# Patient Record
Sex: MALE | Race: WHITE
[De-identification: names, ages, dates, MRNs, and addresses within clinical notes are randomized per-mention and may not be internally consistent; named-entity substitution may affect disease eponyms.]

---

## 2017-01-05 ENCOUNTER — HOSPITAL ENCOUNTER (EMERGENCY)
Dept: HOSPITAL 10 - E/R | Age: 57
Discharge: HOME | End: 2017-01-05
Payer: COMMERCIAL

## 2017-01-05 VITALS
HEIGHT: 63 IN | HEIGHT: 63 IN | WEIGHT: 160.94 LBS | WEIGHT: 160.94 LBS | BODY MASS INDEX: 28.52 KG/M2 | BODY MASS INDEX: 28.52 KG/M2

## 2017-01-05 VITALS
DIASTOLIC BLOOD PRESSURE: 69 MMHG | HEART RATE: 69 BPM | SYSTOLIC BLOOD PRESSURE: 113 MMHG | RESPIRATION RATE: 19 BRPM | TEMPERATURE: 97.7 F

## 2017-01-05 DIAGNOSIS — Z79.4: ICD-10-CM

## 2017-01-05 DIAGNOSIS — R10.84: ICD-10-CM

## 2017-01-05 DIAGNOSIS — E11.9: ICD-10-CM

## 2017-01-05 DIAGNOSIS — F17.210: ICD-10-CM

## 2017-01-05 DIAGNOSIS — R18.8: Primary | ICD-10-CM

## 2017-01-05 RX ADMIN — LIDOCAINE HYDROCHLORIDE ONE ML: 10 INJECTION, SOLUTION EPIDURAL; INFILTRATION; INTRACAUDAL; PERINEURAL at 11:36

## 2017-01-05 RX ADMIN — LIDOCAINE HYDROCHLORIDE ONE ML: 10 INJECTION, SOLUTION EPIDURAL; INFILTRATION; INTRACAUDAL; PERINEURAL at 10:39

## 2017-01-05 NOTE — RADRPT
PROCEDURE:   Ultrasound guided paracentesis. 

 

CLINICAL INDICATION:    Ascites and shortness of breath.  

 

COMPARISON:   12/30/2016.  

 

TECHNIQUE:   

The risks, benefits, and alternatives were explained to the patient, including but not limited to bl
eeding, infection, pain, visceral or vascular damage, shock, and death.  The patient understood the 
risks and the alternatives and wished to proceed with the procedure.  Informed written consent was o
btained. A procedural time out was performed.  The patient's name, date of birth, and procedure to b
e performed were verified.

 

Utilizing ultrasound guidance, optimal location for entry to the peritoneal cavity was ascertained. 
 The overlying skin was prepped and draped in the usual sterile fashion.  Approximately 10 ml of 1% 
Xylocaine was injected locally for pain control.  Using ultrasound guidance, an 8 Scottish catheter wa
s introduced into the peritoneal cavity in the right lower quadrant without difficulty.  

 

FINDINGS:

Initial images demonstrate ascites.  Approximately 4.8 liters of serous fluid was aspirated and disc
arded. The patient tolerated the procedure well without complication.

 

IMPRESSION:

1.  Successful ultrasound-guided paracentesis.  

 

RPTAT: QQ

_____________________________________________ 

.Rene De La Cruz MD, MD           Date    Time 

Electronically viewed and signed by .Rene De La Cruz MD, MD on 01/05/2017 13:45 

 

D:  01/05/2017 13:45  T:  01/05/2017 13:45

.R/

## 2017-01-05 NOTE — ERD
ER Documentation


Chief Complaint


Date/Time


DATE: 1/5/17 


TIME: 13:41


Chief Complaint


here for paracenthesis





HPI


Patient is a 56-year-old male with ascites who presents with abdominal 

distention.  He has abdominal pain and subjective fever but did not take his 

temperature.  It started 2 days ago.  Upon review of old medical records he has 

multiple visits to the ER with similar type complaints.  I have seen him before 

and he appears to be at his baseline.  The patient has had no treatment as of 

yet.





ROS


All systems reviewed and are negative except as per history of present illness.





Medications


Home Meds


Active Scripts


Nitrofurantoin Monohyd Macrocr* (Macrobid*) 100 Mg Capsr, 100 MG PO BID for 7 

Days, CAP


   Prov:FRANCESCA MONTOYA MD         12/26/16


Insulin Aspart* (Novolog Insulin Pen*) 100 Unit/Ml Soln, 10 UNIT SC WITH MEALS 

BEDTIME for 28 Days


   Prov:HANNAH CASTELLANO MD         11/15/16


Reported Medications


Dapagliflozin Propanediol (Farxiga) 10 Mg Tablet, 10 MG PO DAILY, #30 TAB


   11/11/16


Insulin Glargine* (Lantus*) 100 Unit/Ml Soln, 35 UNIT SC QHS, #1 VIAL


   11/11/16


Losartan Potassium* (Cozaar*) 25 Mg Tablet, 25 MG PO DAILY, #30 TAB


   11/11/16


Pantoprazole* (Protonix*) 40 Mg Tablet.dr, 40 MG PO DAILY, TAB


   11/11/16


Furosemide* (Furosemide*) 40 Mg Tablet, 40 MG PO DAILY, TAB


   11/11/16


Fenofibrate Nanocrystallized* (Fenofibrate*) 145 Mg Tablet, 145 MG PO DAILY, TAB


   11/11/16


Zolpidem Tartrate* (Zolpidem Tartrate*) 10 Mg Tablet, 10 MG PO QHS Y for 

INSOMNIA, #30 TAB


   11/11/16


Spironolactone* (Spironolactone*) 100 Mg Tablet, 100 MG PO QAM, TAB


   11/11/16





Allergies


Allergies:  


Coded Allergies:  


     No Known Drug Allergy (Verified  Allergy, Unknown, 1/5/17)





PMhx/Soc


History of Surgery:  Yes (TONSIL SX, COLON SX)


Anesthesia Reaction:  No


Hx Neurological Disorder:  No


Hx Respiratory Disorders:  No


Hx Cardiac Disorders:  Yes (HYPOTENSION)


Hx Psychiatric Problems:  No


Hx Miscellaneous Medical Probl:  Yes (DM, LIVER FAILURE, RENAL DYSFXN.)


Hx Alcohol Use:  Yes (FORMER DRINKER; LAST DRINK 1 YEAR AGO)


Hx Substance Use:  No


Hx Tobacco Use:  Yes (5-6 cig/ day)


Smoking Status:  Current every day smoker





FmHx


Family History:  No diabetes





Physical Exam


Vitals





Vital Signs








  Date Time  Temp Pulse Resp B/P Pulse Ox O2 Delivery O2 Flow Rate FiO2


 


1/5/17 12:09 97.7 69 19 113/69 99 Room Air  


 


1/5/17 11:41 97.9 79 18 118/75 99 Room Air  


 


1/5/17 09:28 98.0 106 24 91/60 99   








Physical Exam


Const: Mild distress


Head:   Atraumatic 


Eyes:    Normal Conjunctiva


ENT:    Normal External Ears, Nose and Mouth.


Neck:               Full range of motion..~ No meningismus.


Resp:    Clear to auscultation bilaterally


Cardio:    Regular rate and rhythm, no murmurs


Abd:    Distended abdomen with positive fluid wave


Skin:    No petechiae or rashes


Back:    No midline or flank tenderness


Ext:    No cyanosis, or edema


Neur:    Awake and alert


Psych:    Normal Mood and Affect


Results 24 hrs





 Current Medications








 Medications


  (Trade)  Dose


 Ordered  Sig/Johnathan


 Route


 PRN Reason  Start Time


 Stop Time Status Last Admin


Dose Admin


 


 Tramadol HCl


  (Ultram)  50 mg  ONCE  ONCE


 PO


   1/5/17 10:00


 1/5/17 10:01 DC 1/5/17 10:05


 


 


 Lidocaine


  (Xylocaine 1%


  (Mpf))  5 ml  STK-MED ONCE


 .ROUTE


   1/5/17 10:39


 1/5/17 10:40 DC  


 











Procedures/MDM


Ultrasound-guided paracentesis performed by radiology.





Patient is a 56-year-old male with ascites who presents for a paracentesis.  He 

had laboratories done 6 days ago and does not require repeat.  I doubt 

spontaneous bacterial peritonitis.  He is afebrile.  He was given tramadol for 

pain and had an ultrasound-guided paracentesis performed.  He feels much better 

and is now smiling and happy.  The patient will be discharged home and can 

follow-up with his primary doctor within 24-48 hours.





Departure


Diagnosis:  


 Primary Impression:  


 Ascites


 Ascites type:  other type  Qualified Code:  R18.8 - Other ascites


 Additional Impression:  


 Abdominal pain


 Abdominal location:  generalized  Qualified Code:  R10.84 - Generalized 

abdominal pain


Condition:  Fair


Patient Instructions:  Ascites





Additional Instructions:  


Call your primary care doctor TOMORROW for an appointment during the next 1-2 

days.See the doctor sooner or return here if your condition worsens before your 

appointment time.











FRANCESCA MONTOYA MD Jan 5, 2017 13:42

## 2017-01-08 ENCOUNTER — HOSPITAL ENCOUNTER (EMERGENCY)
Dept: HOSPITAL 10 - E/R | Age: 57
Discharge: HOME | End: 2017-01-08
Payer: COMMERCIAL

## 2017-01-08 VITALS — DIASTOLIC BLOOD PRESSURE: 69 MMHG | HEART RATE: 98 BPM | SYSTOLIC BLOOD PRESSURE: 89 MMHG | RESPIRATION RATE: 16 BRPM

## 2017-01-08 VITALS — WEIGHT: 155.21 LBS | HEIGHT: 60 IN | BODY MASS INDEX: 30.47 KG/M2

## 2017-01-08 DIAGNOSIS — Z79.4: ICD-10-CM

## 2017-01-08 DIAGNOSIS — F17.210: ICD-10-CM

## 2017-01-08 DIAGNOSIS — E11.9: ICD-10-CM

## 2017-01-08 DIAGNOSIS — K70.31: Primary | ICD-10-CM

## 2017-01-08 LAB
ALBUMIN SERPL-MCNC: 2.8 G/DL (ref 3.3–4.9)
ALBUMIN/GLOB SERPL: 0.65 {RATIO}
ALP SERPL-CCNC: 191 IU/L (ref 42–121)
ALT SERPL-CCNC: 43 IU/L (ref 13–69)
ANION GAP SERPL CALC-SCNC: 15 MMOL/L (ref 8–16)
APTT BLD: 34.6 SEC (ref 25–35)
AST SERPL-CCNC: 53 IU/L (ref 15–46)
BASOPHILS # BLD AUTO: 0 10^3/UL (ref 0–0.1)
BASOPHILS NFR BLD: 0.3 % (ref 0–2)
BILIRUB DIRECT SERPL-MCNC: 0 MG/DL (ref 0–0.2)
BILIRUB SERPL-MCNC: 0.5 MG/DL (ref 0.2–1.3)
BUN SERPL-MCNC: 16 MG/DL (ref 7–20)
CALCIUM SERPL-MCNC: 8.7 MG/DL (ref 8.4–10.2)
CHLORIDE SERPL-SCNC: 97 MMOL/L (ref 97–110)
CO2 SERPL-SCNC: 21 MMOL/L (ref 21–31)
CONDITION: 1
CREAT SERPL-MCNC: 0.93 MG/DL (ref 0.61–1.24)
EOSINOPHIL # BLD: 0.3 10^3/UL (ref 0–0.5)
EOSINOPHIL NFR BLD: 3.1 % (ref 0–7)
ERYTHROCYTE [DISTWIDTH] IN BLOOD BY AUTOMATED COUNT: 16.4 % (ref 11.5–14.5)
GLOBULIN SER-MCNC: 4.3 G/DL (ref 1.3–3.2)
GLUCOSE SERPL-MCNC: 305 MG/DL (ref 70–220)
HCT VFR BLD CALC: 37.8 % (ref 42–52)
HGB BLD-MCNC: 12.9 G/DL (ref 14–18)
INR PPP: 1.15
LH ANALYZER COMMENTS: 1
LYMPHOCYTES # BLD AUTO: 0.6 10^3/UL (ref 0.8–2.9)
LYMPHOCYTES NFR BLD AUTO: 7.7 % (ref 15–51)
MCH RBC QN AUTO: 30 PG (ref 29–33)
MCHC RBC AUTO-ENTMCNC: 34 G/DL (ref 32–37)
MCV RBC AUTO: 88.2 FL (ref 82–101)
MONOCYTES # BLD: 1 10^3/UL (ref 0.3–0.9)
MONOCYTES NFR BLD: 12.2 % (ref 0–11)
NEUTROPHILS # BLD: 6.4 10^3/UL (ref 1.6–7.5)
NEUTROPHILS NFR BLD AUTO: 76.7 % (ref 39–77)
NRBC # BLD MANUAL: 0 10^3/UL (ref 0–0)
NRBC BLD QL: 0 /100WBC (ref 0–0)
PLATELET # BLD: 162 10^3/UL (ref 140–440)
PMV BLD AUTO: 9.7 FL (ref 7.4–10.4)
POTASSIUM SERPL-SCNC: 5.2 MMOL/L (ref 3.5–5.1)
PROT SERPL-MCNC: 7.1 G/DL (ref 6.1–8.1)
PROTHROMBIN TIME: 14.7 SEC (ref 12.2–14.2)
PT RATIO: 1.1
RBC # BLD AUTO: 4.29 10^6/UL (ref 4.7–6.1)
SODIUM SERPL-SCNC: 128 MMOL/L (ref 135–144)
WBC # BLD AUTO: 8.4 10^3/UL (ref 4.8–10.8)
WBC # BLD: 8.4 10^3/UL (ref 4.8–10.8)

## 2017-01-08 PROCEDURE — 83690 ASSAY OF LIPASE: CPT

## 2017-01-08 PROCEDURE — 96374 THER/PROPH/DIAG INJ IV PUSH: CPT

## 2017-01-08 PROCEDURE — 85025 COMPLETE CBC W/AUTO DIFF WBC: CPT

## 2017-01-08 PROCEDURE — 85610 PROTHROMBIN TIME: CPT

## 2017-01-08 PROCEDURE — 76870 US EXAM SCROTUM: CPT

## 2017-01-08 PROCEDURE — 85730 THROMBOPLASTIN TIME PARTIAL: CPT

## 2017-01-08 PROCEDURE — 80053 COMPREHEN METABOLIC PANEL: CPT

## 2017-01-08 PROCEDURE — 96375 TX/PRO/DX INJ NEW DRUG ADDON: CPT

## 2017-01-08 PROCEDURE — 36415 COLL VENOUS BLD VENIPUNCTURE: CPT

## 2017-01-08 NOTE — RADRPT
PROCEDURE:   Ultrasound guided paracentesis

 

CLINICAL INDICATION:   Ascites 

 

TECHNIQUE:   The risks benefits and alternatives of the procedure were explained to the patient.  In
formed written consent was obtained.  A time out was performed.  The patient understood the risks be
nefits and alternatives and wished to proceed with the procedure.  The overlying skin of the right l
ower quadrant of the abdomen was prepped and draped in the usual sterile fashion. Approximately 10 c
c of Xylocaine was injected locally for pain control.  Utilizing ultrasound guidance, a skinny 5-Colin
[facility] Yueh catheter was placed into the  peritoneal cavity without difficulty.  The patient tolerated 
the procedure well without complication.  Approximately 5300 cc of thin jorden fluid was obtained.  T
he fluid was not sent to the lab for further analysis.

 

COMPARISON:  01/05/2017 

 

FINDINGS:

 

Initial ultrasound demonstrated a large amount of simple appearing ascites.  Successful ultrasound-g
uided paracentesis with a total of 5300 cc of thin yellow fluid aspirated.

 

IMPRESSION:

 

Successful ultrasound-guided paracentesis.

 

 

RPTAT: QQ

_____________________________________________ 

.Law Jean MD, MD           Date    Time 

Electronically viewed and signed by .Law Jean MD, MD on 01/08/2017 14:35 

 

D:  01/08/2017 14:35  T:  01/08/2017 14:35

.A/

## 2017-01-08 NOTE — ERD
ER Documentation


Chief Complaint


Date/Time


DATE: 1/8/17 


TIME: 12:09


Chief Complaint


TESTICULAR SWELLING X1 DAY, DENIES PAIN, HX OF LIVER CIRRHOSIS





HPI


56-year-old cirrhotic male who presents with abdominal swelling and testicular 

and penile swelling.  The patient states last paracentesis several days ago.  

He received paracentesis every 3 days.  The patient is concerned today because 

he has significant swelling and edema of his scrotum and penile shaft.  He 

denies any fevers or chills, no drainage or discharge, no focal pain.  She has 

chronic and stable pain of his back but is unchanged.





ROS


All systems reviewed and are negative except as per history of present illness.





Medications


Home Meds


Active Scripts


Nitrofurantoin Monohyd Macrocr* (Macrobid*) 100 Mg Capsr, 100 MG PO BID for 7 

Days, CAP


   Prov:FRANCESCA MONTOYA MD         12/26/16


Insulin Aspart* (Novolog Insulin Pen*) 100 Unit/Ml Soln, 10 UNIT SC WITH MEALS 

BEDTIME for 28 Days


   Prov:HANNAH CASTELLANO MD         11/15/16


Reported Medications


Dapagliflozin Propanediol (Farxiga) 10 Mg Tablet, 10 MG PO DAILY, #30 TAB


   11/11/16


Insulin Glargine* (Lantus*) 100 Unit/Ml Soln, 35 UNIT SC QHS, #1 VIAL


   11/11/16


Losartan Potassium* (Cozaar*) 25 Mg Tablet, 25 MG PO DAILY, #30 TAB


   11/11/16


Pantoprazole* (Protonix*) 40 Mg Tablet.dr, 40 MG PO DAILY, TAB


   11/11/16


Furosemide* (Furosemide*) 40 Mg Tablet, 40 MG PO DAILY, TAB


   11/11/16


Fenofibrate Nanocrystallized* (Fenofibrate*) 145 Mg Tablet, 145 MG PO DAILY, TAB


   11/11/16


Zolpidem Tartrate* (Zolpidem Tartrate*) 10 Mg Tablet, 10 MG PO QHS Y for 

INSOMNIA, #30 TAB


   11/11/16


Spironolactone* (Spironolactone*) 100 Mg Tablet, 100 MG PO QAM, TAB


   11/11/16





Allergies


Allergies:  


Coded Allergies:  


     No Known Drug Allergy (Verified  Allergy, Unknown, 1/8/17)





PMhx/Soc


History of Surgery:  Yes (TONSIL SX, COLON SX)


Anesthesia Reaction:  No


Hx Neurological Disorder:  No


Hx Respiratory Disorders:  No


Hx Cardiac Disorders:  Yes (HYPOTENSION)


Hx Psychiatric Problems:  No


Hx Miscellaneous Medical Probl:  Yes (DM, LIVER FAILURE, RENAL DYSFXN.)


Hx Alcohol Use:  Yes (FORMER DRINKER; LAST DRINK 1 YEAR AGO)


Hx Substance Use:  No


Hx Tobacco Use:  Yes (5-6 cig/ day)


Smoking Status:  Current some day smoker





FmHx


Family History:  No diabetes





Physical Exam


Vitals





Vital Signs








  Date Time  Temp Pulse Resp B/P Pulse Ox O2 Delivery O2 Flow Rate FiO2


 


1/8/17 13:44  98 16 89/69 98 Room Air  


 


1/8/17 10:34  100 18 90/65 99 Room Air  


 


1/8/17 09:31 96.8 107 17 94/63 99   








Physical Exam


General: Well developed, well nourished, no acute distress


Head: Normocephalic, atraumatic.


Eyes: Pupils equally reactive, EOM intact


ENT: Moist mucous membranes


Neck: Supple, no lymphadenopathy


Respiratory: Lungs clear bilaterally, no distress


Cardiovascular: RRR, no murmurs, rubs, or gallops


Abdominal: Soft, protuberant abdomen with fluid wave


: Edema of the penile shaft and scrotum, no focal tenderness difficult to 

palpate the testicles, no drainage or discharge, no evidence of phimosis or 

paraphimosis


MSK: No edema, no unilateral swelling, 5/5 strength


Neurologic: Alert and oriented, moving all extremities, normal speech, no focal 

weakness, no cerebellar signs


Skin: No rash


Psych: Normal mood


Result Diagram:  


1/8/17 1000                                                                    

            1/8/17 1000





Results 24 hrs





 Laboratory Tests








Test


  1/8/17


10:00


 


Activated Partial


Thromboplast Time 34.6Sec 


 


 


Alanine Aminotransferase


(ALT/SGPT) 43IU/L 


 


 


Albumin 2.8g/dl 


 


Albumin/Globulin Ratio 0.65 


 


Alkaline Phosphatase 191IU/L 


 


Anion Gap 15 


 


Aspartate Amino Transf


(AST/SGOT) 53IU/L 


 


 


Basophils # 0.010^3/ul 


 


Basophils % 0.3% 


 


Blood Morphology Comment  


 


Blood Urea Nitrogen 16mg/dl 


 


Calcium Level 8.7mg/dl 


 


Carbon Dioxide Level 21mmol/L 


 


Chloride Level 97mmol/L 


 


Creatinine 0.93mg/dl 


 


Direct Bilirubin 0.00mg/dl 


 


Eosinophils # 0.310^3/ul 


 


Eosinophils % 3.1% 


 


Globulin 4.30g/dl 


 


Glucose Level 305mg/dl 


 


Hematocrit 37.8% 


 


Hemoglobin 12.9g/dl 


 


INR International Normalized


Ratio 1.15 


 


 


Indirect Bilirubin 0.5mg/dl 


 


Lipase 156U/L 


 


Lymphocytes # 0.610^3/ul 


 


Lymphocytes % 7.7% 


 


Mean Corpuscular Hemoglobin 30.0pg 


 


Mean Corpuscular Hemoglobin


Concent 34.0g/dl 


 


 


Mean Corpuscular Volume 88.2fl 


 


Mean Platelet Volume 9.7fl 


 


Monocytes # 1.010^3/ul 


 


Monocytes % 12.2% 


 


Neutrophils # 6.410^3/ul 


 


Neutrophils % 76.7% 


 


Nucleated Red Blood Cells # 0.010^3/ul 


 


Nucleated Red Blood Cells % 0.0/100WBC 


 


Platelet Count 12126^3/UL 


 


Potassium Level 5.2mmol/L 


 


Prothrombin Time 14.7Sec 


 


Prothrombin Time Ratio 1.1 


 


Red Blood Count 4.2910^6/ul 


 


Red Cell Distribution Width 16.4% 


 


Sodium Level 128mmol/L 


 


Total Bilirubin 0.5mg/dl 


 


Total Protein 7.1g/dl 


 


White Blood Count 8.410^3/ul 








 Current Medications








 Medications


  (Trade)  Dose


 Ordered  Sig/Johnathan


 Route


 PRN Reason  Start Time


 Stop Time Status Last Admin


Dose Admin


 


 Morphine Sulfate


  (morphine)  4 mg  ONCE  STAT


 IV


   1/8/17 09:43


 1/8/17 09:44 DC 1/8/17 10:00


 


 


 Ondansetron HCl


  (Zofran Inj)  4 mg  ONCE  STAT


 IV


   1/8/17 09:43


 1/8/17 09:44 DC 1/8/17 09:59


 


 


 Lidocaine


  (Xylocaine 1%


  (Mpf))  5 ml  STK-MED ONCE


 .ROUTE


   1/8/17 13:08


 1/8/17 13:09 DC  


 











Procedures/MDM


EKG, MONITORS, & DIAGNOSTIC IMAGING:


Large volume therapeutic paracentesis performed by interventional radiology.





Ultrasound scrotum:


IMPRESSION:


1.  Benign right epididymal cyst measuring 0.8 cm.


2.  Possible right epididymitis.


3.  Otherwise normal scrotal ultrasound. 


 


RPTAT: QQ





LAB INTERPRETATION:


No significant coagulopathy noted.





MEDICAL DECISION MAKING:


The patient presents with abdominal ascites likely secondary to cirrhosis.  

Patient does not exhibit any signs or symptoms concerning for complications of 

cirrhosis such as GI bleed, hepatic encephalopathy or spontaneous bacterial 

peritonitis.  There is no indication currently for diagnostic paracentesis.  

The patient will benefit from large volume therapeutic paracentesis by 

interventional radiology.  If the patient remains stable without evidence of 

hemodynamic compromise secondary to fluid shifts the patient can be safely 

discharged home with close primary care and hepatology follow-up.





ER COURSE:


The patient had successful large volume therapeutic paracentesis.  The patient 

remained hemodynamically stable and otherwise well-appearing.  The patient is 

safe for discharge home.





The patient's scrotal exam is very consistent with likely dependent edema and 

anasarca secondary to cirrhosis.  The patient has no evidence of phimosis 

paraphimosis, testicular torsion or cellulitis.  The patient has no fever and a 

normal white count.  This is not consistent with necrotizing process.  Symptoms 

likely to improve with large volume paracentesis and fluid management.  Primary 

care referral recommended.





I kept the patient and/or family informed of laboratory and diagnostic imaging 

results throughout the emergency room course.





DISPOSITION PLAN:


We discussed follow up with the patient's primary care doctor within 24 to 48 

hours as needed.  We also discussed return to the emergency room for worsening 

symptoms or worsening condition.





Discharge Medications:


None





Departure


Diagnosis:  


 Primary Impression:  


 Ascites


 Ascites type:  due to alcoholic cirrhosis  Qualified Code:  K70.31 - Ascites 

due to alcoholic cirrhosis


Condition:  Stable











JESSE EM MD Jan 8, 2017 12:09

## 2017-01-08 NOTE — RADRPT
PROCEDURE:   US Scrotum. 

 

CLINICAL INDICATION:   Scrotal pain.  

 

TECHNIQUE:   Multiple sonographic images of the scrotal region were obtained utilizing a linear arra
y transducer with grayscale and color-flow and pulsed Doppler imaging. The images were reviewed on a
 high-resolution PACS workstation. 

 

COMPARISON:   No prior studies are available for comparison. 

 

FINDINGS:

The right testis measures 3.3 x 2.0 x 2.7 cm.

The left testis measures 3.5 x 2.0 x 2.7 cm.

There is no intratesticular mass. 

There is a benign right epididymal cyst measuring 0.8 cm.  The right epididymis is mildly enlarged a
nd hyperemic which may indicate epididymitis.  The epididymi are otherwise normal. 

There is normal flow to both testes demonstrated with color Doppler and pulsed Doppler sonography. 

There is no hydrocele. 

There is no varicocele. 

The scrotal wall is unremarkable.     

 

IMPRESSION:

1.  Benign right epididymal cyst measuring 0.8 cm.

2.  Possible right epididymitis.

3.  Otherwise normal scrotal ultrasound. 

 

RPTAT: QQ

_____________________________________________ 

.Rene De La Cruz MD, MD           Date    Time 

Electronically viewed and signed by .Rene De La Cruz MD, MD on 01/08/2017 11:45 

 

D:  01/08/2017 11:45  T:  01/08/2017 11:45

.R/

## 2017-01-11 ENCOUNTER — HOSPITAL ENCOUNTER (EMERGENCY)
Dept: HOSPITAL 10 - E/R | Age: 57
Discharge: HOME | End: 2017-01-11
Payer: COMMERCIAL

## 2017-01-11 VITALS
HEIGHT: 69 IN | HEIGHT: 69 IN | WEIGHT: 163.14 LBS | WEIGHT: 163.14 LBS | BODY MASS INDEX: 24.16 KG/M2 | BODY MASS INDEX: 24.16 KG/M2

## 2017-01-11 VITALS
RESPIRATION RATE: 18 BRPM | DIASTOLIC BLOOD PRESSURE: 63 MMHG | TEMPERATURE: 98.1 F | HEART RATE: 91 BPM | SYSTOLIC BLOOD PRESSURE: 100 MMHG

## 2017-01-11 DIAGNOSIS — F17.210: ICD-10-CM

## 2017-01-11 DIAGNOSIS — R18.8: Primary | ICD-10-CM

## 2017-01-11 DIAGNOSIS — R10.84: ICD-10-CM

## 2017-01-11 DIAGNOSIS — Z79.4: ICD-10-CM

## 2017-01-11 DIAGNOSIS — E11.9: ICD-10-CM

## 2017-01-11 NOTE — ERD
ER Documentation


Chief Complaint


Date/Time


DATE: 1/11/17 


TIME: 13:39


Chief Complaint


BIB FAMILY FOR ABD PAIN NEEDS PARACENTESIS





HPI


Patient is a 56-year-old male with cirrhosis and ascites who presents with 

abdominal distention.  He had a paracentesis done 3 days ago on January 8 and 

does have multiple visits to the ER for paracentesis.  He usually gets multiple 

paracentesis about every 3 days.  He has abdominal pain and shortness of 

breath.  He denies fever at this time.  He has abdominal pain and shortness of 

breath which is his usual presentation.





ROS


All systems reviewed and are negative except as per history of present illness.





Medications


Home Meds


Active Scripts


Nitrofurantoin Monohyd Macrocr* (Macrobid*) 100 Mg Capsr, 100 MG PO BID for 7 

Days, CAP


   Prov:FRANCESCA MONTOYA MD         12/26/16


Insulin Aspart* (Novolog Insulin Pen*) 100 Unit/Ml Soln, 10 UNIT SC WITH MEALS 

BEDTIME for 28 Days


   Prov:HANNAH CASTELLANO MD         11/15/16


Reported Medications


Dapagliflozin Propanediol (Farxiga) 10 Mg Tablet, 10 MG PO DAILY, #30 TAB


   11/11/16


Insulin Glargine* (Lantus*) 100 Unit/Ml Soln, 35 UNIT SC QHS, #1 VIAL


   11/11/16


Losartan Potassium* (Cozaar*) 25 Mg Tablet, 25 MG PO DAILY, #30 TAB


   11/11/16


Pantoprazole* (Protonix*) 40 Mg Tablet.dr, 40 MG PO DAILY, TAB


   11/11/16


Furosemide* (Furosemide*) 40 Mg Tablet, 40 MG PO DAILY, TAB


   11/11/16


Fenofibrate Nanocrystallized* (Fenofibrate*) 145 Mg Tablet, 145 MG PO DAILY, TAB


   11/11/16


Zolpidem Tartrate* (Zolpidem Tartrate*) 10 Mg Tablet, 10 MG PO QHS Y for 

INSOMNIA, #30 TAB


   11/11/16


Spironolactone* (Spironolactone*) 100 Mg Tablet, 100 MG PO QAM, TAB


   11/11/16





Allergies


Allergies:  


Coded Allergies:  


     No Known Drug Allergy (Verified  Allergy, Unknown, 1/8/17)





PMhx/Soc


History of Surgery:  Yes (TONSIL SX, COLON SX)


Anesthesia Reaction:  No


Hx Neurological Disorder:  No


Hx Respiratory Disorders:  No


Hx Cardiac Disorders:  Yes (HYPOTENSION)


Hx Psychiatric Problems:  No


Hx Miscellaneous Medical Probl:  Yes (DM, LIVER FAILURE, RENAL DYSFXN.)


Hx Alcohol Use:  Yes (FORMER DRINKER; LAST DRINK 1 YEAR AGO)


Hx Substance Use:  No


Hx Tobacco Use:  Yes (5-6 cig/ day)


Smoking Status:  Current every day smoker





FmHx


Family History:  No diabetes





Physical Exam


Vitals





Vital Signs








  Date Time  Temp Pulse Resp B/P Pulse Ox O2 Delivery O2 Flow Rate FiO2


 


1/11/17 13:03 98.1 91 18 100/63    


 


1/11/17 10:27 98.3 100 20 91/52 99   








Physical Exam


Const: No acute distress


Head:   Atraumatic 


Eyes:    Normal Conjunctiva


ENT:    Normal External Ears, Nose and Mouth.


Neck:               Full range of motion..~ No meningismus.


Resp:    Clear to auscultation bilaterally


Cardio:    Regular rate and rhythm, no murmurs


Abd:    Abdominal distention with positive fluid wave


Skin:    No petechiae or rashes


Back:    No midline or flank tenderness


Ext:    No cyanosis, or edema


Neur:    Awake and alert


Psych:    Normal Mood and Affect


Results 24 hrs





 Current Medications








 Medications


  (Trade)  Dose


 Ordered  Sig/Johnathan


 Route


 PRN Reason  Start Time


 Stop Time Status Last Admin


Dose Admin


 


 Lidocaine


  (Xylocaine 1%


  (Mpf))  5 ml  STK-MED ONCE


 .ROUTE


   1/11/17 12:34


 1/11/17 12:35 DC  


 











Procedures/MDM


Ultrasound-guided paracentesis performed by radiology.





Patient is a 56-year-old male who presents with abdominal distention.  He has 

required paracentesis frequently in the past.  He had laboratory studies done 3 

days ago which were basically normal and his coagulation studies were normal.  

The patient will be discharged now that his paracentesis is performed.  I doubt 

spontaneous bacterial peritonitis.  The patient can return for any worsening 

symptoms.  He is planning to have a drain placed to avoid this in the future.  

He tells me that this is supposed to be done within 1 month.





Departure


Diagnosis:  


 Primary Impression:  


 Ascites


 Ascites type:  other type  Qualified Code:  R18.8 - Other ascites


 Additional Impression:  


 Abdominal pain


 Abdominal location:  generalized  Qualified Code:  R10.84 - Generalized 

abdominal pain


Condition:  Fair


Patient Instructions:  Ascites





Additional Instructions:  


Call your primary care doctor TOMORROW for an appointment during the next 1-2 

days.See the doctor sooner or return here if your condition worsens before your 

appointment time.











FRANCESCA MONTOYA MD Jan 11, 2017 13:40

## 2017-01-11 NOTE — RADRPT
PROCEDURE:   Ultrasound guided paracentesis. 

 

CLINICAL INDICATION:    Ascites and shortness of breath.  

 

COMPARISON:   01/08/2017.  

 

TECHNIQUE:   

The risks, benefits, and alternatives were explained to the patient, including but not limited to bl
eeding, infection, pain, visceral or vascular damage, shock, and death.  The patient understood the 
risks and the alternatives and wished to proceed with the procedure.  Informed written consent was o
btained. A procedural time out was performed.  The patient's name, date of birth, and procedure to b
e performed were verified.

 

Utilizing ultrasound guidance, optimal location for entry to the peritoneal cavity was ascertained. 
 The overlying skin was prepped and draped in the usual sterile fashion.  Approximately 10 ml of 1% 
Xylocaine was injected locally for pain control.  Using ultrasound guidance, an 8 Italian catheter wa
s introduced into the peritoneal cavity in the right lower quadrant without difficulty.  

 

FINDINGS:

Initial images demonstrate ascites.  Approximately 7.0 liters of serous fluid was aspirated and disc
arded. The patient tolerated the procedure well without complication.

 

IMPRESSION:

1.  Successful ultrasound-guided paracentesis.  

 

RPTAT: QQ

_____________________________________________ 

.Rene De La Cruz MD, MD           Date    Time 

Electronically viewed and signed by .Rene De La Cruz MD, MD on 01/11/2017 13:38 

 

D:  01/11/2017 13:38  T:  01/11/2017 13:38

.R/

## 2017-01-15 ENCOUNTER — HOSPITAL ENCOUNTER (EMERGENCY)
Dept: HOSPITAL 10 - E/R | Age: 57
Discharge: HOME | End: 2017-01-15
Payer: COMMERCIAL

## 2017-01-15 VITALS
HEART RATE: 97 BPM | TEMPERATURE: 97.1 F | SYSTOLIC BLOOD PRESSURE: 87 MMHG | DIASTOLIC BLOOD PRESSURE: 68 MMHG | RESPIRATION RATE: 20 BRPM

## 2017-01-15 VITALS — HEIGHT: 60 IN | BODY MASS INDEX: 33.44 KG/M2 | WEIGHT: 170.35 LBS

## 2017-01-15 DIAGNOSIS — Z79.84: ICD-10-CM

## 2017-01-15 DIAGNOSIS — K74.60: Primary | ICD-10-CM

## 2017-01-15 DIAGNOSIS — R18.8: ICD-10-CM

## 2017-01-15 DIAGNOSIS — E11.9: ICD-10-CM

## 2017-01-15 DIAGNOSIS — F17.210: ICD-10-CM

## 2017-01-15 DIAGNOSIS — E87.5: ICD-10-CM

## 2017-01-15 DIAGNOSIS — Z79.4: ICD-10-CM

## 2017-01-15 LAB
ALBUMIN SERPL-MCNC: 2.7 G/DL (ref 3.3–4.9)
ALBUMIN/GLOB SERPL: 0.61 {RATIO}
ALP SERPL-CCNC: 215 IU/L (ref 42–121)
ALT SERPL-CCNC: 55 IU/L (ref 13–69)
ANION GAP SERPL CALC-SCNC: 16 MMOL/L (ref 8–16)
APTT BLD: 34.1 SEC (ref 25–35)
AST SERPL-CCNC: 75 IU/L (ref 15–46)
BASOPHILS # BLD AUTO: 0 10^3/UL (ref 0–0.1)
BASOPHILS NFR BLD: 0.2 % (ref 0–2)
BILIRUB DIRECT SERPL-MCNC: 0 MG/DL (ref 0–0.2)
BILIRUB SERPL-MCNC: 0.4 MG/DL (ref 0.2–1.3)
BUN SERPL-MCNC: 19 MG/DL (ref 7–20)
CALCIUM SERPL-MCNC: 8.7 MG/DL (ref 8.4–10.2)
CHLORIDE SERPL-SCNC: 98 MMOL/L (ref 97–110)
CO2 SERPL-SCNC: 20 MMOL/L (ref 21–31)
CONDITION: 1
CREAT SERPL-MCNC: 0.71 MG/DL (ref 0.61–1.24)
EOSINOPHIL # BLD: 0.2 10^3/UL (ref 0–0.5)
EOSINOPHIL NFR BLD: 2.7 % (ref 0–7)
ERYTHROCYTE [DISTWIDTH] IN BLOOD BY AUTOMATED COUNT: 17 % (ref 11.5–14.5)
GLOBULIN SER-MCNC: 4.4 G/DL (ref 1.3–3.2)
GLUCOSE SERPL-MCNC: 222 MG/DL (ref 70–220)
HCT VFR BLD CALC: 37.5 % (ref 42–52)
HGB BLD-MCNC: 12.6 G/DL (ref 14–18)
INR PPP: 1.1
LH ANALYZER COMMENTS: 1
LYMPHOCYTES # BLD AUTO: 0.6 10^3/UL (ref 0.8–2.9)
LYMPHOCYTES NFR BLD AUTO: 9.5 % (ref 15–51)
MCH RBC QN AUTO: 30.1 PG (ref 29–33)
MCHC RBC AUTO-ENTMCNC: 33.7 G/DL (ref 32–37)
MCV RBC AUTO: 89.4 FL (ref 82–101)
MONOCYTES # BLD: 1 10^3/UL (ref 0.3–0.9)
MONOCYTES NFR BLD: 14.2 % (ref 0–11)
NEUTROPHILS # BLD: 5 10^3/UL (ref 1.6–7.5)
NEUTROPHILS NFR BLD AUTO: 73.4 % (ref 39–77)
NRBC # BLD MANUAL: 0 10^3/UL (ref 0–0)
NRBC BLD QL: 0 /100WBC (ref 0–0)
PLATELET # BLD: 147 10^3/UL (ref 140–440)
PMV BLD AUTO: 9.6 FL (ref 7.4–10.4)
POTASSIUM SERPL-SCNC: 5.2 MMOL/L (ref 3.5–5.1)
PROT SERPL-MCNC: 7.1 G/DL (ref 6.1–8.1)
PROTHROMBIN TIME: 14.2 SEC (ref 12.2–14.2)
PT RATIO: 1.1
RBC # BLD AUTO: 4.19 10^6/UL (ref 4.7–6.1)
SODIUM SERPL-SCNC: 129 MMOL/L (ref 135–144)
WBC # BLD AUTO: 6.8 10^3/UL (ref 4.8–10.8)
WBC # BLD: 6.8 10^3/UL (ref 4.8–10.8)

## 2017-01-15 PROCEDURE — 83690 ASSAY OF LIPASE: CPT

## 2017-01-15 PROCEDURE — 85610 PROTHROMBIN TIME: CPT

## 2017-01-15 PROCEDURE — 80053 COMPREHEN METABOLIC PANEL: CPT

## 2017-01-15 PROCEDURE — 85730 THROMBOPLASTIN TIME PARTIAL: CPT

## 2017-01-15 PROCEDURE — 85025 COMPLETE CBC W/AUTO DIFF WBC: CPT

## 2017-01-15 NOTE — ERD
ER Documentation


Chief Complaint


Date/Time


DATE: 1/15/17 


TIME: 15:55


Chief Complaint


ASCITIS





HPI


56-year-old male presents emergency room for increasing abdominal distention. 

His pain is generalized in the abdomen is described as a stretching pain. 

Nothing makes it better or worse but it hasn't given it over the last few days. 

He has chronic liver failure and gets frequent paracenteses at this hospital. 

He denies any fever or chills and states that he feels well except for the 

abdominal distention. Spoke with him at length about his primary care doctor 

the need for a gastroenterology follow-up. He states that his has a heart 

specialist but no specialist for his liver for his abdomen. His doctor tells 

him to just come the emergency room every time he is a paracentesis. He denies 

any weakness lightheadedness, nausea and vomiting. Having normal bowel 

movements.





ROS


All systems reviewed and are negative except as per history of present illness.





Medications


Home Meds


Active Scripts


Furosemide* (Furosemide*) 40 Mg Tablet, 40 MG PO DAILY, #1 TAB


   Prov:WILMA MCLEOD DO         1/15/17


Sodium Polystyrene Sulfonate* (Kayexalate*) 15 Gm/60 Ml Susp, 30 GM PO DAILY 

for 3 Days, ML


   Prov:WILMA MCLEOD DO         1/15/17


Nitrofurantoin Monohyd Macrocr* (Macrobid*) 100 Mg Capsr, 100 MG PO BID for 7 

Days, CAP


   Prov:FRANCESCA MONTOYA MD         12/26/16


Insulin Aspart* (Novolog Insulin Pen*) 100 Unit/Ml Soln, 10 UNIT SC WITH MEALS 

BEDTIME for 28 Days


   Prov:HANNAH CASTELLANO MD         11/15/16


Reported Medications


Dapagliflozin Propanediol (Farxiga) 10 Mg Tablet, 10 MG PO DAILY, #30 TAB


   11/11/16


Insulin Glargine* (Lantus*) 100 Unit/Ml Soln, 35 UNIT SC QHS, #1 VIAL


   11/11/16


Losartan Potassium* (Cozaar*) 25 Mg Tablet, 25 MG PO DAILY, #30 TAB


   11/11/16


Pantoprazole* (Protonix*) 40 Mg Tablet.dr, 40 MG PO DAILY, TAB


   11/11/16


Furosemide* (Furosemide*) 40 Mg Tablet, 40 MG PO DAILY, TAB


   11/11/16


Fenofibrate Nanocrystallized* (Fenofibrate*) 145 Mg Tablet, 145 MG PO DAILY, TAB


   11/11/16


Zolpidem Tartrate* (Zolpidem Tartrate*) 10 Mg Tablet, 10 MG PO QHS Y for 

INSOMNIA, #30 TAB


   11/11/16


Spironolactone* (Spironolactone*) 100 Mg Tablet, 100 MG PO QAM, TAB


   11/11/16





Allergies


Allergies:  


Coded Allergies:  


     No Known Drug Allergy (Verified  Allergy, Unknown, 1/8/17)





PMhx/Soc


History of Surgery:  Yes (TONSIL SX, COLON SX)


Anesthesia Reaction:  No


Hx Neurological Disorder:  No


Hx Respiratory Disorders:  No


Hx Cardiac Disorders:  Yes (HYPOTENSION)


Hx Psychiatric Problems:  No


Hx Miscellaneous Medical Probl:  Yes (DM, LIVER FAILURE, RENAL DYSFXN.)


Hx Alcohol Use:  Yes (FORMER DRINKER: NO DRINK IN OVER A YEAR)


Hx Substance Use:  No


Hx Tobacco Use:  Yes (3 CIGS/DAY)


Smoking Status:  Current every day smoker





Physical Exam


Vitals





Vital Signs








  Date Time  Temp Pulse Resp B/P Pulse Ox O2 Delivery O2 Flow Rate FiO2


 


1/15/17 10:05 97.0 90 20 93/72 99   








Physical Exam


Const:  []             No distress, sitting on edge of bed comfortably


Head:   Atraumatic 


Eyes:    Normal Conjunctiva


ENT:    Normal External Ears, Nose and Mouth.


Neck:               Full range of motion..~ No meningismus.


Resp:    Clear to auscultation bilaterally


Cardio:    Regular rate and rhythm, no murmurs


Abd:    Soft, non tender, moderate firm abdominal distention that is dull to 

percussion,. Normal bowel sounds


Skin:    No petechiae or rashes


Back:    No midline or flank tenderness


Ext:    No cyanosis, or edema


Neur:    Awake and alert and oriented 3, no focal deficits


Psych:    Normal Mood and Affect


Result Diagram:  


1/15/17 1050                                                                   

             1/15/17 1050





Results 24 hrs





 Laboratory Tests








Test


  1/15/17


10:50 1/15/17


13:45


 


Alanine Aminotransferase


(ALT/SGPT) 55IU/L 


  


 


 


Albumin 2.7g/dl  


 


Albumin/Globulin Ratio 0.61  


 


Alkaline Phosphatase 215IU/L  


 


Anion Gap 16  


 


Aspartate Amino Transf


(AST/SGOT) 75IU/L 


  


 


 


Basophils # 0.010^3/ul  


 


Basophils % 0.2%  


 


Blood Morphology Comment   


 


Blood Urea Nitrogen 19mg/dl  


 


Calcium Level 8.7mg/dl  


 


Carbon Dioxide Level 20mmol/L  


 


Chloride Level 98mmol/L  


 


Creatinine 0.71mg/dl  


 


Direct Bilirubin 0.00mg/dl  


 


Eosinophils # 0.210^3/ul  


 


Eosinophils % 2.7%  


 


Globulin 4.40g/dl  


 


Glucose Level 222mg/dl  


 


Hematocrit 37.5%  


 


Hemoglobin 12.6g/dl  


 


Indirect Bilirubin 0.4mg/dl  


 


Lipase 162U/L  


 


Lymphocytes # 0.610^3/ul  


 


Lymphocytes % 9.5%  


 


Mean Corpuscular Hemoglobin 30.1pg  


 


Mean Corpuscular Hemoglobin


Concent 33.7g/dl 


  


 


 


Mean Corpuscular Volume 89.4fl  


 


Mean Platelet Volume 9.6fl  


 


Monocytes # 1.010^3/ul  


 


Monocytes % 14.2%  


 


Neutrophils # 5.010^3/ul  


 


Neutrophils % 73.4%  


 


Nucleated Red Blood Cells # 0.010^3/ul  


 


Nucleated Red Blood Cells % 0.0/100WBC  


 


Platelet Count 95337^3/UL  


 


Potassium Level 5.2mmol/L  


 


Red Blood Count 4.1910^6/ul  


 


Red Cell Distribution Width 17.0%  


 


Sodium Level 129mmol/L  


 


Total Bilirubin 0.4mg/dl  


 


Total Protein 7.1g/dl  


 


White Blood Count 6.810^3/ul  


 


Activated Partial


Thromboplast Time 


  34.1Sec 


 


 


INR International Normalized


Ratio 


  1.10 


 


 


Prothrombin Time  14.2Sec 


 


Prothrombin Time Ratio  1.1 








 Current Medications








 Medications


  (Trade)  Dose


 Ordered  Sig/Johnathan


 Route


 PRN Reason  Start Time


 Stop Time Status Last Admin


Dose Admin


 


 Acetaminophen/


 Hydrocodone Bitart


  (Norco (5/325))  1 tab  ONCE  ONCE


 PO


   1/15/17 13:00


 1/15/17 13:01 DC  


 


 


 Lidocaine


  (Xylocaine 1%


  (Mpf))  5 ml  STK-MED ONCE


 .ROUTE


   1/15/17 15:28


 1/15/17 15:29 DC  


 











Procedures/MDM


Accumulation of ascites and liver cirrhosis patient. Mild hyperkalemia.  Mild 

diabetic hyperglycemia.  Patient's blood pressure to systolic in the 90s 200s 

in the ER is consistent with his prior visits. He has no lightheadedness or 

dizziness. I doubt spontaneous bacterial peritonitis is patient has no signs of 

infection whatsoever in his pain was completely relieved after paracentesis. 

The obtain an ultrasound-guided paracentesis from radiology and states that hE 

is no symptoms currently. He does commonly get hyperkalemia and has a chronic 

hyponatremia. I'm going to discharge him with a tablet of Lasix as well as 3 

days of 30 mg Kayexalate lowers potassium.  Also instructing him to see his 

doctor tomorrow or the next day and have left verbal instructions as well as 

written instructions for his primary care doctor that he needs to set his 

patient up with a gastroenterologist for regular paracenteses as an outpatient 

and better management of his cirrhosis.





Departure


Diagnosis:  


 Primary Impression:  


 Cirrhosis


 Additional Impressions:  


 Ascites


 Hyperkalemia


Condition:  Stable


Patient Instructions:  Hyperkalemia, Ascites





Additional Instructions:  


YOUR PRIMARY CARE DOCTOR NEEDS TO GET YOU AN APPOINTMENT FOR A 

GASTROENTEROLOGIST TO OBTAIN REGULAR PARACENTESIS.


 


Call your primary care doctor TOMORROW for an appointment during the next 1-2 

days.See the doctor sooner or return here if your condition worsens before your 

appointment time.











WILMA MCLEOD DO Luke 15, 2017 16:01

## 2017-01-15 NOTE — RADRPT
PROCEDURE:   Ultrasound guided paracentesis. 

 

CLINICAL INDICATION:    Ascites and shortness of breath.  

 

COMPARISON:   01/11/2017.  

 

TECHNIQUE:   

The risks, benefits, and alternatives were explained to the patient, including but not limited to bl
eeding, infection, pain, visceral or vascular damage, shock, and death.  The patient understood the 
risks and the alternatives and wished to proceed with the procedure.  Informed written consent was o
btained. A procedural time out was performed.  The patient's name, date of birth, and procedure to b
e performed were verified.

 

Utilizing ultrasound guidance, optimal location for entry to the peritoneal cavity was ascertained. 
 The overlying skin was prepped and draped in the usual sterile fashion.  Approximately 10 ml of 1% 
Xylocaine was injected locally for pain control.  Using ultrasound guidance, an 8 [place] catheter wa
s introduced into the peritoneal cavity in the right lower quadrant without difficulty.  

 

FINDINGS:

Initial images demonstrate ascites.  Approximately 7.2 liters of serous fluid was aspirated and disc
arded. The patient tolerated the procedure well without complication.

 

IMPRESSION:

1.  Successful ultrasound-guided paracentesis.  

 

RPTAT: QQ

_____________________________________________ 

.Rene De La Cruz MD, MD           Date    Time 

Electronically viewed and signed by .Rene De La Cruz MD, MD on 01/15/2017 15:35 

 

D:  01/15/2017 15:35  T:  01/15/2017 15:35

.R/

## 2017-01-19 ENCOUNTER — HOSPITAL ENCOUNTER (EMERGENCY)
Dept: HOSPITAL 10 - E/R | Age: 57
Discharge: HOME | End: 2017-01-19
Payer: COMMERCIAL

## 2017-01-19 VITALS
HEART RATE: 95 BPM | RESPIRATION RATE: 20 BRPM | SYSTOLIC BLOOD PRESSURE: 98 MMHG | TEMPERATURE: 98.2 F | DIASTOLIC BLOOD PRESSURE: 71 MMHG

## 2017-01-19 VITALS — WEIGHT: 169.76 LBS | HEIGHT: 60 IN | BODY MASS INDEX: 33.33 KG/M2

## 2017-01-19 DIAGNOSIS — Z79.84: ICD-10-CM

## 2017-01-19 DIAGNOSIS — K74.60: Primary | ICD-10-CM

## 2017-01-19 DIAGNOSIS — D69.6: ICD-10-CM

## 2017-01-19 DIAGNOSIS — F17.210: ICD-10-CM

## 2017-01-19 DIAGNOSIS — E87.1: ICD-10-CM

## 2017-01-19 DIAGNOSIS — Z79.4: ICD-10-CM

## 2017-01-19 DIAGNOSIS — R18.8: ICD-10-CM

## 2017-01-19 DIAGNOSIS — E11.65: ICD-10-CM

## 2017-01-19 LAB
ALBUMIN SERPL-MCNC: 2.6 G/DL (ref 3.3–4.9)
ALBUMIN/GLOB SERPL: 0.63 {RATIO}
ALP SERPL-CCNC: 218 IU/L (ref 42–121)
ALT SERPL-CCNC: 60 IU/L (ref 13–69)
ANION GAP SERPL CALC-SCNC: 13 MMOL/L (ref 8–16)
APPEARANCE FLD: (no result)
APTT BLD: 34.4 SEC (ref 25–35)
AST SERPL-CCNC: 66 IU/L (ref 15–46)
BASOPHILS # BLD AUTO: 0 10^3/UL (ref 0–0.1)
BASOPHILS NFR BLD: 0.4 % (ref 0–2)
BILIRUB DIRECT SERPL-MCNC: 0 MG/DL (ref 0–0.2)
BILIRUB SERPL-MCNC: 0.3 MG/DL (ref 0.2–1.3)
BODY FLD TYPE: (no result)
BUN SERPL-MCNC: 18 MG/DL (ref 7–20)
CALCIUM SERPL-MCNC: 8.5 MG/DL (ref 8.4–10.2)
CHLORIDE SERPL-SCNC: 98 MMOL/L (ref 97–110)
CO2 SERPL-SCNC: 22 MMOL/L (ref 21–31)
CONDITION: 1
CREAT SERPL-MCNC: 0.65 MG/DL (ref 0.61–1.24)
EOSINOPHIL # BLD: 0.2 10^3/UL (ref 0–0.5)
EOSINOPHIL NFR BLD: 3.3 % (ref 0–7)
ERYTHROCYTE [DISTWIDTH] IN BLOOD BY AUTOMATED COUNT: 16.5 % (ref 11.5–14.5)
FLUID BASOPHIL: 0 %
FLUID EOSINOPHIL: 0 %
FLUID LYMPHOCYTES: 50 %
FLUID MONOCYTES: 40 %
FLUID NEUTROPHILS: 10 %
FLUID RBC EST: (no result)
FLUID VOLUME: 21 ML
FLUID WBC'S: 105 /CMM
GLOBULIN SER-MCNC: 4.1 G/DL (ref 1.3–3.2)
GLUCOSE SERPL-MCNC: 232 MG/DL (ref 70–220)
HCT VFR BLD CALC: 34.7 % (ref 42–52)
HGB BLD-MCNC: 11.7 G/DL (ref 14–18)
INR PPP: 1.18
LH ANALYZER COMMENTS: 1
LYMPHOCYTES # BLD AUTO: 0.7 10^3/UL (ref 0.8–2.9)
LYMPHOCYTES NFR BLD AUTO: 10.6 % (ref 15–51)
MCH RBC QN AUTO: 29.9 PG (ref 29–33)
MCHC RBC AUTO-ENTMCNC: 33.8 G/DL (ref 32–37)
MCV RBC AUTO: 88.6 FL (ref 82–101)
MONOCYTES # BLD: 0.8 10^3/UL (ref 0.3–0.9)
MONOCYTES NFR BLD: 12.7 % (ref 0–11)
NEUTROPHILS # BLD: 4.6 10^3/UL (ref 1.6–7.5)
NEUTROPHILS NFR BLD AUTO: 73 % (ref 39–77)
NRBC # BLD MANUAL: 0 10^3/UL (ref 0–0)
NRBC BLD QL: 0 /100WBC (ref 0–0)
PLATELET # BLD: 133 10^3/UL (ref 140–440)
PMV BLD AUTO: 10 FL (ref 7.4–10.4)
POTASSIUM SERPL-SCNC: 3.9 MMOL/L (ref 3.5–5.1)
PROT SERPL-MCNC: 6.7 G/DL (ref 6.1–8.1)
PROTHROMBIN TIME: 15.1 SEC (ref 12.2–14.2)
PT RATIO: 1.2
RBC # BLD AUTO: 3.92 10^6/UL (ref 4.7–6.1)
SODIUM SERPL-SCNC: 129 MMOL/L (ref 135–144)
WBC # BLD AUTO: 6.3 10^3/UL (ref 4.8–10.8)
WBC # BLD: 6.3 10^3/UL (ref 4.8–10.8)

## 2017-01-19 PROCEDURE — 96374 THER/PROPH/DIAG INJ IV PUSH: CPT

## 2017-01-19 PROCEDURE — 82150 ASSAY OF AMYLASE: CPT

## 2017-01-19 PROCEDURE — 84157 ASSAY OF PROTEIN OTHER: CPT

## 2017-01-19 PROCEDURE — 85730 THROMBOPLASTIN TIME PARTIAL: CPT

## 2017-01-19 PROCEDURE — 89050 BODY FLUID CELL COUNT: CPT

## 2017-01-19 PROCEDURE — 85610 PROTHROMBIN TIME: CPT

## 2017-01-19 PROCEDURE — 85025 COMPLETE CBC W/AUTO DIFF WBC: CPT

## 2017-01-19 PROCEDURE — 82042 OTHER SOURCE ALBUMIN QUAN EA: CPT

## 2017-01-19 PROCEDURE — 36415 COLL VENOUS BLD VENIPUNCTURE: CPT

## 2017-01-19 PROCEDURE — 80053 COMPREHEN METABOLIC PANEL: CPT

## 2017-01-19 NOTE — ERD
ER Documentation


Chief Complaint


Date/Time


DATE: 1/19/17 


TIME: 14:50


Chief Complaint


ap with abd distention needs paracenthesis.





HPI


This 56-year-old male presents with increasing abdominal pain secondary to 

abdominal ascites. He's had no fever, chills, diarrhea, nausea or vomiting.  Is 

a known cirrhotic patient receives frequent paracenteses here. Was on his [place] 

his last visit and said that in his best interest he needed to establish 

himself gastroenterologist.  Said the known at told him that yet he did so in 

this interval. Second she sent in by his specialist for a paracentesis to get 

basic studies for analysis. He has an appointment at [facility] on Monday for 

possible TIPS procedure. His abdominal pain is described as a stretching 

abdominal pain that is gotten worse over the last 2 days.





ROS


All systems reviewed and are negative except as per history of present illness.





Medications


Home Meds


Active Scripts


Furosemide* (Furosemide*) 40 Mg Tablet, 40 MG PO DAILY, #1 TAB


   Prov:WILMA MCLEOD DO         1/15/17


Sodium Polystyrene Sulfonate* (Kayexalate*) 15 Gm/60 Ml Susp, 30 GM PO DAILY 

for 3 Days, ML


   Prov:WILMA MCLEOD DO         1/15/17


Nitrofurantoin Monohyd Macrocr* (Macrobid*) 100 Mg Capsr, 100 MG PO BID for 7 

Days, CAP


   Prov:FRANCESCA MONTOYA MD         12/26/16


Insulin Aspart* (Novolog Insulin Pen*) 100 Unit/Ml Soln, 10 UNIT SC WITH MEALS 

BEDTIME for 28 Days


   Prov:HANNAH CASTELLANO MD         11/15/16


Reported Medications


Dapagliflozin Propanediol (Farxiga) 10 Mg Tablet, 10 MG PO DAILY, #30 TAB


   11/11/16


Insulin Glargine* (Lantus*) 100 Unit/Ml Soln, 35 UNIT SC QHS, #1 VIAL


   11/11/16


Losartan Potassium* (Cozaar*) 25 Mg Tablet, 25 MG PO DAILY, #30 TAB


   11/11/16


Pantoprazole* (Protonix*) 40 Mg Tablet.dr, 40 MG PO DAILY, TAB


   11/11/16


Furosemide* (Furosemide*) 40 Mg Tablet, 40 MG PO DAILY, TAB


   11/11/16


Fenofibrate Nanocrystallized* (Fenofibrate*) 145 Mg Tablet, 145 MG PO DAILY, TAB


   11/11/16


Zolpidem Tartrate* (Zolpidem Tartrate*) 10 Mg Tablet, 10 MG PO QHS Y for 

INSOMNIA, #30 TAB


   11/11/16


Spironolactone* (Spironolactone*) 100 Mg Tablet, 100 MG PO QAM, TAB


   11/11/16





Allergies


Allergies:  


Coded Allergies:  


     No Known Drug Allergy (Verified  Allergy, Unknown, 1/8/17)





PMhx/Soc


History of Surgery:  Yes (TONSIL SX, COLON SX)


Anesthesia Reaction:  No


Hx Neurological Disorder:  No


Hx Respiratory Disorders:  No


Hx Cardiac Disorders:  Yes (HYPOTENSION)


Hx Psychiatric Problems:  No


Hx Miscellaneous Medical Probl:  Yes (DM, LIVER FAILURE, RENAL DYSFXN.)


Hx Alcohol Use:  Yes (FORMER DRINKER: NO DRINK IN OVER A YEAR)


Hx Substance Use:  No


Hx Tobacco Use:  Yes (3 CIGS/DAY)


Smoking Status:  Current every day smoker





Physical Exam


Vitals





Vital Signs








  Date Time  Temp Pulse Resp B/P Pulse Ox O2 Delivery O2 Flow Rate FiO2


 


1/19/17 11:20 98.1 100 20 100/70 100 Room Air  


 


1/19/17 08:34 97.2 107 20 98/59 98   








Physical Exam


Const:  []          No acute distress


Head:   Atraumatic 


Eyes:    Normal Conjunctiva


ENT:    Normal External Ears, Nose and Mouth.


Neck:               Full range of motion..~ No meningismus.


Resp:    Clear to auscultation bilaterally


Cardio:    Regular rate and rhythm, no murmurs


Abd:    Soft, no specific tenderness,, moderately distended and firm,. Normal 

bowel sounds


Skin:    No petechiae or rashes


Back:    No midline or flank tenderness


Ext:    No cyanosis, or edema


Neur:    Awake and alert and oriented 3, no focal deficits


Psych:    Normal Mood and Affect


Result Diagram:  


1/19/17 1110                                                                   

             1/19/17 1110





Results 24 hrs





 Laboratory Tests








Test


  1/19/17


11:10 1/19/17


12:27


 


Activated Partial


Thromboplast Time 34.4Sec 


  


 


 


Alanine Aminotransferase


(ALT/SGPT) 60IU/L 


  


 


 


Albumin 2.6g/dl  


 


Albumin/Globulin Ratio 0.63  


 


Alkaline Phosphatase 218IU/L  


 


Amylase Level 44U/L  


 


Anion Gap 13  


 


Aspartate Amino Transf


(AST/SGOT) 66IU/L 


  


 


 


Basophils # 0.010^3/ul  


 


Basophils % 0.4%  


 


Blood Morphology Comment   


 


Blood Urea Nitrogen 18mg/dl  


 


Calcium Level 8.5mg/dl  


 


Carbon Dioxide Level 22mmol/L  


 


Chloride Level 98mmol/L  


 


Creatinine 0.65mg/dl  


 


Direct Bilirubin 0.00mg/dl  


 


Eosinophils # 0.210^3/ul  


 


Eosinophils % 3.3%  


 


Globulin 4.10g/dl  


 


Glucose Level 232mg/dl  


 


Hematocrit 34.7%  


 


Hemoglobin 11.7g/dl  


 


INR International Normalized


Ratio 1.18 


  


 


 


Indirect Bilirubin 0.3mg/dl  


 


Lymphocytes # 0.710^3/ul  


 


Lymphocytes % 10.6%  


 


Mean Corpuscular Hemoglobin 29.9pg  


 


Mean Corpuscular Hemoglobin


Concent 33.8g/dl 


  


 


 


Mean Corpuscular Volume 88.6fl  


 


Mean Platelet Volume 10.0fl  


 


Monocytes # 0.810^3/ul  


 


Monocytes % 12.7%  


 


Neutrophils # 4.610^3/ul  


 


Neutrophils % 73.0%  


 


Nucleated Red Blood Cells # 0.010^3/ul  


 


Nucleated Red Blood Cells % 0.0/100WBC  


 


Platelet Count 03787^3/UL  


 


Potassium Level 3.9mmol/L  


 


Prothrombin Time 15.1Sec  


 


Prothrombin Time Ratio 1.2  


 


Red Blood Count 3.9210^6/ul  


 


Red Cell Distribution Width 16.5%  


 


Sodium Level 129mmol/L  


 


Total Bilirubin 0.3mg/dl  


 


Total Protein 6.7g/dl  


 


White Blood Count 6.310^3/ul  


 


Body Fluid Appearance  SLIGHTLY HAZY 


 


Body Fluid Basophils %  0% 


 


Body Fluid Color  YELLOW 


 


Body Fluid Eosinophils %  0% 


 


Body Fluid Lymphocytes (%)  50% 


 


Body Fluid Monocytes %  40% 


 


Body Fluid Neutrophils %  10% 


 


Body Fluid Other Cells (%)  0% 


 


Body Fluid RBC  1+ 


 


Body Fluid Total Protein  < 2.0g/dl 


 


Body Fluid Type  ASCITES 


 


Body Fluid Volume  21.0ml 


 


Body Fluid WBC  105/cmm 








 Current Medications








 Medications


  (Trade)  Dose


 Ordered  Sig/Johnathan


 Route


 PRN Reason  Start Time


 Stop Time Status Last Admin


Dose Admin


 


 Morphine Sulfate


  (morphine)  2 mg  ONCE  ONCE


 IV


   1/19/17 11:00


 1/19/17 11:01 DC 1/19/17 10:57


 


 


 Lidocaine


  (Xylocaine 1%


  (Mpf))  5 ml  STK-MED ONCE


 .ROUTE


   1/19/17 13:07


 1/19/17 13:08 DC  


 











Procedures/MDM


Patient received an ultrasound-guided paracentesis. Fluid studies were 

corrected as per the note from his physician Dr. Sidney Ching, or telephone 

and spoke with on the phone. He explained to me the workup and surgical artery 

disease that her being considered for the patient and I'll say he will have to 

return to this emergency room for routine paracenteses.  Patient was initially 

given 2 mg of morphine while his labs were returned so that radiology would 

perform the paracentesis. That helped his pain a little bit the pain was 

completely resolved after he received a paracentesis which several liters of 

fluid were obtained. Initial fluid studies show no signs of infection. I 

virtually no suspicion for spontaneous bacterial peritonitis as the patient has 

stable vital signs and feels very well. She is asymptomatic in the initial 

fluid studies show no serious abnormality him going to discharge him. 

Laboratories arty obtained a been faxed to  per his request.  His 

hyponatremia, anemia, and thrombocytopenia are chronic. He has no symptoms of 

dizziness or any neurological symptoms. Abdomen from the patient and he can 

return to this ER whenever he needs to.





Departure


Diagnosis:  


 Primary Impression:  


 Cirrhosis


 Additional Impressions:  


 Ascites


 Hyperglycemia due to type 2 diabetes mellitus


 Hyponatremia


 Thrombocytopenia


Condition:  Stable


Patient Instructions:  Ascites





Additional Instructions:  


Call your primary care doctor TOMORROW for a SAME-DAY APPOINTMENT.Tell the 

 that you were referred from this facility.Call again if your 

condition worsens before your appointment time.











WILMA MCLEOD DO Jan 19, 2017 14:56

## 2017-01-19 NOTE — RADRPT
PROCEDURE:   Ultrasound guided paracentesis. 

 

CLINICAL INDICATION:    Ascites and shortness of breath.  

 

COMPARISON:   01/15/2017.  

 

TECHNIQUE:   

The risks, benefits, and alternatives were explained to the patient, including but not limited to bl
eeding, infection, pain, visceral or vascular damage, shock, and death.  The patient understood the 
risks and the alternatives and wished to proceed with the procedure.  Informed written consent was o
btained. A procedural time out was performed.  The patient's name, date of birth, and procedure to b
e performed were verified.

 

Utilizing ultrasound guidance, optimal location for entry to the peritoneal cavity was ascertained. 
 The overlying skin was prepped and draped in the usual sterile fashion.  Approximately 10 ml of 1% 
Xylocaine was injected locally for pain control.  Using ultrasound guidance, an 8 [place] catheter wa
s introduced into the peritoneal cavity in the right lower quadrant without difficulty.  

 

FINDINGS:

Initial images demonstrate ascites.  Approximately 9.6 liters of serous fluid was aspirated and sent
 for laboratory analysis.. The patient tolerated the procedure well without complication.

 

IMPRESSION:

1.  Successful ultrasound-guided paracentesis.  

 

RPTAT: QQ

_____________________________________________ 

.Rene De La Cruz MD, MD           Date    Time 

Electronically viewed and signed by .Rene De La Cruz MD, MD on 01/19/2017 13:12 

 

D:  01/19/2017 13:12  T:  01/19/2017 13:12

.R/

## 2017-01-22 ENCOUNTER — HOSPITAL ENCOUNTER (EMERGENCY)
Dept: HOSPITAL 10 - E/R | Age: 57
Discharge: HOME | End: 2017-01-22
Payer: COMMERCIAL

## 2017-01-22 VITALS — SYSTOLIC BLOOD PRESSURE: 102 MMHG | DIASTOLIC BLOOD PRESSURE: 68 MMHG | HEART RATE: 78 BPM | RESPIRATION RATE: 18 BRPM

## 2017-01-22 VITALS — HEIGHT: 60 IN | WEIGHT: 165.35 LBS | BODY MASS INDEX: 32.46 KG/M2

## 2017-01-22 VITALS — TEMPERATURE: 98.9 F

## 2017-01-22 DIAGNOSIS — I10: ICD-10-CM

## 2017-01-22 DIAGNOSIS — K70.31: Primary | ICD-10-CM

## 2017-01-22 DIAGNOSIS — Z79.4: ICD-10-CM

## 2017-01-22 DIAGNOSIS — E11.9: ICD-10-CM

## 2017-01-22 DIAGNOSIS — F17.210: ICD-10-CM

## 2017-01-22 LAB
ALBUMIN SERPL-MCNC: 2.7 G/DL (ref 3.3–4.9)
ALBUMIN/GLOB SERPL: 0.61 {RATIO}
ALP SERPL-CCNC: 221 IU/L (ref 42–121)
ALT SERPL-CCNC: 58 IU/L (ref 13–69)
ANION GAP SERPL CALC-SCNC: 14 MMOL/L (ref 8–16)
APPEARANCE FLD: (no result)
AST SERPL-CCNC: 68 IU/L (ref 15–46)
BASOPHILS # BLD AUTO: 0 10^3/UL (ref 0–0.1)
BASOPHILS NFR BLD: 0.3 % (ref 0–2)
BILIRUB DIRECT SERPL-MCNC: 0 MG/DL (ref 0–0.2)
BILIRUB SERPL-MCNC: 0.6 MG/DL (ref 0.2–1.3)
BODY FLD TYPE: (no result)
BODY FLD TYPE: (no result)
BUN SERPL-MCNC: 19 MG/DL (ref 7–20)
CALCIUM SERPL-MCNC: 8.7 MG/DL (ref 8.4–10.2)
CHLORIDE SERPL-SCNC: 99 MMOL/L (ref 97–110)
CO2 SERPL-SCNC: 22 MMOL/L (ref 21–31)
CONDITION: 1
CREAT SERPL-MCNC: 0.81 MG/DL (ref 0.61–1.24)
EOSINOPHIL # BLD: 0.2 10^3/UL (ref 0–0.5)
EOSINOPHIL NFR BLD: 3.5 % (ref 0–7)
ERYTHROCYTE [DISTWIDTH] IN BLOOD BY AUTOMATED COUNT: 16.9 % (ref 11.5–14.5)
FLUID AMYLASE: < 30 U/L
FLUID BASOPHIL: 0 %
FLUID EOSINOPHIL: 0 %
FLUID LYMPHOCYTES: 50 %
FLUID MONOCYTES: 22 %
FLUID NEUTROPHILS: 23 %
FLUID RBC EST: (no result)
FLUID TOTAL PROTEIN: < 2 G/DL
FLUID VOLUME: 1100 ML
FLUID WBC'S: 173 /CMM
GLOBULIN SER-MCNC: 4.4 G/DL (ref 1.3–3.2)
GLUCOSE SERPL-MCNC: 113 MG/DL (ref 70–220)
HCT VFR BLD CALC: 37.2 % (ref 42–52)
HGB BLD-MCNC: 12.6 G/DL (ref 14–18)
INR PPP: 1.07
LH ANALYZER COMMENTS: 1
LYMPHOCYTES # BLD AUTO: 0.8 10^3/UL (ref 0.8–2.9)
LYMPHOCYTES NFR BLD AUTO: 14.1 % (ref 15–51)
MCH RBC QN AUTO: 30 PG (ref 29–33)
MCHC RBC AUTO-ENTMCNC: 33.8 G/DL (ref 32–37)
MCV RBC AUTO: 88.7 FL (ref 82–101)
MONOCYTES # BLD: 1 10^3/UL (ref 0.3–0.9)
MONOCYTES NFR BLD: 17.2 % (ref 0–11)
NEUTROPHILS # BLD: 3.8 10^3/UL (ref 1.6–7.5)
NEUTROPHILS NFR BLD AUTO: 64.9 % (ref 39–77)
NRBC # BLD MANUAL: 0 10^3/UL (ref 0–0)
NRBC BLD QL: 0 /100WBC (ref 0–0)
PLATELET # BLD: 151 10^3/UL (ref 140–440)
PMV BLD AUTO: 9.9 FL (ref 7.4–10.4)
POTASSIUM SERPL-SCNC: 4.9 MMOL/L (ref 3.5–5.1)
PROT SERPL-MCNC: 7.1 G/DL (ref 6.1–8.1)
PROTHROMBIN TIME: 13.9 SEC (ref 12.2–14.2)
PT RATIO: 1.1
RBC # BLD AUTO: 4.19 10^6/UL (ref 4.7–6.1)
SODIUM SERPL-SCNC: 130 MMOL/L (ref 135–144)
TROPONIN I SERPL-MCNC: < 0.012 NG/ML (ref 0–0.12)
WBC # BLD AUTO: 5.8 10^3/UL (ref 4.8–10.8)
WBC # BLD: 5.8 10^3/UL (ref 4.8–10.8)

## 2017-01-22 PROCEDURE — 93005 ELECTROCARDIOGRAM TRACING: CPT

## 2017-01-22 PROCEDURE — 83690 ASSAY OF LIPASE: CPT

## 2017-01-22 PROCEDURE — 80053 COMPREHEN METABOLIC PANEL: CPT

## 2017-01-22 PROCEDURE — 85610 PROTHROMBIN TIME: CPT

## 2017-01-22 PROCEDURE — 83615 LACTATE (LD) (LDH) ENZYME: CPT

## 2017-01-22 PROCEDURE — 87070 CULTURE OTHR SPECIMN AEROBIC: CPT

## 2017-01-22 PROCEDURE — 87102 FUNGUS ISOLATION CULTURE: CPT

## 2017-01-22 PROCEDURE — 87116 MYCOBACTERIA CULTURE: CPT

## 2017-01-22 PROCEDURE — 89050 BODY FLUID CELL COUNT: CPT

## 2017-01-22 PROCEDURE — 84157 ASSAY OF PROTEIN OTHER: CPT

## 2017-01-22 PROCEDURE — 84484 ASSAY OF TROPONIN QUANT: CPT

## 2017-01-22 PROCEDURE — 71010: CPT

## 2017-01-22 PROCEDURE — 82150 ASSAY OF AMYLASE: CPT

## 2017-01-22 PROCEDURE — 85025 COMPLETE CBC W/AUTO DIFF WBC: CPT

## 2017-01-22 PROCEDURE — 36415 COLL VENOUS BLD VENIPUNCTURE: CPT

## 2017-01-22 PROCEDURE — 83986 ASSAY PH BODY FLUID NOS: CPT

## 2017-01-22 NOTE — RADRPT
PROCEDURE:   XR Chest. 

 

CLINICAL INDICATION:  Abdominal pain.

 

TECHNIQUE:   Single frontal chest x-ray. 

 

COMPARISON:  Chest radiograph 11/11/2016.

 

FINDINGS:

The cardiomediastinal silhouette is unremarkable. Aortic atherosclerotic vascular calcifications are
 identified.

There are bilateral low lung volumes with vascular crowding and mild bibasilar atelectasis. No pneum
othorax, pleural effusion or consolidation is seen.

 No acute osseous abnormality is noted.

 

 

IMPRESSION:

1.  Low lung volumes with vascular crowding and mild bibasilar atelectasis. 

2.  Aortic atherosclerosis.

 

 

RPTAT: AA

_____________________________________________ 

.Vj Jones MD, MD           Date    Time 

Electronically viewed and signed by .Vj Jones MD, MD on 01/22/2017 09:48 

 

D:  01/22/2017 09:48  T:  01/22/2017 09:48

.N/

## 2017-01-22 NOTE — ERD
ER Documentation


Chief Complaint


Date/Time


DATE: 1/22/17 


TIME: 12:58


Chief Complaint


PARACENTISIS, LAST ONE DONE 01/18





HPI


56-year-old man here with continued abdominal distention requesting 

paracentesis.  He denies fevers or chills, no blood per rectum or melena, no 

chest pain or shortness of breath.  Has a long history of alcohol induced 

cirrhosis and recurrent ascites.  Just a few days ago about 10 L of ascitic 

fluid was removed





ROS


All systems reviewed and are negative except as per history of present illness.





Medications


Home Meds


Active Scripts


Sodium Polystyrene Sulfonate* (Kayexalate*) 15 Gm/60 Ml Susp, 30 GM PO DAILY 

for 3 Days, ML


   Prov:WILMA MCLEOD DO         1/15/17


Insulin Aspart* (Novolog Insulin Pen*) 100 Unit/Ml Soln, 10 UNIT SC WITH MEALS 

BEDTIME for 28 Days


   Prov:HANNAH CASTELLANO MD         11/15/16


Reported Medications


Dapagliflozin Propanediol (Farxiga) 10 Mg Tablet, 10 MG PO DAILY, #30 TAB


   11/11/16


Insulin Glargine* (Lantus*) 100 Unit/Ml Soln, 35 UNIT SC QHS, #1 VIAL


   11/11/16


Losartan Potassium* (Cozaar*) 25 Mg Tablet, 25 MG PO DAILY, #30 TAB


   11/11/16


Pantoprazole* (Protonix*) 40 Mg Tablet.dr, 40 MG PO DAILY, TAB


   11/11/16


Furosemide* (Furosemide*) 40 Mg Tablet, 40 MG PO DAILY, TAB


   11/11/16


Fenofibrate Nanocrystallized* (Fenofibrate*) 145 Mg Tablet, 145 MG PO DAILY, TAB


   11/11/16


Zolpidem Tartrate* (Zolpidem Tartrate*) 10 Mg Tablet, 10 MG PO QHS Y for 

INSOMNIA, #30 TAB


   11/11/16


Spironolactone* (Spironolactone*) 100 Mg Tablet, 100 MG PO QAM, TAB


   11/11/16


Discontinued Scripts


Furosemide* (Furosemide*) 40 Mg Tablet, 40 MG PO DAILY, #1 TAB


   Prov:WILMA MCLEOD DO         1/15/17


Nitrofurantoin Monohyd Macrocr* (Macrobid*) 100 Mg Capsr, 100 MG PO BID for 7 

Days, CAP


   Prov:FRANCESCA MONTOYA MD         12/26/16





Allergies


Allergies:  


Coded Allergies:  


     No Known Drug Allergy (Verified  Allergy, Unknown, 1/22/17)





PMhx/Soc


Chronic cirrhosis with recurrent ascites secondary to remote alcoholism, 

gastritis, hypertension, diabetes mellitus, hypertension


History of Surgery:  Yes (TONSIL SX, COLON SX)


Anesthesia Reaction:  No


Hx Neurological Disorder:  No


Hx Respiratory Disorders:  No


Hx Cardiac Disorders:  Yes (HYPOTENSION)


Hx Psychiatric Problems:  No


Hx Miscellaneous Medical Probl:  Yes (DM, LIVER FAILURE, RENAL DSE.)


Hx Alcohol Use:  Yes (FORMER DRINKER: NO DRINK IN OVER A YEAR)


Hx Substance Use:  No


Hx Tobacco Use:  Yes (3 CIGS/DAY)


Smoking Status:  Current some day smoker





Physical Exam


Vitals





Vital Signs








  Date Time  Temp Pulse Resp B/P Pulse Ox O2 Delivery O2 Flow Rate FiO2


 


1/22/17 13:22  78 18 102/68 98 Room Air  


 


1/22/17 13:10 98.9 88 18 109/67 98 Room Air  


 


1/22/17 09:09 98.0 103 18 92/54 99   








Physical Exam


GENERAL: Well-developed, well-nourished, well-hydrated, in no apparent distress

, looks nontoxic in appearance


HEENT: Moist mucous membranes, pink conjunctiva, no cervical spine tenderness 

or step-off deformities, no goiter, no jaundice or icterus, extraocular 

movements intact without pain. No submandibular induration, and no pharyngeal 

erythema


NEURO: Alert and oriented 3, cranial nerves II through XII intact bilaterally, 

pupils equal round reactive to light, no focal deficits or facial asymmetry, 

sensation intact distally Strength 5/5 in upper and lower extremities 

bilaterally


CARDIAC: Regular rate and rhythm, no murmurs rubs or gallops


LUNGS: Clear bilaterally no wheezing crackles or stridor


ABDOMEN: Protuberant soft abdomen with evidence of ascites, no rigidity, no 

rebound, no psoas sign no obturator sign. Normoactive bowel sounds


SKIN: Warm and dry to touch, no abrasions, contusions, or hematomas, no 

lacerations, no ecchymosis, no target lesions, and without ulcers


EXTREMITIES: No clubbing cyanosis or edema, calves are bilaterally symmetrical, 

no Homans sign, no popliteal cord sign. Distal pulses equal and bilateral


PSYCH: Normal affect without agitation or irritability


Result Diagram:  


1/22/17 0937                                                                   

             1/22/17 0937





Results 24 hrs





 Laboratory Tests








Test


  1/22/17


09:37


 


Alanine Aminotransferase


(ALT/SGPT) 58IU/L 


 


 


Albumin 2.7g/dl 


 


Albumin/Globulin Ratio 0.61 


 


Alkaline Phosphatase 221IU/L 


 


Anion Gap 14 


 


Aspartate Amino Transf


(AST/SGOT) 68IU/L 


 


 


Basophils # 0.010^3/ul 


 


Basophils % 0.3% 


 


Blood Morphology Comment  


 


Blood Urea Nitrogen 19mg/dl 


 


Calcium Level 8.7mg/dl 


 


Carbon Dioxide Level 22mmol/L 


 


Chloride Level 99mmol/L 


 


Creatinine 0.81mg/dl 


 


Direct Bilirubin 0.00mg/dl 


 


Eosinophils # 0.210^3/ul 


 


Eosinophils % 3.5% 


 


Globulin 4.40g/dl 


 


Glucose Level 113mg/dl 


 


Hematocrit 37.2% 


 


Hemoglobin 12.6g/dl 


 


INR International Normalized


Ratio 1.07 


 


 


Indirect Bilirubin 0.6mg/dl 


 


Lipase 218U/L 


 


Lymphocytes # 0.810^3/ul 


 


Lymphocytes % 14.1% 


 


Mean Corpuscular Hemoglobin 30.0pg 


 


Mean Corpuscular Hemoglobin


Concent 33.8g/dl 


 


 


Mean Corpuscular Volume 88.7fl 


 


Mean Platelet Volume 9.9fl 


 


Monocytes # 1.010^3/ul 


 


Monocytes % 17.2% 


 


Neutrophils # 3.810^3/ul 


 


Neutrophils % 64.9% 


 


Nucleated Red Blood Cells # 0.010^3/ul 


 


Nucleated Red Blood Cells % 0.0/100WBC 


 


Platelet Count 09959^3/UL 


 


Potassium Level 4.9mmol/L 


 


Prothrombin Time 13.9Sec 


 


Prothrombin Time Ratio 1.1 


 


Red Blood Count 4.1910^6/ul 


 


Red Cell Distribution Width 16.9% 


 


Sodium Level 130mmol/L 


 


Total Bilirubin 0.6mg/dl 


 


Total Protein 7.1g/dl 


 


Troponin I < 0.012ng/ml 


 


White Blood Count 5.810^3/ul 








 Current Medications








 Medications


  (Trade)  Dose


 Ordered  Sig/Johnathan


 Route


 PRN Reason  Start Time


 Stop Time Status Last Admin


Dose Admin


 


 Oxycodone/


 Acetaminophen


  (Percocet (5/


 325))  1 tab  ONCE  ONCE


 PO


   1/22/17 09:30


 1/22/17 09:31 DC 1/22/17 09:26


 


 


 Ondansetron HCl


  (Zofran Odt)  4 mg  ONCE  STAT


 ODT


   1/22/17 09:20


 1/22/17 09:22 DC 1/22/17 09:26


 











Procedures/MDM


I administered Percocet 1 tablet p.o. and Zofran 4 mg ODT for his symptoms.





EKG performed, read by me: 99 bpm, normal sinus rhythm, normal axis, no acute 

ST segment changes, narrow QRS complex, with good R-wave progression in 

precordial leads.





CBC was unremarkable, electrolytes normal, liver function tests are normal 

except for mildly elevated alkaline phosphatase, troponin was negative.





Patient underwent ultrasound-guided paracentesis and 7.5 L of clear fluid was 

removed





Differential diagnoses considered, included but not limited to acute coronary 

syndrome, pulmonary embolism, aortic dissection, abdominal aortic aneurysm, 

sepsis, stroke, meningitis, encephalitis, pneumonia, appendicitis, cholecystitis

, bowel obstruction, pyelonephritis, nephrolithiasis, cystitis, as well as 

metabolic, hematologic, and electrolyte abnormalities. As well as abscess, 

cellulitis, fractures, and dislocations. 





Patient feels much better at this time, and vital signs are normal, symptoms 

have improved. I did give strict instructions to return to the ED if symptoms 

continue or worsen, patient will otherwise follow-up with primary care 

physician. Patient understood instructions and agreed to plan.





Departure


Diagnosis:  


 Primary Impression:  


 Ascites


 Ascites type:  due to alcoholic cirrhosis  Qualified Code:  K70.31 - Ascites 

due to alcoholic cirrhosis


Condition:  HOLDEN Darling MD Jan 22, 2017 13:08

## 2017-01-22 NOTE — RADRPT
PROCEDURE:   Ultrasound guided paracentesis 

 

CLINICAL INDICATION:   Ascites 

 

TECHNIQUE:   Risks benefits and alternatives of the procedure were explained to the patient.  Inform
ed written consent was obtained.  Preliminary  ultrasound of the abdomen was performed.  Fluid 
was identified in the left lower quadrant.  The overlying skin of the right lower quadrant was prepp
ed and draped in the usual sterile fashion.  Under ultrasound guidance, an 8-Telugu  catheter was in
troduced into the right lower quadrant peritoneal cavity. 7500 cc of clear yellow fluid was obtained
 without difficulty.  The fluid was sent the laboratory for further analysis.  The patient tolerated
 procedure well without complication. A time out was performed.

 

COMPARISON:  01/19/2017

 

FINDINGS:

 

Approximately 7500 cc of clear yellow fluid was obtained.  The fluid sample given to the emergency r
oom nurse.

 

IMPRESSION:

 

1.  Successful ultrasound-guided paracentesis. 

 

RPTAT: QQ

_____________________________________________ 

.Nura Boyle MD, MD           Date    Time 

Electronically viewed and signed by .Nura Boyle MD, MD on 01/22/2017 12:41 

 

D:  01/22/2017 12:41  T:  01/22/2017 12:41

.d/

## 2017-01-25 ENCOUNTER — HOSPITAL ENCOUNTER (EMERGENCY)
Dept: HOSPITAL 10 - E/R | Age: 57
Discharge: HOME | End: 2017-01-25
Payer: COMMERCIAL

## 2017-01-25 VITALS — WEIGHT: 166.23 LBS | BODY MASS INDEX: 32.64 KG/M2 | HEIGHT: 60 IN

## 2017-01-25 VITALS — SYSTOLIC BLOOD PRESSURE: 100 MMHG | RESPIRATION RATE: 18 BRPM | HEART RATE: 69 BPM | DIASTOLIC BLOOD PRESSURE: 69 MMHG

## 2017-01-25 DIAGNOSIS — Z79.4: ICD-10-CM

## 2017-01-25 DIAGNOSIS — E11.9: ICD-10-CM

## 2017-01-25 DIAGNOSIS — F17.210: ICD-10-CM

## 2017-01-25 DIAGNOSIS — R10.84: ICD-10-CM

## 2017-01-25 DIAGNOSIS — Z79.84: ICD-10-CM

## 2017-01-25 DIAGNOSIS — R18.8: Primary | ICD-10-CM

## 2017-01-25 NOTE — RADRPT
PROCEDURE:   Ultrasound guided paracentesis. 

 

CLINICAL INDICATION:    Ascites and shortness of breath.  

 

COMPARISON:   01/22/2017.  

 

TECHNIQUE:   

The risks, benefits, and alternatives were explained to the patient, including but not limited to bl
eeding, infection, pain, visceral or vascular damage, shock, and death.  The patient understood the 
risks and the alternatives and wished to proceed with the procedure.  Informed written consent was o
btained. A procedural time out was performed.  The patient's name, date of birth, and procedure to b
e performed were verified.

 

Utilizing ultrasound guidance, optimal location for entry to the peritoneal cavity was ascertained. 
 The overlying skin was prepped and draped in the usual sterile fashion.  Approximately 10 ml of 1% 
Xylocaine was injected locally for pain control.  Using ultrasound guidance, an 8 [place] catheter wa
s introduced into the peritoneal cavity in the right lower quadrant without difficulty.  

 

FINDINGS:

Initial images demonstrate ascites.  Approximately 6.7 liters of serous fluid was aspirated and disc
arded. The patient tolerated the procedure well without complication.

 

IMPRESSION:

1.  Successful ultrasound-guided paracentesis.  

 

RPTAT: QQ

_____________________________________________ 

.Rene De La Cruz MD, MD           Date    Time 

Electronically viewed and signed by .Rene De La Cruz MD, MD on 01/25/2017 17:07 

 

D:  01/25/2017 17:07  T:  01/25/2017 17:07

.R/

## 2017-01-25 NOTE — ERD
ER Documentation


Chief Complaint


Date/Time


DATE: 1/25/17 


TIME: 19:06


Chief Complaint


ABDOMINAL PAIN AND DISTENTION FOR THE PAST WEEK, NEEDS PARACNETHESIS





HPI


Patient is a 56-year-old male with cirrhosis and ascites who presents for 

paracentesis.  The patient had a paracentesis done 3 days ago.  He said that he 

needs another paracentesis because his belly is filled with fluid again.  He 

has no fevers.  He has abdominal distention and pain which is his usual 

presentation.  He is well-known to myself and to the staff.  He had laboratory 

studies done 3 days ago which showed normal platelets and anticoagulation 

studies.





ROS


All systems reviewed and are negative except as per history of present illness.





Medications


Home Meds


Active Scripts


Sodium Polystyrene Sulfonate* (Kayexalate*) 15 Gm/60 Ml Susp, 30 GM PO DAILY 

for 3 Days, ML


   Prov:WILMA MCLEOD DO         1/15/17


Insulin Aspart* (Novolog Insulin Pen*) 100 Unit/Ml Soln, 10 UNIT SC WITH MEALS 

BEDTIME for 28 Days


   Prov:HANNAH CASTELLANO MD         11/15/16


Reported Medications


Dapagliflozin Propanediol (Farxiga) 10 Mg Tablet, 10 MG PO DAILY, #30 TAB


   11/11/16


Insulin Glargine* (Lantus*) 100 Unit/Ml Soln, 35 UNIT SC QHS, #1 VIAL


   11/11/16


Losartan Potassium* (Cozaar*) 25 Mg Tablet, 25 MG PO DAILY, #30 TAB


   11/11/16


Pantoprazole* (Protonix*) 40 Mg Tablet.dr, 40 MG PO DAILY, TAB


   11/11/16


Furosemide* (Furosemide*) 40 Mg Tablet, 40 MG PO DAILY, TAB


   11/11/16


Fenofibrate Nanocrystallized* (Fenofibrate*) 145 Mg Tablet, 145 MG PO DAILY, TAB


   11/11/16


Zolpidem Tartrate* (Zolpidem Tartrate*) 10 Mg Tablet, 10 MG PO QHS Y for 

INSOMNIA, #30 TAB


   11/11/16


Spironolactone* (Spironolactone*) 100 Mg Tablet, 100 MG PO QAM, TAB


   11/11/16


Discontinued Scripts


Furosemide* (Furosemide*) 40 Mg Tablet, 40 MG PO DAILY, #1 TAB


   Prov:WILMA MCLEOD DO         1/15/17


Nitrofurantoin Monohyd Macrocr* (Macrobid*) 100 Mg Capsr, 100 MG PO BID for 7 

Days, CAP


   Prov:FRANCESCA MONTOYA MD         12/26/16





Allergies


Allergies:  


Coded Allergies:  


     No Known Drug Allergy (Verified  Allergy, Unknown, 1/22/17)





PMhx/Soc


History of Surgery:  Yes (TONSIL SX, COLON SX)


Anesthesia Reaction:  No


Hx Neurological Disorder:  No


Hx Respiratory Disorders:  No


Hx Cardiac Disorders:  Yes (HYPOTENSION)


Hx Psychiatric Problems:  No


Hx Miscellaneous Medical Probl:  Yes (DM, LIVER FAILURE, RENAL DSE.)


Hx Alcohol Use:  Yes (FORMER DRINKER: NO DRINK IN OVER A YEAR)


Hx Substance Use:  No


Hx Tobacco Use:  Yes (3 CIGS/DAY)


Smoking Status:  Never smoker





FmHx


Family History:  No diabetes





Physical Exam


Vitals





Vital Signs








  Date Time  Temp Pulse Resp B/P Pulse Ox O2 Delivery O2 Flow Rate FiO2


 


1/25/17 17:05  69 18 100/69 99 Room Air  


 


1/25/17 14:34 97.5 102 22 99/75 99   








Physical Exam


Const: No acute distress


Head:   Atraumatic 


Eyes:    Normal Conjunctiva


ENT:    Normal External Ears, Nose and Mouth.


Neck:               Full range of motion..~ No meningismus.


Resp:    Clear to auscultation bilaterally


Cardio:    Regular rate and rhythm, no murmurs


Abd:    Distended abdomen with positive fluid wave


Skin:    No petechiae or rashes


Back:    No midline or flank tenderness


Ext:    No cyanosis, or edema


Neur:    Awake and alert


Psych:    Normal Mood and Affect


Results 24 hrs





 Current Medications








 Medications


  (Trade)  Dose


 Ordered  Sig/Johnathan


 Route


 PRN Reason  Start Time


 Stop Time Status Last Admin


Dose Admin


 


 Lidocaine


  (Xylocaine 1%


  (Mpf))  5 ml  STK-MED ONCE


 .ROUTE


   1/25/17 16:11


 1/25/17 16:12 DC  


 











Procedures/MDM


Paracentesis performed by radiology.





Patient is a 56-year-old male with cirrhosis who presents for paracentesis.  

The patient had a paracentesis performed by radiology.  Laboratory studies done 

3 days ago were within normal limits and he did not need repeat laboratory 

studies.  He feels better after paracentesis.  I doubt spontaneous bacterial 

peritonitis.  I believe outpatient management is appropriate.  The patient 

should follow-up with his primary doctor and schedule these paracentesis as an 

outpatient.





Departure


Diagnosis:  


 Primary Impression:  


 Ascites


 Ascites type:  other type  Qualified Code:  R18.8 - Other ascites


 Additional Impression:  


 Abdominal pain


 Abdominal location:  generalized  Qualified Code:  R10.84 - Generalized 

abdominal pain


Condition:  Fair


Patient Instructions:  Ascites





Additional Instructions:  


Call your primary care doctor TOMORROW for an appointment during the next 1-2 

days.See the doctor sooner or return here if your condition worsens before your 

appointment time.











FRANCESCA MONTOYA MD Jan 25, 2017 19:08

## 2017-01-28 ENCOUNTER — HOSPITAL ENCOUNTER (EMERGENCY)
Dept: HOSPITAL 10 - E/R | Age: 57
Discharge: HOME | End: 2017-01-28
Payer: COMMERCIAL

## 2017-01-28 VITALS — BODY MASS INDEX: 32.03 KG/M2 | WEIGHT: 163.14 LBS | HEIGHT: 60 IN

## 2017-01-28 VITALS — DIASTOLIC BLOOD PRESSURE: 69 MMHG | HEART RATE: 75 BPM | RESPIRATION RATE: 19 BRPM | SYSTOLIC BLOOD PRESSURE: 102 MMHG

## 2017-01-28 DIAGNOSIS — K70.31: Primary | ICD-10-CM

## 2017-01-28 DIAGNOSIS — E11.9: ICD-10-CM

## 2017-01-28 DIAGNOSIS — Z79.84: ICD-10-CM

## 2017-01-28 DIAGNOSIS — F17.210: ICD-10-CM

## 2017-01-28 DIAGNOSIS — Z79.4: ICD-10-CM

## 2017-01-28 LAB
ALBUMIN SERPL-MCNC: 2.8 G/DL (ref 3.3–4.9)
ALBUMIN/GLOB SERPL: 0.65 {RATIO}
ALP SERPL-CCNC: 259 IU/L (ref 42–121)
ALT SERPL-CCNC: 65 IU/L (ref 13–69)
ANION GAP SERPL CALC-SCNC: 14 MMOL/L (ref 8–16)
APTT BLD: 34 SEC (ref 25–35)
AST SERPL-CCNC: 80 IU/L (ref 15–46)
BASOPHILS # BLD AUTO: 0.1 10^3/UL (ref 0–0.1)
BASOPHILS NFR BLD: 1.2 % (ref 0–2)
BILIRUB DIRECT SERPL-MCNC: 0 MG/DL (ref 0–0.2)
BILIRUB SERPL-MCNC: 0.5 MG/DL (ref 0.2–1.3)
BUN SERPL-MCNC: 26 MG/DL (ref 7–20)
CALCIUM SERPL-MCNC: 9.1 MG/DL (ref 8.4–10.2)
CHLORIDE SERPL-SCNC: 97 MMOL/L (ref 97–110)
CO2 SERPL-SCNC: 19 MMOL/L (ref 21–31)
CONDITION: 1
CREAT SERPL-MCNC: 0.67 MG/DL (ref 0.61–1.24)
EOSINOPHIL # BLD: 0.2 10^3/UL (ref 0–0.5)
EOSINOPHIL NFR BLD: 3.3 % (ref 0–7)
ERYTHROCYTE [DISTWIDTH] IN BLOOD BY AUTOMATED COUNT: 16.6 % (ref 11.5–14.5)
GLOBULIN SER-MCNC: 4.3 G/DL (ref 1.3–3.2)
GLUCOSE SERPL-MCNC: 190 MG/DL (ref 70–220)
HCT VFR BLD CALC: 36.3 % (ref 42–52)
HGB BLD-MCNC: 12.7 G/DL (ref 14–18)
INR PPP: 1.09
LH ANALYZER COMMENTS: 1
LYMPHOCYTES # BLD AUTO: 0.8 10^3/UL (ref 0.8–2.9)
LYMPHOCYTES NFR BLD AUTO: 10.8 % (ref 15–51)
MCH RBC QN AUTO: 30.5 PG (ref 29–33)
MCHC RBC AUTO-ENTMCNC: 35 G/DL (ref 32–37)
MCV RBC AUTO: 87.2 FL (ref 82–101)
MONOCYTES # BLD: 1.1 10^3/UL (ref 0.3–0.9)
MONOCYTES NFR BLD: 14.2 % (ref 0–11)
NEUTROPHILS # BLD: 5.2 10^3/UL (ref 1.6–7.5)
NEUTROPHILS NFR BLD AUTO: 70.5 % (ref 39–77)
NRBC # BLD MANUAL: 0 10^3/UL (ref 0–0)
NRBC BLD QL: 0 /100WBC (ref 0–0)
PLATELET # BLD: 153 10^3/UL (ref 140–440)
PMV BLD AUTO: 10.5 FL (ref 7.4–10.4)
POTASSIUM SERPL-SCNC: 4.4 MMOL/L (ref 3.5–5.1)
PROT SERPL-MCNC: 7.1 G/DL (ref 6.1–8.1)
PROTHROMBIN TIME: 14.1 SEC (ref 12.2–14.2)
PT RATIO: 1.1
RBC # BLD AUTO: 4.17 10^6/UL (ref 4.7–6.1)
SODIUM SERPL-SCNC: 126 MMOL/L (ref 135–144)
WBC # BLD AUTO: 7.4 10^3/UL (ref 4.8–10.8)
WBC # BLD: 7.4 10^3/UL (ref 4.8–10.8)

## 2017-01-28 PROCEDURE — 85025 COMPLETE CBC W/AUTO DIFF WBC: CPT

## 2017-01-28 PROCEDURE — 85730 THROMBOPLASTIN TIME PARTIAL: CPT

## 2017-01-28 PROCEDURE — 80053 COMPREHEN METABOLIC PANEL: CPT

## 2017-01-28 PROCEDURE — 85610 PROTHROMBIN TIME: CPT

## 2017-01-28 NOTE — ERD
ER Documentation


Chief Complaint


Date/Time


DATE: 17 


TIME: 09:18


Chief Complaint


ABD PAIN AND DISTENTION. HERE FOR PARACENTHESIS. MILD DISTRESS





HPI


56-year-old male well-known to this ED history of diabetes mellitus and liver 

cirrhosis with recurrent ascites requiring frequent paracentesis most recently  ambulatory to the ED complaining of increasing abdominal distention 

causing shortness of breath while supine.  No chest pain or palpitations.  

Denies abdominal pain, nausea, vomiting, diarrhea or constipation.  No 

hematemesis hematochezia.  Denies dysuria or polyuria.  No headache, neck pain 

altered mental status.  No fevers or chills.





ROS


All systems reviewed and are negative except as per history of present illness.





Medications


Home Meds


Active Scripts


Sodium Polystyrene Sulfonate* (Kayexalate*) 15 Gm/60 Ml Susp, 30 GM PO DAILY 

for 3 Days, ML


   Prov:WILMA MCLEOD DO         1/15/17


Insulin Aspart* (Novolog Insulin Pen*) 100 Unit/Ml Soln, 10 UNIT SC WITH MEALS 

BEDTIME for 28 Days


   Prov:HANNAH CASTELLANO MD         11/15/16


Reported Medications


Dapagliflozin Propanediol (Farxiga) 10 Mg Tablet, 10 MG PO DAILY, #30 TAB


   16


Insulin Glargine* (Lantus*) 100 Unit/Ml Soln, 35 UNIT SC QHS, #1 VIAL


   16


Losartan Potassium* (Cozaar*) 25 Mg Tablet, 25 MG PO DAILY, #30 TAB


   16


Pantoprazole* (Protonix*) 40 Mg Tablet.dr, 40 MG PO DAILY, TAB


   16


Furosemide* (Furosemide*) 40 Mg Tablet, 40 MG PO DAILY, TAB


   16


Fenofibrate Nanocrystallized* (Fenofibrate*) 145 Mg Tablet, 145 MG PO DAILY, TAB


   16


Zolpidem Tartrate* (Zolpidem Tartrate*) 10 Mg Tablet, 10 MG PO QHS Y for 

INSOMNIA, #30 TAB


   16


Spironolactone* (Spironolactone*) 100 Mg Tablet, 100 MG PO QAM, TAB


   16


Discontinued Scripts


Furosemide* (Furosemide*) 40 Mg Tablet, 40 MG PO DAILY, #1 TAB


   Prov:WILMA MCLEOD DO         1/15/17


Nitrofurantoin Monohyd Macrocr* (Macrobid*) 100 Mg Capsr, 100 MG PO BID for 7 

Days, CAP


   Prov:FRANCESCA MONTOYA MD         16





Allergies


Allergies:  


Coded Allergies:  


     No Known Drug Allergy (Verified  Allergy, Unknown, 17)





PMhx/Soc


Reviewed in chart, as per HPI.


History of Surgery:  Yes (TONSIL SX, COLON SX)


Anesthesia Reaction:  No


Hx Neurological Disorder:  No


Hx Respiratory Disorders:  No


Hx Cardiac Disorders:  Yes (HYPOTENSION)


Hx Psychiatric Problems:  No


Hx Miscellaneous Medical Probl:  Yes (DM, LIVER FAILURE, RENAL DSE.)


Hx Alcohol Use:  Yes (FORMER DRINKER: NO DRINK IN OVER A YEAR)


Hx Substance Use:  No


Hx Tobacco Use:  Yes (3 CIGS/DAY)





FmHx


Not relevant to presenting complaint.





Physical Exam


Vitals





Vital Signs








  Date Time  Temp Pulse Resp B/P Pulse Ox O2 Delivery O2 Flow Rate FiO2


 


17 12:23  75 19 102/69 97 Room Air  


 


17 10:18  75 19 92/60 96 Room Air  


 


17 09:01 98.5 101 21 97/55 99   








Physical Exam


Const:     Alert, no acute distress.


Head:   Atraumatic 


Eyes:    Normal Conjunctiva


ENT:    Normal External Ears, Nose and Mouth.


Neck:               Full range of motion.  No JVD.


Resp:   Decreased at the bases but otherwise clear to auscultation bilaterally


Cardio:    Regular rate and rhythm, no murmurs


Abd:    Soft, markedly distended.  Positive fluid wave.  Nontender.  No rebound 

or guarding.


Skin:    No petechiae or rashes


Back:    No midline or flank tenderness


Ext:    No cyanosis,.  1+ edema.


Neur:    Awake and alert


Psych:    Normal Mood and Affect


Result Diagram:  


1730





Results 24 hrs





 Laboratory Tests








Test


  17


09:30


 


Activated Partial


Thromboplast Time 34.0Sec 


 


 


Alanine Aminotransferase


(ALT/SGPT) 65IU/L 


 


 


Albumin 2.8g/dl 


 


Albumin/Globulin Ratio 0.65 


 


Alkaline Phosphatase 259IU/L 


 


Anion Gap 14 


 


Aspartate Amino Transf


(AST/SGOT) 80IU/L 


 


 


Basophils # 0.110^3/ul 


 


Basophils % 1.2% 


 


Blood Morphology Comment  


 


Blood Urea Nitrogen 26mg/dl 


 


Calcium Level 9.1mg/dl 


 


Carbon Dioxide Level 19mmol/L 


 


Chloride Level 97mmol/L 


 


Creatinine 0.67mg/dl 


 


Direct Bilirubin 0.00mg/dl 


 


Eosinophils # 0.210^3/ul 


 


Eosinophils % 3.3% 


 


Globulin 4.30g/dl 


 


Glucose Level 190mg/dl 


 


Hematocrit 36.3% 


 


Hemoglobin 12.7g/dl 


 


INR International Normalized


Ratio 1.09 


 


 


Indirect Bilirubin 0.5mg/dl 


 


Lymphocytes # 0.810^3/ul 


 


Lymphocytes % 10.8% 


 


Mean Corpuscular Hemoglobin 30.5pg 


 


Mean Corpuscular Hemoglobin


Concent 35.0g/dl 


 


 


Mean Corpuscular Volume 87.2fl 


 


Mean Platelet Volume 10.5fl 


 


Monocytes # 1.110^3/ul 


 


Monocytes % 14.2% 


 


Neutrophils # 5.210^3/ul 


 


Neutrophils % 70.5% 


 


Nucleated Red Blood Cells # 0.010^3/ul 


 


Nucleated Red Blood Cells % 0.0/100WBC 


 


Platelet Count 01481^3/UL 


 


Potassium Level 4.4mmol/L 


 


Prothrombin Time 14.1Sec 


 


Prothrombin Time Ratio 1.1 


 


Red Blood Count 4.1710^6/ul 


 


Red Cell Distribution Width 16.6% 


 


Sodium Level 126mmol/L 


 


Total Bilirubin 0.5mg/dl 


 


Total Protein 7.1g/dl 


 


White Blood Count 7.410^3/ul 








 Current Medications








 Medications


  (Trade)  Dose


 Ordered  Sig/Johnathan


 Route


 PRN Reason  Start Time


 Stop Time Status Last Admin


Dose Admin


 


 Lidocaine


  (Xylocaine 1%


  (Mpf))  5 ml  STK-MED ONCE


 .ROUTE


   17 11:55


 17 11:56 DC  


 





PROCEDURE:   Ultrasound guided paracentesis


 


CLINICAL INDICATION:   Ascites 


 


TECHNIQUE:   The risks benefits and alternatives of the procedure were 

explained to the patient.  Informed written consent was obtained.  The patient 

understood the risks benefits and alternatives and wished to proceed with the 

procedure.   A time out was performed.  The overlying skin of the right lower 

quadrant of the abdomen was prepped and draped in the usual sterile fashion. 

Approximately 10 cc of lidocaine was injected locally for pain control.  

Utilizing ultrasound guidance, an 6-Arabic paracentesis catheter was placed 

into the  peritoneal cavity without difficulty.  Fluid was drained into vacuum 

bottles.  After completion of draining fluid, the catheter was removed and 

direct pressure was applied to the puncture site.  A compression bandage was 

then placed at the puncture site.  The patient tolerated the procedure well 

without complication.    The fluid was not sent to the lab for further analysis.


 


COMPARISON:   2017 


 


FINDINGS:


 


Surgeon:  Paulina ALVA.


Preprocedural diagnosis:  Ascites.


Postprocedural diagnosis:  Ascites.


Samples removed:  5500 cc of clear yellow fluid was obtained.


Complications:  None.


Estimated blood loss:  0 cc.


Condition:  Stable and unchanged.


 


IMPRESSION:


 


1.  Successful ultrasound-guided paracentesis.


 


RPTAT: QQ


_____________________________________________ 


.Cleveland Gallardo MD, MD           Date    Time 


Electronically viewed and signed by .Cleveland Gallardo MD, MD on 2017 12:

28 


 


D:  2017 12:28  T:  2017 12:28


.M/





Procedures/MDM


DOCUMENTS REVIEWED:   ED nurse, prior ED, prior records





REEXAMINATION/REEVALUATION: Time: 12: 30.  Doing well.  Back from 

interventional radiology.  Vital signs stable.








MEDICAL DECISION MAKIN-year-old male well-known to this ED history of 

diabetes mellitus and liver cirrhosis with recurrent ascites requiring frequent 

paracentesis most recently 19 ambulatory to the ED complaining of 

increasing abdominal distention causing shortness of breath while supine.  

Paracentesis performed under ultrasound guidance by interventional radiology.  

5.5 L of clear fluid removed.  Patient feels much better.  Abdomen soft 

nontender.  No signs of simultaneous bacterial peritonitis.  Stable for 

discharge with coronary instructions and outpatient follow-up as counseled.





Counseled patient regarding diagnostic workup, diagnosis and need for followup. 

Understands to return to ED if symptoms recur, worsen or any other concerns.





Departure


Diagnosis:  


 Primary Impression:  


 Ascites


 Ascites type:  due to alcoholic cirrhosis  Qualified Code:  K70.31 - Ascites 

due to alcoholic cirrhosis


 Additional Impression:  


 Liver cirrhosis


 Hepatic cirrhosis type:  alcoholic cirrhosis  Ascites presence:  with ascites  

Qualified Code:  K70.31 - Alcoholic cirrhosis of liver with ascites


Condition:  Stable


Patient Instructions:  Ascites, Cirrhosis of the Liver











NITZA ROSS MD 2017 09:18

## 2017-01-31 ENCOUNTER — HOSPITAL ENCOUNTER (EMERGENCY)
Age: 57
Discharge: HOME | End: 2017-01-31

## 2017-01-31 DIAGNOSIS — Z79.84: ICD-10-CM

## 2017-01-31 DIAGNOSIS — Z79.4: ICD-10-CM

## 2017-01-31 DIAGNOSIS — R18.8: Primary | ICD-10-CM

## 2017-01-31 DIAGNOSIS — E11.9: ICD-10-CM

## 2017-01-31 DIAGNOSIS — F17.210: ICD-10-CM

## 2017-01-31 DIAGNOSIS — R10.84: ICD-10-CM

## 2017-02-02 ENCOUNTER — HOSPITAL ENCOUNTER (EMERGENCY)
Dept: HOSPITAL 10 - E/R | Age: 57
Discharge: HOME | End: 2017-02-02
Payer: COMMERCIAL

## 2017-02-02 VITALS — WEIGHT: 165.35 LBS | BODY MASS INDEX: 32.46 KG/M2 | HEIGHT: 60 IN

## 2017-02-02 VITALS
RESPIRATION RATE: 18 BRPM | SYSTOLIC BLOOD PRESSURE: 106 MMHG | DIASTOLIC BLOOD PRESSURE: 60 MMHG | HEART RATE: 99 BPM | TEMPERATURE: 98.2 F

## 2017-02-02 DIAGNOSIS — K72.10: ICD-10-CM

## 2017-02-02 DIAGNOSIS — R06.02: ICD-10-CM

## 2017-02-02 DIAGNOSIS — R18.8: Primary | ICD-10-CM

## 2017-02-02 DIAGNOSIS — Z79.4: ICD-10-CM

## 2017-02-02 DIAGNOSIS — E11.69: ICD-10-CM

## 2017-02-02 LAB
ALBUMIN SERPL-MCNC: 2.6 G/DL (ref 3.3–4.9)
ALBUMIN/GLOB SERPL: 0.65 {RATIO}
ALP SERPL-CCNC: 243 IU/L (ref 42–121)
ALT SERPL-CCNC: 73 IU/L (ref 13–69)
ANION GAP SERPL CALC-SCNC: 13 MMOL/L (ref 8–16)
ANION GAP SERPL CALC-SCNC: 14 MMOL/L (ref 8–16)
APTT BLD: 35.3 SEC (ref 25–35)
AST SERPL-CCNC: 87 IU/L (ref 15–46)
BASOPHILS # BLD AUTO: 0 10^3/UL (ref 0–0.1)
BASOPHILS NFR BLD: 0.6 % (ref 0–2)
BILIRUB DIRECT SERPL-MCNC: 0 MG/DL (ref 0–0.2)
BILIRUB SERPL-MCNC: 0.4 MG/DL (ref 0.2–1.3)
BUN SERPL-MCNC: 24 MG/DL (ref 7–20)
BUN SERPL-MCNC: 25 MG/DL (ref 7–20)
CALCIUM SERPL-MCNC: 8.9 MG/DL (ref 8.4–10.2)
CALCIUM SERPL-MCNC: 9.1 MG/DL (ref 8.4–10.2)
CHLORIDE SERPL-SCNC: 101 MMOL/L (ref 97–110)
CHLORIDE SERPL-SCNC: 99 MMOL/L (ref 97–110)
CO2 SERPL-SCNC: 18 MMOL/L (ref 21–31)
CO2 SERPL-SCNC: 19 MMOL/L (ref 21–31)
CONDITION: 1
CREAT SERPL-MCNC: 0.63 MG/DL (ref 0.61–1.24)
CREAT SERPL-MCNC: 0.73 MG/DL (ref 0.61–1.24)
EOSINOPHIL # BLD: 0.1 10^3/UL (ref 0–0.5)
EOSINOPHIL NFR BLD: 2.1 % (ref 0–7)
ERYTHROCYTE [DISTWIDTH] IN BLOOD BY AUTOMATED COUNT: 16.6 % (ref 11.5–14.5)
GLOBULIN SER-MCNC: 4 G/DL (ref 1.3–3.2)
GLUCOSE SERPL-MCNC: 253 MG/DL (ref 70–220)
GLUCOSE SERPL-MCNC: 312 MG/DL (ref 70–220)
HCT VFR BLD CALC: 38.7 % (ref 42–52)
HGB BLD-MCNC: 12.9 G/DL (ref 14–18)
INR PPP: 1.13
LH ANALYZER COMMENTS: 1
LYMPHOCYTES # BLD AUTO: 0.7 10^3/UL (ref 0.8–2.9)
LYMPHOCYTES NFR BLD AUTO: 10.5 % (ref 15–51)
MCH RBC QN AUTO: 30.2 PG (ref 29–33)
MCHC RBC AUTO-ENTMCNC: 33.4 G/DL (ref 32–37)
MCV RBC AUTO: 90.5 FL (ref 82–101)
MONOCYTES # BLD: 0.8 10^3/UL (ref 0.3–0.9)
MONOCYTES NFR BLD: 12.8 % (ref 0–11)
NEUTROPHILS # BLD: 4.8 10^3/UL (ref 1.6–7.5)
NEUTROPHILS NFR BLD AUTO: 74 % (ref 39–77)
NRBC # BLD MANUAL: 0 10^3/UL (ref 0–0)
NRBC BLD QL: 0 /100WBC (ref 0–0)
PLATELET # BLD: 168 10^3/UL (ref 140–440)
PMV BLD AUTO: 10.8 FL (ref 7.4–10.4)
POTASSIUM SERPL-SCNC: 4.9 MMOL/L (ref 3.5–5.1)
POTASSIUM SERPL-SCNC: 6.3 MMOL/L (ref 3.5–5.1)
PROT SERPL-MCNC: 6.6 G/DL (ref 6.1–8.1)
PROTHROMBIN TIME: 14.5 SEC (ref 12.2–14.2)
PT RATIO: 1.1
RBC # BLD AUTO: 4.28 10^6/UL (ref 4.7–6.1)
SODIUM SERPL-SCNC: 126 MMOL/L (ref 135–144)
SODIUM SERPL-SCNC: 127 MMOL/L (ref 135–144)
WBC # BLD AUTO: 6.4 10^3/UL (ref 4.8–10.8)
WBC # BLD: 6.4 10^3/UL (ref 4.8–10.8)

## 2017-02-02 PROCEDURE — 93005 ELECTROCARDIOGRAM TRACING: CPT

## 2017-02-02 PROCEDURE — 85610 PROTHROMBIN TIME: CPT

## 2017-02-02 PROCEDURE — 80053 COMPREHEN METABOLIC PANEL: CPT

## 2017-02-02 PROCEDURE — 80048 BASIC METABOLIC PNL TOTAL CA: CPT

## 2017-02-02 PROCEDURE — 96374 THER/PROPH/DIAG INJ IV PUSH: CPT

## 2017-02-02 PROCEDURE — 85025 COMPLETE CBC W/AUTO DIFF WBC: CPT

## 2017-02-02 PROCEDURE — 96375 TX/PRO/DX INJ NEW DRUG ADDON: CPT

## 2017-02-02 PROCEDURE — 94644 CONT INHLJ TX 1ST HOUR: CPT

## 2017-02-02 PROCEDURE — 85730 THROMBOPLASTIN TIME PARTIAL: CPT

## 2017-02-02 PROCEDURE — 36415 COLL VENOUS BLD VENIPUNCTURE: CPT

## 2017-02-02 NOTE — RADRPT
PROCEDURE:   Ultrasound guided paracentesis. 

 

CLINICAL INDICATION:    Ascites and shortness of breath.  

 

COMPARISON:   01/31/2017.  

 

TECHNIQUE:   

The risks, benefits, and alternatives were explained to the patient, including but not limited to bl
eeding, infection, pain, visceral or vascular damage, shock, and death.  The patient understood the 
risks and the alternatives and wished to proceed with the procedure.  Informed written consent was o
btained. A procedural time out was performed.  The patient's name, date of birth, and procedure to b
e performed were verified.

 

Utilizing ultrasound guidance, optimal location for entry to the peritoneal cavity was ascertained. 
 The overlying skin was prepped and draped in the usual sterile fashion.  Approximately 10 ml of 1% 
Xylocaine was injected locally for pain control.  Using ultrasound guidance, an 8 Ecuadorean catheter wa
s introduced into the peritoneal cavity in the right lower quadrant without difficulty.  

 

FINDINGS:

Initial images demonstrate ascites.  Approximately 5.4 liters of serous fluid was aspirated and disc
arded. The patient tolerated the procedure well without complication.

 

IMPRESSION:

1.  Successful ultrasound-guided paracentesis.  

 

RPTAT: QQ

_____________________________________________ 

.Rene De La Cruz MD, MD           Date    Time 

Electronically viewed and signed by .Rene De La Cruz MD, MD on 02/02/2017 17:03 

 

D:  02/02/2017 17:03  T:  02/02/2017 17:03

.R/

## 2017-02-02 NOTE — QN
Documentation


Comment


HPI: 57-year-old man signed out to me pending labs and ultrasound-guided 

paracentesis.  Patient has no complaints of chest pain or shortness of breath 

although he has some discomfort due to recent abdominal distention requiring 

paracentesis.  He has had multiple previous paracentesis procedures over the 

last month for chronic recurrent ascites





Past medical history: Chronic recurrent ascites, alcoholic cirrhosis, 

hypertension





Physical exam:





GENERAL: Well-developed, well-nourished, well-hydrated, in no apparent distress

, looks nontoxic in appearance


HEENT: Moist mucous membranes, pink conjunctiva, no cervical spine tenderness 

or step-off deformities, no goiter, no jaundice or icterus, extraocular 

movements intact without pain. No submandibular induration, and no pharyngeal 

erythema


NEURO: Alert and oriented 3, cranial nerves II through XII intact bilaterally, 

pupils equal round reactive to light, no focal deficits or facial asymmetry, 

sensation intact distally Strength 5/5 in upper and lower extremities 

bilaterally


CARDIAC: Regular rate and rhythm, no murmurs rubs or gallops


LUNGS: Clear bilaterally no wheezing crackles or stridor


ABDOMEN: Soft protuberant abdomen without guarding or rigidity, no rebound, no 

psoas sign no obturator sign. Normoactive bowel sounds


SKIN: Warm and dry to touch, no abrasions, contusions, or hematomas, no 

lacerations, no ecchymosis, no target lesions, and without ulcers


EXTREMITIES: No clubbing cyanosis or edema, calves are bilaterally symmetrical, 

no Homans sign, no popliteal cord sign. Distal pulses equal and bilateral


PSYCH: Normal affect without agitation or irritability





Medical decision making: CBC was unremarkable, electrolytes revealed a 

potassium of 6.3.  I immediately administered furosemide 60 mg IV 1 for 

diuresis and to help with hyperkalemia.  I also administered albuterol 10 mg 

via nebulizer.





EKG was performed, read by me revealed a sinus tachycardia at 104 bpm, normal 

axis, narrow QRS complex, no concerning ST elevations or depressions noted, no 

peak T waves noted.





Patient received his paracentesis about 5.5 L was removed and patient feels 

much better.





I repeated the BMP and potassium improved to 4.9.





Differential diagnoses considered, included but not limited to acute coronary 

syndrome, pulmonary embolism, aortic dissection, abdominal aortic aneurysm, 

sepsis, stroke, meningitis, encephalitis, pneumonia, appendicitis, cholecystitis

, bowel obstruction, pyelonephritis, nephrolithiasis, cystitis, as well as 

metabolic, hematologic, and electrolyte abnormalities. As well as abscess, 

cellulitis, fractures, and dislocations.





Patient feels much better at this time, and vital signs are normal, symptoms 

have improved. I did give strict instructions to return to the ED if symptoms 

continue or worsen, patient will otherwise follow-up with primary care 

physician. Patient understood instructions and agreed to plan.





Diagnostic impression: #1) acute ascites post paracentesis





2.)  Acute hyperkalemia











HOLDEN CORONADO MD Feb 2, 2017 19:27

## 2017-02-02 NOTE — ERD
ER Documentation


Chief Complaint


Date/Time


DATE: 2/2/17 


TIME: 13:53


Chief Complaint


ABDOMINAL PAIN AND DISTENTION FOR THE PAST FEW DAYS. NEEDS PARACENTHESIS





HPI


Patient is a 57-year-old  male who reports to the emergency department 

today complaining of abdominal distention.  He has a history of liver failure 

with cirrhosis.  He gets periodic paracentesis for this.  He says that he is in 

need of paracentesis once again.  He denies any significant abdominal pain, 

fever, nausea, vomiting, confusion, chest pain, shortness of breath, diarrhea, 

dysuria, hematuria, flank or back pain, bleeding, bruising, or abnormal rashes.

  He says his abdomen is uncomfortable however this is how it feels when he 

gets distended like this.  The remainder of the systems are negative.





ROS


All systems reviewed and are negative except as per history of present illness.





Medications


Home Meds


Active Scripts


Sodium Polystyrene Sulfonate* (Kayexalate*) 15 Gm/60 Ml Susp, 30 GM PO DAILY 

for 3 Days, ML


   Prov:WILMA MCLEOD DO         1/15/17


Insulin Aspart* (Novolog Insulin Pen*) 100 Unit/Ml Soln, 10 UNIT SC WITH MEALS 

BEDTIME for 28 Days


   Prov:HANNAH CASTELLANO MD         11/15/16


Reported Medications


Dapagliflozin Propanediol (Farxiga) 10 Mg Tablet, 10 MG PO DAILY, #30 TAB


   11/11/16


Insulin Glargine* (Lantus*) 100 Unit/Ml Soln, 35 UNIT SC QHS, #1 VIAL


   11/11/16


Losartan Potassium* (Cozaar*) 25 Mg Tablet, 25 MG PO DAILY, #30 TAB


   11/11/16


Pantoprazole* (Protonix*) 40 Mg Tablet.dr, 40 MG PO DAILY, TAB


   11/11/16


Furosemide* (Furosemide*) 40 Mg Tablet, 40 MG PO DAILY, TAB


   11/11/16


Fenofibrate Nanocrystallized* (Fenofibrate*) 145 Mg Tablet, 145 MG PO DAILY, TAB


   11/11/16


Zolpidem Tartrate* (Zolpidem Tartrate*) 10 Mg Tablet, 10 MG PO QHS Y for 

INSOMNIA, #30 TAB


   11/11/16


Spironolactone* (Spironolactone*) 100 Mg Tablet, 100 MG PO QAM, TAB


   11/11/16





Allergies


Allergies:  


Coded Allergies:  


     No Known Drug Allergy (Verified  Allergy, Unknown, 1/31/17)





PMhx/Soc


History of Surgery:  Yes (TONSIL SX, COLON SX)


Anesthesia Reaction:  No


Hx Neurological Disorder:  No


Hx Respiratory Disorders:  No


Hx Cardiac Disorders:  Yes (HYPOTENSION)


Hx Psychiatric Problems:  No


Hx Miscellaneous Medical Probl:  Yes (DM, LIVER FAILURE, RENAL DSE.)


Hx Alcohol Use:  Yes (FORMER DRINKER: NO DRINK IN OVER A YEAR)


Hx Substance Use:  No


Hx Tobacco Use:  Yes (3 CIGS/DAY)





FmHx


Family History:  coronary disease, diabetes





Physical Exam


Vitals





Vital Signs








  Date Time  Temp Pulse Resp B/P Pulse Ox O2 Delivery O2 Flow Rate FiO2


 


2/2/17 14:05 98.2 95 20 98/70 99 Room Air  


 


2/2/17 10:27 98.9 100 21 98/55 99   








Physical Exam


Const:  [] Well-developed thin  male sitting on the bed in no acute 

distress


Head:   Atraumatic normocephalic


Eyes:    Normal Conjunctiva


ENT:    Normal External Ears, Nose and Mouth.


Neck:               Full range of motion..~ No meningismus.


Resp:    Clear to auscultation bilaterally


Cardio:    Regular rate and rhythm, no murmurs


Abd:    Soft, nontender, grossly distended with a ascitic fluid wave, no masses

, rebound, or guarding, normal bowel sounds


Skin:    No petechiae or rashes


Back:    No midline or flank tenderness


Ext:    No cyanosis, mild nonpitting bilateral lower extremity edema


Neur:    Awake and alert oriented 3, GCS 15


Psych:    Normal Mood and Affect


Result Diagram:  


2/2/17 1350





Results 24 hrs








 Laboratory Tests








Test


  2/2/17


13:50


 


Activated Partial


Thromboplast Time 35.3Sec 


 


 


Basophils # 0.010^3/ul 


 


Basophils % 0.6% 


 


Blood Morphology Comment  


 


Eosinophils # 0.110^3/ul 


 


Eosinophils % 2.1% 


 


Hematocrit 38.7% 


 


Hemoglobin 12.9g/dl 


 


INR International Normalized


Ratio 1.13 


 


 


Lymphocytes # 0.710^3/ul 


 


Lymphocytes % 10.5% 


 


Mean Corpuscular Hemoglobin 30.2pg 


 


Mean Corpuscular Hemoglobin


Concent 33.4g/dl 


 


 


Mean Corpuscular Volume 90.5fl 


 


Mean Platelet Volume 10.8fl 


 


Monocytes # 0.810^3/ul 


 


Monocytes % 12.8% 


 


Neutrophils # 4.810^3/ul 


 


Neutrophils % 74.0% 


 


Nucleated Red Blood Cells # 0.010^3/ul 


 


Nucleated Red Blood Cells % 0.0/100WBC 


 


Platelet Count 01719^3/UL 


 


Prothrombin Time 14.5Sec 


 


Prothrombin Time Ratio 1.1 


 


Red Blood Count 4.2810^6/ul 


 


Red Cell Distribution Width 16.6% 


 


White Blood Count 6.410^3/ul 








 Current Medications








 Medications


  (Trade)  Dose


 Ordered  Sig/Johnathan


 Route


 PRN Reason  Start Time


 Stop Time Status Last Admin


Dose Admin


 


 Sodium Chloride


  (NS)  1,000 ml @ 


 1,000 mls/hr  Q1H ONCE


 IV


   2/2/17 14:30


 2/2/17 15:29  2/2/17 14:15


 


 


 Morphine Sulfate


  (morphine)  4 mg  ONCE  STAT


 IV


   2/2/17 14:04


 2/2/17 14:11 DC 2/2/17 14:15


 


 


 Ondansetron HCl


  (Zofran Inj)  4 mg  ONCE  STAT


 IV


   2/2/17 14:04


 2/2/17 14:11 DC 2/2/17 14:15


 














Procedures/MDM


Differential includes recurrent cirrhosis, spontaneous bacterial peritonitis, 

liver failure





Departure


Diagnosis:  


 Primary Impression:  


 Ascites


 Ascites type:  other type  Qualified Code:  R18.8 - Other ascites


 Additional Impressions:  


 Liver failure without hepatic coma


 Liver failure chronicity:  chronic  Qualified Code:  K72.10 - Chronic liver 

failure without hepatic coma


 Diabetes mellitus


 Diabetes mellitus type:  type 2  Diabetes mellitus complication status:  with 

other specified complication  Diabetes mellitus long term insulin use:  

unspecified long term insulin use status  Qualified Code:  E11.69 - Type 2 

diabetes mellitus with other specified complication, unspecified long term 

insulin use status











REESE GOMEZ Feb 2, 2017 13:56

## 2017-02-05 ENCOUNTER — HOSPITAL ENCOUNTER (EMERGENCY)
Dept: HOSPITAL 10 - E/R | Age: 57
Discharge: HOME | End: 2017-02-05
Payer: COMMERCIAL

## 2017-02-05 VITALS
HEIGHT: 67 IN | WEIGHT: 154.32 LBS | HEIGHT: 67 IN | WEIGHT: 154.32 LBS | BODY MASS INDEX: 24.22 KG/M2 | BODY MASS INDEX: 24.22 KG/M2

## 2017-02-05 VITALS
DIASTOLIC BLOOD PRESSURE: 61 MMHG | HEART RATE: 96 BPM | RESPIRATION RATE: 14 BRPM | SYSTOLIC BLOOD PRESSURE: 99 MMHG | TEMPERATURE: 97.6 F

## 2017-02-05 DIAGNOSIS — Z79.4: ICD-10-CM

## 2017-02-05 DIAGNOSIS — F17.210: ICD-10-CM

## 2017-02-05 DIAGNOSIS — R18.8: Primary | ICD-10-CM

## 2017-02-05 DIAGNOSIS — E11.9: ICD-10-CM

## 2017-02-05 DIAGNOSIS — Z79.84: ICD-10-CM

## 2017-02-05 LAB
ANION GAP SERPL CALC-SCNC: 12 MMOL/L (ref 8–16)
BASOPHILS # BLD AUTO: 0 10^3/UL (ref 0–0.1)
BASOPHILS NFR BLD: 0.8 % (ref 0–2)
BUN SERPL-MCNC: 24 MG/DL (ref 7–20)
CALCIUM SERPL-MCNC: 8.9 MG/DL (ref 8.4–10.2)
CHLORIDE SERPL-SCNC: 97 MMOL/L (ref 97–110)
CO2 SERPL-SCNC: 22 MMOL/L (ref 21–31)
CONDITION: 1
CREAT SERPL-MCNC: 0.67 MG/DL (ref 0.61–1.24)
EOSINOPHIL # BLD: 0.1 10^3/UL (ref 0–0.5)
EOSINOPHIL NFR BLD: 2.4 % (ref 0–7)
ERYTHROCYTE [DISTWIDTH] IN BLOOD BY AUTOMATED COUNT: 16.5 % (ref 11.5–14.5)
GLUCOSE SERPL-MCNC: 166 MG/DL (ref 70–220)
HCT VFR BLD CALC: 37.6 % (ref 42–52)
HGB BLD-MCNC: 12.8 G/DL (ref 14–18)
INR PPP: 1.11
LH ANALYZER COMMENTS: 1
LYMPHOCYTES # BLD AUTO: 0.6 10^3/UL (ref 0.8–2.9)
LYMPHOCYTES NFR BLD AUTO: 11.3 % (ref 15–51)
MCH RBC QN AUTO: 30.3 PG (ref 29–33)
MCHC RBC AUTO-ENTMCNC: 34.1 G/DL (ref 32–37)
MCV RBC AUTO: 89 FL (ref 82–101)
MONOCYTES # BLD: 0.7 10^3/UL (ref 0.3–0.9)
MONOCYTES NFR BLD: 14.7 % (ref 0–11)
NEUTROPHILS # BLD: 3.6 10^3/UL (ref 1.6–7.5)
NEUTROPHILS NFR BLD AUTO: 70.8 % (ref 39–77)
NRBC # BLD MANUAL: 0 10^3/UL (ref 0–0)
NRBC BLD QL: 0 /100WBC (ref 0–0)
PLATELET # BLD: 152 10^3/UL (ref 140–440)
PMV BLD AUTO: 10.1 FL (ref 7.4–10.4)
POTASSIUM SERPL-SCNC: 5.3 MMOL/L (ref 3.5–5.1)
PROTHROMBIN TIME: 14.3 SEC (ref 12.2–14.2)
PT RATIO: 1.1
RBC # BLD AUTO: 4.22 10^6/UL (ref 4.7–6.1)
SODIUM SERPL-SCNC: 126 MMOL/L (ref 135–144)
WBC # BLD AUTO: 5.1 10^3/UL (ref 4.8–10.8)
WBC # BLD: 5.1 10^3/UL (ref 4.8–10.8)

## 2017-02-05 PROCEDURE — 85610 PROTHROMBIN TIME: CPT

## 2017-02-05 PROCEDURE — 88305 TISSUE EXAM BY PATHOLOGIST: CPT

## 2017-02-05 PROCEDURE — 88104 CYTOPATH FL NONGYN SMEARS: CPT

## 2017-02-05 PROCEDURE — 82140 ASSAY OF AMMONIA: CPT

## 2017-02-05 PROCEDURE — 85025 COMPLETE CBC W/AUTO DIFF WBC: CPT

## 2017-02-05 PROCEDURE — 80048 BASIC METABOLIC PNL TOTAL CA: CPT

## 2017-02-05 NOTE — ERD
ER Documentation


Chief Complaint


Date/Time


DATE: 17 


TIME: 12:53


Chief Complaint


NEEDS PARACENTESIS





HPI


57-year-old male returns to the emergency department with his family for 

recurrent paracentesis.


Patient is a long-standing history of alcohol induced cirrhosis with ascites.  

He is apparently awaiting a TIPS procedure.


Patient returns the emergency department for recurrent paracentesis.  Patient 

reports no fevers chills abdominal pain.  Patient reports no GI bleeding 

symptoms.  According to the family, patient has not been confused or 

encephalopathic.  Patient has had no shortness of breath.





ROS


All systems reviewed and are negative except as per history of present illness.





Medications


Home Meds


Active Scripts


Insulin Aspart* (Novolog Insulin Pen*) 100 Unit/Ml Soln, 10 UNIT SC WITH MEALS 

BEDTIME for 28 Days


   Prov:HANNAH CASTELLANO MD         11/15/16


Reported Medications


Dapagliflozin Propanediol (Farxiga) 10 Mg Tablet, 10 MG PO DAILY, #30 TAB


   16


Insulin Glargine* (Lantus*) 100 Unit/Ml Soln, 35 UNIT SC QHS, #1 VIAL


   16


Losartan Potassium* (Cozaar*) 25 Mg Tablet, 25 MG PO DAILY, #30 TAB


   16


Pantoprazole* (Protonix*) 40 Mg Tablet.dr, 40 MG PO DAILY, TAB


   16


Furosemide* (Furosemide*) 40 Mg Tablet, 40 MG PO DAILY, TAB


   16


Fenofibrate Nanocrystallized* (Fenofibrate*) 145 Mg Tablet, 145 MG PO DAILY, TAB


   16


Zolpidem Tartrate* (Zolpidem Tartrate*) 10 Mg Tablet, 10 MG PO QHS Y for 

INSOMNIA, #30 TAB


   16


Spironolactone* (Spironolactone*) 100 Mg Tablet, 100 MG PO QAM, TAB


   16


Discontinued Scripts


Sodium Polystyrene Sulfonate* (Kayexalate*) 15 Gm/60 Ml Susp, 30 GM PO DAILY 

for 3 Days, ML


   Prov:WILMA MCLEOD DO         1/15/17





Allergies


Allergies:  


Coded Allergies:  


     No Known Drug Allergy (Verified  Allergy, Unknown, 17)





PMhx/Soc


History of Surgery:  No


Anesthesia Reaction:  No


Hx Neurological Disorder:  No


Hx Respiratory Disorders:  No


Hx Cardiac Disorders:  No


Hx Psychiatric Problems:  No


Hx Miscellaneous Medical Probl:  Yes (liver disease/failure )


Hx Alcohol Use:  Yes (Used too )


Hx Substance Use:  No


Hx Tobacco Use:  Yes (Used to )


Smoking Status:  Current some day smoker





FmHx


Noncontributory for chief complaint with supportive son at bedside





Physical Exam


Vitals





Vital Signs








  Date Time  Temp Pulse Resp B/P Pulse Ox O2 Delivery O2 Flow Rate FiO2


 


17 09:18 97.8 98 18 101/68 100   








Physical Exam


GENERAL: Chronically ill appearing male in no acute distress


HEENT: Pupils equal, round, and reactive to light.  EOMI. There is no scleral 

icterus.


NECK: C-spine is soft and supple, there is no meningismus.  There is no 

cervical lymphadenopathy.


LUNGS: Clear to auscultation bilaterally. There are no rales, wheezes or 

rhonchi.


HEART: Regular rate and rhythm, no murmurs, clicks, rubs or gallops.


ABDOMEN: Soft, distended with a fluid wave.  No tenderness to palpation with no 

evidence of peritonitis.


EXTREMITIES: There is no peripheral cyanosis or edema.  No focal swelling or 

erythema.


NEURO: The patient moves all four extremities with 5/5 strength.  Cranial 

nerves II - XII are intact. Normal gait. Alert and oriented


SKIN: There is no apparent rash or petechiae.


HEME/LYMPHATIC: There is no evidence of excessive bruising or lymphedema.


PSYCHIATRIC: The patient does not appear anxious or depressed.


Result Diagram:  


17 0953                                                                    

            17 0953





Results 24 hrs





 Laboratory Tests








Test


  17


04:53 17


09:53


 


INR International Normalized


Ratio 1.11 


  


 


 


Prothrombin Time 14.3Sec  


 


Prothrombin Time Ratio 1.1  


 


Ammonia  54umol/l 


 


Anion Gap  12 


 


Basophils #  0.010^3/ul 


 


Basophils %  0.8% 


 


Blood Morphology Comment   


 


Blood Urea Nitrogen  24mg/dl 


 


Calcium Level  8.9mg/dl 


 


Carbon Dioxide Level  22mmol/L 


 


Chloride Level  97mmol/L 


 


Creatinine  0.67mg/dl 


 


Eosinophils #  0.110^3/ul 


 


Eosinophils %  2.4% 


 


Glucose Level  166mg/dl 


 


Hematocrit  37.6% 


 


Hemoglobin  12.8g/dl 


 


Lymphocytes #  0.610^3/ul 


 


Lymphocytes %  11.3% 


 


Mean Corpuscular Hemoglobin  30.3pg 


 


Mean Corpuscular Hemoglobin


Concent 


  34.1g/dl 


 


 


Mean Corpuscular Volume  89.0fl 


 


Mean Platelet Volume  10.1fl 


 


Monocytes #  0.710^3/ul 


 


Monocytes %  14.7% 


 


Neutrophils #  3.610^3/ul 


 


Neutrophils %  70.8% 


 


Nucleated Red Blood Cells #  0.010^3/ul 


 


Nucleated Red Blood Cells %  0.0/100WBC 


 


Platelet Count  59353^3/UL 


 


Potassium Level  5.3mmol/L 


 


Red Blood Count  4.2210^6/ul 


 


Red Cell Distribution Width  16.5% 


 


Sodium Level  126mmol/L 


 


White Blood Count  5.110^3/ul 








 Current Medications








 Medications


  (Trade)  Dose


 Ordered  Sig/Johnathan


 Route


 PRN Reason  Start Time


 Stop Time Status Last Admin


Dose Admin


 


 Lidocaine


  (Xylocaine 1%


  (Mpf))  5 ml  STK-MED ONCE


 .ROUTE


   17 12:37


 17 12:38 DC  


 











Procedures/MDM


Patient was taken to a room, seen and evaluated. Comfort measures were 

initiated. 





Diagnostic tests were ordered and reviewed.





RADIOLOGY: reviewed with the radiologist





REEVALUATION: After his therapeutic paracentesis, his abdominal symptoms 

improved significantly





MEDICAL DECISION MAKIN-year-old male presents to the emergency department 

for recurrent paracentesis.  At this time, patient shows no evidence of SBP or 

other high-risk symptoms.  He does not appear to be cephalopathic.  After his 

therapeutic paracentesis, he is feeling much better and seems to be appropriate 

for outpatient care.





Departure


Diagnosis:  


 Primary Impression:  


 Ascites


Condition:  Stable


Patient Instructions:  Ascites





Additional Instructions:  


See your doctor for follow-up as discussed.  Take a copy of your test results, 

if appropriate, to this follow-up visit.





See your doctor or return here if your symptoms do not improve as expected.





At any time, please return to the emergency department for any change or 

worsening in her symptoms.











MAGNOLIA MELENDEZ 2017 12:56

## 2017-02-05 NOTE — RADRPT
PROCEDURE:   Ultrasound guided paracentesis 

 

CLINICAL INDICATION:   Ascites 

 

TECHNIQUE:   The risks, benefits, and alternatives were explained to the patient, including but not 
limited to bleeding, infection, pain, visceral or vascular damage, shock, and death. The patient und
erstood the risks and the alternatives and wished to proceed with the procedure. Informed written co
nsent was obtained. A procedural time out was performed. The patient's name, date of birth, and proc
edure to be performed were verified.

 

Preliminary  ultrasound of the abdomen was performed.  Fluid was identified in the right lower 
quadrant.  The overlying skin of the right lower quadrant was prepped and draped in the usual steril
e fashion.  Under ultrasound guidance, a skin an 8-Nepali catheter was introduced into the right low
er quadrant peritoneal cavity after 10 ml of 1% lidocaine was injected for pain control. 7400 ml of 
clear yellow fluid was obtained without difficulty.  The fluid was sent the laboratory for further a
nalysis.  The patient tolerated procedure well without complication. 

 

COMPARISON:   02/02/2017

 

FINDINGS:

 

Approximately 7400 ml of clear yellow fluid was obtained.  The fluid was sent to the laboratory for 
further evaluation. 

 

RPTAT: QQ

 

IMPRESSION:

 

1.  Successful ultrasound-guided paracentesis.

_____________________________________________ 

.Mehreen Kiser MD, MD           Date    Time 

Electronically viewed and signed by .Merheen Kiser MD, MD on 02/05/2017 12:40 

 

D:  02/05/2017 12:40  T:  02/05/2017 12:40

.T/

## 2017-02-07 ENCOUNTER — HOSPITAL ENCOUNTER (EMERGENCY)
Dept: HOSPITAL 10 - E/R | Age: 57
Discharge: HOME | End: 2017-02-07
Payer: COMMERCIAL

## 2017-02-07 VITALS
RESPIRATION RATE: 20 BRPM | DIASTOLIC BLOOD PRESSURE: 54 MMHG | TEMPERATURE: 97.5 F | HEART RATE: 95 BPM | SYSTOLIC BLOOD PRESSURE: 86 MMHG

## 2017-02-07 VITALS — HEIGHT: 60 IN | WEIGHT: 160.94 LBS | BODY MASS INDEX: 31.6 KG/M2

## 2017-02-07 DIAGNOSIS — R11.2: ICD-10-CM

## 2017-02-07 DIAGNOSIS — G47.09: ICD-10-CM

## 2017-02-07 DIAGNOSIS — Z79.4: ICD-10-CM

## 2017-02-07 DIAGNOSIS — E11.9: ICD-10-CM

## 2017-02-07 DIAGNOSIS — R18.8: Primary | ICD-10-CM

## 2017-02-07 DIAGNOSIS — R10.9: ICD-10-CM

## 2017-02-07 DIAGNOSIS — Z79.84: ICD-10-CM

## 2017-02-07 LAB
ALBUMIN SERPL-MCNC: 2.4 G/DL (ref 3.3–4.9)
ALBUMIN/GLOB SERPL: 0.61 {RATIO}
ALP SERPL-CCNC: 236 IU/L (ref 42–121)
ALT SERPL-CCNC: 67 IU/L (ref 13–69)
ANION GAP SERPL CALC-SCNC: 12 MMOL/L (ref 8–16)
APTT BLD: 35.8 SEC (ref 25–35)
AST SERPL-CCNC: 82 IU/L (ref 15–46)
BASOPHILS # BLD AUTO: 0 10^3/UL (ref 0–0.1)
BASOPHILS NFR BLD: 0.4 % (ref 0–2)
BILIRUB DIRECT SERPL-MCNC: 0 MG/DL (ref 0–0.2)
BILIRUB SERPL-MCNC: 0.4 MG/DL (ref 0.2–1.3)
BUN SERPL-MCNC: 20 MG/DL (ref 7–20)
CALCIUM SERPL-MCNC: 8.3 MG/DL (ref 8.4–10.2)
CHLORIDE SERPL-SCNC: 97 MMOL/L (ref 97–110)
CO2 SERPL-SCNC: 19 MMOL/L (ref 21–31)
CONDITION: 1
CREAT SERPL-MCNC: 0.66 MG/DL (ref 0.61–1.24)
EOSINOPHIL # BLD: 0.1 10^3/UL (ref 0–0.5)
EOSINOPHIL NFR BLD: 1.7 % (ref 0–7)
ERYTHROCYTE [DISTWIDTH] IN BLOOD BY AUTOMATED COUNT: 16.1 % (ref 11.5–14.5)
GLOBULIN SER-MCNC: 3.9 G/DL (ref 1.3–3.2)
GLUCOSE SERPL-MCNC: 297 MG/DL (ref 70–220)
HCT VFR BLD CALC: 34.5 % (ref 42–52)
HGB BLD-MCNC: 11.7 G/DL (ref 14–18)
INR PPP: 1.13
LH ANALYZER COMMENTS: 1
LYMPHOCYTES # BLD AUTO: 0.7 10^3/UL (ref 0.8–2.9)
LYMPHOCYTES NFR BLD AUTO: 10.5 % (ref 15–51)
MCH RBC QN AUTO: 30.4 PG (ref 29–33)
MCHC RBC AUTO-ENTMCNC: 34 G/DL (ref 32–37)
MCV RBC AUTO: 89.5 FL (ref 82–101)
MONOCYTES # BLD: 1.1 10^3/UL (ref 0.3–0.9)
MONOCYTES NFR BLD: 16.2 % (ref 0–11)
NEUTROPHILS # BLD: 5 10^3/UL (ref 1.6–7.5)
NEUTROPHILS NFR BLD AUTO: 71.2 % (ref 39–77)
NRBC # BLD MANUAL: 0 10^3/UL (ref 0–0)
NRBC BLD QL: 0 /100WBC (ref 0–0)
PLATELET # BLD: 167 10^3/UL (ref 140–440)
PMV BLD AUTO: 10.4 FL (ref 7.4–10.4)
POTASSIUM SERPL-SCNC: 5.3 MMOL/L (ref 3.5–5.1)
PROT SERPL-MCNC: 6.3 G/DL (ref 6.1–8.1)
PROTHROMBIN TIME: 14.5 SEC (ref 12.2–14.2)
PT RATIO: 1.1
RBC # BLD AUTO: 3.86 10^6/UL (ref 4.7–6.1)
SODIUM SERPL-SCNC: 123 MMOL/L (ref 135–144)
TROPONIN I SERPL-MCNC: < 0.012 NG/ML (ref 0–0.12)
WBC # BLD AUTO: 7 10^3/UL (ref 4.8–10.8)
WBC # BLD: 7 10^3/UL (ref 4.8–10.8)

## 2017-02-07 PROCEDURE — 85730 THROMBOPLASTIN TIME PARTIAL: CPT

## 2017-02-07 PROCEDURE — 83690 ASSAY OF LIPASE: CPT

## 2017-02-07 PROCEDURE — 85025 COMPLETE CBC W/AUTO DIFF WBC: CPT

## 2017-02-07 PROCEDURE — 80053 COMPREHEN METABOLIC PANEL: CPT

## 2017-02-07 PROCEDURE — 85610 PROTHROMBIN TIME: CPT

## 2017-02-07 PROCEDURE — 84484 ASSAY OF TROPONIN QUANT: CPT

## 2017-02-07 NOTE — ERD
ER Documentation


Chief Complaint


Date/Time


DATE: 2/7/17 


TIME: 14:43


Chief Complaint


abdominal pain and distention for a few days. needs paracenthesis





HPI


This is a 57-year-old male with a history of liver failure who is well known to 

this ER for repetitive visits for paracentesis.  Patient states that he needs 

to have his fluid drained from his abdomen is getting too large and starting to 

get tense and painful.  He has no shortness of breath no abdominal pain, he 

said he had one episode of nausea vomiting yesterday but he says that he has 

nausea vomiting frequently and this is not anything new.  No chest pain 

shortness of breath.  No diarrhea.  No fever cough.





ROS


All systems reviewed and are negative except as per history of present illness.





Medications


Home Meds


Active Scripts


Ondansetron (Ondansetron Odt) 4 Mg Tab.rapdis, 4 MG PO Q6H Y for NAUSEA AND/OR 

VOMITING, #10 TAB


   Prov:BERNIE COWAN DO         2/7/17


Zolpidem Tartrate* (Ambien*) 5 Mg Tablet, 5 MG PO HS Y for INSOMNIA, #15 TAB


   Prov:BERNIE COWAN DO         2/7/17


Insulin Aspart* (Novolog Insulin Pen*) 100 Unit/Ml Soln, 10 UNIT SC WITH MEALS 

BEDTIME for 28 Days


   Prov:HANNAH CASTELLANO MD         11/15/16


Reported Medications


Dapagliflozin Propanediol (Farxiga) 10 Mg Tablet, 10 MG PO DAILY, #30 TAB


   11/11/16


Insulin Glargine* (Lantus*) 100 Unit/Ml Soln, 35 UNIT SC QHS, #1 VIAL


   11/11/16


Losartan Potassium* (Cozaar*) 25 Mg Tablet, 25 MG PO DAILY, #30 TAB


   11/11/16


Pantoprazole* (Protonix*) 40 Mg Tablet.dr, 40 MG PO DAILY, TAB


   11/11/16


Furosemide* (Furosemide*) 40 Mg Tablet, 40 MG PO DAILY, TAB


   11/11/16


Fenofibrate Nanocrystallized* (Fenofibrate*) 145 Mg Tablet, 145 MG PO DAILY, TAB


   11/11/16


Zolpidem Tartrate* (Zolpidem Tartrate*) 10 Mg Tablet, 10 MG PO QHS Y for 

INSOMNIA, #30 TAB


   11/11/16


Spironolactone* (Spironolactone*) 100 Mg Tablet, 100 MG PO QAM, TAB


   11/11/16


Discontinued Scripts


Sodium Polystyrene Sulfonate* (Kayexalate*) 15 Gm/60 Ml Susp, 30 GM PO DAILY 

for 3 Days, ML


   Prov:WILMA MCLEOD DO         1/15/17





Allergies


Allergies:  


Coded Allergies:  


     No Known Drug Allergy (Verified  Allergy, Unknown, 2/5/17)





PMhx/Soc


History of Surgery:  No


Anesthesia Reaction:  No


Hx Neurological Disorder:  No


Hx Respiratory Disorders:  No


Hx Cardiac Disorders:  No


Hx Psychiatric Problems:  No


Hx Miscellaneous Medical Probl:  Yes (liver disease/failure )


Hx Alcohol Use:  Yes (Used too )


Hx Substance Use:  No


Hx Tobacco Use:  Yes (Used to )


Smoking Status:  Never smoker





FmHx


Family History:  No coronary disease





Physical Exam


Vitals





Vital Signs








  Date Time  Temp Pulse Resp B/P Pulse Ox O2 Delivery O2 Flow Rate FiO2


 


2/7/17 09:04 97.5 100 21 95/64 97   








Physical Exam


Const:      Well-developed, well-nourished


 Head:        Atraumatic, normocephalic


 Eyes:       Normal Conjunctiva, PERRLA, EOMI, normal sclera, no nystagmus


 ENT:         Normal External Ears, Nose and Mouth, moist mucus membranes.


 Neck:        Full range of motion.  No meningismus, no lymphadenopathy.


 Resp:         Clear to auscultation bilaterally, no wheezing, rhonchi, rales


 Cardio:       Regular rate and rhythm, no murmurs, S1 S2 present


 Abd:         Soft, non tender x 4, distended abdomen with ascites normal bowel 

sounds, no guarding or rebound, no pulsitile abdominal masses or bruits


 Skin:         No petechiae or rashes, no ecchymosis , no maculopapular rash


 Back:        No midline or flank tenderness


 Ext:          No cyanosis, or edema, FROM x 4, normal inspection, 

neurovascularly intact x 4


 Neur:        Awake and alert, STR 5/5 x 4, sensation intact x 4, no focal 

findings, cerebellum intact


 Psych:        Normal Mood and Affect


Result Diagram:  


2/7/17 1225                                                                    

            2/7/17 1225





Results 24 hrs





 Laboratory Tests








Test


  2/7/17


12:25


 


Activated Partial


Thromboplast Time 35.8Sec 


 


 


Alanine Aminotransferase


(ALT/SGPT) 67IU/L 


 


 


Albumin 2.4g/dl 


 


Albumin/Globulin Ratio 0.61 


 


Alkaline Phosphatase 236IU/L 


 


Anion Gap 12 


 


Aspartate Amino Transf


(AST/SGOT) 82IU/L 


 


 


Basophils # 0.010^3/ul 


 


Basophils % 0.4% 


 


Blood Morphology Comment  


 


Blood Urea Nitrogen 20mg/dl 


 


Calcium Level 8.3mg/dl 


 


Carbon Dioxide Level 19mmol/L 


 


Chloride Level 97mmol/L 


 


Creatinine 0.66mg/dl 


 


Direct Bilirubin 0.00mg/dl 


 


Eosinophils # 0.110^3/ul 


 


Eosinophils % 1.7% 


 


Globulin 3.90g/dl 


 


Glucose Level 297mg/dl 


 


Hematocrit 34.5% 


 


Hemoglobin 11.7g/dl 


 


INR International Normalized


Ratio 1.13 


 


 


Indirect Bilirubin 0.4mg/dl 


 


Lipase 235U/L 


 


Lymphocytes # 0.710^3/ul 


 


Lymphocytes % 10.5% 


 


Mean Corpuscular Hemoglobin 30.4pg 


 


Mean Corpuscular Hemoglobin


Concent 34.0g/dl 


 


 


Mean Corpuscular Volume 89.5fl 


 


Mean Platelet Volume 10.4fl 


 


Monocytes # 1.110^3/ul 


 


Monocytes % 16.2% 


 


Neutrophils # 5.010^3/ul 


 


Neutrophils % 71.2% 


 


Nucleated Red Blood Cells # 0.010^3/ul 


 


Nucleated Red Blood Cells % 0.0/100WBC 


 


Platelet Count 02978^3/UL 


 


Potassium Level 5.3mmol/L 


 


Prothrombin Time 14.5Sec 


 


Prothrombin Time Ratio 1.1 


 


Red Blood Count 3.8610^6/ul 


 


Red Cell Distribution Width 16.1% 


 


Sodium Level 123mmol/L 


 


Total Bilirubin 0.4mg/dl 


 


Total Protein 6.3g/dl 


 


Troponin I < 0.012ng/ml 


 


White Blood Count 7.010^3/ul 








 Current Medications








 Medications


  (Trade)  Dose


 Ordered  Sig/Johnathan


 Route


 PRN Reason  Start Time


 Stop Time Status Last Admin


Dose Admin


 


 Ondansetron HCl


  (Zofran Odt)  4 mg  ONCE  STAT


 ODT


   2/7/17 12:02


 2/7/17 12:03 DC 2/7/17 12:24


 


 


 Lidocaine


  (Xylocaine 1%


  (Mpf))  5 ml  STK-MED ONCE


 .ROUTE


   2/7/17 13:37


 2/7/17 13:38 DC  


 











Procedures/MDM


PROCEDURE:   Ultrasound guided paracentesis


 


CLINICAL INDICATION:   Ascites 


 


TECHNIQUE:   The risks benefits and alternatives of the procedure were 

explained to the patient.  Informed written consent was obtained.  A time out 

was performed.  The patient understood the risks benefits and alternatives and 

wished to proceed with the procedure. 


 


COMPARISON:   None available 


 


FINDINGS:


 


A time out was performed.  The overlying skin of the right lower quadrant of 

the abdomen was prepped and draped in the usual sterile fashion. Approximately 

10 cc of lidocaine was injected locally for pain control.  Utilizing ultrasound 

guidance, a 6-Yi paracentesis catheter was placed into the  peritoneal 

cavity without difficulty.  The patient tolerated the procedure well without 

complication.  Approximately 7550 cc of clear yellow fluid was obtained.  The 

fluid was not sent to the lab for further analysis.


 


IMPRESSION:


 


1.  Successful ultrasound-guided paracentesis.


 


RPTAT: QQ


_____________________________________________ 


.Max Hein MD, MD           Date    Time 


Electronically viewed and signed by .Max Hein MD, MD on 02/07/2017 14:

31 


 


D:  02/07/2017 14:31  T:  02/07/2017 14:31


.R/





CC: BERNIE COWAN DO














Patient is asking for some sleep aid is having some insomnia lately.  He has 

successful paracentesis and says he is feeling much better





Departure


Diagnosis:  


 Primary Impression:  


 Ascites


 Ascites type:  other type  Qualified Code:  R18.8 - Other ascites


 Additional Impression:  


 Insomnia


 Insomnia type:  other insomnia  Qualified Code:  G47.09 - Other insomnia


Condition:  Stable


Patient Instructions:  Treating Insomnia, Ascites











BERNIE COWAN DO Feb 7, 2017 14:45

## 2017-02-07 NOTE — RADRPT
PROCEDURE:   Ultrasound guided paracentesis

 

CLINICAL INDICATION:   Ascites 

 

TECHNIQUE:   The risks benefits and alternatives of the procedure were explained to the patient.  In
formed written consent was obtained.  A time out was performed.  The patient understood the risks be
nefits and alternatives and wished to proceed with the procedure. 

 

COMPARISON:   None available 

 

FINDINGS:

 

A time out was performed.  The overlying skin of the right lower quadrant of the abdomen was prepped
 and draped in the usual sterile fashion. Approximately 10 cc of lidocaine was injected locally for 
pain control.  Utilizing ultrasound guidance, a 6-Tamazight paracentesis catheter was placed into the  
peritoneal cavity without difficulty.  The patient tolerated the procedure well without complication
.  Approximately 7550 cc of clear yellow fluid was obtained.  The fluid was not sent to the lab for 
further analysis.

 

IMPRESSION:

 

1.  Successful ultrasound-guided paracentesis.

 

RPTAT: QQ

_____________________________________________ 

.Max Hein MD, MD           Date    Time 

Electronically viewed and signed by .Max Hein MD, MD on 02/07/2017 14:31 

 

D:  02/07/2017 14:31  T:  02/07/2017 14:31

.R/

## 2017-02-09 ENCOUNTER — HOSPITAL ENCOUNTER (EMERGENCY)
Dept: HOSPITAL 10 - E/R | Age: 57
Discharge: HOME | End: 2017-02-09
Payer: COMMERCIAL

## 2017-02-09 VITALS
HEART RATE: 88 BPM | SYSTOLIC BLOOD PRESSURE: 107 MMHG | DIASTOLIC BLOOD PRESSURE: 62 MMHG | RESPIRATION RATE: 18 BRPM | TEMPERATURE: 98.3 F

## 2017-02-09 VITALS
HEIGHT: 66 IN | BODY MASS INDEX: 27.46 KG/M2 | WEIGHT: 170.86 LBS | HEIGHT: 66 IN | WEIGHT: 170.86 LBS | BODY MASS INDEX: 27.46 KG/M2

## 2017-02-09 DIAGNOSIS — Z79.84: ICD-10-CM

## 2017-02-09 DIAGNOSIS — E11.9: ICD-10-CM

## 2017-02-09 DIAGNOSIS — Z79.4: ICD-10-CM

## 2017-02-09 DIAGNOSIS — R18.8: Primary | ICD-10-CM

## 2017-02-09 DIAGNOSIS — Z87.891: ICD-10-CM

## 2017-02-09 PROCEDURE — 82962 GLUCOSE BLOOD TEST: CPT

## 2017-02-09 NOTE — ERD
ER Documentation


Chief Complaint


Date/Time


DATE: 2/9/17 


TIME: 14:28


Chief Complaint


Complains of abdominal pain Hx of Ascites





HPI


Patient is a 57-year-old male with cirrhosis and ascites as well as diabetes 

who presents for a paracentesis.  He had a paracentesis done 2 days ago and his 

abdomen is filled with fluid.  He is well-known to our staff for multiple 

visits for paracentesis.  He has had no fevers.  He forgot to take his insulin 

yesterday and wants to have his sugar checked as well.





ROS


All systems reviewed and are negative except as per history of present illness.





Medications


Home Meds


Active Scripts


Ondansetron (Ondansetron Odt) 4 Mg Tab.rapdis, 4 MG PO Q6H Y for NAUSEA AND/OR 

VOMITING, #10 TAB


   Prov:BERNIE COWAN DO         2/7/17


Insulin Aspart* (Novolog Insulin Pen*) 100 Unit/Ml Soln, 10 UNIT SC WITH MEALS 

BEDTIME for 28 Days


   Prov:HANNAH CASTELLANO MD         11/15/16


Reported Medications


Dapagliflozin Propanediol (Farxiga) 10 Mg Tablet, 10 MG PO DAILY, #30 TAB


   11/11/16


Insulin Glargine* (Lantus*) 100 Unit/Ml Soln, 35 UNIT SC QHS, #1 VIAL


   11/11/16


Losartan Potassium* (Cozaar*) 25 Mg Tablet, 25 MG PO DAILY, #30 TAB


   11/11/16


Pantoprazole* (Protonix*) 40 Mg Tablet.dr, 40 MG PO DAILY, TAB


   11/11/16


Furosemide* (Furosemide*) 40 Mg Tablet, 40 MG PO DAILY, TAB


   11/11/16


Fenofibrate Nanocrystallized* (Fenofibrate*) 145 Mg Tablet, 145 MG PO DAILY, TAB


   11/11/16


Zolpidem Tartrate* (Zolpidem Tartrate*) 10 Mg Tablet, 10 MG PO QHS Y for 

INSOMNIA, #30 TAB


   11/11/16


Spironolactone* (Spironolactone*) 100 Mg Tablet, 100 MG PO QAM, TAB


   11/11/16


Discontinued Scripts


Zolpidem Tartrate* (Ambien*) 5 Mg Tablet, 5 MG PO HS Y for INSOMNIA, #15 TAB


   Prov:BERNIE COWAN DO         2/7/17


Sodium Polystyrene Sulfonate* (Kayexalate*) 15 Gm/60 Ml Susp, 30 GM PO DAILY 

for 3 Days, ML


   Prov:WILMA MCLEOD DO         1/15/17





Allergies


Allergies:  


Coded Allergies:  


     No Known Drug Allergy (Verified  Allergy, Unknown, 2/9/17)





PMhx/Soc


Medical and Surgical Hx:  pt denies Medical Hx, pt denies Surgical Hx


History of Surgery:  No


Anesthesia Reaction:  No


Hx Neurological Disorder:  No


Hx Respiratory Disorders:  No


Hx Cardiac Disorders:  No


Hx Psychiatric Problems:  No


Hx Miscellaneous Medical Probl:  Yes (liver disease/failure )


Hx Alcohol Use:  Yes (Used too )


Hx Substance Use:  No


Hx Tobacco Use:  Yes (Used to )


Smoking Status:  Never smoker





FmHx


Family History:  diabetes





Physical Exam


Vitals





Vital Signs








  Date Time  Temp Pulse Resp B/P Pulse Ox O2 Delivery O2 Flow Rate FiO2


 


2/9/17 09:24 98.3 93 20 110/68 99   








Physical Exam


Const: No acute distress


Head:   Atraumatic 


Eyes:    Normal Conjunctiva


ENT:    Normal External Ears, Nose and Mouth.


Neck:               Full range of motion..~ No meningismus.


Resp:    Clear to auscultation bilaterally


Cardio:    Regular rate and rhythm, no murmurs


Abd:    Distended abdomen with positive fluid wave


Skin:    No petechiae or rashes


Back:    No midline or flank tenderness


Ext:    No cyanosis, or edema


Neur:    Awake and alert


Psych:    Normal Mood and Affect


Results 24 hrs





 Laboratory Tests








Test


  2/9/17


09:48


 


Bedside Glucose 277mg/dL 








 Current Medications








 Medications


  (Trade)  Dose


 Ordered  Sig/Johnathan


 Route


 PRN Reason  Start Time


 Stop Time Status Last Admin


Dose Admin


 


 Lidocaine


  (Xylocaine 1%


  (Mpf))  5 ml  STK-MED ONCE


 .ROUTE


   2/9/17 12:28


 2/9/17 12:29 DC 2/9/17 12:38


 











Procedures/MDM


Ultrasound-guided paracentesis done by radiology.





Patient is a 57-year-old male who presents with acute ascites.  The patient had 

a paracentesis performed.  At this point I doubt spontaneous bacterial 

peritonitis.  I doubt diabetic ketoacidosis.  I doubt appendicitis, 

cholecystitis, pink otitis, or bowel obstruction.  I believe outpatient 

management is appropriate.  The patient can return for any worsening symptoms.





Departure


Diagnosis:  


 Primary Impression:  


 Ascites


 Ascites type:  other type  Qualified Code:  R18.8 - Other ascites


 Additional Impression:  


 Abdominal pain


 Abdominal location:  unspecified location  Qualified Code:  R10.9 - Abdominal 

pain, unspecified location


Condition:  Fair


Patient Instructions:  Abdominal Pain, Ascites





Additional Instructions:  


Call your primary care doctor TOMORROW for an appointment during the next 1-2 

days.See the doctor sooner or return here if your condition worsens before your 

appointment time.











FRANCESCA MONTOYA MD Feb 9, 2017 14:29

## 2017-02-09 NOTE — RADRPT
PROCEDURE:   Ultrasound guided paracentesis

 

CLINICAL INDICATION:   Ascites 

 

TECHNIQUE:   The risks benefits and alternatives of the procedure were explained to the patient.  In
formed written consent was obtained.  A time out was performed.  The patient understood the risks be
nefits and alternatives and wished to proceed with the procedure. 

 

COMPARISON:   02/07/2017

 

FINDINGS:

 

A time out was performed.  The overlying skin of the right lower quadrant of the abdomen was prepped
 and draped in the usual sterile fashion. Approximately 10 cc of lidocaine was injected locally for 
pain control.  Utilizing ultrasound guidance, a 6-Q[place] paracentesis catheter was placed into the  
peritoneal cavity without difficulty.  The patient tolerated the procedure well without complication
.  Approximately 6000 cc of clear yellow fluid was obtained.  The fluid was not sent to the lab for 
further analysis.

 

IMPRESSION:

 

1.  Successful ultrasound-guided paracentesis.

 

RPTAT: QQ

_____________________________________________ 

.Max Hein MD, MD           Date    Time 

Electronically viewed and signed by .Max Hein MD, MD on 02/09/2017 13:06 

 

D:  02/09/2017 13:06  T:  02/09/2017 13:06

.R/

## 2017-02-10 ENCOUNTER — HOSPITAL ENCOUNTER (INPATIENT)
Dept: HOSPITAL 10 - E/R | Age: 57
LOS: 4 days | Discharge: HOME | DRG: 948 | End: 2017-02-14
Attending: INTERNAL MEDICINE | Admitting: INTERNAL MEDICINE
Payer: COMMERCIAL

## 2017-02-10 VITALS — TEMPERATURE: 98 F

## 2017-02-10 VITALS — HEART RATE: 95 BPM | DIASTOLIC BLOOD PRESSURE: 59 MMHG | SYSTOLIC BLOOD PRESSURE: 102 MMHG | RESPIRATION RATE: 18 BRPM

## 2017-02-10 VITALS
WEIGHT: 160.34 LBS | BODY MASS INDEX: 28.41 KG/M2 | HEIGHT: 63 IN | HEIGHT: 63 IN | BODY MASS INDEX: 28.41 KG/M2 | WEIGHT: 160.34 LBS

## 2017-02-10 VITALS — RESPIRATION RATE: 18 BRPM | SYSTOLIC BLOOD PRESSURE: 104 MMHG | DIASTOLIC BLOOD PRESSURE: 67 MMHG

## 2017-02-10 DIAGNOSIS — R10.9: ICD-10-CM

## 2017-02-10 DIAGNOSIS — K86.1: ICD-10-CM

## 2017-02-10 DIAGNOSIS — E87.1: ICD-10-CM

## 2017-02-10 DIAGNOSIS — D64.9: ICD-10-CM

## 2017-02-10 DIAGNOSIS — K72.90: ICD-10-CM

## 2017-02-10 DIAGNOSIS — I10: ICD-10-CM

## 2017-02-10 DIAGNOSIS — K74.60: ICD-10-CM

## 2017-02-10 DIAGNOSIS — R18.8: Primary | ICD-10-CM

## 2017-02-10 DIAGNOSIS — E11.9: ICD-10-CM

## 2017-02-10 LAB
ADD SCAN DIFF: NO
ADD UMIC: YES
ALBUMIN SERPL-MCNC: 2.4 G/DL (ref 3.3–4.9)
ALBUMIN/GLOB SERPL: 0.6 {RATIO}
ALP SERPL-CCNC: 236 IU/L (ref 42–121)
ALT SERPL-CCNC: 68 IU/L (ref 13–69)
ANION GAP SERPL CALC-SCNC: 14 MMOL/L (ref 8–16)
ANION GAP SERPL CALC-SCNC: 16 MMOL/L (ref 8–16)
APTT BLD: 33.4 SEC (ref 25–35)
APTT BLD: 35.4 SEC (ref 25–35)
AST SERPL-CCNC: 90 IU/L (ref 15–46)
BASOPHILS # BLD AUTO: 0 10^3/UL (ref 0–0.1)
BASOPHILS NFR BLD: 0.7 % (ref 0–2)
BILIRUB DIRECT SERPL-MCNC: 0 MG/DL (ref 0–0.2)
BILIRUB SERPL-MCNC: 0.3 MG/DL (ref 0.2–1.3)
BUN SERPL-MCNC: 23 MG/DL (ref 7–20)
BUN SERPL-MCNC: 24 MG/DL (ref 7–20)
CALCIUM SERPL-MCNC: 8.5 MG/DL (ref 8.4–10.2)
CALCIUM SERPL-MCNC: 8.6 MG/DL (ref 8.4–10.2)
CHLORIDE SERPL-SCNC: 100 MMOL/L (ref 97–110)
CHLORIDE SERPL-SCNC: 99 MMOL/L (ref 97–110)
CO2 SERPL-SCNC: 14 MMOL/L (ref 21–31)
CO2 SERPL-SCNC: 19 MMOL/L (ref 21–31)
COLOR UR: (no result)
CREAT SERPL-MCNC: 0.63 MG/DL (ref 0.61–1.24)
CREAT SERPL-MCNC: 0.66 MG/DL (ref 0.61–1.24)
EOSINOPHIL # BLD: 0.1 10^3/UL (ref 0–0.5)
EOSINOPHIL NFR BLD: 1.3 % (ref 0–7)
ERYTHROCYTE [DISTWIDTH] IN BLOOD BY AUTOMATED COUNT: 15.5 % (ref 11.5–14.5)
GLOBULIN SER-MCNC: 4 G/DL (ref 1.3–3.2)
GLUCOSE SERPL-MCNC: 224 MG/DL (ref 70–220)
GLUCOSE SERPL-MCNC: 227 MG/DL (ref 70–220)
GLUCOSE UR STRIP-MCNC: >=1000 %
HCT VFR BLD CALC: 35.3 % (ref 42–52)
HGB BLD-MCNC: 12.3 G/DL (ref 14–18)
INR PPP: 1.1
INR PPP: 1.16
KETONES UR STRIP.AUTO-MCNC: NEGATIVE MG/DL
LYMPHOCYTES # BLD AUTO: 0.6 10^3/UL (ref 0.8–2.9)
LYMPHOCYTES NFR BLD AUTO: 10.1 % (ref 15–51)
MCH RBC QN AUTO: 30.1 PG (ref 29–33)
MCHC RBC AUTO-ENTMCNC: 34.8 G/DL (ref 32–37)
MCV RBC AUTO: 86.5 FL (ref 82–101)
MONOCYTES # BLD: 1 10^3/UL (ref 0.3–0.9)
MONOCYTES NFR BLD: 17 % (ref 0–11)
NEUTROPHILS # BLD: 4.3 10^3/UL (ref 1.6–7.5)
NEUTROPHILS NFR BLD AUTO: 70.4 % (ref 39–77)
NITRITE UR QL STRIP.AUTO: NEGATIVE
NRBC # BLD MANUAL: 0 10^3/UL (ref 0–0)
NRBC BLD QL: 0 /100WBC (ref 0–0)
PLATELET # BLD: 132 10^3/UL (ref 140–415)
PMV BLD AUTO: 11.6 FL (ref 7.4–10.4)
POTASSIUM SERPL-SCNC: 4.6 MMOL/L (ref 3.5–5.1)
POTASSIUM SERPL-SCNC: 4.7 MMOL/L (ref 3.5–5.1)
PROT SERPL-MCNC: 6.4 G/DL (ref 6.1–8.1)
PROTHROMBIN TIME: 14.2 SEC (ref 12.2–14.2)
PROTHROMBIN TIME: 14.8 SEC (ref 12.2–14.2)
PT RATIO: 1.1
PT RATIO: 1.2
RBC # BLD AUTO: 4.08 10^6/UL (ref 4.7–6.1)
RBC # UR AUTO: NEGATIVE /UL
RBC #/AREA URNS HPF: (no result) /HPF
SODIUM SERPL-SCNC: 124 MMOL/L (ref 135–144)
SODIUM SERPL-SCNC: 128 MMOL/L (ref 135–144)
TROPONIN I SERPL-MCNC: < 0.012 NG/ML (ref 0–0.12)
URINE BILIRUBIN (DIP): NEGATIVE
URINE TOTAL PROTEIN (DIP): NEGATIVE
UROBILINOGEN UR STRIP-ACNC: (no result) (ref 0.1–1)
WBC # BLD AUTO: 6.1 10^3/UL (ref 4.8–10.8)
WBC # UR STRIP: (no result) /UL

## 2017-02-10 PROCEDURE — 80048 BASIC METABOLIC PNL TOTAL CA: CPT

## 2017-02-10 PROCEDURE — 96376 TX/PRO/DX INJ SAME DRUG ADON: CPT

## 2017-02-10 PROCEDURE — 84484 ASSAY OF TROPONIN QUANT: CPT

## 2017-02-10 PROCEDURE — 83690 ASSAY OF LIPASE: CPT

## 2017-02-10 PROCEDURE — 82962 GLUCOSE BLOOD TEST: CPT

## 2017-02-10 PROCEDURE — 96374 THER/PROPH/DIAG INJ IV PUSH: CPT

## 2017-02-10 PROCEDURE — 74176 CT ABD & PELVIS W/O CONTRAST: CPT

## 2017-02-10 PROCEDURE — 80053 COMPREHEN METABOLIC PANEL: CPT

## 2017-02-10 PROCEDURE — 85610 PROTHROMBIN TIME: CPT

## 2017-02-10 PROCEDURE — 85025 COMPLETE CBC W/AUTO DIFF WBC: CPT

## 2017-02-10 PROCEDURE — 82140 ASSAY OF AMMONIA: CPT

## 2017-02-10 PROCEDURE — 36415 COLL VENOUS BLD VENIPUNCTURE: CPT

## 2017-02-10 PROCEDURE — 93005 ELECTROCARDIOGRAM TRACING: CPT

## 2017-02-10 PROCEDURE — 96375 TX/PRO/DX INJ NEW DRUG ADDON: CPT

## 2017-02-10 PROCEDURE — P9047 ALBUMIN (HUMAN), 25%, 50ML: HCPCS

## 2017-02-10 PROCEDURE — 81003 URINALYSIS AUTO W/O SCOPE: CPT

## 2017-02-10 PROCEDURE — C9113 INJ PANTOPRAZOLE SODIUM, VIA: HCPCS

## 2017-02-10 PROCEDURE — 85730 THROMBOPLASTIN TIME PARTIAL: CPT

## 2017-02-10 PROCEDURE — 81001 URINALYSIS AUTO W/SCOPE: CPT

## 2017-02-10 RX ADMIN — PANTOPRAZOLE SODIUM SCH MG: 40 INJECTION, POWDER, FOR SOLUTION INTRAVENOUS at 22:14

## 2017-02-10 RX ADMIN — PANTOPRAZOLE SODIUM SCH MG: 40 INJECTION, POWDER, FOR SOLUTION INTRAVENOUS at 08:52

## 2017-02-10 RX ADMIN — FOLIC ACID SCH MLS/HR: 5 INJECTION, SOLUTION INTRAMUSCULAR; INTRAVENOUS; SUBCUTANEOUS at 18:03

## 2017-02-10 NOTE — RADRPT
PROCEDURE:   CT abdomen and pelvis without intravenous contrast. 

 

CLINICAL INDICATION: Pain.

 

TECHNIQUE:   CT of the abdomen/pelvis was performed utilizing axial images with reconstructions in s
agittal and coronal planes. The administered radiation dose is CTDI 14.3 mGy, .2 mGy-cm. 

 

COMPARISON: No pertinent prior examinations were submitted for comparison.

 

FINDINGS:

 

Visualized Chest: Mild atelectasis within the lung bases.  Coronary artery calcifications are noted.
  There is trace pericardial effusion.  There is mild cardiomegaly.

 

Abdomen:

 

The liver is small with diffusely nodular contours, compatible with cirrhosis.  There is moderate sp
lenomegaly.  Extensive upper abdominal varices are present.  There is moderate intra-abdominal ascit
es.

 

There is diffuse dilatation of the pancreatic duct with coarse pancreatic calcifications compatible 
with chronic pancreatitis.  Stones are noted within a contracted gallbladder.

 

The adrenal glands are unremarkable.

 

The kidneys are without hydronephrosis.  No definite urinary calculi are seen. Small right renal cys
ts are noted.

 

There is no evidence of bowel obstruction.  The appendix is normal.  No intra-abdominal free air is 
seen.  

 

There is no evidence of intra-abdominal adenopathy or free fluid.  

 

 

Pelvis:

 

There is ascites within the pelvis.  The urinary bladder and prostate are unremarkable.  There is a 
small, fat-containing right inguinal hernia.

 

Osseous structures: Unremarkable.

 

IMPRESSION:

 

Cirrhotic liver with associated moderate splenomegaly and upper abdominal varices as well as intra-a
bdominal ascites.

 

Cholelithiasis.

 

Chronic pancreatitis.

 

RPTAT: HIKT

_____________________________________________ 

.Hugo Williamson MD, MD           Date    Time 

Electronically viewed and signed by .Hugo Williamson MD, MD on 02/10/2017 03:46 

 

D:  02/10/2017 03:46  T:  02/10/2017 03:46

.T/

## 2017-02-10 NOTE — ERA
ER Documentation


Chief Complaint


Date/Time


DATE: 2/10/17 


TIME: 05:22


Chief Complaint


abd pain,hx liver cirrhosis,just had paracentesis prior to coming in





HPI


57-year-old male with a history of end-stage liver disease presenting for 

abdominal pain.  He has a history of chronic abdominal pain and frequently 

needs to have paracenteses done, now about every 2 days.  She was here in the 

ER earlier this morning and had a paracentesis done.  He went home and felt 

better.  However after some time, his abdomen started to swell again and he 

started to feel diffuse pain in his abdomen.  He describes as a pressure-like 

pain.  No associated fevers or chills.  Per his wife, lately he has been 

vomiting more than usual and his stools have been looser than usual.  He has 

not been taking his lactulose for several days secondary to diarrhea.  He 

denies chest pain or shortness of breath.





ROS


All systems reviewed and are negative except as per history of present illness.





Medications


Home Meds


Active Scripts


Ondansetron (Ondansetron Odt) 4 Mg Tab.rapdis, 4 MG PO Q6H Y for NAUSEA AND/OR 

VOMITING, #10 TAB


   Prov:BERNIE COWAN DO         2/7/17


Insulin Aspart* (Novolog Insulin Pen*) 100 Unit/Ml Soln, 10 UNIT SC WITH MEALS 

BEDTIME for 28 Days


   Prov:HANNAH CASTELLANO MD         11/15/16


Reported Medications


Dapagliflozin Propanediol (Farxiga) 10 Mg Tablet, 10 MG PO DAILY, #30 TAB


   11/11/16


Insulin Glargine* (Lantus*) 100 Unit/Ml Soln, 35 UNIT SC QHS, #1 VIAL


   11/11/16


Losartan Potassium* (Cozaar*) 25 Mg Tablet, 25 MG PO DAILY, #30 TAB


   11/11/16


Pantoprazole* (Protonix*) 40 Mg Tablet.dr, 40 MG PO DAILY, TAB


   11/11/16


Furosemide* (Furosemide*) 40 Mg Tablet, 40 MG PO DAILY, TAB


   11/11/16


Fenofibrate Nanocrystallized* (Fenofibrate*) 145 Mg Tablet, 145 MG PO DAILY, TAB


   11/11/16


Zolpidem Tartrate* (Zolpidem Tartrate*) 10 Mg Tablet, 10 MG PO QHS Y for 

INSOMNIA, #30 TAB


   11/11/16


Spironolactone* (Spironolactone*) 100 Mg Tablet, 100 MG PO QAM, TAB


   11/11/16


Discontinued Scripts


Zolpidem Tartrate* (Ambien*) 5 Mg Tablet, 5 MG PO HS Y for INSOMNIA, #15 TAB


   Prov:BERNIE COWAN DO         2/7/17





Allergies


Allergies:  


Coded Allergies:  


     No Known Drug Allergy (Verified  Allergy, Unknown, 2/9/17)





PMhx/Soc


History of Surgery:  No


Anesthesia Reaction:  No


Hx Neurological Disorder:  No


Hx Respiratory Disorders:  No


Hx Cardiac Disorders:  No


Hx Psychiatric Problems:  No


Hx Miscellaneous Medical Probl:  Yes (liver disease/failure, paracentesis)


Hx Alcohol Use:  Yes (Used too )


Hx Substance Use:  No


Hx Tobacco Use:  Yes (Used to )


Smoking Status:  Former smoker





FmHx


Family History:  No diabetes





Physical Exam


Vitals





Vital Signs








  Date Time  Temp Pulse Resp B/P Pulse Ox O2 Delivery O2 Flow Rate FiO2


 


2/10/17 04:07  98 18 87/64 97 Room Air  


 


2/10/17 00:38 98.2 102 17 97/70 97   








Physical Exam


Const:     Nontoxic, mild distress


Head:   Atraumatic 


Eyes:    Normal Conjunctiva


ENT:    Normal External Ears, Nose and Mouth.


Neck:               Full range of motion.No meningismus.


Resp:    Clear to auscultation bilaterally, breath sounds diminished at bases


Cardio:    Regular rate and rhythm, no murmurs


Abd:    Soft, distended, ascites present, minimal diffuse tenderness, no 

bleeding from site of paracentesis.  No rebound or guarding.   Normal bowel 

sounds


Skin:    No petechiae or rashes


Back:    No midline or flank tenderness


Ext:    No cyanosis, 2+ pitting edema bilaterally.  2+ distal pulses.


Neur:    Awake and alert and oriented 3, face symmetric, moving all extremity


Psych:    Depressed mood and flat affect


Result Diagram:  


2/10/17 0054                                                                   

             2/10/17 0054





Results 24 hrs





 Laboratory Tests








Test


  2/10/17


00:54


 


Activated Partial


Thromboplast Time 33.4Sec 


 


 


Alanine Aminotransferase


(ALT/SGPT) 68IU/L 


 


 


Albumin 2.4g/dl 


 


Albumin/Globulin Ratio 0.60 


 


Alkaline Phosphatase 236IU/L 


 


Anion Gap 16 


 


Aspartate Amino Transf


(AST/SGOT) 90IU/L 


 


 


Basophils # 0.010^3/ul 


 


Basophils % 0.7% 


 


Blood Urea Nitrogen 23mg/dl 


 


Calcium Level 8.5mg/dl 


 


Carbon Dioxide Level 14mmol/L 


 


Chloride Level 99mmol/L 


 


Creatinine 0.66mg/dl 


 


Direct Bilirubin 0.00mg/dl 


 


Eosinophils # 0.110^3/ul 


 


Eosinophils % 1.3% 


 


Globulin 4.00g/dl 


 


Glucose Level 224mg/dl 


 


Hematocrit 35.3% 


 


Hemoglobin 12.3g/dl 


 


INR International Normalized


Ratio 1.10 


 


 


Indirect Bilirubin 0.3mg/dl 


 


Lymphocytes # 0.610^3/ul 


 


Lymphocytes % 10.1% 


 


Mean Corpuscular Hemoglobin 30.1pg 


 


Mean Corpuscular Hemoglobin


Concent 34.8g/dl 


 


 


Mean Corpuscular Volume 86.5fl 


 


Mean Platelet Volume 11.6fl 


 


Monocytes # 1.010^3/ul 


 


Monocytes % 17.0% 


 


Neutrophils # 4.310^3/ul 


 


Neutrophils % 70.4% 


 


Nucleated Red Blood Cells # 0.010^3/ul 


 


Nucleated Red Blood Cells % 0.0/100WBC 


 


Platelet Count 65386^3/UL 


 


Potassium Level 4.7mmol/L 


 


Prothrombin Time 14.2Sec 


 


Prothrombin Time Ratio 1.1 


 


Red Blood Count 4.0810^6/ul 


 


Red Cell Distribution Width 15.5% 


 


Sodium Level 124mmol/L 


 


Total Bilirubin 0.3mg/dl 


 


Total Protein 6.4g/dl 


 


White Blood Count 6.110^3/ul 








 Current Medications








 Medications


  (Trade)  Dose


 Ordered  Sig/Johnathan


 Route


 PRN Reason  Start Time


 Stop Time Status Last Admin


Dose Admin


 


 Morphine Sulfate


  (morphine)  4 mg  ONCE  STAT


 IV


   2/10/17 01:19


 2/10/17 01:20 DC  


 


 


 Fentanyl 25 mcg  25 mcg  ONCE  ONCE


 IV


   2/10/17 02:00


 2/10/17 02:01 DC 2/10/17 01:40


 


 


 Ceftriaxone Sodium


  (Rocephin)  50 ml @ 


 100 mls/hr  ONCE  STAT


 IVPB


   2/10/17 02:17


 2/10/17 02:46 DC 2/10/17 03:43


 


 


 Ondansetron HCl


  (Zofran Inj)  4 mg  BRIDGE ORDER PRN


 IV


 NAUSEA AND/OR VOMITING  2/10/17 03:30


 2/11/17 03:29   


 


 


 Fentanyl


  (Sublimaze)  50 mcg  ONCE  ONCE


 IV


   2/10/17 04:00


 2/10/17 04:01 DC 2/10/17 03:46


 











Procedures/MDM


EKG: 


Rate/Rhythm:             Sinus tachycardia at 103


QRS, ST, T-waves:    No changes consistent w/ acute ischemia


Impression:      No evidence of ischemia or arrhythmia





Patient is presenting with acute on chronic abdominal pain.  His vitals were 

notable for mild tachycardia.  He has no peritoneal signs on exam.  I have a 

low suspicion for bowel obstruction, spontaneous bacterial peritonitis, or 

bowel perforation.  Labs are notable for hyponatremia, hypoalbuminemia, and 

hyperglycemia.  CT of the abdomen and pelvis was ordered and showed cirrhosis 

and ascites without any other acute abnormalities.  Family states that they do 

not feel comfortable taking him home as he is declining.  He is awaiting a TIPS 

procedure, but they are awaiting authorization for this.  His pain is not well 

controlled with the Norco he has at home.  I spoke with Dr. Castellano and he is 

willing to admit to the hospital.  As the patient had a paracentesis today and 

is presenting with abdominal pain, ceftriaxone was started.  I suspect he will 

need another paracentesis while here.





Accepting Care Team:


Current data and ongoing care discussed.


Time:                      Time of admission


Primary Provider:      CAN Castellano


Consulting:                  none


Outstanding Data:       none





Departure


Diagnosis:  


 Primary Impression:  


 Diffuse abdominal pain


 Additional Impressions:  


 History of ascites


 History of liver disease


Condition:  YANCI Kim MD Feb 10, 2017 05:32

## 2017-02-10 NOTE — HP
DATE OF ADMISSION: 02/10/2017

 

HISTORY OF PRESENT ILLNESS:  Berta Irizarry is a 57-year-old male with history of cirrhosis of the [place]
er, ascites, multiple paracenteses, underwent paracentesis recently, now presents back with abdomina
l pain.  The patient is noted to have a hematocrit of 35.2 in the past.  The patient also has a hist
ory of hypertension, diabetes mellitus, end-stage liver disease.  Patient has other history includes
 history of Staphylococcus aureus urinary tract infection.

 

ALLERGY HISTORY:  NEGATIVE.

 

FAMILY HISTORY:  Negative.

 

SOCIAL HISTORY:  Denies.

 

MEDICATION HISTORY:  Includes:

1.  Farxiga.

2.  Fenofibrate.

3.  Lasix.

4.  Insulin.

5.  Losartan.

6.  Zofran.

7.  Protonix.  

8.  Aldactone.

9.  Ambien.

 

REVIEW OF SYSTEMS

HEENT:  Unremarkable.

RESPIRATORY:  Unremarkable.

CARDIOVASCULAR:  Unremarkable.

ABDOMEN:  Abdominal pain that started yesterday.

EXTREMITIES:  Unremarkable.

CENTRAL NERVOUS SYSTEM:  Unremarkable.

 

PHYSICAL EXAMINATION:

GENERAL:  The patient is awake, alert.

VITAL SIGNS:  Pulse 92, blood pressure 99/70.

HEAD:  Atraumatic, normocephalic.  Pupils are equal, reactive.  Pale conjunctivae, icterus positive.

NECK:  Supple, no JVD.

LUNGS:  Clear.

CARDIOVASCULAR:  S1, S2 normal.

ABDOMEN:  Distended.  Bowel sounds positive.  Ascites appreciated.

EXTREMITIES:  No cyanosis, clubbing.  Trace edema.

CENTRAL NERVOUS SYSTEM:  The patient is awake, alert, no deficit.

 

LABORATORY DATA:  Hematocrit 35.3, platelet count of 132.  Sodium 120, potassium 4.6, BUN 24, creati
nine 0.63.  Patient had a lipase 3934.  Ammonia 89, albumin 2.4, hemoglobin 4.0.  The patient had a 
pelvic abdominal CT scan done which shows the patient has a cirrhotic liver with associated moderate
 splenomegaly, upper abdominal varices noted, cholelithiasis, chronic pancreatitis.

 

IMPRESSION:.  The patient has:  

1.  Abdominal pain.

2.  Chronic pancreatitis.

3.  Cirrhosis of the liver.  

4.  Anemia.

5.  Hyponatremia.  

6.  Hepatic encephalopathy.

 

PLAN:  To continue home medications, gentle IV fluids, clear liquid diet.  Orders were done.

 

 

Dictated By: HANNAH BIRMINGHAM/MARKO

DD:    02/10/2017 16:08:37

DT:    02/10/2017 16:42:21

Conf#: [id number]

DID#:  [id number]

## 2017-02-11 VITALS — DIASTOLIC BLOOD PRESSURE: 50 MMHG | SYSTOLIC BLOOD PRESSURE: 81 MMHG

## 2017-02-11 VITALS — SYSTOLIC BLOOD PRESSURE: 83 MMHG | DIASTOLIC BLOOD PRESSURE: 53 MMHG | RESPIRATION RATE: 20 BRPM

## 2017-02-11 VITALS — SYSTOLIC BLOOD PRESSURE: 105 MMHG | DIASTOLIC BLOOD PRESSURE: 69 MMHG | RESPIRATION RATE: 18 BRPM

## 2017-02-11 LAB
ADD SCAN DIFF: NO
ALBUMIN SERPL-MCNC: 2.3 G/DL (ref 3.3–4.9)
ALBUMIN/GLOB SERPL: 0.63 {RATIO}
ALP SERPL-CCNC: 199 IU/L (ref 42–121)
ALT SERPL-CCNC: 59 IU/L (ref 13–69)
ANION GAP SERPL CALC-SCNC: 13 MMOL/L (ref 8–16)
AST SERPL-CCNC: 78 IU/L (ref 15–46)
BASOPHILS # BLD AUTO: 0.1 10^3/UL (ref 0–0.1)
BASOPHILS NFR BLD: 1.5 % (ref 0–2)
BILIRUB DIRECT SERPL-MCNC: 0 MG/DL (ref 0–0.2)
BILIRUB SERPL-MCNC: 0.7 MG/DL (ref 0.2–1.3)
BUN SERPL-MCNC: 22 MG/DL (ref 7–20)
CALCIUM SERPL-MCNC: 8.4 MG/DL (ref 8.4–10.2)
CHLORIDE SERPL-SCNC: 102 MMOL/L (ref 97–110)
CO2 SERPL-SCNC: 16 MMOL/L (ref 21–31)
CREAT SERPL-MCNC: 0.53 MG/DL (ref 0.61–1.24)
EOSINOPHIL # BLD: 0.1 10^3/UL (ref 0–0.5)
EOSINOPHIL NFR BLD: 2.9 % (ref 0–7)
ERYTHROCYTE [DISTWIDTH] IN BLOOD BY AUTOMATED COUNT: 15.4 % (ref 11.5–14.5)
GLOBULIN SER-MCNC: 3.6 G/DL (ref 1.3–3.2)
GLUCOSE SERPL-MCNC: 137 MG/DL (ref 70–220)
HCT VFR BLD CALC: 33.9 % (ref 42–52)
HGB BLD-MCNC: 11.6 G/DL (ref 14–18)
LYMPHOCYTES # BLD AUTO: 0.7 10^3/UL (ref 0.8–2.9)
LYMPHOCYTES NFR BLD AUTO: 16.2 % (ref 15–51)
MCH RBC QN AUTO: 29.8 PG (ref 29–33)
MCHC RBC AUTO-ENTMCNC: 34.2 G/DL (ref 32–37)
MCV RBC AUTO: 87.1 FL (ref 82–101)
MONOCYTES # BLD: 0.7 10^3/UL (ref 0.3–0.9)
MONOCYTES NFR BLD: 17.2 % (ref 0–11)
NEUTROPHILS # BLD: 2.5 10^3/UL (ref 1.6–7.5)
NEUTROPHILS NFR BLD AUTO: 61.7 % (ref 39–77)
NRBC # BLD MANUAL: 0 10^3/UL (ref 0–0)
NRBC BLD QL: 0 /100WBC (ref 0–0)
PLATELET # BLD: 139 10^3/UL (ref 140–415)
PMV BLD AUTO: 11.9 FL (ref 7.4–10.4)
POTASSIUM SERPL-SCNC: 4.8 MMOL/L (ref 3.5–5.1)
PROT SERPL-MCNC: 5.9 G/DL (ref 6.1–8.1)
RBC # BLD AUTO: 3.89 10^6/UL (ref 4.7–6.1)
SODIUM SERPL-SCNC: 126 MMOL/L (ref 135–144)
WBC # BLD AUTO: 4.1 10^3/UL (ref 4.8–10.8)

## 2017-02-11 RX ADMIN — PANTOPRAZOLE SODIUM SCH MG: 40 INJECTION, POWDER, FOR SOLUTION INTRAVENOUS at 20:12

## 2017-02-11 RX ADMIN — TEMAZEPAM PRN MG: 15 CAPSULE ORAL at 21:16

## 2017-02-11 RX ADMIN — MIDODRINE HYDROCHLORIDE SCH MG: 5 TABLET ORAL at 06:36

## 2017-02-11 RX ADMIN — FOLIC ACID SCH MLS/HR: 5 INJECTION, SOLUTION INTRAMUSCULAR; INTRAVENOUS; SUBCUTANEOUS at 16:00

## 2017-02-11 RX ADMIN — MIDODRINE HYDROCHLORIDE SCH MG: 5 TABLET ORAL at 12:55

## 2017-02-11 RX ADMIN — PANTOPRAZOLE SODIUM SCH MG: 40 INJECTION, POWDER, FOR SOLUTION INTRAVENOUS at 09:59

## 2017-02-11 RX ADMIN — FUROSEMIDE SCH MG: 40 TABLET ORAL at 06:00

## 2017-02-11 RX ADMIN — LOSARTAN POTASSIUM SCH MG: 25 TABLET, FILM COATED ORAL at 09:59

## 2017-02-11 RX ADMIN — MIDODRINE HYDROCHLORIDE SCH MG: 5 TABLET ORAL at 06:40

## 2017-02-11 RX ADMIN — PANTOPRAZOLE SODIUM SCH MG: 40 TABLET, DELAYED RELEASE ORAL at 05:20

## 2017-02-11 RX ADMIN — FENOFIBRATE SCH MG: 145 TABLET ORAL at 09:59

## 2017-02-11 RX ADMIN — MIDODRINE HYDROCHLORIDE SCH MG: 5 TABLET ORAL at 17:56

## 2017-02-11 RX ADMIN — SPIRONOLACTONE SCH MG: 50 TABLET ORAL at 10:00

## 2017-02-11 NOTE — PN
Date/Time of Note


Date/Time of Note


DATE: 2/11/17 


TIME: 18:59





Assessment/Plan


VTE Prophylaxis


VTE Prophylaxis Intervention:  other





Assessment/Plan


Assessment/Plan


Abdominal pain.


2.  Chronic pancreatitis.


3.  Cirrhosis of the liver.  


4.  Anemia.


5.  Hyponatremia.  


6.  Hepatic encephalopathy.





[id number]   coherent


               gets paracentesis   every 10 days


             may need  soon


chk ammomnia level am





Exam/Review of Systems


Vital Signs


Vitals





 Vital Signs








  Date Time  Temp Pulse Resp B/P Pulse Ox O2 Delivery O2 Flow Rate FiO2


 


2/11/17 07:42 97.8 94 18 105/69 99   


 


2/10/17 14:30      Room Air  














 Intake and Output   


 


 2/10/17 2/10/17 2/11/17





 15:00 23:00 07:00


 


Intake Total   1130 ml


 


Balance   1130 ml











Exam


Constitutional:  alert, oriented, well developed


Neck:  supple


Respiratory:  clear to auscultation


Cardiovascular:  regular rate and rhythm


Gastrointestinal:  distended, soft


Musculoskeletal:  nl extremities to inspection


Neurological:  nl mental status





Results


Result Diagram:  


2/11/17 0500                                                                   

             2/11/17 0500





Results 24 hrs





Laboratory Tests








Test


  2/10/17


22:00 2/11/17


02:12 2/11/17


05:00 2/11/17


07:51


 


Bedside Glucose 248  H 167    125  


 


Alanine Aminotransferase


(ALT/SGPT) 


  


  59  


  


 


 


Albumin   2.3  L 


 


Albumin/Globulin Ratio   0.63   


 


Alkaline Phosphatase   199  H 


 


Anion Gap   13   


 


Aspartate Amino Transf


(AST/SGOT) 


  


  78  H


  


 


 


Basophils #   0.1   


 


Basophils %   1.5   


 


Blood Urea Nitrogen   22  H 


 


Calcium Level   8.4   


 


Carbon Dioxide Level   16  L 


 


Chloride Level   102   


 


Creatinine   0.53  L 


 


Direct Bilirubin   0.00   


 


Eosinophils #   0.1   


 


Eosinophils %   2.9   


 


Globulin   3.60  H 


 


Glucose Level   137  # 


 


Hematocrit   33.9  L 


 


Hemoglobin   11.6  L 


 


Indirect Bilirubin   0.7   


 


Lipase   317  H 


 


Lymphocytes #   0.7  L 


 


Lymphocytes %   16.2   


 


Mean Corpuscular Hemoglobin   29.8   


 


Mean Corpuscular Hemoglobin


Concent 


  


  34.2  


  


 


 


Mean Corpuscular Volume   87.1   


 


Mean Platelet Volume   11.9  H 


 


Monocytes #   0.7   


 


Monocytes %   17.2  H 


 


Neutrophils #   2.5   


 


Neutrophils %   61.7   


 


Nucleated Red Blood Cells #   0.0   


 


Nucleated Red Blood Cells %   0.0   


 


Platelet Count   139  L 


 


Potassium Level   4.8   


 


Red Blood Count   3.89  L 


 


Red Cell Distribution Width   15.4  H 


 


Sodium Level   126  L 


 


Total Bilirubin   0.7   


 


Total Protein   5.9  L 


 


White Blood Count   4.1  #L 














Test


  2/11/17


11:49 2/11/17


17:42 


  


 


 


Bedside Glucose 312  H 321  H  











Medications


Medications





 Current Medications


Pantoprazole (Protonix Iv) 40 mg BID IV  Last administered on 2/11/17at 09:59; 

Admin Dose 40 MG;  Start 2/10/17 at 09:00


Ondansetron HCl (Zofran Inj) 4 mg Q6H  PRN IV NAUSEA ;  Start 2/10/17 at 08:30


Morphine Sulfate (morphine) 1 mg Q4H  PRN IV PAIN;  Start 2/10/17 at 09:00


Diagnostic Test (Pha) 1 ea 1 ea 02 XX ;  Start 2/11/17 at 02:00


Sodium Chloride (NS) 1,000 ml @  30 mls/hr Q24H IV  Last administered on 2/10/

17at 18:03; Admin Dose 30 MLS/HR;  Start 2/10/17 at 16:00


Fenofibrate (Tricor) 145 mg DAILY PO  Last administered on 2/11/17at 09:59; 

Admin Dose 145 MG;  Start 2/11/17 at 09:00


Furosemide (Lasix) 40 mg DAILY@06 PO ;  Start 2/11/17 at 06:00


Losartan Potassium (Cozaar) 25 mg DAILY PO  Last administered on 2/11/17at 09:59

; Admin Dose 25 MG;  Start 2/11/17 at 09:00


Ondansetron HCl (Zofran Odt) 4 mg Q6H  PRN ODT NAUSEA AND/OR VOMITING;  Start 2/

10/17 at 16:00


Pantoprazole (Protonix Tab) 40 mg DAILY@06 PO  Last administered on 2/11/17at 05

:20; Admin Dose 40 MG;  Start 2/11/17 at 06:00


Spironolactone (Aldactone) 100 mg QAM PO  Last administered on 2/11/17at 10:00; 

Admin Dose 100 MG;  Start 2/11/17 at 09:00


Miscellaneous Information 1 ea NOTE XX ;  Start 2/10/17 at 17:00


Glucose (Glutose) 15 gm Q15M  PRN PO DECREASED GLUCOSE;  Start 2/10/17 at 17:00


Glucose (Glutose) 22.5 gm Q15M  PRN PO DECREASED GLUCOSE;  Start 2/10/17 at 17:

00


Dextrose (D50w Syringe) 25 ml Q15M  PRN IV DECREASED GLUCOSE;  Start 2/10/17 at 

17:00


Dextrose (D50w Syringe) 50 ml Q15M  PRN IV DECREASED GLUCOSE;  Start 2/10/17 at 

17:00


Glucagon (Glucagen) 1 mg Q15M  PRN IM DECREASED GLUCOSE;  Start 2/10/17 at 17:00


Glucose (Glutose) 15 gm Q15M  PRN BUCCAL DECREASED GLUCOSE;  Start 2/10/17 at 17

:00


Temazepam (Restoril) 30 mg HS  PRN PO SLEEP;  Start 2/11/17 at 00:55


Midodrine 5 mg 5 mg TID@09,13,17 PO  Last administered on 2/11/17at 17:56; 

Admin Dose 5 MG;  Start 2/11/17 at 06:30


Albumin Human (Albumin Human 25%) 200 ml @  100 mls/hr ONCE  ONCE IV  Last 

administered on 2/11/17at 06:49; Admin Dose 100 MLS/HR;  Start 2/11/17 at 06:30

;  Stop 2/11/17 at 08:29











RANJEET NIÑO MD Feb 11, 2017 19:02

## 2017-02-11 NOTE — CONS
Date/Time of Note


Date/Time of Note


DATE: 2/11/17 


TIME: 11:53





Assessment/Plan


Assessment/Plan


Chief Complaint/Hosp Course


PLEASE DC THIS NOTE -- WAS ASKED TO SEE THIS PATIENT -- SUBSEQUENTLY WAS 

ADVISED THIS IS NOT THE ACTUAL PATIENT REQUESTED TO BE SEEN after note was 

already opened.


Thank you -- please retract this note -- patient not seen.


Problems:  





Consultation Date/Type/Reason


Admit Date/Time


Feb 10, 2017 at 03:28


Initial Consult Date





Type of Consultation:  ID





Exam/Review of Systems


Vital Signs


Vitals





 Vital Signs








  Date Time  Temp Pulse Resp B/P Pulse Ox O2 Delivery O2 Flow Rate FiO2


 


2/11/17 07:42 97.8 94 18 105/69 99   


 


2/10/17 14:30      Room Air  














 Intake and Output   


 


 2/10/17 2/10/17 2/11/17





 15:00 23:00 07:00


 


Intake Total   1130 ml


 


Balance   1130 ml











Results


Result Diagram:  


2/11/17 0500                                                                   

             2/11/17 0500





Results 24 hrs





Laboratory Tests








Test


  2/10/17


13:14 2/10/17


17:26 2/10/17


22:00 2/11/17


02:12


 


Urine Bilirubin NEGATIVE     


 


Urine Clarity CLEAR     


 


Urine Color LT. YELLOW     


 


Urine Epithelial Cells RARE     


 


Urine Glucose >=1000     


 


Urine Hemoglobin NEGATIVE     


 


Urine Ketones NEGATIVE     


 


Urine Leukocyte Esterase TRACE  H   


 


Urine Microscopic RBC NONE SEEN     


 


Urine Microscopic WBC 2-5     


 


Urine Nitrite NEGATIVE     


 


Urine Specific Gravity <=1.005  L   


 


Urine Total Protein NEGATIVE     


 


Urine Urobilinogen 0.2  E.U./dL     


 


Urine pH 5.0     


 


Bedside Glucose  318  H 248  H 167  














Test


  2/11/17


05:00 2/11/17


07:51 2/11/17


11:49 


 


 


Alanine Aminotransferase


(ALT/SGPT) 59  


  


  


  


 


 


Albumin 2.3  L   


 


Albumin/Globulin Ratio 0.63     


 


Alkaline Phosphatase 199  H   


 


Anion Gap 13     


 


Aspartate Amino Transf


(AST/SGOT) 78  H


  


  


  


 


 


Basophils # 0.1     


 


Basophils % 1.5     


 


Blood Urea Nitrogen 22  H   


 


Calcium Level 8.4     


 


Carbon Dioxide Level 16  L   


 


Chloride Level 102     


 


Creatinine 0.53  L   


 


Direct Bilirubin 0.00     


 


Eosinophils # 0.1     


 


Eosinophils % 2.9     


 


Globulin 3.60  H   


 


Glucose Level 137  #   


 


Hematocrit 33.9  L   


 


Hemoglobin 11.6  L   


 


Indirect Bilirubin 0.7     


 


Lipase 317  H   


 


Lymphocytes # 0.7  L   


 


Lymphocytes % 16.2     


 


Mean Corpuscular Hemoglobin 29.8     


 


Mean Corpuscular Hemoglobin


Concent 34.2  


  


  


  


 


 


Mean Corpuscular Volume 87.1     


 


Mean Platelet Volume 11.9  H   


 


Monocytes # 0.7     


 


Monocytes % 17.2  H   


 


Neutrophils # 2.5     


 


Neutrophils % 61.7     


 


Nucleated Red Blood Cells # 0.0     


 


Nucleated Red Blood Cells % 0.0     


 


Platelet Count 139  L   


 


Potassium Level 4.8     


 


Red Blood Count 3.89  L   


 


Red Cell Distribution Width 15.4  H   


 


Sodium Level 126  L   


 


Total Bilirubin 0.7     


 


Total Protein 5.9  L   


 


White Blood Count 4.1  #L   


 


Bedside Glucose  125   312  H 











Medications


Medications





 Current Medications


Pantoprazole (Protonix Iv) 40 mg BID IV  Last administered on 2/11/17at 09:59; 

Admin Dose 40 MG;  Start 2/10/17 at 09:00


Ondansetron HCl (Zofran Inj) 4 mg Q6H  PRN IV NAUSEA ;  Start 2/10/17 at 08:30


Morphine Sulfate (morphine) 1 mg Q4H  PRN IV PAIN;  Start 2/10/17 at 09:00


Diagnostic Test (Pha) 1 ea 1 ea 02 XX ;  Start 2/11/17 at 02:00


Sodium Chloride (NS) 1,000 ml @  30 mls/hr Q24H IV  Last administered on 2/10/

17at 18:03; Admin Dose 30 MLS/HR;  Start 2/10/17 at 16:00


Fenofibrate (Tricor) 145 mg DAILY PO  Last administered on 2/11/17at 09:59; 

Admin Dose 145 MG;  Start 2/11/17 at 09:00


Furosemide (Lasix) 40 mg DAILY@06 PO ;  Start 2/11/17 at 06:00


Losartan Potassium (Cozaar) 25 mg DAILY PO  Last administered on 2/11/17at 09:59

; Admin Dose 25 MG;  Start 2/11/17 at 09:00


Ondansetron HCl (Zofran Odt) 4 mg Q6H  PRN ODT NAUSEA AND/OR VOMITING;  Start 2/

10/17 at 16:00


Pantoprazole (Protonix Tab) 40 mg DAILY@06 PO  Last administered on 2/11/17at 05

:20; Admin Dose 40 MG;  Start 2/11/17 at 06:00


Spironolactone (Aldactone) 100 mg QAM PO  Last administered on 2/11/17at 10:00; 

Admin Dose 100 MG;  Start 2/11/17 at 09:00


Miscellaneous Information 1 ea NOTE XX ;  Start 2/10/17 at 17:00


Glucose (Glutose) 15 gm Q15M  PRN PO DECREASED GLUCOSE;  Start 2/10/17 at 17:00


Glucose (Glutose) 22.5 gm Q15M  PRN PO DECREASED GLUCOSE;  Start 2/10/17 at 17:

00


Dextrose (D50w Syringe) 25 ml Q15M  PRN IV DECREASED GLUCOSE;  Start 2/10/17 at 

17:00


Dextrose (D50w Syringe) 50 ml Q15M  PRN IV DECREASED GLUCOSE;  Start 2/10/17 at 

17:00


Glucagon (Glucagen) 1 mg Q15M  PRN IM DECREASED GLUCOSE;  Start 2/10/17 at 17:00


Glucose (Glutose) 15 gm Q15M  PRN BUCCAL DECREASED GLUCOSE;  Start 2/10/17 at 17

:00


Temazepam (Restoril) 30 mg HS  PRN PO SLEEP;  Start 2/11/17 at 00:55


Midodrine 5 mg 5 mg TID@09,13,17 PO  Last administered on 2/11/17at 06:36; 

Admin Dose 5 MG;  Start 2/11/17 at 06:30


Albumin Human (Albumin Human 25%) 200 ml @  100 mls/hr ONCE  ONCE IV  Last 

administered on 2/11/17at 06:49; Admin Dose 100 MLS/HR;  Start 2/11/17 at 06:30

;  Stop 2/11/17 at 08:29











CARLTON MASSEY NP Feb 11, 2017 11:53

## 2017-02-12 VITALS — HEART RATE: 69 BPM | DIASTOLIC BLOOD PRESSURE: 50 MMHG | SYSTOLIC BLOOD PRESSURE: 68 MMHG

## 2017-02-12 VITALS — HEART RATE: 82 BPM | DIASTOLIC BLOOD PRESSURE: 63 MMHG | SYSTOLIC BLOOD PRESSURE: 76 MMHG

## 2017-02-12 VITALS — SYSTOLIC BLOOD PRESSURE: 94 MMHG | DIASTOLIC BLOOD PRESSURE: 60 MMHG | HEART RATE: 83 BPM

## 2017-02-12 VITALS — HEART RATE: 80 BPM | SYSTOLIC BLOOD PRESSURE: 80 MMHG | DIASTOLIC BLOOD PRESSURE: 51 MMHG

## 2017-02-12 VITALS — SYSTOLIC BLOOD PRESSURE: 89 MMHG | DIASTOLIC BLOOD PRESSURE: 65 MMHG | HEART RATE: 72 BPM

## 2017-02-12 VITALS — SYSTOLIC BLOOD PRESSURE: 80 MMHG | HEART RATE: 100 BPM | DIASTOLIC BLOOD PRESSURE: 54 MMHG

## 2017-02-12 VITALS — DIASTOLIC BLOOD PRESSURE: 51 MMHG | HEART RATE: 72 BPM | SYSTOLIC BLOOD PRESSURE: 72 MMHG

## 2017-02-12 VITALS — DIASTOLIC BLOOD PRESSURE: 55 MMHG | SYSTOLIC BLOOD PRESSURE: 72 MMHG | RESPIRATION RATE: 20 BRPM

## 2017-02-12 VITALS — SYSTOLIC BLOOD PRESSURE: 76 MMHG | DIASTOLIC BLOOD PRESSURE: 53 MMHG | HEART RATE: 96 BPM

## 2017-02-12 VITALS — HEART RATE: 72 BPM | SYSTOLIC BLOOD PRESSURE: 74 MMHG | DIASTOLIC BLOOD PRESSURE: 57 MMHG

## 2017-02-12 VITALS — DIASTOLIC BLOOD PRESSURE: 62 MMHG | HEART RATE: 88 BPM | SYSTOLIC BLOOD PRESSURE: 70 MMHG

## 2017-02-12 VITALS — DIASTOLIC BLOOD PRESSURE: 55 MMHG | SYSTOLIC BLOOD PRESSURE: 72 MMHG | HEART RATE: 90 BPM

## 2017-02-12 VITALS — SYSTOLIC BLOOD PRESSURE: 75 MMHG | DIASTOLIC BLOOD PRESSURE: 57 MMHG | HEART RATE: 97 BPM

## 2017-02-12 VITALS — HEART RATE: 97 BPM | SYSTOLIC BLOOD PRESSURE: 74 MMHG | DIASTOLIC BLOOD PRESSURE: 58 MMHG

## 2017-02-12 VITALS — HEART RATE: 93 BPM | DIASTOLIC BLOOD PRESSURE: 56 MMHG | SYSTOLIC BLOOD PRESSURE: 77 MMHG

## 2017-02-12 VITALS — SYSTOLIC BLOOD PRESSURE: 58 MMHG | RESPIRATION RATE: 18 BRPM | DIASTOLIC BLOOD PRESSURE: 40 MMHG

## 2017-02-12 VITALS — SYSTOLIC BLOOD PRESSURE: 68 MMHG | DIASTOLIC BLOOD PRESSURE: 50 MMHG | HEART RATE: 77 BPM

## 2017-02-12 LAB
ALBUMIN SERPL-MCNC: 2.1 G/DL (ref 3.3–4.9)
ALBUMIN/GLOB SERPL: 0.72 {RATIO}
ALP SERPL-CCNC: 154 IU/L (ref 42–121)
ALT SERPL-CCNC: 49 IU/L (ref 13–69)
ANION GAP SERPL CALC-SCNC: 12 MMOL/L (ref 8–16)
AST SERPL-CCNC: 62 IU/L (ref 15–46)
BASOPHILS # BLD AUTO: 0 10^3/UL (ref 0–0.1)
BASOPHILS NFR BLD: 0.8 % (ref 0–2)
BILIRUB DIRECT SERPL-MCNC: 0 MG/DL (ref 0–0.2)
BILIRUB SERPL-MCNC: 0.3 MG/DL (ref 0.2–1.3)
BUN SERPL-MCNC: 28 MG/DL (ref 7–20)
CALCIUM SERPL-MCNC: 8 MG/DL (ref 8.4–10.2)
CHLORIDE SERPL-SCNC: 104 MMOL/L (ref 97–110)
CO2 SERPL-SCNC: 16 MMOL/L (ref 21–31)
CONDITION: 1
CREAT SERPL-MCNC: 0.83 MG/DL (ref 0.61–1.24)
EOSINOPHIL # BLD: 0.1 10^3/UL (ref 0–0.5)
EOSINOPHIL NFR BLD: 2.6 % (ref 0–7)
ERYTHROCYTE [DISTWIDTH] IN BLOOD BY AUTOMATED COUNT: 16.1 % (ref 11.5–14.5)
GLOBULIN SER-MCNC: 2.9 G/DL (ref 1.3–3.2)
GLUCOSE SERPL-MCNC: 151 MG/DL (ref 70–220)
HCT VFR BLD CALC: 30.6 % (ref 42–52)
HGB BLD-MCNC: 10.5 G/DL (ref 14–18)
LH ANALYZER COMMENTS: 1
LYMPHOCYTES # BLD AUTO: 0.8 10^3/UL (ref 0.8–2.9)
LYMPHOCYTES NFR BLD AUTO: 17.2 % (ref 15–51)
MCH RBC QN AUTO: 30.6 PG (ref 29–33)
MCHC RBC AUTO-ENTMCNC: 34.3 G/DL (ref 32–37)
MCV RBC AUTO: 89.2 FL (ref 82–101)
MONOCYTES # BLD: 0.8 10^3/UL (ref 0.3–0.9)
MONOCYTES NFR BLD: 17.6 % (ref 0–11)
NEUTROPHILS # BLD: 2.8 10^3/UL (ref 1.6–7.5)
NEUTROPHILS NFR BLD AUTO: 61.8 % (ref 39–77)
NRBC # BLD MANUAL: 0 10^3/UL (ref 0–0)
NRBC BLD QL: 0 /100WBC (ref 0–0)
PLATELET # BLD: 114 10^3/UL (ref 140–440)
PMV BLD AUTO: 10.5 FL (ref 7.4–10.4)
POTASSIUM SERPL-SCNC: 4.1 MMOL/L (ref 3.5–5.1)
PROT SERPL-MCNC: 5 G/DL (ref 6.1–8.1)
RBC # BLD AUTO: 3.43 10^6/UL (ref 4.7–6.1)
SODIUM SERPL-SCNC: 128 MMOL/L (ref 135–144)
WBC # BLD AUTO: 4.5 10^3/UL (ref 4.8–10.8)
WBC # BLD: 4.5 10^3/UL (ref 4.8–10.8)

## 2017-02-12 RX ADMIN — FOLIC ACID SCH MLS/HR: 5 INJECTION, SOLUTION INTRAMUSCULAR; INTRAVENOUS; SUBCUTANEOUS at 03:53

## 2017-02-12 RX ADMIN — LACTULOSE SCH GM: 10 SOLUTION ORAL at 06:52

## 2017-02-12 RX ADMIN — MIDODRINE HYDROCHLORIDE SCH MG: 5 TABLET ORAL at 08:50

## 2017-02-12 RX ADMIN — MIDODRINE HYDROCHLORIDE SCH MG: 5 TABLET ORAL at 12:52

## 2017-02-12 RX ADMIN — FOLIC ACID SCH MLS/HR: 5 INJECTION, SOLUTION INTRAMUSCULAR; INTRAVENOUS; SUBCUTANEOUS at 16:00

## 2017-02-12 RX ADMIN — PANTOPRAZOLE SODIUM SCH MG: 40 INJECTION, POWDER, FOR SOLUTION INTRAVENOUS at 08:50

## 2017-02-12 RX ADMIN — TEMAZEPAM PRN MG: 15 CAPSULE ORAL at 01:04

## 2017-02-12 RX ADMIN — FENOFIBRATE SCH MG: 145 TABLET ORAL at 08:50

## 2017-02-12 RX ADMIN — MORPHINE SULFATE PRN MG: 2 INJECTION, SOLUTION INTRAMUSCULAR; INTRAVENOUS at 20:34

## 2017-02-12 RX ADMIN — SPIRONOLACTONE SCH MG: 50 TABLET ORAL at 09:00

## 2017-02-12 RX ADMIN — LACTULOSE SCH GM: 10 SOLUTION ORAL at 12:13

## 2017-02-12 RX ADMIN — FUROSEMIDE SCH MG: 40 TABLET ORAL at 06:00

## 2017-02-12 RX ADMIN — PANTOPRAZOLE SODIUM SCH MG: 40 TABLET, DELAYED RELEASE ORAL at 06:10

## 2017-02-12 RX ADMIN — PANTOPRAZOLE SODIUM SCH MG: 40 INJECTION, POWDER, FOR SOLUTION INTRAVENOUS at 20:34

## 2017-02-12 RX ADMIN — LOSARTAN POTASSIUM SCH MG: 25 TABLET, FILM COATED ORAL at 09:00

## 2017-02-12 RX ADMIN — MIDODRINE HYDROCHLORIDE SCH MG: 5 TABLET ORAL at 17:27

## 2017-02-12 RX ADMIN — LACTULOSE SCH GM: 10 SOLUTION ORAL at 17:26

## 2017-02-12 NOTE — PN
Date/Time of Note


Date/Time of Note


DATE: 2/12/17 


TIME: 10:20





Assessment/Plan


VTE Prophylaxis


VTE Prophylaxis Intervention:  other





Assessment/Plan


Chief Complaint/Hosp Course


Assessment/Plan


Abdominal pain.


2.  Chronic pancreatitis.


3.  Cirrhosis of the liver.  


4.  Anemia.


5.  Hyponatremia.  


6.  Hepatic encephalopathy.





[id number]   coherent


               gets paracentesis   every 10 days


             may need  soon


chk ammomnia level am


[id number]   dropped bp  ivf bolus


             mentation clear


           ammonia increase/lactulose


         start diet


Problems:  





Exam/Review of Systems


Vital Signs


Vitals





 Vital Signs








  Date Time  Temp Pulse Resp B/P Pulse Ox O2 Delivery O2 Flow Rate FiO2


 


2/12/17 09:20  72  74/57    


 


2/12/17 07:30 97.8  18  97   


 


2/10/17 14:30      Room Air  














 Intake and Output   


 


 2/11/17 2/11/17 2/12/17





 15:00 23:00 07:00


 


Intake Total 200 ml 3290 ml 970 ml


 


Output Total  500 ml 


 


Balance 200 ml 2790 ml 970 ml











Exam


Constitutional:  alert, oriented, well developed


Psych:  no complaints


Eyes:  nl conjunctiva


Neck:  supple


Respiratory:  clear to auscultation


Cardiovascular:  regular rate and rhythm


Gastrointestinal:  distended, soft


Extremities:  normal pulses





Results


Result Diagram:  


2/12/17 0450                                                                   

             2/12/17 0450





Results 24 hrs





Laboratory Tests








Test


  2/11/17


11:49 2/11/17


17:42 2/11/17


21:05 2/12/17


02:23


 


Bedside Glucose 312  H 321  H 305  H 200  














Test


  2/12/17


04:50 2/12/17


08:01 


  


 


 


Alanine Aminotransferase


(ALT/SGPT) 49  


  


  


  


 


 


Albumin 2.1  L   


 


Albumin/Globulin Ratio 0.72     


 


Alkaline Phosphatase 154  H   


 


Ammonia 85  H   


 


Anion Gap 12     


 


Aspartate Amino Transf


(AST/SGOT) 62  H


  


  


  


 


 


Basophils # 0.0     


 


Basophils % 0.8     


 


Blood Morphology Comment      


 


Blood Urea Nitrogen 28  H   


 


Calcium Level 8.0  L   


 


Carbon Dioxide Level 16  L   


 


Chloride Level 104     


 


Creatinine 0.83     


 


Direct Bilirubin 0.00     


 


Eosinophils # 0.1     


 


Eosinophils % 2.6     


 


Globulin 2.90     


 


Glucose Level 151     


 


Hematocrit 30.6  L   


 


Hemoglobin 10.5  L   


 


Indirect Bilirubin 0.3     


 


Lymphocytes # 0.8     


 


Lymphocytes % 17.2     


 


Mean Corpuscular Hemoglobin 30.6     


 


Mean Corpuscular Hemoglobin


Concent 34.3  


  


  


  


 


 


Mean Corpuscular Volume 89.2     


 


Mean Platelet Volume 10.5  H   


 


Monocytes # 0.8     


 


Monocytes % 17.6  H   


 


Neutrophils # 2.8     


 


Neutrophils % 61.8     


 


Nucleated Red Blood Cells # 0.0     


 


Nucleated Red Blood Cells % 0.0     


 


Platelet Count 114  #L   


 


Potassium Level 4.1     


 


Red Blood Count 3.43  L   


 


Red Cell Distribution Width 16.1  H   


 


Sodium Level 128  L   


 


Total Bilirubin 0.3     


 


Total Protein 5.0  L   


 


White Blood Count 4.5  L   


 


Bedside Glucose  152    











Medications


Medications





 Current Medications


Pantoprazole (Protonix Iv) 40 mg BID IV  Last administered on 2/12/17at 08:50; 

Admin Dose 40 MG;  Start 2/10/17 at 09:00


Ondansetron HCl (Zofran Inj) 4 mg Q6H  PRN IV NAUSEA ;  Start 2/10/17 at 08:30


Morphine Sulfate (morphine) 1 mg Q4H  PRN IV PAIN;  Start 2/10/17 at 09:00


Diagnostic Test (Pha) 1 ea 1 ea 02 XX  Last administered on 2/12/17at 02:45; 

Admin Dose 1 EA;  Start 2/11/17 at 02:00


Sodium Chloride (NS) 1,000 ml @  30 mls/hr Q24H IV  Last administered on 2/12/ 17at 03:53; Admin Dose 30 MLS/HR;  Start 2/10/17 at 16:00


Fenofibrate (Tricor) 145 mg DAILY PO  Last administered on 2/12/17at 08:50; 

Admin Dose 145 MG;  Start 2/11/17 at 09:00


Furosemide (Lasix) 40 mg DAILY@06 PO ;  Start 2/11/17 at 06:00


Losartan Potassium (Cozaar) 25 mg DAILY PO  Last administered on 2/11/17at 09:59

; Admin Dose 25 MG;  Start 2/11/17 at 09:00


Ondansetron HCl (Zofran Odt) 4 mg Q6H  PRN ODT NAUSEA AND/OR VOMITING;  Start 2/

10/17 at 16:00


Pantoprazole (Protonix Tab) 40 mg DAILY@06 PO  Last administered on 2/12/17at 06

:10; Admin Dose 40 MG;  Start 2/11/17 at 06:00


Spironolactone (Aldactone) 100 mg QAM PO  Last administered on 2/11/17at 10:00; 

Admin Dose 100 MG;  Start 2/11/17 at 09:00


Miscellaneous Information 1 ea NOTE XX ;  Start 2/10/17 at 17:00


Glucose (Glutose) 15 gm Q15M  PRN PO DECREASED GLUCOSE;  Start 2/10/17 at 17:00


Glucose (Glutose) 22.5 gm Q15M  PRN PO DECREASED GLUCOSE;  Start 2/10/17 at 17:

00


Dextrose (D50w Syringe) 25 ml Q15M  PRN IV DECREASED GLUCOSE;  Start 2/10/17 at 

17:00


Dextrose (D50w Syringe) 50 ml Q15M  PRN IV DECREASED GLUCOSE;  Start 2/10/17 at 

17:00


Glucagon (Glucagen) 1 mg Q15M  PRN IM DECREASED GLUCOSE;  Start 2/10/17 at 17:00


Glucose (Glutose) 15 gm Q15M  PRN BUCCAL DECREASED GLUCOSE;  Start 2/10/17 at 17

:00


Temazepam (Restoril) 30 mg HS  PRN PO SLEEP Last administered on 2/12/17at 01:04

; Admin Dose 30 MG;  Start 2/11/17 at 00:55


Midodrine 5 mg 5 mg TID@09,13,17 PO  Last administered on 2/12/17at 08:50; 

Admin Dose 5 MG;  Start 2/11/17 at 06:30


Albumin Human 200 ml @  100 mls/hr ONCE  ONCE IV  Last administered on 2/11/ 17at 06:49; Admin Dose 100 MLS/HR;  Start 2/11/17 at 06:30;  Stop 2/11/17 at 08:

29


Sodium Chloride (NS) 250 ml @  250 mls/hr Q1H ONCE IV  Last administered on 2/12 /17at 06:41; Admin Dose 250 MLS/HR;  Start 2/12/17 at 07:00;  Stop 2/12/17 at 07

:59


Lactulose (Enulose) 20 gm Q6 PO  Last administered on 2/12/17at 06:52; Admin 

Dose 20 GM;  Start 2/12/17 at 07:00











RANJEET NIÑO MD Feb 12, 2017 10:22

## 2017-02-13 VITALS — SYSTOLIC BLOOD PRESSURE: 94 MMHG | RESPIRATION RATE: 18 BRPM | DIASTOLIC BLOOD PRESSURE: 64 MMHG

## 2017-02-13 VITALS — DIASTOLIC BLOOD PRESSURE: 60 MMHG | HEART RATE: 68 BPM | SYSTOLIC BLOOD PRESSURE: 100 MMHG

## 2017-02-13 VITALS — SYSTOLIC BLOOD PRESSURE: 82 MMHG | RESPIRATION RATE: 18 BRPM | DIASTOLIC BLOOD PRESSURE: 53 MMHG

## 2017-02-13 VITALS — RESPIRATION RATE: 18 BRPM | HEART RATE: 64 BPM | SYSTOLIC BLOOD PRESSURE: 100 MMHG | DIASTOLIC BLOOD PRESSURE: 54 MMHG

## 2017-02-13 LAB
ALBUMIN SERPL-MCNC: 2.5 G/DL (ref 3.3–4.9)
ALBUMIN/GLOB SERPL: 0.75 {RATIO}
ALP SERPL-CCNC: 181 IU/L (ref 42–121)
ALT SERPL-CCNC: 57 IU/L (ref 13–69)
ANION GAP SERPL CALC-SCNC: 13 MMOL/L (ref 8–16)
AST SERPL-CCNC: 69 IU/L (ref 15–46)
BASOPHILS # BLD AUTO: 0 10^3/UL (ref 0–0.1)
BASOPHILS NFR BLD: 0.6 % (ref 0–2)
BILIRUB DIRECT SERPL-MCNC: 0 MG/DL (ref 0–0.2)
BILIRUB SERPL-MCNC: 0.7 MG/DL (ref 0.2–1.3)
BUN SERPL-MCNC: 28 MG/DL (ref 7–20)
CALCIUM SERPL-MCNC: 8.5 MG/DL (ref 8.4–10.2)
CHLORIDE SERPL-SCNC: 104 MMOL/L (ref 97–110)
CO2 SERPL-SCNC: 16 MMOL/L (ref 21–31)
CONDITION: 1
CREAT SERPL-MCNC: 0.69 MG/DL (ref 0.61–1.24)
EOSINOPHIL # BLD: 0.1 10^3/UL (ref 0–0.5)
EOSINOPHIL NFR BLD: 1.3 % (ref 0–7)
ERYTHROCYTE [DISTWIDTH] IN BLOOD BY AUTOMATED COUNT: 16.3 % (ref 11.5–14.5)
GLOBULIN SER-MCNC: 3.3 G/DL (ref 1.3–3.2)
GLUCOSE SERPL-MCNC: 307 MG/DL (ref 70–220)
HCT VFR BLD CALC: 36.1 % (ref 42–52)
HGB BLD-MCNC: 12.2 G/DL (ref 14–18)
INR PPP: 1.24
LH ANALYZER COMMENTS: 1
LYMPHOCYTES # BLD AUTO: 0.5 10^3/UL (ref 0.8–2.9)
LYMPHOCYTES NFR BLD AUTO: 9.9 % (ref 15–51)
MCH RBC QN AUTO: 30.4 PG (ref 29–33)
MCHC RBC AUTO-ENTMCNC: 33.7 G/DL (ref 32–37)
MCV RBC AUTO: 90.2 FL (ref 82–101)
MONOCYTES # BLD: 0.7 10^3/UL (ref 0.3–0.9)
MONOCYTES NFR BLD: 12.2 % (ref 0–11)
NEUTROPHILS # BLD: 4.2 10^3/UL (ref 1.6–7.5)
NEUTROPHILS NFR BLD AUTO: 76 % (ref 39–77)
NRBC # BLD MANUAL: 0 10^3/UL (ref 0–0)
NRBC BLD QL: 0 /100WBC (ref 0–0)
PLATELET # BLD: 139 10^3/UL (ref 140–440)
PMV BLD AUTO: 10.6 FL (ref 7.4–10.4)
POTASSIUM SERPL-SCNC: 5.1 MMOL/L (ref 3.5–5.1)
PROT SERPL-MCNC: 5.8 G/DL (ref 6.1–8.1)
PROTHROMBIN TIME: 15.7 SEC (ref 12.2–14.2)
PT RATIO: 1.2
RBC # BLD AUTO: 4 10^6/UL (ref 4.7–6.1)
SODIUM SERPL-SCNC: 128 MMOL/L (ref 135–144)
SUSPECT: 1
WBC # BLD AUTO: 5.5 10^3/UL (ref 4.8–10.8)
WBC # BLD: 5.5 10^3/UL (ref 4.8–10.8)

## 2017-02-13 PROCEDURE — 0W9G3ZZ DRAINAGE OF PERITONEAL CAVITY, PERCUTANEOUS APPROACH: ICD-10-PCS | Performed by: RADIOLOGY

## 2017-02-13 RX ADMIN — MORPHINE SULFATE PRN MG: 2 INJECTION, SOLUTION INTRAMUSCULAR; INTRAVENOUS at 00:58

## 2017-02-13 RX ADMIN — MIDODRINE HYDROCHLORIDE SCH MG: 5 TABLET ORAL at 12:46

## 2017-02-13 RX ADMIN — FOLIC ACID SCH MLS/HR: 5 INJECTION, SOLUTION INTRAMUSCULAR; INTRAVENOUS; SUBCUTANEOUS at 18:14

## 2017-02-13 RX ADMIN — MIDODRINE HYDROCHLORIDE SCH MG: 5 TABLET ORAL at 17:24

## 2017-02-13 RX ADMIN — LACTULOSE SCH GM: 10 SOLUTION ORAL at 12:07

## 2017-02-13 RX ADMIN — FUROSEMIDE SCH MG: 40 TABLET ORAL at 05:04

## 2017-02-13 RX ADMIN — LACTULOSE SCH GM: 10 SOLUTION ORAL at 01:04

## 2017-02-13 RX ADMIN — TEMAZEPAM PRN MG: 15 CAPSULE ORAL at 22:49

## 2017-02-13 RX ADMIN — MIDODRINE HYDROCHLORIDE SCH MG: 5 TABLET ORAL at 09:00

## 2017-02-13 RX ADMIN — MORPHINE SULFATE PRN MG: 2 INJECTION, SOLUTION INTRAMUSCULAR; INTRAVENOUS at 22:49

## 2017-02-13 RX ADMIN — MORPHINE SULFATE PRN MG: 2 INJECTION, SOLUTION INTRAMUSCULAR; INTRAVENOUS at 09:01

## 2017-02-13 RX ADMIN — FENOFIBRATE SCH MG: 145 TABLET ORAL at 09:00

## 2017-02-13 RX ADMIN — MORPHINE SULFATE PRN MG: 2 INJECTION, SOLUTION INTRAMUSCULAR; INTRAVENOUS at 18:19

## 2017-02-13 RX ADMIN — DEXAMETHASONE SODIUM PHOSPHATE PRN MG: 10 INJECTION, SOLUTION INTRAMUSCULAR; INTRAVENOUS at 07:55

## 2017-02-13 RX ADMIN — FOLIC ACID SCH MLS/HR: 5 INJECTION, SOLUTION INTRAMUSCULAR; INTRAVENOUS; SUBCUTANEOUS at 00:59

## 2017-02-13 RX ADMIN — SPIRONOLACTONE SCH MG: 50 TABLET ORAL at 08:36

## 2017-02-13 RX ADMIN — LACTULOSE SCH GM: 10 SOLUTION ORAL at 05:01

## 2017-02-13 RX ADMIN — PANTOPRAZOLE SODIUM SCH MG: 40 TABLET, DELAYED RELEASE ORAL at 05:01

## 2017-02-13 RX ADMIN — MORPHINE SULFATE PRN MG: 2 INJECTION, SOLUTION INTRAMUSCULAR; INTRAVENOUS at 13:51

## 2017-02-13 RX ADMIN — LOSARTAN POTASSIUM SCH MG: 25 TABLET, FILM COATED ORAL at 08:36

## 2017-02-13 RX ADMIN — DEXAMETHASONE SODIUM PHOSPHATE PRN MG: 10 INJECTION, SOLUTION INTRAMUSCULAR; INTRAVENOUS at 18:14

## 2017-02-13 RX ADMIN — LACTULOSE SCH GM: 10 SOLUTION ORAL at 17:23

## 2017-02-13 RX ADMIN — MORPHINE SULFATE PRN MG: 2 INJECTION, SOLUTION INTRAMUSCULAR; INTRAVENOUS at 04:59

## 2017-02-13 RX ADMIN — PANTOPRAZOLE SODIUM SCH MG: 40 INJECTION, POWDER, FOR SOLUTION INTRAVENOUS at 09:00

## 2017-02-13 NOTE — RADRPT
PROCEDURE:   Ultrasound guided paracentesis. 

 

CLINICAL INDICATION:    Ascites and shortness of breath.  

 

COMPARISON:   02/09/2017.  

 

TECHNIQUE:   

The risks, benefits, and alternatives were explained to the patient and/or the patient's family, inc
luding but not limited to bleeding, infection, pain, visceral or vascular damage, shock, and death. 
 The patient and/or the patient's family understood the risks and the alternatives and wished to pro
ceed with the procedure.  Informed written consent was obtained. A procedural time out was performed
.  The patient's name, date of birth, and procedure to be performed were verified.

 

Utilizing ultrasound guidance, optimal location for entry to the peritoneal cavity was ascertained. 
 The overlying skin was prepped and draped in the usual sterile fashion.  Approximately 10 ml of 1% 
Xylocaine was injected locally for pain control.  Using ultrasound guidance, an 8 [place] catheter wa
s introduced into the peritoneal cavity in the right lower quadrant without difficulty.  

 

FINDINGS:

Initial images demonstrate ascites.  Approximately 9.2 liters of serous fluid was aspirated and disc
arded. The patient tolerated the procedure well without complication.

 

IMPRESSION:

1.  Successful ultrasound-guided paracentesis.  

 

RPTAT: QQ

_____________________________________________ 

.Rene De La Cruz MD, MD           Date    Time 

Electronically viewed and signed by .Rene De La Cruz MD, MD on 02/13/2017 17:56 

 

D:  02/13/2017 17:56  T:  02/13/2017 17:56

.R/

## 2017-02-13 NOTE — PN
Date/Time of Note


Date/Time of Note


DATE: 2/13/17 


TIME: 11:57





Assessment/Plan


VTE Prophylaxis


VTE Prophylaxis Intervention:  other





Assessment/Plan


Chief Complaint/Hosp Course


Assessment/Plan


Abdominal pain.


2.  Chronic pancreatitis.


3.  Cirrhosis of the liver.  


4.  Anemia.


5.  Hyponatremia.  


6.  Hepatic encephalopathy.





[id number]   coherent


               gets paracentesis   every 10 days


             may need  soon


chk ammomnia level am


015263   dropped bp  ivf bolus


             mentation clear


           ammonia increase/lactulose


         start diet


[id number]  paracentesis plan


Problems:  





Subjective


24 Hr Interval Summary


Gastrointestinal:  other





Exam/Review of Systems


Vital Signs


Vitals





 Vital Signs








  Date Time  Temp Pulse Resp B/P Pulse Ox O2 Delivery O2 Flow Rate FiO2


 


2/13/17 07:27 98.1 98 18 94/64 96   


 


2/10/17 14:30      Room Air  














 Intake and Output   


 


 2/12/17 2/12/17 2/13/17





 15:00 23:00 07:00


 


Intake Total 400 ml 1570 ml 1375 ml


 


Balance 400 ml 1570 ml 1375 ml











Exam


Constitutional:  alert, oriented, well developed


Head:  normocephalic


Eyes:  nl conjunctiva


ENMT:  nl external ears & nose


Neck:  supple


Respiratory:  clear to auscultation


Cardiovascular:  regular rate and rhythm


Gastrointestinal:  distended, soft





Results


Result Diagram:  


2/12/17 0450                                                                   

             2/12/17 0450





Results 24 hrs





Laboratory Tests








Test


  2/12/17


17:11 2/12/17


20:40 2/13/17


02:18 2/13/17


07:46


 


Bedside Glucose 234  H 208   226  H 291  H














Test


  2/13/17


11:53 


  


  


 


 


Bedside Glucose 322  H   











Medications


Medications





 Current Medications


Pantoprazole (Protonix Iv) 40 mg BID IV  Last administered on 2/13/17at 09:00; 

Admin Dose 40 MG;  Start 2/10/17 at 09:00


Ondansetron HCl (Zofran Inj) 4 mg Q6H  PRN IV NAUSEA  Last administered on 2/13/ 17at 07:55; Admin Dose 4 MG;  Start 2/10/17 at 08:30


Morphine Sulfate (morphine) 1 mg Q4H  PRN IV PAIN Last administered on 2/13/ 17at 09:01; Admin Dose 1 MG;  Start 2/10/17 at 09:00


Diagnostic Test (Pha) 1 ea 1 ea 02 XX  Last administered on 2/13/17at 02:31; 

Admin Dose 1 EA;  Start 2/11/17 at 02:00


Sodium Chloride (NS) 1,000 ml @  75 mls/hr [id number] IV  Last administered on 2/13 /17at 00:59; Admin Dose 75 MLS/HR;  Start 2/10/17 at 16:00


Fenofibrate (Tricor) 145 mg DAILY PO  Last administered on 2/13/17at 09:00; 

Admin Dose 145 MG;  Start 2/11/17 at 09:00


Furosemide (Lasix) 40 mg DAILY@06 PO ;  Start 2/11/17 at 06:00


Losartan Potassium (Cozaar) 25 mg DAILY PO  Last administered on 2/11/17at 09:59

; Admin Dose 25 MG;  Start 2/11/17 at 09:00


Ondansetron HCl (Zofran Odt) 4 mg Q6H  PRN ODT NAUSEA AND/OR VOMITING;  Start 2/

10/17 at 16:00


Pantoprazole (Protonix Tab) 40 mg DAILY@06 PO  Last administered on 2/13/17at 05

:01; Admin Dose 40 MG;  Start 2/11/17 at 06:00


Spironolactone (Aldactone) 100 mg QAM PO  Last administered on 2/11/17at 10:00; 

Admin Dose 100 MG;  Start 2/11/17 at 09:00


Miscellaneous Information 1 ea NOTE XX ;  Start 2/10/17 at 17:00


Glucose (Glutose) 15 gm Q15M  PRN PO DECREASED GLUCOSE;  Start 2/10/17 at 17:00


Glucose (Glutose) 22.5 gm Q15M  PRN PO DECREASED GLUCOSE;  Start 2/10/17 at 17:

00


Dextrose (D50w Syringe) 25 ml Q15M  PRN IV DECREASED GLUCOSE;  Start 2/10/17 at 

17:00


Dextrose (D50w Syringe) 50 ml Q15M  PRN IV DECREASED GLUCOSE;  Start 2/10/17 at 

17:00


Glucagon (Glucagen) 1 mg Q15M  PRN IM DECREASED GLUCOSE;  Start 2/10/17 at 17:00


Glucose (Glutose) 15 gm Q15M  PRN BUCCAL DECREASED GLUCOSE;  Start 2/10/17 at 17

:00


Temazepam (Restoril) 30 mg HS  PRN PO SLEEP Last administered on 2/12/17at 01:04

; Admin Dose 30 MG;  Start 2/11/17 at 00:55


Midodrine (Proamatine) 5 mg TID@09,13,17 PO  Last administered on 2/13/17at 09:

00; Admin Dose 5 MG;  Start 2/11/17 at 06:30


Lactulose (Enulose) 20 gm Q6 PO  Last administered on 2/13/17at 05:01; Admin 

Dose 20 GM;  Start 2/12/17 at 07:00











RANJEET NIÑO MD Feb 13, 2017 11:58

## 2017-02-14 VITALS — HEART RATE: 104 BPM | RESPIRATION RATE: 16 BRPM | DIASTOLIC BLOOD PRESSURE: 54 MMHG | SYSTOLIC BLOOD PRESSURE: 93 MMHG

## 2017-02-14 VITALS — DIASTOLIC BLOOD PRESSURE: 54 MMHG | SYSTOLIC BLOOD PRESSURE: 89 MMHG | RESPIRATION RATE: 20 BRPM

## 2017-02-14 RX ADMIN — FOLIC ACID SCH MLS/HR: 5 INJECTION, SOLUTION INTRAMUSCULAR; INTRAVENOUS; SUBCUTANEOUS at 08:37

## 2017-02-14 RX ADMIN — LACTULOSE SCH GM: 10 SOLUTION ORAL at 06:20

## 2017-02-14 RX ADMIN — MIDODRINE HYDROCHLORIDE SCH MG: 5 TABLET ORAL at 08:36

## 2017-02-14 RX ADMIN — SPIRONOLACTONE SCH MG: 50 TABLET ORAL at 08:36

## 2017-02-14 RX ADMIN — LACTULOSE SCH GM: 10 SOLUTION ORAL at 00:00

## 2017-02-14 RX ADMIN — LACTULOSE SCH GM: 10 SOLUTION ORAL at 12:04

## 2017-02-14 RX ADMIN — FENOFIBRATE SCH MG: 145 TABLET ORAL at 08:35

## 2017-02-14 RX ADMIN — LOSARTAN POTASSIUM SCH MG: 25 TABLET, FILM COATED ORAL at 08:13

## 2017-02-14 RX ADMIN — FUROSEMIDE SCH MG: 40 TABLET ORAL at 06:00

## 2017-02-14 RX ADMIN — PANTOPRAZOLE SODIUM SCH MG: 40 TABLET, DELAYED RELEASE ORAL at 06:20

## 2017-02-14 NOTE — PN
Date/Time of Note


Date/Time of Note


DATE: 2/14/17 


TIME: 12:41





Assessment/Plan


VTE Prophylaxis


VTE Prophylaxis Intervention:  other





Assessment/Plan


Chief Complaint/Hosp Course


Assessment/Plan


Abdominal pain.


2.  Chronic pancreatitis.


3.  Cirrhosis of the liver.  


4.  Anemia.


5.  Hyponatremia.  


6.  Hepatic encephalopathy.





[id number]   coherent


               gets paracentesis   every 10 days


             may need  soon


chk ammomnia level am


195625   dropped bp  ivf bolus


             mentation clear


           ammonia increase/lactulose


         start diet


162119  paracentesis plan


[id number]  post paracentesis   


            symptoms  resolved   dc home   f/u pmd


Problems:  





Subjective


24 Hr Interval Summary


Constitutional:  no complaints





Exam/Review of Systems


Vital Signs


Vitals





 Vital Signs








  Date Time  Temp Pulse Resp B/P Pulse Ox O2 Delivery O2 Flow Rate FiO2


 


2/14/17 07:33  88 20 89/54 97   


 


2/14/17 06:25 98.0     Room Air  














 Intake and Output   


 


 2/13/17 2/13/17 2/14/17





 15:00 23:00 07:00


 


Intake Total  1975 ml 1100 ml


 


Balance  1975 ml 1100 ml











Exam


Constitutional:  alert, oriented


Psych:  no complaints


Head:  normocephalic


Neck:  supple


Respiratory:  clear to auscultation


Cardiovascular:  regular rate and rhythm


Gastrointestinal:  soft


Extremities:  edema





Results


Result Diagram:  


2/13/17 1240                                                                   

             2/13/17 1240





Results 24 hrs





Laboratory Tests








Test


  2/13/17


17:23 2/13/17


20:09 2/14/17


02:33 2/14/17


07:47


 


Bedside Glucose 299  H 275  H 199   175  














Test


  2/14/17


11:26 


  


  


 


 


Bedside Glucose 330  H   











Medications


Medications





 Current Medications


Ondansetron HCl (Zofran Inj) 4 mg Q6H  PRN IV NAUSEA  Last administered on 2/13/ 17at 18:14; Admin Dose 4 MG;  Start 2/10/17 at 08:30


Morphine Sulfate (morphine) 1 mg Q4H  PRN IV PAIN Last administered on 2/13/ 17at 22:49; Admin Dose 1 MG;  Start 2/10/17 at 09:00


Diagnostic Test (Pha) 1 ea 1 ea 02 XX  Last administered on 2/14/17at 02:39; 

Admin Dose 1 EA;  Start 2/11/17 at 02:00


Sodium Chloride (NS) 1,000 ml @  75 mls/hr [id number] IV  Last administered on 2/14 /17at 08:37; Admin Dose 75 MLS/HR;  Start 2/10/17 at 16:00


Fenofibrate (Tricor) 145 mg DAILY PO  Last administered on 2/14/17at 08:35; 

Admin Dose 145 MG;  Start 2/11/17 at 09:00


Furosemide (Lasix) 40 mg DAILY@06 PO ;  Start 2/11/17 at 06:00


Losartan Potassium (Cozaar) 25 mg DAILY PO  Last administered on 2/11/17at 09:59

; Admin Dose 25 MG;  Start 2/11/17 at 09:00


Ondansetron HCl (Zofran Odt) 4 mg Q6H  PRN ODT NAUSEA AND/OR VOMITING;  Start 2/

10/17 at 16:00


Pantoprazole (Protonix Tab) 40 mg DAILY@06 PO  Last administered on 2/14/17at 06

:20; Admin Dose 40 MG;  Start 2/11/17 at 06:00


Spironolactone (Aldactone) 100 mg QAM PO  Last administered on 2/14/17at 08:36; 

Admin Dose 100 MG;  Start 2/11/17 at 09:00


Miscellaneous Information 1 ea NOTE XX ;  Start 2/10/17 at 17:00


Glucose (Glutose) 15 gm Q15M  PRN PO DECREASED GLUCOSE;  Start 2/10/17 at 17:00


Glucose (Glutose) 22.5 gm Q15M  PRN PO DECREASED GLUCOSE;  Start 2/10/17 at 17:

00


Dextrose (D50w Syringe) 25 ml Q15M  PRN IV DECREASED GLUCOSE;  Start 2/10/17 at 

17:00


Dextrose (D50w Syringe) 50 ml Q15M  PRN IV DECREASED GLUCOSE;  Start 2/10/17 at 

17:00


Glucagon (Glucagen) 1 mg Q15M  PRN IM DECREASED GLUCOSE;  Start 2/10/17 at 17:00


Glucose (Glutose) 15 gm Q15M  PRN BUCCAL DECREASED GLUCOSE;  Start 2/10/17 at 17

:00


Temazepam (Restoril) 30 mg HS  PRN PO SLEEP Last administered on 2/13/17at 22:49

; Admin Dose 30 MG;  Start 2/11/17 at 00:55


Midodrine (Proamatine) 5 mg TID@09,13,17 PO  Last administered on 2/14/17at 08:

36; Admin Dose 5 MG;  Start 2/11/17 at 06:30


Lactulose (Enulose) 20 gm Q6 PO  Last administered on 2/14/17at 06:20; Admin 

Dose 20 GM;  Start 2/12/17 at 07:00











RANJEET NIÑO MD Feb 14, 2017 12:42

## 2017-02-14 NOTE — PDOCDIS
Discharge Instructions


CONDITION


Patient Condition:  Stable





HOME CARE INSTRUCTIONS:


Diet Instructions:  2gm NaSpecial Diet:  1800 LUIZA 2GM NA





ACTIVITY:








Activity Restrictions:   Avoid heavy lifting

















RANJEET NIÑO MD Feb 14, 2017 12:40

## 2017-02-15 ENCOUNTER — HOSPITAL ENCOUNTER (EMERGENCY)
Dept: HOSPITAL 10 - E/R | Age: 57
Discharge: HOME | End: 2017-02-15
Payer: COMMERCIAL

## 2017-02-15 VITALS
BODY MASS INDEX: 26.12 KG/M2 | WEIGHT: 176.37 LBS | BODY MASS INDEX: 26.12 KG/M2 | HEIGHT: 69 IN | WEIGHT: 176.37 LBS | HEIGHT: 69 IN

## 2017-02-15 VITALS
RESPIRATION RATE: 16 BRPM | TEMPERATURE: 97.9 F | SYSTOLIC BLOOD PRESSURE: 122 MMHG | DIASTOLIC BLOOD PRESSURE: 77 MMHG | HEART RATE: 82 BPM

## 2017-02-15 DIAGNOSIS — Z79.84: ICD-10-CM

## 2017-02-15 DIAGNOSIS — Z87.891: ICD-10-CM

## 2017-02-15 DIAGNOSIS — Z79.4: ICD-10-CM

## 2017-02-15 DIAGNOSIS — K70.31: Primary | ICD-10-CM

## 2017-02-15 LAB
ALBUMIN SERPL-MCNC: 2.9 G/DL (ref 3.3–4.9)
ALBUMIN/GLOB SERPL: 0.8 {RATIO}
ALP SERPL-CCNC: 227 IU/L (ref 42–121)
ALT SERPL-CCNC: 60 IU/L (ref 13–69)
ANION GAP SERPL CALC-SCNC: 16 MMOL/L (ref 8–16)
APTT BLD: 36.5 SEC (ref 25–35)
AST SERPL-CCNC: 70 IU/L (ref 15–46)
BASOPHILS # BLD AUTO: 0 10^3/UL (ref 0–0.1)
BASOPHILS NFR BLD: 0.8 % (ref 0–2)
BILIRUB DIRECT SERPL-MCNC: 0 MG/DL (ref 0–0.2)
BILIRUB SERPL-MCNC: 0.6 MG/DL (ref 0.2–1.3)
BUN SERPL-MCNC: 19 MG/DL (ref 7–20)
CALCIUM SERPL-MCNC: 9 MG/DL (ref 8.4–10.2)
CHLORIDE SERPL-SCNC: 102 MMOL/L (ref 97–110)
CO2 SERPL-SCNC: 19 MMOL/L (ref 21–31)
CONDITION: 1
CREAT SERPL-MCNC: 0.68 MG/DL (ref 0.61–1.24)
EOSINOPHIL # BLD: 0 10^3/UL (ref 0–0.5)
EOSINOPHIL NFR BLD: 0.3 % (ref 0–7)
ERYTHROCYTE [DISTWIDTH] IN BLOOD BY AUTOMATED COUNT: 16.9 % (ref 11.5–14.5)
GLOBULIN SER-MCNC: 3.6 G/DL (ref 1.3–3.2)
GLUCOSE SERPL-MCNC: 373 MG/DL (ref 70–220)
HCT VFR BLD CALC: 38.5 % (ref 42–52)
HGB BLD-MCNC: 12.9 G/DL (ref 14–18)
INR PPP: 1.22
LH ANALYZER COMMENTS: 1
LYMPHOCYTES # BLD AUTO: 0.5 10^3/UL (ref 0.8–2.9)
LYMPHOCYTES NFR BLD AUTO: 8.7 % (ref 15–51)
MCH RBC QN AUTO: 30.4 PG (ref 29–33)
MCHC RBC AUTO-ENTMCNC: 33.6 G/DL (ref 32–37)
MCV RBC AUTO: 90.4 FL (ref 82–101)
MONOCYTES # BLD: 0.6 10^3/UL (ref 0.3–0.9)
MONOCYTES NFR BLD: 10.2 % (ref 0–11)
NEUTROPHILS # BLD: 4.4 10^3/UL (ref 1.6–7.5)
NEUTROPHILS NFR BLD AUTO: 80 % (ref 39–77)
NRBC # BLD MANUAL: 0 10^3/UL (ref 0–0)
NRBC BLD QL: 0 /100WBC (ref 0–0)
PLATELET # BLD: 117 10^3/UL (ref 140–440)
PMV BLD AUTO: 10.3 FL (ref 7.4–10.4)
POTASSIUM SERPL-SCNC: 4.6 MMOL/L (ref 3.5–5.1)
PROT SERPL-MCNC: 6.5 G/DL (ref 6.1–8.1)
PROTHROMBIN TIME: 15.5 SEC (ref 12.2–14.2)
PT RATIO: 1.2
RBC # BLD AUTO: 4.26 10^6/UL (ref 4.7–6.1)
SODIUM SERPL-SCNC: 132 MMOL/L (ref 135–144)
WBC # BLD AUTO: 5.4 10^3/UL (ref 4.8–10.8)
WBC # BLD: 5.4 10^3/UL (ref 4.8–10.8)

## 2017-02-15 PROCEDURE — 80053 COMPREHEN METABOLIC PANEL: CPT

## 2017-02-15 PROCEDURE — 96372 THER/PROPH/DIAG INJ SC/IM: CPT

## 2017-02-15 PROCEDURE — 85730 THROMBOPLASTIN TIME PARTIAL: CPT

## 2017-02-15 PROCEDURE — 85610 PROTHROMBIN TIME: CPT

## 2017-02-15 PROCEDURE — 85025 COMPLETE CBC W/AUTO DIFF WBC: CPT

## 2017-02-15 PROCEDURE — 83690 ASSAY OF LIPASE: CPT

## 2017-02-15 NOTE — RADRPT
PROCEDURE:   Ultrasound guided paracentesis. 

 

CLINICAL INDICATION:    Ascites and shortness of breath.  

 

COMPARISON:   02/13/2017.  

 

TECHNIQUE:   

The risks, benefits, and alternatives were explained to the patient and/or the patient's family, inc
luding but not limited to bleeding, infection, pain, visceral or vascular damage, shock, and death. 
 The patient and/or the patient's family understood the risks and the alternatives and wished to pro
ceed with the procedure.  Informed written consent was obtained. A procedural time out was performed
.  The patient's name, date of birth, and procedure to be performed were verified.

 

Utilizing ultrasound guidance, optimal location for entry to the peritoneal cavity was ascertained. 
 The overlying skin was prepped and draped in the usual sterile fashion.  Approximately 10 ml of 1% 
Xylocaine was injected locally for pain control.  Using ultrasound guidance, an 8 [place] catheter wa
s introduced into the peritoneal cavity in the right lower quadrant without difficulty.  

 

FINDINGS:

Initial images demonstrate ascites.  Approximately 3.8 liters of serous fluid was aspirated and disc
arded. The patient tolerated the procedure well without complication.

 

IMPRESSION:

1.  Successful ultrasound-guided paracentesis.  

 

RPTAT: QQ

_____________________________________________ 

.Rene De La Cruz MD, MD           Date    Time 

Electronically viewed and signed by .Rene De La Cruz MD, MD on 02/15/2017 22:32 

 

D:  02/15/2017 22:32  T:  02/15/2017 22:32

.R/

## 2017-02-15 NOTE — ERD
ER Documentation


Chief Complaint


Date/Time


DATE: 2/15/17 


TIME: 18:05


Chief Complaint


BIB [facility] FOR PARACENTESIS.





HPI


57-year-old male well-known to this emergency department with a history of 

cirrhosis here in need of a paracentesis.  Last paracentesis approximately 3 

days ago.  He does describe some mild nausea with nonbloody nonbilious emesis 

1.  He also has chronic lumbar back pain that is similar to back pain in the 

past.  No fevers or chills, no abdominal pain but does describe abdominal 

distention.





ROS


All systems reviewed and are negative except as per history of present illness.





Medications


Home Meds


Active Scripts


Ondansetron (Ondansetron Odt) 4 Mg Tab.rapdis, 4 MG PO Q6H Y for NAUSEA AND/OR 

VOMITING, #10 TAB


   Prov:BERNIE COWAN DO         2/7/17


Insulin Aspart* (Novolog Insulin Pen*) 100 Unit/Ml Soln, 10 UNIT SC WITH MEALS 

BEDTIME for 28 Days


   Prov:HANNAH CASTELLANO MD         11/15/16


Reported Medications


Dapagliflozin Propanediol (Farxiga) 10 Mg Tablet, 10 MG PO DAILY, #30 TAB


   11/11/16


Insulin Glargine* (Lantus*) 100 Unit/Ml Soln, 35 UNIT SC QHS, #1 VIAL


   11/11/16


Losartan Potassium* (Cozaar*) 25 Mg Tablet, 25 MG PO DAILY, #30 TAB


   11/11/16


Pantoprazole* (Protonix*) 40 Mg Tablet.dr, 40 MG PO DAILY, TAB


   11/11/16


Furosemide* (Furosemide*) 40 Mg Tablet, 40 MG PO DAILY, TAB


   11/11/16


Fenofibrate Nanocrystallized* (Fenofibrate*) 145 Mg Tablet, 145 MG PO DAILY, TAB


   11/11/16


Zolpidem Tartrate* (Zolpidem Tartrate*) 10 Mg Tablet, 10 MG PO QHS Y for 

INSOMNIA, #30 TAB


   11/11/16


Spironolactone* (Spironolactone*) 100 Mg Tablet, 100 MG PO QAM, TAB


   11/11/16


Discontinued Scripts


Zolpidem Tartrate* (Ambien*) 5 Mg Tablet, 5 MG PO HS Y for INSOMNIA, #15 TAB


   Prov:BERNIE COWAN DO         2/7/17





Allergies


Allergies:  


Coded Allergies:  


     No Known Drug Allergy (Verified  Allergy, Unknown, 2/9/17)





PMhx/Soc


History of Surgery:  Yes


Anesthesia Reaction:  No


Hx Neurological Disorder:  Yes


Hx Respiratory Disorders:  No (When ascites is too much, SOB)


Hx Cardiac Disorders:  No


Hx Psychiatric Problems:  No


Hx Alcohol Use:  No


Hx Substance Use:  No


Hx Tobacco Use:  No (Former)


Smoking Status:  Never smoker





FmHx


Family History:  No diabetes





Physical Exam


Vitals





Vital Signs








  Date Time  Temp Pulse Resp B/P Pulse Ox O2 Delivery O2 Flow Rate FiO2


 


2/15/17 16:45 98.6 80 19 137/80 99   








Physical Exam


General: Well developed, well nourished, no acute distress


Head: Normocephalic, atraumatic.


Eyes: Pupils equally reactive, EOM intact


ENT: Moist mucous membranes


Neck: Supple, no lymphadenopathy


Respiratory: Lungs clear bilaterally, no distress


Cardiovascular: RRR, no murmurs, rubs, or gallops


Abdominal: Soft, protuberant with fluid wave


: Deferred


MSK: No edema, no unilateral swelling, 5/5 strength


Neurologic: Alert and oriented, moving all extremities, normal speech, no focal 

weakness, no cerebellar signs


Skin: No rash


Psych: Normal mood


Result Diagram:  


2/15/17 1645                                                                   

             2/15/17 1645





Results 24 hrs





 Laboratory Tests








Test


  2/15/17


16:45


 


Activated Partial


Thromboplast Time 36.5Sec 


 


 


Alanine Aminotransferase


(ALT/SGPT) 60IU/L 


 


 


Albumin 2.9g/dl 


 


Albumin/Globulin Ratio 0.80 


 


Alkaline Phosphatase 227IU/L 


 


Anion Gap 16 


 


Aspartate Amino Transf


(AST/SGOT) 70IU/L 


 


 


Basophils # 0.010^3/ul 


 


Basophils % 0.8% 


 


Blood Morphology Comment  


 


Blood Urea Nitrogen 19mg/dl 


 


Calcium Level 9.0mg/dl 


 


Carbon Dioxide Level 19mmol/L 


 


Chloride Level 102mmol/L 


 


Creatinine 0.68mg/dl 


 


Direct Bilirubin 0.00mg/dl 


 


Eosinophils # 0.010^3/ul 


 


Eosinophils % 0.3% 


 


Globulin 3.60g/dl 


 


Glucose Level 373mg/dl 


 


Hematocrit 38.5% 


 


Hemoglobin 12.9g/dl 


 


INR International Normalized


Ratio 1.22 


 


 


Indirect Bilirubin 0.6mg/dl 


 


Lipase 2428U/L 


 


Lymphocytes # 0.510^3/ul 


 


Lymphocytes % 8.7% 


 


Mean Corpuscular Hemoglobin 30.4pg 


 


Mean Corpuscular Hemoglobin


Concent 33.6g/dl 


 


 


Mean Corpuscular Volume 90.4fl 


 


Mean Platelet Volume 10.3fl 


 


Monocytes # 0.610^3/ul 


 


Monocytes % 10.2% 


 


Neutrophils # 4.410^3/ul 


 


Neutrophils % 80.0% 


 


Nucleated Red Blood Cells # 0.010^3/ul 


 


Nucleated Red Blood Cells % 0.0/100WBC 


 


Platelet Count 87568^3/UL 


 


Potassium Level 4.6mmol/L 


 


Prothrombin Time 15.5Sec 


 


Prothrombin Time Ratio 1.2 


 


Red Blood Count 4.2610^6/ul 


 


Red Cell Distribution Width 16.9% 


 


Sodium Level 132mmol/L 


 


Total Bilirubin 0.6mg/dl 


 


Total Protein 6.5g/dl 


 


White Blood Count 5.410^3/ul 








 Current Medications








 Medications


  (Trade)  Dose


 Ordered  Sig/Johnathan


 Route


 PRN Reason  Start Time


 Stop Time Status Last Admin


Dose Admin


 


 Sodium Chloride


  (NS)  1,000 ml @ 


 1,000 mls/hr  Q1H STAT


 IV


   2/15/17 16:32


 2/15/17 17:31 DC  


 


 


 Ondansetron HCl


  (Zofran Inj)  4 mg  ONCE  STAT


 IV


   2/15/17 16:32


 2/15/17 16:35 DC  


 


 


 Lidocaine


  (Xylocaine 1%


  (Mpf))  5 ml  STK-MED ONCE


 .ROUTE


   2/15/17 17:38


 2/15/17 17:39 DC 2/15/17 17:40


 


 


 Acetaminophen/


 Hydrocodone Bitart


  (Norco (10/325))  1 tab  ONCE  ONCE


 PO


   2/15/17 18:30


 2/15/17 18:31   


 


 


 Ondansetron HCl


  (Zofran Odt)  4 mg  ONCE  STAT


 ODT


   2/15/17 18:01


 2/15/17 18:02 DC  


 











Procedures/MDM


EKG, MONITORS, & DIAGNOSTIC IMAGING:


Large volume therapeutic paracentesis performed by interventional radiology.





LAB INTERPRETATION:


No significant coagulopathy noted.





MEDICAL DECISION MAKING:


The patient presents with abdominal ascites likely secondary to cirrhosis.  

Patient does not exhibit any signs or symptoms concerning for complications of 

cirrhosis such as GI bleed, hepatic encephalopathy or spontaneous bacterial 

peritonitis.  There is no indication currently for diagnostic paracentesis.  

The patient will benefit from large volume therapeutic paracentesis by 

interventional radiology.  If the patient remains stable without evidence of 

hemodynamic compromise secondary to fluid shifts the patient can be safely 

discharged home with close primary care and hepatology follow-up.





ER COURSE:


The patient had successful large volume therapeutic paracentesis.  The patient 

remained hemodynamically stable and otherwise well-appearing.  The patient is 

safe for discharge home.





The patient was given Norco and Zofran here in the emergency room.  His pain is 

consistent with chronic pain.  No evidence of SBP.  The patient is safe for 

discharge.





I kept the patient and/or family informed of laboratory and diagnostic imaging 

results throughout the emergency room course.





DISPOSITION PLAN:


We discussed follow up with the patient's primary care doctor within 24 to 48 

hours as needed.  We also discussed return to the emergency room for worsening 

symptoms or worsening condition.





Discharge Medications:


None





Departure


Diagnosis:  


 Primary Impression:  


 Ascites


 Ascites type:  due to alcoholic cirrhosis  Qualified Code:  K70.31 - Ascites 

due to alcoholic cirrhosis


 Additional Impressions:  


 Cirrhosis


 Hepatic cirrhosis type:  alcoholic cirrhosis  Ascites presence:  with ascites  

Qualified Code:  K70.31 - Alcoholic cirrhosis of liver with ascites


 Nausea and vomiting


 Vomiting type:  unspecified  Vomiting Intractability:  non-intractable  

Qualified Code:  R11.2 - Non-intractable vomiting with nausea, unspecified 

vomiting type


Condition:  Stable











JESSE EM MD Feb 15, 2017 18:07

## 2017-02-17 ENCOUNTER — HOSPITAL ENCOUNTER (EMERGENCY)
Dept: HOSPITAL 10 - E/R | Age: 57
Discharge: HOME | End: 2017-02-17
Payer: COMMERCIAL

## 2017-02-17 VITALS
HEIGHT: 67 IN | HEIGHT: 67 IN | BODY MASS INDEX: 30.67 KG/M2 | WEIGHT: 195.42 LBS | WEIGHT: 195.42 LBS | BODY MASS INDEX: 30.67 KG/M2

## 2017-02-17 VITALS
TEMPERATURE: 98.1 F | SYSTOLIC BLOOD PRESSURE: 96 MMHG | DIASTOLIC BLOOD PRESSURE: 66 MMHG | HEART RATE: 95 BPM | RESPIRATION RATE: 18 BRPM

## 2017-02-17 DIAGNOSIS — Z79.4: ICD-10-CM

## 2017-02-17 DIAGNOSIS — Z87.891: ICD-10-CM

## 2017-02-17 DIAGNOSIS — E11.9: ICD-10-CM

## 2017-02-17 DIAGNOSIS — K74.60: ICD-10-CM

## 2017-02-17 DIAGNOSIS — R10.9: Primary | ICD-10-CM

## 2017-02-17 DIAGNOSIS — R11.0: ICD-10-CM

## 2017-02-17 LAB
ADD UMIC: YES
ALBUMIN SERPL-MCNC: 2.7 G/DL (ref 3.3–4.9)
ALBUMIN/GLOB SERPL: 0.79 {RATIO}
ALP SERPL-CCNC: 180 IU/L (ref 42–121)
ALT SERPL-CCNC: 51 IU/L (ref 13–69)
ANION GAP SERPL CALC-SCNC: 15 MMOL/L (ref 8–16)
APTT BLD: 33.8 SEC (ref 25–35)
AST SERPL-CCNC: 56 IU/L (ref 15–46)
BACTERIA #/AREA URNS HPF: (no result) /[HPF]
BASOPHILS # BLD AUTO: 0 10^3/UL (ref 0–0.1)
BASOPHILS NFR BLD: 0.1 % (ref 0–2)
BILIRUB DIRECT SERPL-MCNC: 0 MG/DL (ref 0–0.2)
BILIRUB SERPL-MCNC: 0.6 MG/DL (ref 0.2–1.3)
BUN SERPL-MCNC: 19 MG/DL (ref 7–20)
CALCIUM SERPL-MCNC: 8.9 MG/DL (ref 8.4–10.2)
CHLORIDE SERPL-SCNC: 100 MMOL/L (ref 97–110)
CO2 SERPL-SCNC: 23 MMOL/L (ref 21–31)
COLOR UR: (no result)
CONDITION: 1
CREAT SERPL-MCNC: 0.73 MG/DL (ref 0.61–1.24)
EOSINOPHIL # BLD: 0 10^3/UL (ref 0–0.5)
EOSINOPHIL NFR BLD: 0.5 % (ref 0–7)
ERYTHROCYTE [DISTWIDTH] IN BLOOD BY AUTOMATED COUNT: 16.4 % (ref 11.5–14.5)
GLOBULIN SER-MCNC: 3.4 G/DL (ref 1.3–3.2)
GLUCOSE SERPL-MCNC: 268 MG/DL (ref 70–220)
GLUCOSE UR STRIP-MCNC: >=1000 %
HCT VFR BLD CALC: 36.7 % (ref 42–52)
HGB BLD-MCNC: 12.4 G/DL (ref 14–18)
INR PPP: 1.18
KETONES UR STRIP.AUTO-MCNC: NEGATIVE MG/DL
LH ANALYZER COMMENTS: 1
LYMPHOCYTES # BLD AUTO: 0.4 10^3/UL (ref 0.8–2.9)
LYMPHOCYTES NFR BLD AUTO: 7.6 % (ref 15–51)
MCH RBC QN AUTO: 30.3 PG (ref 29–33)
MCHC RBC AUTO-ENTMCNC: 33.8 G/DL (ref 32–37)
MCV RBC AUTO: 89.8 FL (ref 82–101)
MONOCYTES # BLD: 0.6 10^3/UL (ref 0.3–0.9)
MONOCYTES NFR BLD: 11.1 % (ref 0–11)
NEUTROPHILS # BLD: 4.1 10^3/UL (ref 1.6–7.5)
NEUTROPHILS NFR BLD AUTO: 80.7 % (ref 39–77)
NITRITE UR QL STRIP.AUTO: NEGATIVE
NRBC # BLD MANUAL: 0 10^3/UL (ref 0–0)
NRBC BLD QL: 0 /100WBC (ref 0–0)
PLATELET # BLD: 122 10^3/UL (ref 140–440)
PMV BLD AUTO: 10.3 FL (ref 7.4–10.4)
POTASSIUM SERPL-SCNC: 4.5 MMOL/L (ref 3.5–5.1)
PROT SERPL-MCNC: 6.1 G/DL (ref 6.1–8.1)
PROTHROMBIN TIME: 15.1 SEC (ref 12.2–14.2)
PT RATIO: 1.2
RBC # BLD AUTO: 4.09 10^6/UL (ref 4.7–6.1)
RBC # UR AUTO: NEGATIVE /UL
RBC #/AREA URNS HPF: (no result) /HPF
SODIUM SERPL-SCNC: 133 MMOL/L (ref 135–144)
TROPONIN I SERPL-MCNC: < 0.012 NG/ML (ref 0–0.12)
URINE BILIRUBIN (DIP): NEGATIVE
URINE TOTAL PROTEIN (DIP): NEGATIVE
UROBILINOGEN UR STRIP-ACNC: (no result) (ref 0.1–1)
WBC # BLD AUTO: 5.1 10^3/UL (ref 4.8–10.8)
WBC # BLD: 5.1 10^3/UL (ref 4.8–10.8)
WBC # UR STRIP: (no result) /UL

## 2017-02-17 PROCEDURE — 81001 URINALYSIS AUTO W/SCOPE: CPT

## 2017-02-17 PROCEDURE — 81003 URINALYSIS AUTO W/O SCOPE: CPT

## 2017-02-17 PROCEDURE — 85730 THROMBOPLASTIN TIME PARTIAL: CPT

## 2017-02-17 PROCEDURE — 84484 ASSAY OF TROPONIN QUANT: CPT

## 2017-02-17 PROCEDURE — 83690 ASSAY OF LIPASE: CPT

## 2017-02-17 PROCEDURE — 96375 TX/PRO/DX INJ NEW DRUG ADDON: CPT

## 2017-02-17 PROCEDURE — 71010: CPT

## 2017-02-17 PROCEDURE — 96374 THER/PROPH/DIAG INJ IV PUSH: CPT

## 2017-02-17 PROCEDURE — 82140 ASSAY OF AMMONIA: CPT

## 2017-02-17 PROCEDURE — 93005 ELECTROCARDIOGRAM TRACING: CPT

## 2017-02-17 PROCEDURE — 80053 COMPREHEN METABOLIC PANEL: CPT

## 2017-02-17 PROCEDURE — 85025 COMPLETE CBC W/AUTO DIFF WBC: CPT

## 2017-02-17 PROCEDURE — 85610 PROTHROMBIN TIME: CPT

## 2017-02-17 PROCEDURE — 36415 COLL VENOUS BLD VENIPUNCTURE: CPT

## 2017-02-17 NOTE — ERD
ER Documentation


Chief Complaint


Date/Time


DATE: 17 


TIME: 1024


Chief Complaint


nausea and generalized weakness; abdominal distention





HPI


57-year-old male well-known to this emergency department for multiple visits 

secondary to his liver disease presents to the emergency department today 

complaining of shortness of breath.


Patient has had multiple visits for paracentesis his including a recent 

hospitalization during which time he was reevaluated.  Patient states that he 

has had no significant trouble with his fluid at this point, but has a diffuse, 

nonspecific, spontaneous abdominal pain.  The pain does not radiate and is 

associated with no fevers chills vomiting or melena.





ROS


All systems reviewed and are negative except as per history of present illness.





Medications


Home Meds


Active Scripts


Hydrocodone/Acetaminophen (Norco 5-325 Tablet) 1 Each Tablet, 1 EACH PO TID, #

14 TAB


   Prov:MAGNOLIA MELENDEZ         17


Insulin Aspart* (Novolog Insulin Pen*) 100 Unit/Ml Soln, 10 UNIT SC WITH MEALS 

BEDTIME for 28 Days


   Prov:HANNAH CASTELLANO MD         11/15/16


Reported Medications


Dapagliflozin Propanediol (Farxiga) 10 Mg Tablet, 10 MG PO DAILY, #30 TAB


   16


Insulin Glargine* (Lantus*) 100 Unit/Ml Soln, 35 UNIT SC QHS, #1 VIAL


   16


Losartan Potassium* (Cozaar*) 25 Mg Tablet, 25 MG PO DAILY, #30 TAB


   16


Pantoprazole* (Protonix*) 40 Mg Tablet.dr, 40 MG PO DAILY, TAB


   16


Furosemide* (Furosemide*) 40 Mg Tablet, 40 MG PO DAILY, TAB


   16


Fenofibrate Nanocrystallized* (Fenofibrate*) 145 Mg Tablet, 145 MG PO DAILY, TAB


   16


Zolpidem Tartrate* (Zolpidem Tartrate*) 10 Mg Tablet, 10 MG PO QHS Y for 

INSOMNIA, #30 TAB


   16


Spironolactone* (Spironolactone*) 100 Mg Tablet, 100 MG PO QAM, TAB


   16


Discontinued Scripts


Ondansetron (Ondansetron Odt) 4 Mg Tab.rapdis, 4 MG PO Q6H Y for NAUSEA AND/OR 

VOMITING, #30 TAB


   Prov:JESSE EM MD         2/15/17


Ondansetron (Ondansetron Odt) 4 Mg Tab.rapdis, 4 MG PO Q6H Y for NAUSEA AND/OR 

VOMITING, #10 TAB


   Prov:BERNIE COWAN DO         17





Allergies


Allergies:  


Coded Allergies:  


     No Known Drug Allergy (Verified  Allergy, Unknown, 17)





PMhx/Soc


History of Surgery:  Yes


Anesthesia Reaction:  No


Hx Neurological Disorder:  Yes


Hx Respiratory Disorders:  No


Hx Cardiac Disorders:  No


Hx Psychiatric Problems:  No


Hx Alcohol Use:  No


Hx Substance Use:  No


Hx Tobacco Use:  No (Former)


Smoking Status:  Former smoker





FmHx


Noncontributory for chief complaint





Physical Exam


Vitals





Vital Signs








  Date Time  Temp Pulse Resp B/P Pulse Ox O2 Delivery O2 Flow Rate FiO2


 


17 10:29 98.0 100 18 112/75 99   








Physical Exam


GENERAL: Patient is a chronically ill-appearing male but in no acute distress


HEENT: Pupils equal, round, and reactive to light.  EOMI. There is no scleral 

icterus.


NECK: C-spine is soft and supple, there is no meningismus.  There is no 

cervical lymphadenopathy.


LUNGS: Clear to auscultation bilaterally. There are no rales, wheezes or 

rhonchi.


HEART: Regular rate and rhythm, no murmurs, clicks, rubs or gallops.


ABDOMEN: Soft and distended with evidence of his underlying liver disease.  No 

rebound guarding or peritoneal signs


EXTREMITIES: There is no peripheral cyanosis or edema.  No focal swelling or 

erythema.


NEURO: The patient moves all four extremities with 5/5 strength.  Cranial 

nerves II - XII are intact. Normal gait. Alert and oriented


SKIN: There is no apparent rash or petechiae.


HEME/LYMPHATIC: There is no evidence of excessive bruising or lymphedema.


PSYCHIATRIC: The patient does not appear anxious or depressed.


Result Diagram:  


17 1050                                                                   

             17 1050





Results 24 hrs





 Laboratory Tests








Test


  17


10:50 17


11:30


 


Activated Partial


Thromboplast Time 33.8Sec 


  


 


 


Alanine Aminotransferase


(ALT/SGPT) 51IU/L 


  


 


 


Albumin 2.7g/dl  


 


Albumin/Globulin Ratio 0.79  


 


Alkaline Phosphatase 180IU/L  


 


Anion Gap 15  


 


Aspartate Amino Transf


(AST/SGOT) 56IU/L 


  


 


 


Basophils # 0.010^3/ul  


 


Basophils % 0.1%  


 


Blood Morphology Comment   


 


Blood Urea Nitrogen 19mg/dl  


 


Calcium Level 8.9mg/dl  


 


Carbon Dioxide Level 23mmol/L  


 


Chloride Level 100mmol/L  


 


Creatinine 0.73mg/dl  


 


Direct Bilirubin 0.00mg/dl  


 


Eosinophils # 0.010^3/ul  


 


Eosinophils % 0.5%  


 


Globulin 3.40g/dl  


 


Glucose Level 268mg/dl  


 


Hematocrit 36.7%  


 


Hemoglobin 12.4g/dl  


 


INR International Normalized


Ratio 1.18 


  


 


 


Indirect Bilirubin 0.6mg/dl  


 


Lipase 164U/L  


 


Lymphocytes # 0.410^3/ul  


 


Lymphocytes % 7.6%  


 


Mean Corpuscular Hemoglobin 30.3pg  


 


Mean Corpuscular Hemoglobin


Concent 33.8g/dl 


  


 


 


Mean Corpuscular Volume 89.8fl  


 


Mean Platelet Volume 10.3fl  


 


Monocytes # 0.610^3/ul  


 


Monocytes % 11.1%  


 


Neutrophils # 4.110^3/ul  


 


Neutrophils % 80.7%  


 


Nucleated Red Blood Cells # 0.010^3/ul  


 


Nucleated Red Blood Cells % 0.0/100WBC  


 


Platelet Count 76205^3/UL  


 


Potassium Level 4.5mmol/L  


 


Prothrombin Time 15.1Sec  


 


Prothrombin Time Ratio 1.2  


 


Red Blood Count 4.0910^6/ul  


 


Red Cell Distribution Width 16.4%  


 


Sodium Level 133mmol/L  


 


Total Bilirubin 0.6mg/dl  


 


Total Protein 6.1g/dl  


 


Troponin I < 0.012ng/ml  


 


White Blood Count 5.110^3/ul  


 


Urine Bacteria  MODERATE 


 


Urine Bilirubin  NEGATIVE 


 


Urine Clarity  CLEAR 


 


Urine Color  LT. YELLOW 


 


Urine Epithelial Cells  MODERATE 


 


Urine Glucose  >=1000% 


 


Urine Hemoglobin  NEGATIVE 


 


Urine Ketones  NEGATIVE 


 


Urine Leukocyte Esterase  1+ 


 


Urine Microscopic RBC  0-2/HPF 


 


Urine Microscopic WBC  0-2/HPF 


 


Urine Nitrite  NEGATIVE 


 


Urine Specific Gravity  1.015 


 


Urine Total Protein  NEGATIVE 


 


Urine Urobilinogen  0.2  E.U./dL 


 


Urine Yeast  MODERATE 


 


Urine pH  5.0 








 Current Medications








 Medications


  (Trade)  Dose


 Ordered  Sig/Johnathan


 Route


 PRN Reason  Start Time


 Stop Time Status Last Admin


Dose Admin


 


 Ondansetron HCl


  (Zofran Inj)  4 mg  ONCE  STAT


 IV


   17 10:29


 17 10:30 DC 17 10:57


 


 


 Morphine Sulfate


  (morphine)  2 mg  ONCE  ONCE


 IV


   17 11:30


 17 11:31 DC 17 11:47


 











Procedures/MDM


Patient was taken to a room, seen and evaluated. Comfort measures were 

initiated. 





Diagnostic tests were ordered and reviewed.





3 LEAD RHYTHM STRIP: Normal sinus rhythm without ectopy





12 lead EKG interpreted by myself:





Rate/rhythm: Normal sinus rhythm


Axis/intervals: Normal


Ischemia: Nonspecific ST and T-wave changes with no ST elevation


Impression: Nonspecific EKG


RADIOLOGY: reviewed with the radiologist





CONSULTATION: hospitalist was notified for admission





REEVALUATION: Patient remained comfortable after morphine





MEDICAL DECISION MAKIN-year-old male with existing history of underlying 

liver disease presents to the emergency department with abdominal pain 

consistent with his underlying liver disease.  At this time, patient shows no 

evidence of significant pancreatitis, no evidence of acute infection, no 

evidence of SBP.  Overall, patient appears to be clinically nontoxic and 

appears to be appropriate for outpatient care at this time.





Departure


Diagnosis:  


 Primary Impression:  


 Abdominal pain


 Additional Impression:  


 Cirrhosis


Condition:  Stable


Patient Instructions:  Abdominal Pain


Referrals:  


DAVID ARCE (PCP)





Additional Instructions:  


Please see your doctor for ongoing care. 





Return for any problems or concerns











MAGNOLIA MELENDEZ 2017 12:28

## 2017-02-17 NOTE — RADRPT
PROCEDURE:   XR Chest 1 View. 

 

CLINICAL INDICATION:   Abnormal breath sounds, abdominal pain 

 

TECHNIQUE:   AP view of the chest were obtained. 

 

COMPARISON:   January 22, 2017 

 

FINDINGS:

The heart size is within normal limits. Calcified atherosclerosis is seen in the aorta. The lungs ar
e hypoinflated.  Elevation right hemidiaphragm is observed.  Associated atelectasis/consolidation of
 the right middle and lower lobes is seen.  Subsegmental atelectasis is noted at the left lung base.
  Osseous structures are intact. 

 

IMPRESSION:

Calcified atherosclerosis in the aorta. 

 

Hypoinflated lungs.

 

Elevation right hemidiaphragm with associated atelectasis/consolidation of the right middle and lowe
r lobes.

 

Subsegmental atelectasis at the left lung base.

 

 

RPTAT: AA

_____________________________________________ 

.Carlton Tejada MD, MD           Date    Time 

Electronically viewed and signed by .Carlton Tejada MD, MD on 02/17/2017 11:01 

 

D:  02/17/2017 11:01  T:  02/17/2017 11:01

.P/

## 2017-02-19 ENCOUNTER — HOSPITAL ENCOUNTER (EMERGENCY)
Dept: HOSPITAL 10 - E/R | Age: 57
Discharge: HOME | End: 2017-02-19
Payer: COMMERCIAL

## 2017-02-19 VITALS
DIASTOLIC BLOOD PRESSURE: 70 MMHG | TEMPERATURE: 98.2 F | RESPIRATION RATE: 18 BRPM | SYSTOLIC BLOOD PRESSURE: 93 MMHG | HEART RATE: 77 BPM

## 2017-02-19 VITALS — BODY MASS INDEX: 31.16 KG/M2 | WEIGHT: 158.73 LBS | HEIGHT: 60 IN

## 2017-02-19 DIAGNOSIS — I10: ICD-10-CM

## 2017-02-19 DIAGNOSIS — R11.2: ICD-10-CM

## 2017-02-19 DIAGNOSIS — Z79.4: ICD-10-CM

## 2017-02-19 DIAGNOSIS — K70.31: Primary | ICD-10-CM

## 2017-02-19 DIAGNOSIS — Z87.891: ICD-10-CM

## 2017-02-19 DIAGNOSIS — E11.9: ICD-10-CM

## 2017-02-19 LAB
ALBUMIN SERPL-MCNC: 2.8 G/DL (ref 3.3–4.9)
ALBUMIN/GLOB SERPL: 0.84 {RATIO}
ALP SERPL-CCNC: 188 IU/L (ref 42–121)
ALT SERPL-CCNC: 49 IU/L (ref 13–69)
ANION GAP SERPL CALC-SCNC: 14 MMOL/L (ref 8–16)
APTT BLD: 33.7 SEC (ref 25–35)
AST SERPL-CCNC: 56 IU/L (ref 15–46)
BASOPHILS # BLD AUTO: 0 10^3/UL (ref 0–0.1)
BASOPHILS NFR BLD: 0.5 % (ref 0–2)
BILIRUB DIRECT SERPL-MCNC: 0 MG/DL (ref 0–0.2)
BILIRUB SERPL-MCNC: 0.5 MG/DL (ref 0.2–1.3)
BUN SERPL-MCNC: 19 MG/DL (ref 7–20)
CALCIUM SERPL-MCNC: 8.6 MG/DL (ref 8.4–10.2)
CHLORIDE SERPL-SCNC: 99 MMOL/L (ref 97–110)
CO2 SERPL-SCNC: 25 MMOL/L (ref 21–31)
CONDITION: 1
CREAT SERPL-MCNC: 0.76 MG/DL (ref 0.61–1.24)
EOSINOPHIL # BLD: 0.1 10^3/UL (ref 0–0.5)
EOSINOPHIL NFR BLD: 1.2 % (ref 0–7)
ERYTHROCYTE [DISTWIDTH] IN BLOOD BY AUTOMATED COUNT: 16.5 % (ref 11.5–14.5)
GLOBULIN SER-MCNC: 3.3 G/DL (ref 1.3–3.2)
GLUCOSE SERPL-MCNC: 204 MG/DL (ref 70–220)
HCT VFR BLD CALC: 37.3 % (ref 42–52)
HGB BLD-MCNC: 12.6 G/DL (ref 14–18)
INR PPP: 1.16
LH ANALYZER COMMENTS: 1
LYMPHOCYTES # BLD AUTO: 0.5 10^3/UL (ref 0.8–2.9)
LYMPHOCYTES NFR BLD AUTO: 11.1 % (ref 15–51)
MCH RBC QN AUTO: 30.5 PG (ref 29–33)
MCHC RBC AUTO-ENTMCNC: 33.9 G/DL (ref 32–37)
MCV RBC AUTO: 90 FL (ref 82–101)
MONOCYTES # BLD: 0.5 10^3/UL (ref 0.3–0.9)
MONOCYTES NFR BLD: 10.4 % (ref 0–11)
NEUTROPHILS # BLD: 3.7 10^3/UL (ref 1.6–7.5)
NEUTROPHILS NFR BLD AUTO: 76.8 % (ref 39–77)
NRBC # BLD MANUAL: 0 10^3/UL (ref 0–0)
NRBC BLD QL: 0 /100WBC (ref 0–0)
PLATELET # BLD: 143 10^3/UL (ref 140–440)
PMV BLD AUTO: 10.6 FL (ref 7.4–10.4)
POTASSIUM SERPL-SCNC: 4.4 MMOL/L (ref 3.5–5.1)
PROT SERPL-MCNC: 6.1 G/DL (ref 6.1–8.1)
PROTHROMBIN TIME: 14.9 SEC (ref 12.2–14.2)
PT RATIO: 1.2
RBC # BLD AUTO: 4.15 10^6/UL (ref 4.7–6.1)
SODIUM SERPL-SCNC: 134 MMOL/L (ref 135–144)
WBC # BLD AUTO: 4.9 10^3/UL (ref 4.8–10.8)
WBC # BLD: 4.9 10^3/UL (ref 4.8–10.8)

## 2017-02-19 PROCEDURE — 85610 PROTHROMBIN TIME: CPT

## 2017-02-19 PROCEDURE — 85730 THROMBOPLASTIN TIME PARTIAL: CPT

## 2017-02-19 PROCEDURE — 83690 ASSAY OF LIPASE: CPT

## 2017-02-19 PROCEDURE — 96374 THER/PROPH/DIAG INJ IV PUSH: CPT

## 2017-02-19 PROCEDURE — 82962 GLUCOSE BLOOD TEST: CPT

## 2017-02-19 PROCEDURE — 80053 COMPREHEN METABOLIC PANEL: CPT

## 2017-02-19 PROCEDURE — 85025 COMPLETE CBC W/AUTO DIFF WBC: CPT

## 2017-02-19 PROCEDURE — 96375 TX/PRO/DX INJ NEW DRUG ADDON: CPT

## 2017-02-19 PROCEDURE — 36415 COLL VENOUS BLD VENIPUNCTURE: CPT

## 2017-02-19 NOTE — RADRPT
PROCEDURE:   Ultrasound guided paracentesis

 

CLINICAL INDICATION:   Ascites 

 

TECHNIQUE:   The risks benefits and alternatives of the procedure were explained to the patient.  In
formed written consent was obtained.  A time out was performed.  The patient understood the risks be
nefits and alternatives and wished to proceed with the procedure. 

 

COMPARISON:   None available.

 

FINDINGS:

 

A time out was performed.  The overlying skin of the right lower quadrant of the abdomen was prepped
 and draped in the usual sterile fashion. Approximately 10 cc of Xylocaine was injected locally for 
pain control.  Utilizing ultrasound guidance, a skinny 5-[place] Yueh catheter was placed into the  p
eritoneal cavity without difficulty.  The patient tolerated the procedure well without complication.
  Approximately 6550 cc of clear yellow fluid was obtained.  The fluid was not sent to the lab for f
urther analysis.

 

IMPRESSION:

 

1.  Successful ultrasound-guided paracentesis.

 

RPTAT: QQ

_____________________________________________ 

.Vj Jones MD, MD           Date    Time 

Electronically viewed and signed by .Vj Jones MD, MD on 02/19/2017 11:54 

 

D:  02/19/2017 11:54  T:  02/19/2017 11:54

.N/

## 2017-02-19 NOTE — ERD
ER Documentation


Chief Complaint


Date/Time


DATE: 2/19/17 


TIME: 10:23


Chief Complaint


ABD DISTENSION/PAIN, PT HERE FOR PARACENTESIS





PRUDENCE Hampton is a 57-year-old male with a history of alcoholic cirrhosis.  The 

patient presents for paracentesis.  The patient describes abdominal distention 

and pressure.  He is also discovering persistent nausea with occasional 

nonbloody nonbilious emesis, no melena.  The patient has multiple presentations 

to the emergency room.  His last paracentesis was approximately 4 days ago.  

The patient was seen here 2 days ago for shortness of breath and was 

discharged.  The patient is being arranged at [facility] to have a port placed.  He 

states his symptoms are similar to prior episodes requiring paracentesis.  He 

denies any fevers or chills.





ROS


All systems reviewed and are negative except as per history of present illness.





Medications


Home Meds


Active Scripts


Hydrocodone/Acetaminophen (Norco 5-325 Tablet) 1 Each Tablet, 1 EACH PO TID, #

14 TAB


   Prov:MAGNOLIA MELENDEZ         2/17/17


Insulin Aspart* (Novolog Insulin Pen*) 100 Unit/Ml Soln, 10 UNIT SC WITH MEALS 

BEDTIME for 28 Days


   Prov:HANNAH CASTELLANO MD         11/15/16


Reported Medications


Dapagliflozin Propanediol (Farxiga) 10 Mg Tablet, 10 MG PO DAILY, #30 TAB


   11/11/16


Insulin Glargine* (Lantus*) 100 Unit/Ml Soln, 35 UNIT SC QHS, #1 VIAL


   11/11/16


Losartan Potassium* (Cozaar*) 25 Mg Tablet, 25 MG PO DAILY, #30 TAB


   11/11/16


Pantoprazole* (Protonix*) 40 Mg Tablet.dr, 40 MG PO DAILY, TAB


   11/11/16


Furosemide* (Furosemide*) 40 Mg Tablet, 40 MG PO DAILY, TAB


   11/11/16


Fenofibrate Nanocrystallized* (Fenofibrate*) 145 Mg Tablet, 145 MG PO DAILY, TAB


   11/11/16


Zolpidem Tartrate* (Zolpidem Tartrate*) 10 Mg Tablet, 10 MG PO QHS Y for 

INSOMNIA, #30 TAB


   11/11/16


Spironolactone* (Spironolactone*) 100 Mg Tablet, 100 MG PO QAM, TAB


   11/11/16


Discontinued Scripts


Ondansetron (Ondansetron Odt) 4 Mg Tab.rapdis, 4 MG PO Q6H Y for NAUSEA AND/OR 

VOMITING, #30 TAB


   Prov:JESSE EM MD         2/15/17


Ondansetron (Ondansetron Odt) 4 Mg Tab.rapdis, 4 MG PO Q6H Y for NAUSEA AND/OR 

VOMITING, #10 TAB


   Prov:BERNIE COWAN DO         2/7/17





Allergies


Allergies:  


Coded Allergies:  


     No Known Drug Allergy (Verified  Allergy, Unknown, 2/17/17)





PMhx/Soc


History of Surgery:  Yes


Anesthesia Reaction:  No


Hx Neurological Disorder:  Yes


Hx Respiratory Disorders:  No


Hx Cardiac Disorders:  No


Hx Psychiatric Problems:  No


Hx Alcohol Use:  No


Hx Substance Use:  No


Hx Tobacco Use:  No (Former)





FmHx


Family History:  No diabetes





Physical Exam


Vitals





Vital Signs








  Date Time  Temp Pulse Resp B/P Pulse Ox O2 Delivery O2 Flow Rate FiO2


 


2/19/17 09:26 96.8 70 18 102/73 97   








Physical Exam


General: Cachectic with stigmata of liver disease


Head: Normocephalic, atraumatic.


Eyes: Pupils equally reactive, EOM intact


ENT: Moist mucous membranes


Neck: Supple, no lymphadenopathy


Respiratory: Lungs clear bilaterally, no distress


Cardiovascular: RRR, no murmurs, rubs, or gallops


Abdominal: Soft, protuberant with fluid wave, nontender, no pulsatile mass


: Deferred


MSK: No edema, no unilateral swelling, 5/5 strength


Neurologic: Alert and oriented, moving all extremities, normal speech, no focal 

weakness, no cerebellar signs


Skin: No rash


Psych: Normal mood


Result Diagram:  


2/19/17 1015                                                                   

             2/19/17 1015





Results 24 hrs





 Laboratory Tests








Test


  2/19/17


10:15 2/19/17


10:28


 


Activated Partial


Thromboplast Time 33.7Sec 


  


 


 


Alanine Aminotransferase


(ALT/SGPT) 49IU/L 


  


 


 


Albumin 2.8g/dl  


 


Albumin/Globulin Ratio 0.84  


 


Alkaline Phosphatase 188IU/L  


 


Anion Gap 14  


 


Aspartate Amino Transf


(AST/SGOT) 56IU/L 


  


 


 


Basophils # 0.010^3/ul  


 


Basophils % 0.5%  


 


Blood Morphology Comment   


 


Blood Urea Nitrogen 19mg/dl  


 


Calcium Level 8.6mg/dl  


 


Carbon Dioxide Level 25mmol/L  


 


Chloride Level 99mmol/L  


 


Creatinine 0.76mg/dl  


 


Direct Bilirubin 0.00mg/dl  


 


Eosinophils # 0.110^3/ul  


 


Eosinophils % 1.2%  


 


Globulin 3.30g/dl  


 


Glucose Level 204mg/dl  


 


Hematocrit 37.3%  


 


Hemoglobin 12.6g/dl  


 


INR International Normalized


Ratio 1.16 


  


 


 


Indirect Bilirubin 0.5mg/dl  


 


Lipase 40U/L  


 


Lymphocytes # 0.510^3/ul  


 


Lymphocytes % 11.1%  


 


Mean Corpuscular Hemoglobin 30.5pg  


 


Mean Corpuscular Hemoglobin


Concent 33.9g/dl 


  


 


 


Mean Corpuscular Volume 90.0fl  


 


Mean Platelet Volume 10.6fl  


 


Monocytes # 0.510^3/ul  


 


Monocytes % 10.4%  


 


Neutrophils # 3.710^3/ul  


 


Neutrophils % 76.8%  


 


Nucleated Red Blood Cells # 0.010^3/ul  


 


Nucleated Red Blood Cells % 0.0/100WBC  


 


Platelet Count 82571^3/UL  


 


Potassium Level 4.4mmol/L  


 


Prothrombin Time 14.9Sec  


 


Prothrombin Time Ratio 1.2  


 


Red Blood Count 4.1510^6/ul  


 


Red Cell Distribution Width 16.5%  


 


Sodium Level 134mmol/L  


 


Total Bilirubin 0.5mg/dl  


 


Total Protein 6.1g/dl  


 


White Blood Count 4.910^3/ul  


 


Bedside Glucose  204mg/dL 








 Current Medications








 Medications


  (Trade)  Dose


 Ordered  Sig/Johnathan


 Route


 PRN Reason  Start Time


 Stop Time Status Last Admin


Dose Admin


 


 Sodium Chloride


  (NS)  500 ml @ 


 500 mls/hr  Q1H STAT


 IV


   2/19/17 10:03


 2/19/17 11:02 DC 2/19/17 10:30


 


 


 Hydromorphone HCl


  (Dilaudid)  0.5 mg  ONCE  STAT


 IV


   2/19/17 10:03


 2/19/17 10:05 DC 2/19/17 10:25


 


 


 Ondansetron HCl


  (Zofran Inj)  4 mg  ONCE  STAT


 IV


   2/19/17 10:03


 2/19/17 10:05 DC 2/19/17 10:25


 


 


 Lidocaine


  (Xylocaine 1%


  (Mpf))  5 ml  STK-MED ONCE


 .ROUTE


   2/19/17 11:51


 2/19/17 11:52 DC  


 











Procedures/MDM


EKG, MONITORS, & DIAGNOSTIC IMAGING:


Large volume therapeutic paracentesis performed by interventional radiology.





LAB INTERPRETATION:


No significant coagulopathy noted.





MEDICAL DECISION MAKING:


The patient presents with abdominal ascites likely secondary to cirrhosis.  

Patient does not exhibit any signs or symptoms concerning for complications of 

cirrhosis such as GI bleed, hepatic encephalopathy or spontaneous bacterial 

peritonitis.  There is no indication currently for diagnostic paracentesis.  

The patient will benefit from large volume therapeutic paracentesis by 

interventional radiology.  If the patient remains stable without evidence of 

hemodynamic compromise secondary to fluid shifts the patient can be safely 

discharged home with close primary care and hepatology follow-up.





The patient does not have evidence of complication.  He has no evidence of 

spontaneous bacterial peritonitis.  The patient seems to be deteriorating.  I 

believe this is consistent with end-stage cirrhosis.  Further conversation with 

primary care physician and hepatologist may be necessary, consideration for 

hospice as well.  The patient is having more frequent visits to the emergency 

room and more persistent symptoms.  I offered hospitalization but the patient 

refused he states that he would like to go home.





ER COURSE:


The patient had successful large volume therapeutic paracentesis.  The patient 

remained hemodynamically stable and otherwise well-appearing.  The patient is 

safe for discharge home.





I kept the patient and/or family informed of laboratory and diagnostic imaging 

results throughout the emergency room course.





DISPOSITION PLAN:


We discussed follow up with the patient's primary care doctor within 24 to 48 

hours as needed.  We also discussed return to the emergency room for worsening 

symptoms or worsening condition.





Discharge Medications:


None





Departure


Diagnosis:  


 Primary Impression:  


 Ascites


 Ascites type:  due to alcoholic cirrhosis  Qualified Code:  K70.31 - Ascites 

due to alcoholic cirrhosis


Condition:  Stable











JESSE EM MD Feb 19, 2017 10:26

## 2017-02-22 ENCOUNTER — HOSPITAL ENCOUNTER (EMERGENCY)
Dept: HOSPITAL 10 - E/R | Age: 57
Discharge: HOME | End: 2017-02-22
Payer: COMMERCIAL

## 2017-02-22 VITALS — HEIGHT: 60 IN | WEIGHT: 156.97 LBS | BODY MASS INDEX: 30.82 KG/M2

## 2017-02-22 VITALS — SYSTOLIC BLOOD PRESSURE: 117 MMHG | RESPIRATION RATE: 18 BRPM | DIASTOLIC BLOOD PRESSURE: 69 MMHG | HEART RATE: 60 BPM

## 2017-02-22 DIAGNOSIS — E11.9: ICD-10-CM

## 2017-02-22 DIAGNOSIS — E72.20: ICD-10-CM

## 2017-02-22 DIAGNOSIS — Z87.891: ICD-10-CM

## 2017-02-22 DIAGNOSIS — R18.8: Primary | ICD-10-CM

## 2017-02-22 DIAGNOSIS — R06.02: ICD-10-CM

## 2017-02-22 DIAGNOSIS — Z79.4: ICD-10-CM

## 2017-02-22 DIAGNOSIS — I10: ICD-10-CM

## 2017-02-22 LAB
ALBUMIN SERPL-MCNC: 2.9 G/DL (ref 3.3–4.9)
ALBUMIN/GLOB SERPL: 0.82 {RATIO}
ALP SERPL-CCNC: 200 IU/L (ref 42–121)
ALT SERPL-CCNC: 51 IU/L (ref 13–69)
ANION GAP SERPL CALC-SCNC: 15 MMOL/L (ref 8–16)
APTT BLD: 29.2 SEC (ref 25–35)
AST SERPL-CCNC: 64 IU/L (ref 15–46)
BASOPHILS # BLD AUTO: 0.1 10^3/UL (ref 0–0.1)
BASOPHILS NFR BLD: 1.1 % (ref 0–2)
BILIRUB DIRECT SERPL-MCNC: 0 MG/DL (ref 0–0.2)
BILIRUB SERPL-MCNC: 0.8 MG/DL (ref 0.2–1.3)
BUN SERPL-MCNC: 16 MG/DL (ref 7–20)
CALCIUM SERPL-MCNC: 8.5 MG/DL (ref 8.4–10.2)
CHLORIDE SERPL-SCNC: 101 MMOL/L (ref 97–110)
CO2 SERPL-SCNC: 20 MMOL/L (ref 21–31)
CONDITION: 1
CREAT SERPL-MCNC: 0.69 MG/DL (ref 0.61–1.24)
EOSINOPHIL # BLD: 0.1 10^3/UL (ref 0–0.5)
EOSINOPHIL NFR BLD: 1.5 % (ref 0–7)
ERYTHROCYTE [DISTWIDTH] IN BLOOD BY AUTOMATED COUNT: 16.6 % (ref 11.5–14.5)
GLOBULIN SER-MCNC: 3.5 G/DL (ref 1.3–3.2)
GLUCOSE SERPL-MCNC: 107 MG/DL (ref 70–220)
HCT VFR BLD CALC: 37.9 % (ref 42–52)
HGB BLD-MCNC: 12.9 G/DL (ref 14–18)
INR PPP: 1.17
LH ANALYZER COMMENTS: 1
LYMPHOCYTES # BLD AUTO: 0.7 10^3/UL (ref 0.8–2.9)
LYMPHOCYTES NFR BLD AUTO: 10.6 % (ref 15–51)
MCH RBC QN AUTO: 30.4 PG (ref 29–33)
MCHC RBC AUTO-ENTMCNC: 34.1 G/DL (ref 32–37)
MCV RBC AUTO: 89.3 FL (ref 82–101)
MONOCYTES # BLD: 0.7 10^3/UL (ref 0.3–0.9)
MONOCYTES NFR BLD: 11.3 % (ref 0–11)
NEUTROPHILS # BLD: 4.9 10^3/UL (ref 1.6–7.5)
NEUTROPHILS NFR BLD AUTO: 75.5 % (ref 39–77)
NRBC # BLD MANUAL: 0 10^3/UL (ref 0–0)
NRBC BLD QL: 0 /100WBC (ref 0–0)
PLATELET # BLD: 151 10^3/UL (ref 140–440)
PMV BLD AUTO: 10.7 FL (ref 7.4–10.4)
POTASSIUM SERPL-SCNC: 4.1 MMOL/L (ref 3.5–5.1)
PROT SERPL-MCNC: 6.4 G/DL (ref 6.1–8.1)
PROTHROMBIN TIME: 15 SEC (ref 12.2–14.2)
PT RATIO: 1.2
RBC # BLD AUTO: 4.24 10^6/UL (ref 4.7–6.1)
SODIUM SERPL-SCNC: 132 MMOL/L (ref 135–144)
WBC # BLD AUTO: 6.5 10^3/UL (ref 4.8–10.8)
WBC # BLD: 6.5 10^3/UL (ref 4.8–10.8)

## 2017-02-22 PROCEDURE — P9047 ALBUMIN (HUMAN), 25%, 50ML: HCPCS

## 2017-02-22 PROCEDURE — 85610 PROTHROMBIN TIME: CPT

## 2017-02-22 PROCEDURE — 85025 COMPLETE CBC W/AUTO DIFF WBC: CPT

## 2017-02-22 PROCEDURE — 96375 TX/PRO/DX INJ NEW DRUG ADDON: CPT

## 2017-02-22 PROCEDURE — 80053 COMPREHEN METABOLIC PANEL: CPT

## 2017-02-22 PROCEDURE — 85730 THROMBOPLASTIN TIME PARTIAL: CPT

## 2017-02-22 PROCEDURE — 82140 ASSAY OF AMMONIA: CPT

## 2017-02-22 PROCEDURE — 96374 THER/PROPH/DIAG INJ IV PUSH: CPT

## 2017-02-22 NOTE — RADRPT
PROCEDURE:   Ultrasound guided paracentesis. 

 

CLINICAL INDICATION:    Ascites and shortness of breath.  

 

COMPARISON:   02/19/2017.  

 

TECHNIQUE:   

The risks, benefits, and alternatives were explained to the patient and/or the patient's family, inc
luding but not limited to bleeding, infection, pain, visceral or vascular damage, shock, and death. 
 The patient and/or the patient's family understood the risks and the alternatives and wished to pro
ceed with the procedure.  Informed written consent was obtained. A procedural time out was performed
.  The patient's name, date of birth, and procedure to be performed were verified.

 

Utilizing ultrasound guidance, optimal location for entry to the peritoneal cavity was ascertained. 
 The overlying skin was prepped and draped in the usual sterile fashion.  Approximately 10 ml of 1% 
Xylocaine was injected locally for pain control.  Using ultrasound guidance, an 8 [place]ian catheter wa
s introduced into the peritoneal cavity in the right lower quadrant without difficulty.  

 

FINDINGS:

Initial images demonstrate ascites.  Approximately 2.0 liters of serous fluid was aspirated and disc
arded. The patient tolerated the procedure well without complication.

 

IMPRESSION:

1.  Successful ultrasound-guided paracentesis.  

 

RPTAT: QQ

_____________________________________________ 

.Rene De La Cruz MD, MD           Date    Time 

Electronically viewed and signed by .Rene De La Cruz MD, MD on 02/22/2017 13:34 

 

D:  02/22/2017 13:34  T:  02/22/2017 13:34

.R/

## 2017-02-22 NOTE — ERD
ER Documentation


Chief Complaint


Date/Time


DATE: 2/22/17 


TIME: 12:24


Chief Complaint


abdominal pain and distention for 3 days. needs parecenthesis





HPI


This is a 57-year-old male with a known history of liver cirrhosis and ascites.

  The patient presents to the emergency department complaining of abdominal 

distention that progressively worsened since his last paracentesis, 3 days 

prior to arrival.  He indicated that 6 L of fluid was removed.  Contrary to the 

triage note the patient is not experiencing abdominal pain but states he is 

having generalized myalgias.  He denies any back pain.  He has had no fevers no 

shaking or chills.  He denies any hemoptysis or hematemesis.  He states the 

generalized myalgias that he is experiencing is similar nature to when he 

requires previous paracentesis.  Therefore he presents to the emergency 

department with his wife stating that he would like a therapeutic paracentesis.

  He has no chest pain or pressure that radiates to the neck arm back or jaw





ROS


All systems reviewed and are negative except as per history of present illness.





Medications


Home Meds


Active Scripts


Hydrocodone/Acetaminophen (Norco 5-325 Tablet) 1 Each Tablet, 1 EACH PO TID, #

14 TAB


   Prov:MAGNOLIA MELENDEZ         2/17/17


Insulin Aspart* (Novolog Insulin Pen*) 100 Unit/Ml Soln, 10 UNIT SC WITH MEALS 

BEDTIME for 28 Days


   Prov:HANNAH CASTELLANO MD         11/15/16


Reported Medications


Dapagliflozin Propanediol (Farxiga) 10 Mg Tablet, 10 MG PO DAILY, #30 TAB


   11/11/16


Insulin Glargine* (Lantus*) 100 Unit/Ml Soln, 35 UNIT SC QHS, #1 VIAL


   11/11/16


Losartan Potassium* (Cozaar*) 25 Mg Tablet, 25 MG PO DAILY, #30 TAB


   11/11/16


Pantoprazole* (Protonix*) 40 Mg Tablet.dr, 40 MG PO DAILY, TAB


   11/11/16


Furosemide* (Furosemide*) 40 Mg Tablet, 40 MG PO DAILY, TAB


   11/11/16


Fenofibrate Nanocrystallized* (Fenofibrate*) 145 Mg Tablet, 145 MG PO DAILY, TAB


   11/11/16


Zolpidem Tartrate* (Zolpidem Tartrate*) 10 Mg Tablet, 10 MG PO QHS Y for 

INSOMNIA, #30 TAB


   11/11/16


Spironolactone* (Spironolactone*) 100 Mg Tablet, 100 MG PO QAM, TAB


   11/11/16


Discontinued Scripts


Ondansetron (Ondansetron Odt) 4 Mg Tab.rapdis, 4 MG PO Q6H Y for NAUSEA AND/OR 

VOMITING, #30 TAB


   Prov:JESSE EM MD         2/15/17


Ondansetron (Ondansetron Odt) 4 Mg Tab.rapdis, 4 MG PO Q6H Y for NAUSEA AND/OR 

VOMITING, #10 TAB


   Prov:BERNIE COWAN DO         2/7/17





Allergies


Allergies:  


Coded Allergies:  


     No Known Drug Allergy (Verified  Allergy, Unknown, 2/17/17)





PMhx/Soc


History of Surgery:  Yes


Anesthesia Reaction:  No


Hx Neurological Disorder:  Yes (NEUROPATHY)


Hx Respiratory Disorders:  No


Hx Cardiac Disorders:  Yes (HTN)


Hx Psychiatric Problems:  No


Hx Miscellaneous Medical Probl:  Yes (Ascites, DM, LIVER DISEASE, CHRONIC 

PANCREATITIS)


Hx Alcohol Use:  No


Hx Substance Use:  No


Hx Tobacco Use:  No (Former)


Smoking Status:  Former smoker





Physical Exam


Vitals





Vital Signs








  Date Time  Temp Pulse Resp B/P Pulse Ox O2 Delivery O2 Flow Rate FiO2


 


2/22/17 13:28  60 18 117/69 95 Room Air  


 


2/22/17 10:32 98.3 95 21 99/67 94   








Physical Exam


Constitutional:Well-developed. Well-nourished.


HEENT:Normocephalic. Atraumatic.Pupils were equal round reactive to light. Dry 

mucous membranes.No tonsillar exudates.


Neck: No nuchal rigidity. No lymphadenopathy. No posterior cervical spine 

tenderness or step-offs.


Respiratory: Not using accessory muscles of respiration.Lungs were clear to 

auscultation bilaterally. No rhonchi. No rales. No wheezing. 


Cardiovascular: Regular rate regular rhythm.No murmurs. No rubs were 

appreciated.S1, S2 normal. Distal pulses are palpable 2+ bilaterally.


GI: Abdomen was soft. Nontender.  Tense ascites with positive fluid thrill. No 

pulsatile abdominal masses or bruits. No rebound. No guarding. Bowel sounds 

were present and normal. 


Muscle skeletal: Full range of motion of both the upper and lower extremities 

bilaterally.Normal muscle tone.No assymetrical calf tenderness or swelling. 


Skin: No petechia, no purpura. No lesions on the palms or the soles of the 

feet. No maculopapular rash.


NEURO: Patient was alert, awake, orientated x3.No facial droop. Gait observed 

and normal with no ataxia.Speech had regular rate and rhythm. No focal 

neurological deficits.


Result Diagram:  


2/22/17 1248                                                                   

             2/22/17 1248





Results 24 hrs





 Laboratory Tests








Test


  2/22/17


12:48


 


Activated Partial


Thromboplast Time 29.2Sec 


 


 


Alanine Aminotransferase


(ALT/SGPT) 51IU/L 


 


 


Albumin 2.9g/dl 


 


Albumin/Globulin Ratio 0.82 


 


Alkaline Phosphatase 200IU/L 


 


Ammonia 92umol/l 


 


Anion Gap 15 


 


Aspartate Amino Transf


(AST/SGOT) 64IU/L 


 


 


Basophils # 0.110^3/ul 


 


Basophils % 1.1% 


 


Blood Morphology Comment  


 


Blood Urea Nitrogen 16mg/dl 


 


Calcium Level 8.5mg/dl 


 


Carbon Dioxide Level 20mmol/L 


 


Chloride Level 101mmol/L 


 


Creatinine 0.69mg/dl 


 


Direct Bilirubin 0.00mg/dl 


 


Eosinophils # 0.110^3/ul 


 


Eosinophils % 1.5% 


 


Globulin 3.50g/dl 


 


Glucose Level 107mg/dl 


 


Hematocrit 37.9% 


 


Hemoglobin 12.9g/dl 


 


INR International Normalized


Ratio 1.17 


 


 


Indirect Bilirubin 0.8mg/dl 


 


Lymphocytes # 0.710^3/ul 


 


Lymphocytes % 10.6% 


 


Mean Corpuscular Hemoglobin 30.4pg 


 


Mean Corpuscular Hemoglobin


Concent 34.1g/dl 


 


 


Mean Corpuscular Volume 89.3fl 


 


Mean Platelet Volume 10.7fl 


 


Monocytes # 0.710^3/ul 


 


Monocytes % 11.3% 


 


Neutrophils # 4.910^3/ul 


 


Neutrophils % 75.5% 


 


Nucleated Red Blood Cells # 0.010^3/ul 


 


Nucleated Red Blood Cells % 0.0/100WBC 


 


Platelet Count 00277^3/UL 


 


Potassium Level 4.1mmol/L 


 


Prothrombin Time 15.0Sec 


 


Prothrombin Time Ratio 1.2 


 


Red Blood Count 4.2410^6/ul 


 


Red Cell Distribution Width 16.6% 


 


Sodium Level 132mmol/L 


 


Total Bilirubin 0.8mg/dl 


 


Total Protein 6.4g/dl 


 


White Blood Count 6.510^3/ul 








 Current Medications








 Medications


  (Trade)  Dose


 Ordered  Sig/Johnathan


 Route


 PRN Reason  Start Time


 Stop Time Status Last Admin


Dose Admin


 


 Morphine Sulfate


  (morphine)  4 mg  ONCE  STAT


 IV


   2/22/17 11:05


 2/22/17 11:07 DC 2/22/17 11:05


 


 


 Ondansetron HCl 4


 mg  4 mg  ONCE  STAT


 IV


   2/22/17 11:05


 2/22/17 11:07 DC 2/22/17 11:05


 


 


 Albumin Human  50 ml @ 


 100 mls/hr  ONCE  ONCE


 IV


   2/22/17 11:30


 2/22/17 11:59 DC 2/22/17 11:30


 


 


 Sodium Chloride


  (NS)  1,000 ml @ 


 1,000 mls/hr  Q1H STAT


 IV


   2/22/17 11:05


 2/22/17 12:04 DC  


 


 


 Lidocaine


  (Xylocaine 1%


  (Mpf))  5 ml  STK-MED ONCE


 .ROUTE


   2/22/17 12:46


 2/22/17 12:47 DC  


 











Procedures/MDM


This patient presented to the emergency department with abdominal distention 

and worsening ascites over the past 3 days.  The patient was hypotensive with a 

blood pressure of 99/67 however when reviewing previous records this is the 

patient's baseline.  He did appear to have clinical dehydration therefore IV 

access was established and the patient was given a liter bolus of 0.9 normal 

saline.  Using the patient ancillary laboratory work from 3 days prior to 

arrival, indicating he was not coagulopathic, an ultrasound-guided paracentesis

, performed by the radiologist, was done on today's visit.  The patient had no 

physical exam findings to suggest spontaneous bacterial peritonitis and 

therefore this was a therapeutic paracentesis.





The patient was given albumin intravenously after the paracentesis and 2 L of 

fluid was removed.  The patient's ammonia level was elevated at 92 and he did 

receive lactulose.





Observation Note:


Time:   5 hours


Family Hx:   No Hypertension


Evaluation:   Multiple exams showed improving symptoms and no evidence of 

hepatic encephalopathy.  The patient was now alert awake orientated 3, his 

blood pressure had improved and he felt comfortable being discharged home.





Departure


Diagnosis:  


 Primary Impression:  


 Ascites of liver


 Additional Impression:  


 Hyperammonemia


Condition:  OLIVERIO Rivas Feb 22, 2017 12:32

## 2017-02-27 ENCOUNTER — HOSPITAL ENCOUNTER (EMERGENCY)
Dept: HOSPITAL 10 - E/R | Age: 57
Discharge: HOME | End: 2017-02-27
Payer: COMMERCIAL

## 2017-02-27 VITALS
RESPIRATION RATE: 18 BRPM | SYSTOLIC BLOOD PRESSURE: 110 MMHG | DIASTOLIC BLOOD PRESSURE: 79 MMHG | TEMPERATURE: 97.9 F | HEART RATE: 102 BPM

## 2017-02-27 VITALS — HEIGHT: 60 IN | BODY MASS INDEX: 32.72 KG/M2 | WEIGHT: 166.67 LBS

## 2017-02-27 DIAGNOSIS — Z79.4: ICD-10-CM

## 2017-02-27 DIAGNOSIS — R11.0: ICD-10-CM

## 2017-02-27 DIAGNOSIS — R10.84: ICD-10-CM

## 2017-02-27 DIAGNOSIS — Z87.891: ICD-10-CM

## 2017-02-27 DIAGNOSIS — Z79.84: ICD-10-CM

## 2017-02-27 DIAGNOSIS — I10: ICD-10-CM

## 2017-02-27 DIAGNOSIS — E11.9: ICD-10-CM

## 2017-02-27 DIAGNOSIS — R18.8: Primary | ICD-10-CM

## 2017-02-27 NOTE — ERD
ER Documentation


Chief Complaint


Date/Time


DATE: 2/27/17 


TIME: 13:19


Chief Complaint


ABD DISTENTION HERE FOR PARACENTHESIS.





HPI


Patient is a 57-year-old male with ascites and liver failure who presents for a 

paracentesis.  He has abdominal distention.  He gets a paracentesis 

approximately every 3 days.  He is well-known to myself and to our staff for 

similar type presentations.  He denies fevers.  He has had nausea over the past 

few days.





ROS


All systems reviewed and are negative except as per history of present illness.





Medications


Home Meds


Active Scripts


Hydrocodone/Acetaminophen (Norco 5-325 Tablet) 1 Each Tablet, 1 EACH PO TID, #

14 TAB


   Prov:MAGNOLIA MELENDEZ         2/17/17


Insulin Aspart* (Novolog Insulin Pen*) 100 Unit/Ml Soln, 10 UNIT SC WITH MEALS 

BEDTIME for 28 Days


   Prov:HANNAH CASTELLANO MD         11/15/16


Reported Medications


Dapagliflozin Propanediol (Farxiga) 10 Mg Tablet, 10 MG PO DAILY, #30 TAB


   11/11/16


Insulin Glargine* (Lantus*) 100 Unit/Ml Soln, 35 UNIT SC QHS, #1 VIAL


   11/11/16


Losartan Potassium* (Cozaar*) 25 Mg Tablet, 25 MG PO DAILY, #30 TAB


   11/11/16


Pantoprazole* (Protonix*) 40 Mg Tablet.dr, 40 MG PO DAILY, TAB


   11/11/16


Furosemide* (Furosemide*) 40 Mg Tablet, 40 MG PO DAILY, TAB


   11/11/16


Fenofibrate Nanocrystallized* (Fenofibrate*) 145 Mg Tablet, 145 MG PO DAILY, TAB


   11/11/16


Zolpidem Tartrate* (Zolpidem Tartrate*) 10 Mg Tablet, 10 MG PO QHS Y for 

INSOMNIA, #30 TAB


   11/11/16


Spironolactone* (Spironolactone*) 100 Mg Tablet, 100 MG PO QAM, TAB


   11/11/16





Allergies


Allergies:  


Coded Allergies:  


     No Known Drug Allergy (Verified  Allergy, Unknown, 2/17/17)





PMhx/Soc


History of Surgery:  Yes


Anesthesia Reaction:  No


Hx Neurological Disorder:  Yes (NEUROPATHY)


Hx Respiratory Disorders:  No


Hx Cardiac Disorders:  Yes (HTN)


Hx Psychiatric Problems:  No


Hx Miscellaneous Medical Probl:  Yes (Ascites, DM, LIVER DISEASE, CHRONIC 

PANCREATITIS)


Hx Alcohol Use:  No


Hx Substance Use:  No


Hx Tobacco Use:  No (Former)


Smoking Status:  Former smoker





FmHx


Family History:  No diabetes





Physical Exam


Vitals





Vital Signs








  Date Time  Temp Pulse Resp B/P Pulse Ox O2 Delivery O2 Flow Rate FiO2


 


2/27/17 09:07 98.0 102 20 98/65 99   








Physical Exam


Const: No acute distress


Head:   Atraumatic 


Eyes:    Normal Conjunctiva


ENT:    Normal External Ears, Nose and Mouth.


Neck:               Full range of motion..~ No meningismus.


Resp:    Clear to auscultation bilaterally


Cardio:    Regular rate and rhythm, no murmurs


Abd:    Distended abdomen


Skin:    No petechiae or rashes


Back:    No midline or flank tenderness


Ext:    No cyanosis, or edema


Neur:    Awake and alert


Psych:    Normal Mood and Affect


Results 24 hrs





 Current Medications








 Medications


  (Trade)  Dose


 Ordered  Sig/Johnathan


 Route


 PRN Reason  Start Time


 Stop Time Status Last Admin


Dose Admin


 


 Ondansetron HCl


  (Zofran Odt)  4 mg  ONCE  STAT


 ODT


   2/27/17 10:08


 2/27/17 10:09 DC 2/27/17 10:29


 











Procedures/MDM


Ultrasound-guided paracentesis will be performed by radiology.





Patient is a 57-year-old male who presents with acute ascites.  He gets 

frequent paracentesis.  He will have a paracentesis done today.  Laboratory 

studies done just a few days ago were normal and we do not need to repeat 

these.  The patient will be discharged home after his paracentesis.  At this 

point I doubt spontaneous bacterial peritonitis.  The patient will need to 

follow-up with his primary doctor.





Departure


Diagnosis:  


 Primary Impression:  


 Ascites


 Ascites type:  other type  Qualified Code:  R18.8 - Other ascites


 Additional Impression:  


 Abdominal pain


 Abdominal location:  generalized  Qualified Code:  R10.84 - Generalized 

abdominal pain


Condition:  Fair


Patient Instructions:  Ascites





Additional Instructions:  


Call your primary care doctor TOMORROW for an appointment during the next 1-2 

days.See the doctor sooner or return here if your condition worsens before your 

appointment time.











FRANCESCA MONTOYA MD Feb 27, 2017 13:21

## 2017-02-27 NOTE — RADRPT
PROCEDURE:   Ultrasound guided paracentesis. 

 

CLINICAL INDICATION:    Ascites and shortness of breath.  

 

COMPARISON:   02/22/2017.  

 

TECHNIQUE:   

The risks, benefits, and alternatives were explained to the patient and/or the patient's family, inc
luding but not limited to bleeding, infection, pain, visceral or vascular damage, shock, and death. 
 The patient and/or the patient's family understood the risks and the alternatives and wished to pro
ceed with the procedure.  Informed written consent was obtained. A procedural time out was performed
.  The patient's name, date of birth, and procedure to be performed were verified.

 

Utilizing ultrasound guidance, optimal location for entry to the peritoneal cavity was ascertained. 
 The overlying skin was prepped and draped in the usual sterile fashion.  Approximately 10 ml of 1% 
Xylocaine was injected locally for pain control.  Using ultrasound guidance, an 8 [place] catheter wa
s introduced into the peritoneal cavity in the right lower quadrant without difficulty.  

 

FINDINGS:

Initial images demonstrate ascites.  Approximately 8.5 liters of serous fluid was aspirated and disc
arded. The patient tolerated the procedure well without complication.

 

IMPRESSION:

1.  Successful ultrasound-guided paracentesis.  

 

RPTAT: QQ

_____________________________________________ 

.Rene De La Cruz MD, MD           Date    Time 

Electronically viewed and signed by .Rene De La Cruz MD, MD on 02/27/2017 16:12 

 

D:  02/27/2017 16:12  T:  02/27/2017 16:12

.R/

## 2017-03-19 ENCOUNTER — HOSPITAL ENCOUNTER (EMERGENCY)
Dept: HOSPITAL 10 - E/R | Age: 57
Discharge: HOME | End: 2017-03-19
Payer: COMMERCIAL

## 2017-03-19 VITALS — DIASTOLIC BLOOD PRESSURE: 64 MMHG | HEART RATE: 58 BPM | RESPIRATION RATE: 18 BRPM | SYSTOLIC BLOOD PRESSURE: 104 MMHG

## 2017-03-19 VITALS — HEIGHT: 60 IN | WEIGHT: 176.37 LBS | BODY MASS INDEX: 34.63 KG/M2

## 2017-03-19 DIAGNOSIS — Z79.84: ICD-10-CM

## 2017-03-19 DIAGNOSIS — K70.31: Primary | ICD-10-CM

## 2017-03-19 DIAGNOSIS — E11.9: ICD-10-CM

## 2017-03-19 DIAGNOSIS — Z87.891: ICD-10-CM

## 2017-03-19 DIAGNOSIS — Z79.4: ICD-10-CM

## 2017-03-19 DIAGNOSIS — I10: ICD-10-CM

## 2017-03-19 LAB
%HYPO/RBC NFR BLD AUTO: (no result) %
ADD SCAN DIFF: NO
ALBUMIN SERPL-MCNC: 4.4 G/DL (ref 3.3–4.9)
ALBUMIN/GLOB SERPL: 2 {RATIO}
ALP SERPL-CCNC: 73 IU/L (ref 42–121)
ALT SERPL-CCNC: 29 IU/L (ref 13–69)
ANION GAP SERPL CALC-SCNC: 21 MMOL/L (ref 8–16)
ANISOCYTOSIS BLD QL SMEAR: (no result)
APTT BLD: 39 SEC (ref 25–35)
AST SERPL-CCNC: 48 IU/L (ref 15–46)
BILIRUB DIRECT SERPL-MCNC: 0 MG/DL (ref 0–0.2)
BILIRUB SERPL-MCNC: 1.1 MG/DL (ref 0.2–1.3)
BUN SERPL-MCNC: 29 MG/DL (ref 7–20)
BURR CELLS BLD QL SMEAR: (no result)
CALCIUM SERPL-MCNC: 9.4 MG/DL (ref 8.4–10.2)
CHLORIDE SERPL-SCNC: 101 MMOL/L (ref 97–110)
CO2 SERPL-SCNC: 24 MMOL/L (ref 21–31)
CREAT SERPL-MCNC: 1.25 MG/DL (ref 0.61–1.24)
EOSINOPHIL # BLD: 0.3 10^3/UL (ref 0–0.5)
EOSINOPHIL NFR BLD: 6 % (ref 0–7)
ERYTHROCYTE [DISTWIDTH] IN BLOOD BY AUTOMATED COUNT: 17.2 % (ref 11.5–14.5)
GLOBULIN SER-MCNC: 2.2 G/DL (ref 1.3–3.2)
GLUCOSE SERPL-MCNC: 224 MG/DL (ref 70–220)
HCT VFR BLD CALC: 32.6 % (ref 42–52)
HGB BLD-MCNC: 10.3 G/DL (ref 14–18)
INR PPP: 1.9
LYMPHOCYTES # BLD AUTO: 0.5 10^3/UL (ref 0.8–2.9)
LYMPHOCYTES NFR BLD AUTO: 11 % (ref 15–51)
MCH RBC QN AUTO: 29.7 PG (ref 29–33)
MCHC RBC AUTO-ENTMCNC: 31.6 G/DL (ref 32–37)
MCV RBC AUTO: 93.9 FL (ref 82–101)
MONOCYTES # BLD: 0.5 10^3/UL (ref 0.3–0.9)
MONOCYTES NFR BLD: 11 % (ref 0–11)
NEUTROPHILS # BLD: 3 10^3/UL (ref 1.6–7.5)
NEUTROPHILS NFR BLD AUTO: 69 % (ref 39–77)
PLATELET # BLD EST: (no result) 10*3/UL
PLATELET # BLD: 111 10^3/UL (ref 140–415)
PMV BLD AUTO: 11.8 FL (ref 7.4–10.4)
POTASSIUM SERPL-SCNC: 5.5 MMOL/L (ref 3.5–5.1)
PROT SERPL-MCNC: 6.6 G/DL (ref 6.1–8.1)
PROTHROMBIN TIME: 22 SEC (ref 12.2–14.2)
PT RATIO: 1.7
RBC # BLD AUTO: 3.47 10^6/UL (ref 4.7–6.1)
SODIUM SERPL-SCNC: 140 MMOL/L (ref 135–144)
TOTAL CELLS COUNTED PERCENT: 100 %
WBC # BLD AUTO: 4.3 10^3/UL (ref 4.8–10.8)

## 2017-03-19 PROCEDURE — 80053 COMPREHEN METABOLIC PANEL: CPT

## 2017-03-19 PROCEDURE — 85610 PROTHROMBIN TIME: CPT

## 2017-03-19 PROCEDURE — 85025 COMPLETE CBC W/AUTO DIFF WBC: CPT

## 2017-03-19 PROCEDURE — 85730 THROMBOPLASTIN TIME PARTIAL: CPT

## 2017-03-19 NOTE — ERD
ER Documentation


Chief Complaint


Date/Time


DATE: 3/19/17 


TIME: 13:27


Chief Complaint


PARACENTISIS





HPI


57-year-old male history of alcoholic cirrhosis who presents to the emergency 

room complaining of need for paracentesis.  The patient had a last paracentesis 

approximately 4-5 days ago.  He does describe some mild leaking from the 

paracentesis site on the anterior abdomen but no fevers or chills.  He 

describes abdominal distention but no pain.





ROS


All systems reviewed and are negative except as per history of present illness.





Medications


Home Meds


Active Scripts


Hydrocodone/Acetaminophen (Norco 5-325 Tablet) 1 Each Tablet, 1 EACH PO TID, #

14 TAB


   Prov:MAGNOLIA MELENDEZ         2/17/17


Insulin Aspart* (Novolog Insulin Pen*) 100 Unit/Ml Soln, 10 UNIT SC WITH MEALS 

BEDTIME for 28 Days


   Prov:HANNAH CASTELLANO MD         11/15/16


Reported Medications


Dapagliflozin Propanediol (Farxiga) 10 Mg Tablet, 10 MG PO DAILY, #30 TAB


   11/11/16


Insulin Glargine* (Lantus*) 100 Unit/Ml Soln, 35 UNIT SC QHS, #1 VIAL


   11/11/16


Losartan Potassium* (Cozaar*) 25 Mg Tablet, 25 MG PO DAILY, #30 TAB


   11/11/16


Pantoprazole* (Protonix*) 40 Mg Tablet.dr, 40 MG PO DAILY, TAB


   11/11/16


Furosemide* (Furosemide*) 40 Mg Tablet, 40 MG PO DAILY, TAB


   11/11/16


Fenofibrate Nanocrystallized* (Fenofibrate*) 145 Mg Tablet, 145 MG PO DAILY, TAB


   11/11/16


Zolpidem Tartrate* (Zolpidem Tartrate*) 10 Mg Tablet, 10 MG PO QHS Y for 

INSOMNIA, #30 TAB


   11/11/16


Spironolactone* (Spironolactone*) 100 Mg Tablet, 100 MG PO QAM, TAB


   11/11/16





Allergies


Allergies:  


Coded Allergies:  


     No Known Drug Allergy (Verified  Allergy, Unknown, 2/17/17)





PMhx/Soc


History of Surgery:  Yes


Anesthesia Reaction:  No


Hx Neurological Disorder:  Yes (NEUROPATHY)


Hx Respiratory Disorders:  No


Hx Cardiac Disorders:  Yes (HTN)


Hx Psychiatric Problems:  No


Hx Miscellaneous Medical Probl:  Yes (Ascites, DM, LIVER DISEASE, CHRONIC 

PANCREATITIS)


Hx Alcohol Use:  No


Hx Substance Use:  No


Hx Tobacco Use:  No (Former)





FmHx


Family History:  No diabetes





Physical Exam


Vitals





Vital Signs








  Date Time  Temp Pulse Resp B/P Pulse Ox O2 Delivery O2 Flow Rate FiO2


 


3/19/17 10:56 98.2 70 18 102/52 99   








Physical Exam


General: Well developed, well nourished, no acute distress


Head: Normocephalic, atraumatic.


Eyes: Pupils equally reactive, EOM intact


ENT: Moist mucous membranes


Neck: Supple, no lymphadenopathy


Respiratory: Lungs clear bilaterally, no distress


Cardiovascular: RRR, no murmurs, rubs, or gallops


Abdominal: Soft, protuberant with fluid wave, very mild skin breakdown, no 

leaking, no peritonitis


: Deferred


MSK: No edema, no unilateral swelling, 5/5 strength


Neurologic: Alert and oriented, moving all extremities, normal speech, no focal 

weakness, no cerebellar signs


Skin: No rash


Psych: Normal mood


Result Diagram:  


3/19/17 1259                                                                   

             3/19/17 1259





Results 24 hrs








 Laboratory Tests








Test


  3/19/17


12:59


 


Activated Partial


Thromboplast Time 39.0Sec 


 


 


Alanine Aminotransferase


(ALT/SGPT) 29IU/L 


 


 


Albumin 4.4g/dl 


 


Albumin/Globulin Ratio 2.00 


 


Alkaline Phosphatase 73IU/L 


 


Anion Gap 21 


 


Anisocytosis 1+ 


 


Aspartate Amino Transf


(AST/SGOT) 48IU/L 


 


 


Blood Urea Nitrogen 29mg/dl 


 


Calcium Level 9.4mg/dl 


 


Carbon Dioxide Level 24mmol/L 


 


Chloride Level 101mmol/L 


 


Creatinine 1.25mg/dl 


 


Direct Bilirubin 0.00mg/dl 


 


Eosinophils # 0.310^3/ul 


 


Eosinophils % 6.0% 


 


Globulin 2.20g/dl 


 


Glucose Level 224mg/dl 


 


Hematocrit 32.6% 


 


Hemoglobin 10.3g/dl 


 


Hypochromasia 1+ 


 


INR International Normalized


Ratio 1.90 


 


 


Indirect Bilirubin 1.1mg/dl 


 


Lymphocytes # 0.510^3/ul 


 


Lymphocytes % 11.0% 


 


Mean Corpuscular Hemoglobin 29.7pg 


 


Mean Corpuscular Hemoglobin


Concent 31.6g/dl 


 


 


Mean Corpuscular Volume 93.9fl 


 


Mean Platelet Volume 11.8fl 


 


Monocytes # 0.510^3/ul 


 


Monocytes % 11.0% 


 


Neutrophils # 3.010^3/ul 


 


Neutrophils % 69.0% 


 


Platelet Count 54261^3/UL 


 


Platelet Estimate


  PLT APPEAR


DECREASED


 


Potassium Level 5.5mmol/L 


 


Prothrombin Time 22.0Sec 


 


Prothrombin Time Ratio 1.7 


 


Reactive Lymphocytes % 3.0% 


 


Red Blood Count 3.4710^6/ul 


 


Red Cell Distribution Width 17.2% 


 


Sodium Level 140mmol/L 


 


Total Bilirubin 1.1mg/dl 


 


Total Protein 6.6g/dl 


 


White Blood Count 4.310^3/ul 














Procedures/MDM


EKG, MONITORS, & DIAGNOSTIC IMAGING:


Large volume therapeutic paracentesis performed by interventional radiology.





LAB INTERPRETATION:


No significant coagulopathy noted.





MEDICAL DECISION MAKING:


The patient presents with abdominal ascites likely secondary to cirrhosis.  

Patient does not exhibit any signs or symptoms concerning for complications of 

cirrhosis such as GI bleed, hepatic encephalopathy or spontaneous bacterial 

peritonitis.  There is no indication currently for diagnostic paracentesis.  

The patient will benefit from large volume therapeutic paracentesis by 

interventional radiology.  If the patient remains stable without evidence of 

hemodynamic compromise secondary to fluid shifts the patient can be safely 

discharged home with close primary care and hepatology follow-up.





ER COURSE:


The patient had successful large volume therapeutic paracentesis.  The patient 

remained hemodynamically stable and otherwise well-appearing.  The patient is 

safe for discharge home.





I kept the patient and/or family informed of laboratory and diagnostic imaging 

results throughout the emergency room course.





DISPOSITION PLAN:


We discussed follow up with the patient's primary care doctor within 24 to 48 

hours as needed.  We also discussed return to the emergency room for worsening 

symptoms or worsening condition.





Discharge Medications:


None





Departure


Diagnosis:  


 Primary Impression:  


 Ascites


 Ascites type:  due to alcoholic cirrhosis  Qualified Code:  K70.31 - Ascites 

due to alcoholic cirrhosis


Condition:  Stable











JESSE EM MD Mar 19, 2017 13:28

## 2017-03-19 NOTE — RADRPT
PROCEDURE:   Ultrasound guided paracentesis

 

CLINICAL INDICATION:   Ascites 

 

TECHNIQUE:   The risks benefits and alternatives of the procedure were explained to the patient.  In
formed written consent was obtained.  The patient understood the risks benefits and alternatives and
 wished to proceed with the procedure.   A time out was performed.  The overlying skin of the right 
lower quadrant of the abdomen was prepped and draped in the usual sterile fashion. Approximately 10 
cc of lidocaine was injected locally for pain control.  Utilizing ultrasound guidance, an 6-[place] p
aracentesis catheter was placed into the  peritoneal cavity without difficulty.  Fluid was drained i
nto vacuum bottles.  After completion of draining fluid, the catheter was removed and direct pressur
e was applied to the puncture site.  A compression bandage was then placed at the puncture site.  Th
e patient tolerated the procedure well without complication.    The fluid was not sent to the lab fo
r further analysis.

 

COMPARISON:   02/27/2017 

 

FINDINGS:

 

Surgeon:  Paulina ALVA.

Preprocedural diagnosis:  Ascites.

Postprocedural diagnosis:  Ascites.

Samples removed:  7200 cc of clear yellow fluid was obtained.

Complications:  None.

Estimated blood loss:  0 cc.

Condition:  Stable and unchanged.

 

IMPRESSION:

 

1.  Successful ultrasound-guided paracentesis.

 

RPTAT: QQ

_____________________________________________ 

.Cleveland Gallardo MD, MD           Date    Time 

Electronically viewed and signed by .Cleveland Gallardo MD, MD on 03/19/2017 16:54 

 

D:  03/19/2017 16:54  T:  03/19/2017 16:54

.M/

## 2017-03-25 ENCOUNTER — HOSPITAL ENCOUNTER (EMERGENCY)
Dept: HOSPITAL 10 - E/R | Age: 57
Discharge: HOME | End: 2017-03-25
Payer: COMMERCIAL

## 2017-03-25 VITALS
WEIGHT: 166.45 LBS | HEIGHT: 62 IN | WEIGHT: 166.45 LBS | BODY MASS INDEX: 30.63 KG/M2 | BODY MASS INDEX: 30.63 KG/M2 | HEIGHT: 62 IN

## 2017-03-25 VITALS
TEMPERATURE: 98.1 F | DIASTOLIC BLOOD PRESSURE: 62 MMHG | SYSTOLIC BLOOD PRESSURE: 99 MMHG | RESPIRATION RATE: 18 BRPM | HEART RATE: 82 BPM

## 2017-03-25 DIAGNOSIS — Z87.891: ICD-10-CM

## 2017-03-25 DIAGNOSIS — E11.9: ICD-10-CM

## 2017-03-25 DIAGNOSIS — I10: ICD-10-CM

## 2017-03-25 DIAGNOSIS — R18.8: Primary | ICD-10-CM

## 2017-03-25 DIAGNOSIS — Z79.4: ICD-10-CM

## 2017-03-25 DIAGNOSIS — Z79.84: ICD-10-CM

## 2017-03-25 DIAGNOSIS — R10.9: ICD-10-CM

## 2017-03-25 LAB
ABNORMAL IP MESSAGE: 1
ADD SCAN DIFF: NO
ALBUMIN SERPL-MCNC: 3.7 G/DL (ref 3.3–4.9)
ALBUMIN/GLOB SERPL: 1.32 {RATIO}
ALP SERPL-CCNC: 146 IU/L (ref 42–121)
ALT SERPL-CCNC: 45 IU/L (ref 13–69)
ANION GAP SERPL CALC-SCNC: 13 MMOL/L (ref 8–16)
APTT BLD: 37.1 SEC (ref 25–35)
AST SERPL-CCNC: 83 IU/L (ref 15–46)
BASOPHILS # BLD AUTO: 0.1 10^3/UL (ref 0–0.1)
BASOPHILS NFR BLD: 1.4 % (ref 0–2)
BILIRUB DIRECT SERPL-MCNC: 0 MG/DL (ref 0–0.2)
BILIRUB SERPL-MCNC: 0.9 MG/DL (ref 0.2–1.3)
BUN SERPL-MCNC: 23 MG/DL (ref 7–20)
CALCIUM SERPL-MCNC: 8.5 MG/DL (ref 8.4–10.2)
CHLORIDE SERPL-SCNC: 102 MMOL/L (ref 97–110)
CO2 SERPL-SCNC: 24 MMOL/L (ref 21–31)
CREAT SERPL-MCNC: 0.88 MG/DL (ref 0.61–1.24)
EOSINOPHIL # BLD: 0.1 10^3/UL (ref 0–0.5)
EOSINOPHIL NFR BLD: 2.7 % (ref 0–7)
ERYTHROCYTE [DISTWIDTH] IN BLOOD BY AUTOMATED COUNT: 17.9 % (ref 11.5–14.5)
GLOBULIN SER-MCNC: 2.8 G/DL (ref 1.3–3.2)
GLUCOSE SERPL-MCNC: 180 MG/DL (ref 70–220)
HCT VFR BLD CALC: 29.2 % (ref 42–52)
HGB BLD-MCNC: 9.9 G/DL (ref 14–18)
INR PPP: 1.74
LYMPHOCYTES # BLD AUTO: 0.5 10^3/UL (ref 0.8–2.9)
LYMPHOCYTES NFR BLD AUTO: 14 % (ref 15–51)
MCH RBC QN AUTO: 31.2 PG (ref 29–33)
MCHC RBC AUTO-ENTMCNC: 33.9 G/DL (ref 32–37)
MCV RBC AUTO: 92.1 FL (ref 82–101)
MONOCYTES # BLD: 0.4 10^3/UL (ref 0.3–0.9)
MONOCYTES NFR BLD: 11.5 % (ref 0–11)
NEUTROPHILS # BLD: 2.5 10^3/UL (ref 1.6–7.5)
NEUTROPHILS NFR BLD AUTO: 69.9 % (ref 39–77)
NRBC # BLD MANUAL: 0 10^3/UL (ref 0–0)
NRBC BLD QL: 0 /100WBC (ref 0–0)
PLATELET # BLD: 106 10^3/UL (ref 140–415)
PMV BLD AUTO: 13 FL (ref 7.4–10.4)
POTASSIUM SERPL-SCNC: 4.1 MMOL/L (ref 3.5–5.1)
PROT SERPL-MCNC: 6.5 G/DL (ref 6.1–8.1)
PROTHROMBIN TIME: 20.5 SEC (ref 12.2–14.2)
PT RATIO: 1.6
RBC # BLD AUTO: 3.17 10^6/UL (ref 4.7–6.1)
SODIUM SERPL-SCNC: 135 MMOL/L (ref 135–144)
WBC # BLD AUTO: 3.6 10^3/UL (ref 4.8–10.8)

## 2017-03-25 PROCEDURE — 80053 COMPREHEN METABOLIC PANEL: CPT

## 2017-03-25 PROCEDURE — 85025 COMPLETE CBC W/AUTO DIFF WBC: CPT

## 2017-03-25 PROCEDURE — 85730 THROMBOPLASTIN TIME PARTIAL: CPT

## 2017-03-25 PROCEDURE — 85610 PROTHROMBIN TIME: CPT

## 2017-03-25 PROCEDURE — 36415 COLL VENOUS BLD VENIPUNCTURE: CPT

## 2017-03-25 NOTE — RADRPT
PROCEDURE:   Ultrasound guided paracentesis. 

 

CLINICAL INDICATION:   Recurrent ascites. 

 

TECHNIQUE:  Ultrasound guidance.

 

COMPARISON:  Exam dated 03/19/2017. 

 

FINDINGS:

Informed consent was obtained prior to the start of the procedure.  The risks, benefits, alternative
s were discussed which include but are not limited to bleeding, infection, damage the adjacent struc
tures, and bowel perforation.  A time out was performed immediately prior to the start of the proced
ure.

 

Preliminary ultrasound of the abdomen was performed and demonstrated ascites.  An appropriate site f
or entry into the peritoneal cavity was identified in the right upper quadrant and marked at the ski
n surface. The patient was prepped and draped in usual sterile fashion.  The subcutaneous tissues we
re anesthetized with 1% lidocaine.  A small incision was made at the skin surface.  Then, a 5-[place]
 coaxial needle was introduced into the peritoneal cavity and connected to tubing for suction.  Appr
oximately 8300 cc of clear yellow fluid was aspirated.  The catheter was then removed and hemostasis
 was achieved at the skin surface with manual compression.  A dry dressing was applied. The patient 
tolerated the procedure well and there were no immediate complications.

 

IMPRESSION:

 

Technically successful ultrasound-guided paracentesis. There were no immediate complications.

 

RPTAT: QQ

_____________________________________________ 

.Michael Ramirez MD, MD           Date    Time 

Electronically viewed and signed by .Michael Ramirez MD, MD on 03/25/2017 15:09 

 

D:  03/25/2017 15:09  T:  03/25/2017 15:09

.P/

## 2017-03-25 NOTE — ERD
ER Documentation


Chief Complaint


Date/Time


DATE: 3/25/17 


TIME: 15:15


Chief Complaint


here for paracenthesis, ap,ascitis





HPI


This a 57-year-old male with a history of cirrhosis with chronic ascites.  The 

patient is frequently here for paracentesis and is here for the same today.  

Says he is complaining of some worsening abdominal swelling over the past week 

consistent with his prior ascites.  He is having no fever shortness of breath 

abdominal pain diarrhea nausea vomiting fever.





ROS


All systems reviewed and are negative except as per history of present illness.





Medications


Home Meds


Active Scripts


Hydrocodone/Acetaminophen (Norco 5-325 Tablet) 1 Each Tablet, 1 EACH PO TID, #

14 TAB


   Prov:MAGNOLIA MELENDEZ         2/17/17


Insulin Aspart* (Novolog Insulin Pen*) 100 Unit/Ml Soln, 10 UNIT SC WITH MEALS 

BEDTIME for 28 Days


   Prov:HANNAH CASTELLANO MD         11/15/16


Reported Medications


Dapagliflozin Propanediol (Farxiga) 10 Mg Tablet, 10 MG PO DAILY, #30 TAB


   11/11/16


Insulin Glargine* (Lantus*) 100 Unit/Ml Soln, 35 UNIT SC QHS, #1 VIAL


   11/11/16


Losartan Potassium* (Cozaar*) 25 Mg Tablet, 25 MG PO DAILY, #30 TAB


   11/11/16


Pantoprazole* (Protonix*) 40 Mg Tablet.dr, 40 MG PO DAILY, TAB


   11/11/16


Furosemide* (Furosemide*) 40 Mg Tablet, 40 MG PO DAILY, TAB


   11/11/16


Fenofibrate Nanocrystallized* (Fenofibrate*) 145 Mg Tablet, 145 MG PO DAILY, TAB


   11/11/16


Zolpidem Tartrate* (Zolpidem Tartrate*) 10 Mg Tablet, 10 MG PO QHS Y for 

INSOMNIA, #30 TAB


   11/11/16


Spironolactone* (Spironolactone*) 100 Mg Tablet, 100 MG PO QAM, TAB


   11/11/16





Allergies


Allergies:  


Coded Allergies:  


     No Known Drug Allergy (Verified  Allergy, Unknown, 2/17/17)





PMhx/Soc


History of Surgery:  Yes


Anesthesia Reaction:  No


Hx Neurological Disorder:  Yes (NEUROPATHY)


Hx Respiratory Disorders:  No


Hx Cardiac Disorders:  Yes (HTN)


Hx Psychiatric Problems:  No


Hx Miscellaneous Medical Probl:  Yes (Ascites, DM, LIVER DISEASE, CHRONIC 

PANCREATITIS)


Hx Alcohol Use:  No


Hx Substance Use:  No


Hx Tobacco Use:  No (Former)


Smoking Status:  Never smoker





FmHx


Family History:  No coronary disease





Physical Exam


Vitals





Vital Signs








  Date Time  Temp Pulse Resp B/P Pulse Ox O2 Delivery O2 Flow Rate FiO2


 


3/25/17 15:06 98.1 82 18 99/62 98   


 


3/25/17 14:09 98.1  18 109/78 99   


 


3/25/17 10:12 98.1 85 20 89/66 99   








Physical Exam


Const:      Well-developed, well-nourished


 Head:        Atraumatic, normocephalic


 Eyes:       Normal Conjunctiva, PERRLA, EOMI, normal sclera, no nystagmus


 ENT:         Normal External Ears, Nose and Mouth, moist mucus membranes.


 Neck:        Full range of motion.  No meningismus, no lymphadenopathy.


 Resp:         Clear to auscultation bilaterally, no wheezing, rhonchi, rales


 Cardio:       Regular rate and rhythm, no murmurs, S1 S2 present


 Abd:         Soft, distended with ascites nontender 4 normal bowel sounds, no 

guarding or rebound, no pulsitile abdominal masses or bruits


 Skin:         No petechiae or rashes, no ecchymosis , no maculopapular rash


 Back:        No midline or flank tenderness


 Ext:          No cyanosis, or edema, FROM x 4, normal inspection, 

neurovascularly intact x 4


 Neur:        Awake and alert, STR 5/5 x 4, sensation intact x 4, no focal 

findings, cerebellum intact


 Psych:        Normal Mood and Affect


Result Diagram:  


3/25/17 1340                                                                   

             3/25/17 1340





Results 24 hrs





 Laboratory Tests








Test


  3/25/17


13:40


 


White Blood Count 3.610^3/ul 


 


Red Blood Count 3.1710^6/ul 


 


Hemoglobin 9.9g/dl 


 


Hematocrit 29.2% 


 


Mean Corpuscular Volume 92.1fl 


 


Mean Corpuscular Hemoglobin 31.2pg 


 


Mean Corpuscular Hemoglobin


Concent 33.9g/dl 


 


 


Red Cell Distribution Width 17.9% 


 


Platelet Count 80003^3/UL 


 


Mean Platelet Volume 13.0fl 


 


Neutrophils % 69.9% 


 


Lymphocytes % 14.0% 


 


Monocytes % 11.5% 


 


Eosinophils % 2.7% 


 


Basophils % 1.4% 


 


Nucleated Red Blood Cells % 0.0/100WBC 


 


Neutrophils # 2.510^3/ul 


 


Lymphocytes # 0.510^3/ul 


 


Monocytes # 0.410^3/ul 


 


Eosinophils # 0.110^3/ul 


 


Basophils # 0.110^3/ul 


 


Nucleated Red Blood Cells # 0.010^3/ul 


 


Prothrombin Time 20.5Sec 


 


Prothrombin Time Ratio 1.6 


 


INR International Normalized


Ratio 1.74 


 


 


Activated Partial


Thromboplast Time 37.1Sec 


 


 


Sodium Level 135mmol/L 


 


Potassium Level 4.1mmol/L 


 


Chloride Level 102mmol/L 


 


Carbon Dioxide Level 24mmol/L 


 


Anion Gap 13 


 


Blood Urea Nitrogen 23mg/dl 


 


Creatinine 0.88mg/dl 


 


Glucose Level 180mg/dl 


 


Calcium Level 8.5mg/dl 


 


Total Bilirubin 0.9mg/dl 


 


Direct Bilirubin 0.00mg/dl 


 


Indirect Bilirubin 0.9mg/dl 


 


Aspartate Amino Transf


(AST/SGOT) 83IU/L 


 


 


Alanine Aminotransferase


(ALT/SGPT) 45IU/L 


 


 


Alkaline Phosphatase 146IU/L 


 


Total Protein 6.5g/dl 


 


Albumin 3.7g/dl 


 


Globulin 2.80g/dl 


 


Albumin/Globulin Ratio 1.32 











Procedures/MDM


PROCEDURE:   Ultrasound guided paracentesis. 


 


CLINICAL INDICATION:   Recurrent ascites. 


 


TECHNIQUE:  Ultrasound guidance.


 


COMPARISON:  Exam dated 03/19/2017. 


 


FINDINGS:


Informed consent was obtained prior to the start of the procedure.  The risks, 

benefits, alternatives were discussed which include but are not limited to 

bleeding, infection, damage the adjacent structures, and bowel perforation.  A 

time out was performed immediately prior to the start of the procedure.


 


Preliminary ultrasound of the abdomen was performed and demonstrated ascites.  

An appropriate site for entry into the peritoneal cavity was identified in the 

right upper quadrant and marked at the skin surface. The patient was prepped 

and draped in usual sterile fashion.  The subcutaneous tissues were 

anesthetized with 1% lidocaine.  A small incision was made at the skin surface.

  Then, a 5-[place] coaxial needle was introduced into the peritoneal cavity and 

connected to tubing for suction.  Approximately 8300 cc of clear yellow fluid 

was aspirated.  The catheter was then removed and hemostasis was achieved at 

the skin surface with manual compression.  A dry dressing was applied. The 

patient tolerated the procedure well and there were no immediate complications.


 


IMPRESSION:


 


Technically successful ultrasound-guided paracentesis. There were no immediate 

complications.


 


RPTAT: QQ


_____________________________________________ 


.Michael Ramirez MD, MD           Date    Time 


Electronically viewed and signed by .Michael Ramirez MD, MD on 03/25/2017 15:09 


 


D:  03/25/2017 15:09  T:  03/25/2017 15:09


.P/





CC: BERNIE COWAN DO














Patient's paracentesis is successful and he feels much better discharged home





Departure


Diagnosis:  


 Primary Impression:  


 Ascites


 Ascites type:  other type  Qualified Code:  R18.8 - Other ascites


Condition:  Stable


Patient Instructions:  Ascites











BERNIE COWAN DO Mar 25, 2017 15:16

## 2017-04-01 ENCOUNTER — HOSPITAL ENCOUNTER (EMERGENCY)
Dept: HOSPITAL 10 - E/R | Age: 57
Discharge: HOME | End: 2017-04-01
Payer: COMMERCIAL

## 2017-04-01 VITALS — HEART RATE: 72 BPM | RESPIRATION RATE: 18 BRPM | DIASTOLIC BLOOD PRESSURE: 67 MMHG | SYSTOLIC BLOOD PRESSURE: 109 MMHG

## 2017-04-01 VITALS — HEIGHT: 60 IN | WEIGHT: 167.55 LBS | BODY MASS INDEX: 32.89 KG/M2

## 2017-04-01 DIAGNOSIS — Z79.84: ICD-10-CM

## 2017-04-01 DIAGNOSIS — E11.9: ICD-10-CM

## 2017-04-01 DIAGNOSIS — Z79.4: ICD-10-CM

## 2017-04-01 DIAGNOSIS — K70.31: Primary | ICD-10-CM

## 2017-04-01 DIAGNOSIS — Z87.891: ICD-10-CM

## 2017-04-01 DIAGNOSIS — I10: ICD-10-CM

## 2017-04-01 LAB
ABNORMAL IP MESSAGE: 1
ADD SCAN DIFF: NO
ALBUMIN SERPL-MCNC: 3.2 G/DL (ref 3.3–4.9)
ALBUMIN/GLOB SERPL: 1 {RATIO}
ALP SERPL-CCNC: 203 IU/L (ref 42–121)
ALT SERPL-CCNC: 60 IU/L (ref 13–69)
ANION GAP SERPL CALC-SCNC: 14 MMOL/L (ref 8–16)
APTT BLD: 33.3 SEC (ref 25–35)
AST SERPL-CCNC: 92 IU/L (ref 15–46)
BASOPHILS # BLD AUTO: 0.1 10^3/UL (ref 0–0.1)
BASOPHILS NFR BLD: 1.2 % (ref 0–2)
BILIRUB DIRECT SERPL-MCNC: 0 MG/DL (ref 0–0.2)
BILIRUB SERPL-MCNC: 0.6 MG/DL (ref 0.2–1.3)
BUN SERPL-MCNC: 18 MG/DL (ref 7–20)
CALCIUM SERPL-MCNC: 8.9 MG/DL (ref 8.4–10.2)
CHLORIDE SERPL-SCNC: 102 MMOL/L (ref 97–110)
CO2 SERPL-SCNC: 21 MMOL/L (ref 21–31)
CREAT SERPL-MCNC: 0.8 MG/DL (ref 0.61–1.24)
EOSINOPHIL # BLD: 0.1 10^3/UL (ref 0–0.5)
EOSINOPHIL NFR BLD: 1.2 % (ref 0–7)
ERYTHROCYTE [DISTWIDTH] IN BLOOD BY AUTOMATED COUNT: 18.8 % (ref 11.5–14.5)
GLOBULIN SER-MCNC: 3.2 G/DL (ref 1.3–3.2)
GLUCOSE SERPL-MCNC: 265 MG/DL (ref 70–220)
HCT VFR BLD CALC: 29.5 % (ref 42–52)
HGB BLD-MCNC: 10 G/DL (ref 14–18)
INR PPP: 1.34
LYMPHOCYTES # BLD AUTO: 0.5 10^3/UL (ref 0.8–2.9)
LYMPHOCYTES NFR BLD AUTO: 10.6 % (ref 15–51)
MCH RBC QN AUTO: 30.7 PG (ref 29–33)
MCHC RBC AUTO-ENTMCNC: 33.9 G/DL (ref 32–37)
MCV RBC AUTO: 90.5 FL (ref 82–101)
MONOCYTES # BLD: 0.5 10^3/UL (ref 0.3–0.9)
MONOCYTES NFR BLD: 12.5 % (ref 0–11)
NEUTROPHILS # BLD: 3.2 10^3/UL (ref 1.6–7.5)
NEUTROPHILS NFR BLD AUTO: 74 % (ref 39–77)
NRBC # BLD MANUAL: 0 10^3/UL (ref 0–0)
NRBC BLD QL: 0 /100WBC (ref 0–0)
PLATELET # BLD: 122 10^3/UL (ref 140–415)
PMV BLD AUTO: 12.1 FL (ref 7.4–10.4)
POTASSIUM SERPL-SCNC: 4.4 MMOL/L (ref 3.5–5.1)
PROT SERPL-MCNC: 6.4 G/DL (ref 6.1–8.1)
PROTHROMBIN TIME: 16.7 SEC (ref 12.2–14.2)
PT RATIO: 1.3
RBC # BLD AUTO: 3.26 10^6/UL (ref 4.7–6.1)
SODIUM SERPL-SCNC: 133 MMOL/L (ref 135–144)
WBC # BLD AUTO: 4.3 10^3/UL (ref 4.8–10.8)

## 2017-04-01 PROCEDURE — 36415 COLL VENOUS BLD VENIPUNCTURE: CPT

## 2017-04-01 PROCEDURE — 85025 COMPLETE CBC W/AUTO DIFF WBC: CPT

## 2017-04-01 PROCEDURE — 80053 COMPREHEN METABOLIC PANEL: CPT

## 2017-04-01 PROCEDURE — 85730 THROMBOPLASTIN TIME PARTIAL: CPT

## 2017-04-01 PROCEDURE — 85610 PROTHROMBIN TIME: CPT

## 2017-04-01 NOTE — RADRPT
PROCEDURE:   Ultrasound guided paracentesis

 

CLINICAL INDICATION:   Ascites 

 

TECHNIQUE:   The risks benefits and alternatives of the procedure were explained to the patient.  In
formed written consent was obtained.  A time out was performed.  The patient understood the risks be
nefits and alternatives and wished to proceed with the procedure. 

 

COMPARISON:   None available 

 

FINDINGS:

 

A time out was performed.  The overlying skin of the right lower quadrant of the abdomen was prepped
 and draped in the usual sterile fashion. Approximately 10 cc of lidocaine was injected locally for 
pain control.  Utilizing ultrasound guidance, a 6-[place] paracentesis catheter was placed into the  
peritoneal cavity without difficulty.  The patient tolerated the procedure well without complication
.  Approximately 7500 cc of clear yellow fluid was obtained.  The fluid was not sent to the lab for 
further analysis.

 

IMPRESSION:

 

1.  Successful ultrasound-guided paracentesis.

 

RPTAT: QQ

_____________________________________________ 

.Max Hein MD, MD           Date    Time 

Electronically viewed and signed by .Max Hein MD, MD on 04/01/2017 13:26 

 

D:  04/01/2017 13:26  T:  04/01/2017 13:26

.R/

## 2017-04-01 NOTE — ERD
ER Documentation


Chief Complaint


Date/Time


DATE: 4/1/17 


TIME: 11:30


Chief Complaint


ABD PAIN SWELLING





HPI


57-year-old male history of alcoholic cirrhosis who presents with abdominal 

distention and fullness.  His last paracentesis was approximately 1 week ago.  

He is on the transplant list.  He denies any significant pain, fevers, no melena

, no hematemesis.  He otherwise has no complaints.





ROS


All systems reviewed and are negative except as per history of present illness.





Medications


Home Meds


Active Scripts


Insulin Aspart* (Novolog Insulin Pen*) 100 Unit/Ml Soln, 10 UNIT SC WITH MEALS 

BEDTIME for 28 Days


   Prov:HANNAH CASTELLANO MD         11/15/16


Reported Medications


Dapagliflozin Propanediol (Farxiga) 10 Mg Tablet, 10 MG PO DAILY, #30 TAB


   11/11/16


Insulin Glargine* (Lantus*) 100 Unit/Ml Soln, 35 UNIT SC QHS, #1 VIAL


   11/11/16


Losartan Potassium* (Cozaar*) 25 Mg Tablet, 25 MG PO DAILY, #30 TAB


   11/11/16


Pantoprazole* (Protonix*) 40 Mg Tablet.dr, 40 MG PO DAILY, TAB


   11/11/16


Furosemide* (Furosemide*) 40 Mg Tablet, 40 MG PO DAILY, TAB


   11/11/16


Fenofibrate Nanocrystallized* (Fenofibrate*) 145 Mg Tablet, 145 MG PO DAILY, TAB


   11/11/16


Zolpidem Tartrate* (Zolpidem Tartrate*) 10 Mg Tablet, 10 MG PO QHS Y for 

INSOMNIA, #30 TAB


   11/11/16


Spironolactone* (Spironolactone*) 100 Mg Tablet, 100 MG PO QAM, TAB


   11/11/16


Discontinued Scripts


Hydrocodone/Acetaminophen (Norco 5-325 Tablet) 1 Each Tablet, 1 EACH PO TID, #

14 TAB


   Prov:MAGNOLIA MELENDEZ         2/17/17





Allergies


Allergies:  


Coded Allergies:  


     No Known Drug Allergy (Verified  Allergy, Unknown, 4/1/17)





PMhx/Soc


History of Surgery:  Yes


Anesthesia Reaction:  No


Hx Neurological Disorder:  Yes (NEUROPATHY)


Hx Respiratory Disorders:  No


Hx Cardiac Disorders:  Yes (HTN)


Hx Psychiatric Problems:  No


Hx Miscellaneous Medical Probl:  Yes (Ascites, DM, LIVER DISEASE, CHRONIC 

PANCREATITIS)


Hx Alcohol Use:  No


Hx Substance Use:  No


Hx Tobacco Use:  No (Former)





FmHx


Family History:  No diabetes





Physical Exam


Vitals





Vital Signs








  Date Time  Temp Pulse Resp B/P Pulse Ox O2 Delivery O2 Flow Rate FiO2


 


4/1/17 13:28  72 18 109/67 100 Room Air  


 


4/1/17 11:21 98.0 78 18 106/70 99   








Physical Exam


General: Well developed, well nourished, no acute distress


Head: Normocephalic, atraumatic.


Eyes: Pupils equally reactive, EOM intact


ENT: Moist mucous membranes


Neck: Supple, no lymphadenopathy


Respiratory: Lungs clear bilaterally, no distress


Cardiovascular: RRR, no murmurs, rubs, or gallops


Abdominal: Soft, protuberant with fluid wave, nontender


: Deferred


MSK: No edema, no unilateral swelling, 5/5 strength


Neurologic: Alert and oriented, moving all extremities, normal speech, no focal 

weakness, no cerebellar signs


Skin: No rash


Psych: Normal mood


Result Diagram:  


4/1/17 1132                                                                    

            4/1/17 1132





Results 24 hrs








 Laboratory Tests








Test


  4/1/17


11:32


 


White Blood Count 4.310^3/ul 


 


Red Blood Count 3.2610^6/ul 


 


Hemoglobin 10.0g/dl 


 


Hematocrit 29.5% 


 


Mean Corpuscular Volume 90.5fl 


 


Mean Corpuscular Hemoglobin 30.7pg 


 


Mean Corpuscular Hemoglobin


Concent 33.9g/dl 


 


 


Red Cell Distribution Width 18.8% 


 


Platelet Count 59103^3/UL 


 


Mean Platelet Volume 12.1fl 


 


Neutrophils % 74.0% 


 


Lymphocytes % 10.6% 


 


Monocytes % 12.5% 


 


Eosinophils % 1.2% 


 


Basophils % 1.2% 


 


Nucleated Red Blood Cells % 0.0/100WBC 


 


Neutrophils # 3.210^3/ul 


 


Lymphocytes # 0.510^3/ul 


 


Monocytes # 0.510^3/ul 


 


Eosinophils # 0.110^3/ul 


 


Basophils # 0.110^3/ul 


 


Nucleated Red Blood Cells # 0.010^3/ul 


 


Prothrombin Time 16.7Sec 


 


Prothrombin Time Ratio 1.3 


 


INR International Normalized


Ratio 1.34 


 


 


Activated Partial


Thromboplast Time 33.3Sec 


 


 


Sodium Level 133mmol/L 


 


Potassium Level 4.4mmol/L 


 


Chloride Level 102mmol/L 


 


Carbon Dioxide Level 21mmol/L 


 


Anion Gap 14 


 


Blood Urea Nitrogen 18mg/dl 


 


Creatinine 0.80mg/dl 


 


Glucose Level 265mg/dl 


 


Calcium Level 8.9mg/dl 


 


Total Bilirubin 0.6mg/dl 


 


Direct Bilirubin 0.00mg/dl 


 


Indirect Bilirubin 0.6mg/dl 


 


Aspartate Amino Transf


(AST/SGOT) 92IU/L 


 


 


Alanine Aminotransferase


(ALT/SGPT) 60IU/L 


 


 


Alkaline Phosphatase 203IU/L 


 


Total Protein 6.4g/dl 


 


Albumin 3.2g/dl 


 


Globulin 3.20g/dl 


 


Albumin/Globulin Ratio 1.00 














Procedures/MDM


EKG, MONITORS, & DIAGNOSTIC IMAGING:


Large volume therapeutic paracentesis performed by interventional radiology.





LAB INTERPRETATION:


No significant coagulopathy noted.





MEDICAL DECISION MAKING:


The patient presents with abdominal ascites likely secondary to cirrhosis.  

Patient does not exhibit any signs or symptoms concerning for complications of 

cirrhosis such as GI bleed, hepatic encephalopathy or spontaneous bacterial 

peritonitis.  There is no indication currently for diagnostic paracentesis.  

The patient will benefit from large volume therapeutic paracentesis by 

interventional radiology.  If the patient remains stable without evidence of 

hemodynamic compromise secondary to fluid shifts the patient can be safely 

discharged home with close primary care and hepatology follow-up.





ER COURSE:


The patient had successful large volume therapeutic paracentesis.  The patient 

remained hemodynamically stable and otherwise well-appearing.  The patient is 

safe for discharge home.





I kept the patient and/or family informed of laboratory and diagnostic imaging 

results throughout the emergency room course.





DISPOSITION PLAN:


We discussed follow up with the patient's primary care doctor within 24 to 48 

hours as needed.  We also discussed return to the emergency room for worsening 

symptoms or worsening condition.





Discharge Medications:


None





Departure


Diagnosis:  


 Primary Impression:  


 Alcoholic cirrhosis of liver with ascites


Condition:  Stable











JESSE EM MD Apr 1, 2017 11:31

## 2017-04-06 ENCOUNTER — HOSPITAL ENCOUNTER (EMERGENCY)
Dept: HOSPITAL 10 - E/R | Age: 57
Discharge: HOME | End: 2017-04-06
Payer: COMMERCIAL

## 2017-04-06 VITALS — HEIGHT: 60 IN | WEIGHT: 171.96 LBS | BODY MASS INDEX: 33.76 KG/M2

## 2017-04-06 DIAGNOSIS — R18.8: Primary | ICD-10-CM

## 2017-04-06 DIAGNOSIS — Z87.891: ICD-10-CM

## 2017-04-06 DIAGNOSIS — I10: ICD-10-CM

## 2017-04-06 DIAGNOSIS — Z79.84: ICD-10-CM

## 2017-04-06 DIAGNOSIS — R10.84: ICD-10-CM

## 2017-04-06 DIAGNOSIS — E11.9: ICD-10-CM

## 2017-04-06 DIAGNOSIS — Z79.4: ICD-10-CM

## 2017-04-06 NOTE — RADRPT
PROCEDURE:   Ultrasound guided paracentesis. 

 

CLINICAL INDICATION:    Ascites and shortness of breath.  

 

COMPARISON:   04/01/2017.  

 

TECHNIQUE:   

The risks, benefits, and alternatives were explained to the patient and/or the patient's family, inc
luding but not limited to bleeding, infection, pain, visceral or vascular damage, shock, and death. 
 The patient and/or the patient's family understood the risks and the alternatives and wished to pro
ceed with the procedure.  Informed written consent was obtained. A procedural time out was performed
.  The patient's name, date of birth, and procedure to be performed were verified.

 

Utilizing ultrasound guidance, optimal location for entry to the peritoneal cavity was ascertained. 
 The overlying skin was prepped and draped in the usual sterile fashion.  Approximately 10 ml of 1% 
Xylocaine was injected locally for pain control.  Using ultrasound guidance, an 8 [place] catheter wa
s introduced into the peritoneal cavity in the right lower quadrant without difficulty.  

 

FINDINGS:

Initial images demonstrate ascites.  Approximately 8.0 liters of serous fluid was aspirated and disc
arded. The patient tolerated the procedure well without complication.

 

IMPRESSION:

1.  Successful ultrasound-guided paracentesis.  

 

RPTAT: QQ

_____________________________________________ 

.Rene De La Cruz MD, MD           Date    Time 

Electronically viewed and signed by .Rene De La Cruz MD, MD on 04/06/2017 13:44 

 

D:  04/06/2017 13:44  T:  04/06/2017 13:44

.R/

## 2017-04-06 NOTE — ERD
ER Documentation


Chief Complaint


Date/Time


DATE: 4/6/17 


TIME: 13:50


Chief Complaint


PARACENTESIS





HPI


Patient is a 57-year-old male with cirrhosis who presents for a paracentesis.  

He received paracentesis approximately every 3 days.  His last paracentesis was 

on April 1 and he did have labs done at that day.  His abdomen has filled up 

with fluid again and he came back for abdominal swelling.  He has no complaints 

other than abdominal pain.  He has had no fevers.  He has had no treatment as 

of yet.





ROS


All systems reviewed and are negative except as per history of present illness.





Medications


Home Meds


Active Scripts


Insulin Aspart* (Novolog Insulin Pen*) 100 Unit/Ml Soln, 10 UNIT SC WITH MEALS 

BEDTIME for 28 Days


   Prov:HANNAH CASTELLANO MD         11/15/16


Reported Medications


Dapagliflozin Propanediol (Farxiga) 10 Mg Tablet, 10 MG PO DAILY, #30 TAB


   11/11/16


Insulin Glargine* (Lantus*) 100 Unit/Ml Soln, 35 UNIT SC QHS, #1 VIAL


   11/11/16


Losartan Potassium* (Cozaar*) 25 Mg Tablet, 25 MG PO DAILY, #30 TAB


   11/11/16


Pantoprazole* (Protonix*) 40 Mg Tablet.dr, 40 MG PO DAILY, TAB


   11/11/16


Furosemide* (Furosemide*) 40 Mg Tablet, 40 MG PO DAILY, TAB


   11/11/16


Fenofibrate Nanocrystallized* (Fenofibrate*) 145 Mg Tablet, 145 MG PO DAILY, TAB


   11/11/16


Zolpidem Tartrate* (Zolpidem Tartrate*) 10 Mg Tablet, 10 MG PO QHS Y for 

INSOMNIA, #30 TAB


   11/11/16


Spironolactone* (Spironolactone*) 100 Mg Tablet, 100 MG PO QAM, TAB


   11/11/16


Discontinued Scripts


Hydrocodone/Acetaminophen (Norco 5-325 Tablet) 1 Each Tablet, 1 EACH PO TID, #

14 TAB


   Prov:MAGNOLIA MELENDEZ         2/17/17





Allergies


Allergies:  


Coded Allergies:  


     No Known Drug Allergy (Verified  Allergy, Unknown, 4/6/17)





PMhx/Soc


History of Surgery:  Yes


Anesthesia Reaction:  No


Hx Neurological Disorder:  Yes (NEUROPATHY)


Hx Respiratory Disorders:  No


Hx Cardiac Disorders:  Yes (HTN)


Hx Psychiatric Problems:  No


Hx Miscellaneous Medical Probl:  Yes (Ascites, DM, LIVER DISEASE, CHRONIC 

PANCREATITIS)


Hx Alcohol Use:  No (hx of etoh abuse)


Hx Substance Use:  No


Hx Tobacco Use:  No (Former)





FmHx


Family History:  No diabetes





Physical Exam


Vitals





Vital Signs








  Date Time  Temp Pulse Resp B/P Pulse Ox O2 Delivery O2 Flow Rate FiO2


 


4/6/17 10:48 98.1 81 18 114/76 99   








Physical Exam


Const: No acute distress


Head:   Atraumatic 


Eyes:    Normal Conjunctiva


ENT:    Normal External Ears, Nose and Mouth.


Neck:               Full range of motion..~ No meningismus.


Resp:    Clear to auscultation bilaterally


Cardio:    Regular rate and rhythm, no murmurs


Abd:    Distended abdomen with positive fluid wave


Skin:    No petechiae or rashes


Back:    No midline or flank tenderness


Ext:    No cyanosis, or edema


Neur:    Awake and alert


Psych:    Normal Mood and Affect


Results 24 hrs





 Current Medications








 Medications


  (Trade)  Dose


 Ordered  Sig/Johnathan


 Route


 PRN Reason  Start Time


 Stop Time Status Last Admin


Dose Admin


 


 Lidocaine


  (Xylocaine 1%


  (Mpf))  5 ml  STK-MED ONCE


 .ROUTE


   4/6/17 13:28


 4/6/17 13:29 DC  


 











Procedures/MDM


I reviewed laboratory studies from April 1 and the patient has no coagulopathy 

and therefore can receive his paracentesis.  The patient had a paracentesis 

done by ultrasound with radiology.





Patient is a 57-year-old male who presents for paracentesis.  The patient had a 

paracentesis done by radiology.  He feels better.  I doubt spontaneous 

bacterial peritonitis.  I believe outpatient management is appropriate.





Departure


Diagnosis:  


 Primary Impression:  


 Ascites


 Ascites type:  other type  Qualified Code:  R18.8 - Other ascites


 Additional Impression:  


 Abdominal pain


 Abdominal location:  generalized  Qualified Code:  R10.84 - Generalized 

abdominal pain


Condition:  Fair


Patient Instructions:  Ascites





Additional Instructions:  


Call your primary care doctor TOMORROW for an appointment during the next 1 

WEEK.Tell the  that you were referred from this facility.See the 

doctor sooner or return here if your  condition worsens before your appointment 

time.











FRANCESCA MONTOYA MD Apr 6, 2017 13:51

## 2017-04-11 ENCOUNTER — HOSPITAL ENCOUNTER (EMERGENCY)
Dept: HOSPITAL 10 - E/R | Age: 57
Discharge: HOME | End: 2017-04-11
Payer: COMMERCIAL

## 2017-04-11 VITALS
WEIGHT: 165.35 LBS | WEIGHT: 165.35 LBS | BODY MASS INDEX: 29.3 KG/M2 | HEIGHT: 63 IN | BODY MASS INDEX: 29.3 KG/M2 | HEIGHT: 63 IN

## 2017-04-11 VITALS — DIASTOLIC BLOOD PRESSURE: 85 MMHG | RESPIRATION RATE: 20 BRPM | HEART RATE: 70 BPM | SYSTOLIC BLOOD PRESSURE: 100 MMHG

## 2017-04-11 DIAGNOSIS — I10: ICD-10-CM

## 2017-04-11 DIAGNOSIS — E11.9: ICD-10-CM

## 2017-04-11 DIAGNOSIS — R06.02: ICD-10-CM

## 2017-04-11 DIAGNOSIS — Z87.891: ICD-10-CM

## 2017-04-11 DIAGNOSIS — R18.8: Primary | ICD-10-CM

## 2017-04-11 DIAGNOSIS — Z79.84: ICD-10-CM

## 2017-04-11 DIAGNOSIS — Z79.4: ICD-10-CM

## 2017-04-11 LAB
ABNORMAL IP MESSAGE: 1
ADD SCAN DIFF: NO
ALBUMIN SERPL-MCNC: 3 G/DL (ref 3.3–4.9)
ALBUMIN/GLOB SERPL: 0.9 {RATIO}
ALP SERPL-CCNC: 195 IU/L (ref 42–121)
ALT SERPL-CCNC: 53 IU/L (ref 13–69)
ANION GAP SERPL CALC-SCNC: 10 MMOL/L (ref 8–16)
APTT BLD: 32.4 SEC (ref 25–35)
AST SERPL-CCNC: 63 IU/L (ref 15–46)
BASOPHILS # BLD AUTO: 0.1 10^3/UL (ref 0–0.1)
BASOPHILS NFR BLD: 1.2 % (ref 0–2)
BILIRUB DIRECT SERPL-MCNC: 0 MG/DL (ref 0–0.2)
BILIRUB SERPL-MCNC: 0.4 MG/DL (ref 0.2–1.3)
BUN SERPL-MCNC: 16 MG/DL (ref 7–20)
CALCIUM SERPL-MCNC: 8.3 MG/DL (ref 8.4–10.2)
CHLORIDE SERPL-SCNC: 107 MMOL/L (ref 97–110)
CO2 SERPL-SCNC: 20 MMOL/L (ref 21–31)
CREAT SERPL-MCNC: 0.89 MG/DL (ref 0.61–1.24)
EOSINOPHIL # BLD: 0.1 10^3/UL (ref 0–0.5)
EOSINOPHIL NFR BLD: 2.8 % (ref 0–7)
ERYTHROCYTE [DISTWIDTH] IN BLOOD BY AUTOMATED COUNT: 18.6 % (ref 11.5–14.5)
GLOBULIN SER-MCNC: 3.3 G/DL (ref 1.3–3.2)
GLUCOSE SERPL-MCNC: 309 MG/DL (ref 70–220)
HCT VFR BLD CALC: 32.2 % (ref 42–52)
HGB BLD-MCNC: 10.1 G/DL (ref 14–18)
INR PPP: 1.29
LYMPHOCYTES # BLD AUTO: 0.5 10^3/UL (ref 0.8–2.9)
LYMPHOCYTES NFR BLD AUTO: 11 % (ref 15–51)
MCH RBC QN AUTO: 29.6 PG (ref 29–33)
MCHC RBC AUTO-ENTMCNC: 31.4 G/DL (ref 32–37)
MCV RBC AUTO: 94.4 FL (ref 82–101)
MONOCYTES # BLD: 0.5 10^3/UL (ref 0.3–0.9)
MONOCYTES NFR BLD: 11 % (ref 0–11)
NEUTROPHILS # BLD: 3.6 10^3/UL (ref 1.6–7.5)
NEUTROPHILS NFR BLD AUTO: 73.4 % (ref 39–77)
NRBC # BLD MANUAL: 0 10^3/UL (ref 0–0)
NRBC BLD QL: 0 /100WBC (ref 0–0)
PLATELET # BLD: 133 10^3/UL (ref 140–415)
PMV BLD AUTO: 11.6 FL (ref 7.4–10.4)
POTASSIUM SERPL-SCNC: 6 MMOL/L (ref 3.5–5.1)
PROT SERPL-MCNC: 6.3 G/DL (ref 6.1–8.1)
PROTHROMBIN TIME: 16.2 SEC (ref 12.2–14.2)
PT RATIO: 1.3
RBC # BLD AUTO: 3.41 10^6/UL (ref 4.7–6.1)
SODIUM SERPL-SCNC: 131 MMOL/L (ref 135–144)
WBC # BLD AUTO: 4.9 10^3/UL (ref 4.8–10.8)

## 2017-04-11 PROCEDURE — 36415 COLL VENOUS BLD VENIPUNCTURE: CPT

## 2017-04-11 PROCEDURE — 85730 THROMBOPLASTIN TIME PARTIAL: CPT

## 2017-04-11 PROCEDURE — 85025 COMPLETE CBC W/AUTO DIFF WBC: CPT

## 2017-04-11 PROCEDURE — 85610 PROTHROMBIN TIME: CPT

## 2017-04-11 PROCEDURE — 80053 COMPREHEN METABOLIC PANEL: CPT

## 2017-04-11 NOTE — RADRPT
PROCEDURE:   Ultrasound guided paracentesis. 

 

CLINICAL INDICATION:    Ascites and shortness of breath.  

 

COMPARISON:   04/06/2017.  

 

TECHNIQUE:   

The risks, benefits, and alternatives were explained to the patient and/or the patient's family, inc
luding but not limited to bleeding, infection, pain, visceral or vascular damage, shock, and death. 
 The patient and/or the patient's family understood the risks and the alternatives and wished to pro
ceed with the procedure.  Informed written consent was obtained. A procedural time out was performed
.  The patient's name, date of birth, and procedure to be performed were verified.

 

Utilizing ultrasound guidance, optimal location for entry to the peritoneal cavity was ascertained. 
 The overlying skin was prepped and draped in the usual sterile fashion.  Approximately 10 ml of 1% 
Xylocaine was injected locally for pain control.  Using ultrasound guidance, an 8 Vietnamese catheter wa
s introduced into the peritoneal cavity in the right lower quadrant without difficulty.  

 

FINDINGS:

Initial images demonstrate ascites.  Approximately 7.25 liters of serous fluid was aspirated and dis
carded. The patient tolerated the procedure well without complication.

 

IMPRESSION:

1.  Successful ultrasound-guided paracentesis.  

 

RPTAT: QQ

_____________________________________________ 

.Rene De La Cruz MD, MD           Date    Time 

Electronically viewed and signed by .Rene De La Cruz MD, MD on 04/11/2017 15:53 

 

D:  04/11/2017 15:53  T:  04/11/2017 15:53

.R/

## 2017-04-11 NOTE — ERD
ER Documentation


Chief Complaint


Date/Time


DATE: 17 


TIME: 13:26


Chief Complaint


FOR PARACENTESIS





HPI


57-year-old male with a history of cirrhosis and recurrent ascites presents the 

emergency department for therapeutic paracentesis.  Patient has no other 

complaints.





ROS


All systems reviewed and are negative except as per history of present illness.





Medications


Home Meds


Active Scripts


Insulin Aspart* (Novolog Insulin Pen*) 100 Unit/Ml Soln, 10 UNIT SC WITH MEALS 

BEDTIME for 28 Days


   Prov:HANNAH CASTELLANO MD         11/15/16


Reported Medications


Dapagliflozin Propanediol (Farxiga) 10 Mg Tablet, 10 MG PO DAILY, #30 TAB


   16


Insulin Glargine* (Lantus*) 100 Unit/Ml Soln, 35 UNIT SC QHS, #1 VIAL


   16


Losartan Potassium* (Cozaar*) 25 Mg Tablet, 25 MG PO DAILY, #30 TAB


   16


Pantoprazole* (Protonix*) 40 Mg Tablet.dr, 40 MG PO DAILY, TAB


   16


Furosemide* (Furosemide*) 40 Mg Tablet, 40 MG PO DAILY, TAB


   16


Fenofibrate Nanocrystallized* (Fenofibrate*) 145 Mg Tablet, 145 MG PO DAILY, TAB


   16


Zolpidem Tartrate* (Zolpidem Tartrate*) 10 Mg Tablet, 10 MG PO QHS Y for 

INSOMNIA, #30 TAB


   16


Spironolactone* (Spironolactone*) 100 Mg Tablet, 100 MG PO QAM, TAB


   16





Allergies


Allergies:  


Coded Allergies:  


     No Known Drug Allergy (Verified  Allergy, Unknown, 17)





PMhx/Soc


History of Surgery:  Yes


Anesthesia Reaction:  No


Hx Neurological Disorder:  Yes (NEUROPATHY)


Hx Respiratory Disorders:  No


Hx Cardiac Disorders:  Yes (HTN)


Hx Psychiatric Problems:  No


Hx Miscellaneous Medical Probl:  Yes (Ascites, DM, LIVER DISEASE, CHRONIC 

PANCREATITIS)


Hx Alcohol Use:  No (hx of etoh abuse)


Hx Substance Use:  No


Hx Tobacco Use:  No (Former)


Smoking Status:  Former smoker





FmHx


Noncontributory for chief complaint





Physical Exam


Vitals





Vital Signs








  Date Time  Temp Pulse Resp B/P Pulse Ox O2 Delivery O2 Flow Rate FiO2


 


17 10:46 97.1 77 18 107/66 99   








Physical Exam


GENERAL: The patient is well developed and appropriate for usual state of 

health in no apparent distress


HEENT: Pupils equal, round, and reactive to light.  EOMI. There is no scleral 

icterus.


NECK: C-spine is soft and supple, there is no meningismus.  There is no 

cervical lymphadenopathy.


LUNGS: Clear to auscultation bilaterally. There are no rales, wheezes or 

rhonchi.


HEART: Regular rate and rhythm, no murmurs, clicks, rubs or gallops.


ABDOMEN: Soft, distended.  No tenderness to palpation.  No rebound or guarding.


EXTREMITIES: There is no peripheral cyanosis or edema.  No focal swelling or 

erythema.


NEURO: The patient moves all four extremities with 5/5 strength.  Cranial 

nerves II - XII are intact. Normal gait. Alert and oriented


SKIN: There is no apparent rash or petechiae.


HEME/LYMPHATIC: There is no evidence of excessive bruising or lymphedema.


PSYCHIATRIC: The patient does not appear anxious or depressed.


Result Diagram:  


17 1108                                                                   

             17 1108





Results 24 hrs





 Laboratory Tests








Test


  17


11:08


 


White Blood Count 4.910^3/ul 


 


Red Blood Count 3.4110^6/ul 


 


Hemoglobin 10.1g/dl 


 


Hematocrit 32.2% 


 


Mean Corpuscular Volume 94.4fl 


 


Mean Corpuscular Hemoglobin 29.6pg 


 


Mean Corpuscular Hemoglobin


Concent 31.4g/dl 


 


 


Red Cell Distribution Width 18.6% 


 


Platelet Count 94848^3/UL 


 


Mean Platelet Volume 11.6fl 


 


Neutrophils % 73.4% 


 


Lymphocytes % 11.0% 


 


Monocytes % 11.0% 


 


Eosinophils % 2.8% 


 


Basophils % 1.2% 


 


Nucleated Red Blood Cells % 0.0/100WBC 


 


Neutrophils # 3.610^3/ul 


 


Lymphocytes # 0.510^3/ul 


 


Monocytes # 0.510^3/ul 


 


Eosinophils # 0.110^3/ul 


 


Basophils # 0.110^3/ul 


 


Nucleated Red Blood Cells # 0.010^3/ul 


 


Prothrombin Time 16.2Sec 


 


Prothrombin Time Ratio 1.3 


 


INR International Normalized


Ratio 1.29 


 


 


Activated Partial


Thromboplast Time 32.4Sec 


 


 


Sodium Level 131mmol/L 


 


Potassium Level 6.0mmol/L 


 


Chloride Level 107mmol/L 


 


Carbon Dioxide Level 20mmol/L 


 


Anion Gap 10 


 


Blood Urea Nitrogen 16mg/dl 


 


Creatinine 0.89mg/dl 


 


Glucose Level 309mg/dl 


 


Calcium Level 8.3mg/dl 


 


Total Bilirubin 0.4mg/dl 


 


Direct Bilirubin 0.00mg/dl 


 


Indirect Bilirubin 0.4mg/dl 


 


Aspartate Amino Transf


(AST/SGOT) 63IU/L 


 


 


Alanine Aminotransferase


(ALT/SGPT) 53IU/L 


 


 


Alkaline Phosphatase 195IU/L 


 


Total Protein 6.3g/dl 


 


Albumin 3.0g/dl 


 


Globulin 3.30g/dl 


 


Albumin/Globulin Ratio 0.90 








 Current Medications








 Medications


  (Trade)  Dose


 Ordered  Sig/Johnathan


 Route


 PRN Reason  Start Time


 Stop Time Status Last Admin


Dose Admin


 


 Lidocaine


  (Xylocaine 1%


  (Mpf))  5 ml  STK-MED ONCE


 .ROUTE


   17 12:41


 17 12:42 DC 17 12:44


 











Procedures/MDM


Patient was taken to a room, seen and evaluated. Comfort measures were 

initiated. 





Diagnostic tests were ordered and reviewed.





RADIOLOGY: [reviewed with the radiologist]





REEVALUATION: After therapeutic paracentesis, patient felt much better and 

continued to have no complaints.





MEDICAL DECISION MAKIN-year-old male presents for recurrent therapeutic 

paracentesis.  After the paracentesis, patient felt much better, patient was 

clinically well and appropriate for outpatient care.





Departure


Diagnosis:  


 Primary Impression:  


 Ascites


Condition:  Stable


Referrals:  


DAVID ARCE (PCP)





Additional Instructions:  


See your doctor for follow-up as discussed.  Take a copy of your test results, 

if appropriate, to this follow-up visit.





See your doctor or return here if your symptoms do not improve as expected.





At any time, please return to the emergency department for any change or 

worsening in her symptoms.











MAGNOLIA MELENDEZ 2017 13:27

## 2017-04-12 ENCOUNTER — HOSPITAL ENCOUNTER (OUTPATIENT)
Dept: HOSPITAL 10 - GIL | Age: 57
LOS: 1 days | Discharge: HOME | End: 2017-04-13
Attending: INTERNAL MEDICINE
Payer: COMMERCIAL

## 2017-04-12 VITALS — DIASTOLIC BLOOD PRESSURE: 71 MMHG | RESPIRATION RATE: 24 BRPM | HEART RATE: 70 BPM | SYSTOLIC BLOOD PRESSURE: 104 MMHG

## 2017-04-12 VITALS — RESPIRATION RATE: 18 BRPM | SYSTOLIC BLOOD PRESSURE: 90 MMHG | HEART RATE: 72 BPM | DIASTOLIC BLOOD PRESSURE: 54 MMHG

## 2017-04-12 VITALS
WEIGHT: 150.58 LBS | HEIGHT: 63 IN | BODY MASS INDEX: 26.68 KG/M2 | HEIGHT: 63 IN | BODY MASS INDEX: 26.68 KG/M2 | WEIGHT: 150.58 LBS

## 2017-04-12 DIAGNOSIS — E78.5: ICD-10-CM

## 2017-04-12 DIAGNOSIS — Z12.11: Primary | ICD-10-CM

## 2017-04-12 DIAGNOSIS — K64.9: ICD-10-CM

## 2017-04-12 DIAGNOSIS — I10: ICD-10-CM

## 2017-04-12 DIAGNOSIS — E11.9: ICD-10-CM

## 2017-04-12 PROCEDURE — 82962 GLUCOSE BLOOD TEST: CPT

## 2017-04-12 PROCEDURE — 45378 DIAGNOSTIC COLONOSCOPY: CPT

## 2017-04-13 NOTE — GILP
DATE OF PROCEDURE:  04/12/2017

 

 

PROCEDURE:  Colonoscopy

 

INDICATION:  A 57-year-old male undergoing this procedure for colon cancer screening.  The risks of 
the procedure, related and unrelated complications, anesthetic risks, alternatives discussed and inf
ormed consent was obtained.

 

DESCRIPTION OF PROCEDURE:  The patient was brought to the GI lab, sedated by Dr. Dinorah Barry.  A
fter optimal sedation, scope was passed with much ease into rectum and advanced slowly through sigmo
id, descending, transverse colon all the way into cecum and into terminal ileum, which was normal. W
hile coming out, mucosa thoroughly inspected.  The rest of the colon appeared normal, had stool most
ly in the descending colon.  Small hemorrhoids identified.

 

IMPRESSION:

1.  Normal findings all the way into cecum and terminal ileum.

2.  Somewhat poor prep.

3.  Hemorrhoids.

 

PLAN:  Stay on high fiber diet.

 

 

Dictated By: SIDDHARTH RECINOS MD

 

PJ/NTS

DD:    04/12/2017 10:46:27

DT:    04/12/2017 11:27:50

Conf#: [id number]

DID#:  [id number]

CC: SIDDHARTH RECINOS MD;*EndCC*

## 2017-04-15 ENCOUNTER — HOSPITAL ENCOUNTER (EMERGENCY)
Dept: HOSPITAL 10 - E/R | Age: 57
Discharge: HOME | End: 2017-04-15
Payer: COMMERCIAL

## 2017-04-15 VITALS
BODY MASS INDEX: 25.95 KG/M2 | WEIGHT: 165.35 LBS | HEIGHT: 67 IN | BODY MASS INDEX: 25.95 KG/M2 | HEIGHT: 67 IN | WEIGHT: 165.35 LBS

## 2017-04-15 VITALS — DIASTOLIC BLOOD PRESSURE: 83 MMHG | SYSTOLIC BLOOD PRESSURE: 118 MMHG | RESPIRATION RATE: 16 BRPM | HEART RATE: 60 BPM

## 2017-04-15 VITALS — TEMPERATURE: 95.7 F

## 2017-04-15 DIAGNOSIS — R18.8: Primary | ICD-10-CM

## 2017-04-15 DIAGNOSIS — Z79.4: ICD-10-CM

## 2017-04-15 DIAGNOSIS — E11.9: ICD-10-CM

## 2017-04-15 DIAGNOSIS — Z79.84: ICD-10-CM

## 2017-04-15 DIAGNOSIS — I10: ICD-10-CM

## 2017-04-15 NOTE — RADRPT
PROCEDURE:   Ultrasound guided paracentesis

 

CLINICAL INDICATION:   Ascites 

 

TECHNIQUE:   The risks benefits and alternatives of the procedure were explained to the patient.  In
formed written consent was obtained.  A time out was performed.  The patient understood the risks be
nefits and alternatives and wished to proceed with the procedure.  The overlying skin of the right l
ower quadrant of the abdomen was prepped and draped in the usual sterile fashion. Approximately 10 c
c of Xylocaine was injected locally for pain control.  Utilizing ultrasound guidance, a skinny 5-[facility] Yueh catheter was placed into the  peritoneal cavity without difficulty.  The patient tolerated 
the procedure well without complication.  Approximately 6150 cc of thin jorden fluid was obtained.  T
he fluid was not sent to the lab for further analysis.

 

COMPARISON:  04/11/2017 

 

FINDINGS:

 

Initial ultrasound demonstrated a large amount of simple appearing ascites.  Successful ultrasound-g
uided paracentesis with a total of 6150 cc of thin yellow fluid aspirated.

 

IMPRESSION:

 

Successful ultrasound-guided paracentesis.

 

 

RPTAT: JJ

_____________________________________________ 

.Law Jean MD, MD           Date    Time 

Electronically viewed and signed by .Law Jean MD, MD on 04/15/2017 14:42 

 

D:  04/15/2017 14:42  T:  04/15/2017 14:42

.A/

## 2017-04-20 ENCOUNTER — HOSPITAL ENCOUNTER (EMERGENCY)
Dept: HOSPITAL 10 - E/R | Age: 57
Discharge: HOME | End: 2017-04-20
Payer: COMMERCIAL

## 2017-04-20 VITALS
WEIGHT: 169.76 LBS | WEIGHT: 169.76 LBS | HEIGHT: 68 IN | HEIGHT: 68 IN | BODY MASS INDEX: 25.73 KG/M2 | BODY MASS INDEX: 25.73 KG/M2

## 2017-04-20 VITALS
HEART RATE: 70 BPM | SYSTOLIC BLOOD PRESSURE: 113 MMHG | DIASTOLIC BLOOD PRESSURE: 65 MMHG | RESPIRATION RATE: 18 BRPM | TEMPERATURE: 97.5 F

## 2017-04-20 DIAGNOSIS — E11.9: ICD-10-CM

## 2017-04-20 DIAGNOSIS — K70.31: Primary | ICD-10-CM

## 2017-04-20 DIAGNOSIS — N18.6: ICD-10-CM

## 2017-04-20 DIAGNOSIS — Z79.4: ICD-10-CM

## 2017-04-20 LAB
ADD SCAN DIFF: NO
ALBUMIN SERPL-MCNC: 2.9 G/DL (ref 3.3–4.9)
ALBUMIN/GLOB SERPL: 0.74 {RATIO}
ALP SERPL-CCNC: 168 IU/L (ref 42–121)
ALT SERPL-CCNC: 40 IU/L (ref 13–69)
ANION GAP SERPL CALC-SCNC: 14 MMOL/L (ref 8–16)
APTT BLD: 30.8 SEC (ref 25–35)
AST SERPL-CCNC: 51 IU/L (ref 15–46)
BASOPHILS # BLD AUTO: 0.1 10^3/UL (ref 0–0.1)
BASOPHILS NFR BLD: 1 % (ref 0–2)
BILIRUB DIRECT SERPL-MCNC: 0 MG/DL (ref 0–0.2)
BILIRUB SERPL-MCNC: 0.4 MG/DL (ref 0.2–1.3)
BUN SERPL-MCNC: 17 MG/DL (ref 7–20)
CALCIUM SERPL-MCNC: 8.6 MG/DL (ref 8.4–10.2)
CHLORIDE SERPL-SCNC: 104 MMOL/L (ref 97–110)
CO2 SERPL-SCNC: 20 MMOL/L (ref 21–31)
CREAT SERPL-MCNC: 0.79 MG/DL (ref 0.61–1.24)
EOSINOPHIL # BLD: 0.1 10^3/UL (ref 0–0.5)
EOSINOPHIL NFR BLD: 2.2 % (ref 0–7)
ERYTHROCYTE [DISTWIDTH] IN BLOOD BY AUTOMATED COUNT: 18.6 % (ref 11.5–14.5)
GLOBULIN SER-MCNC: 3.9 G/DL (ref 1.3–3.2)
GLUCOSE SERPL-MCNC: 273 MG/DL (ref 70–220)
HCT VFR BLD CALC: 32.8 % (ref 42–52)
HGB BLD-MCNC: 10.6 G/DL (ref 14–18)
INR PPP: 1.31
LYMPHOCYTES # BLD AUTO: 0.5 10^3/UL (ref 0.8–2.9)
LYMPHOCYTES NFR BLD AUTO: 10.5 % (ref 15–51)
MCH RBC QN AUTO: 30.2 PG (ref 29–33)
MCHC RBC AUTO-ENTMCNC: 32.3 G/DL (ref 32–37)
MCV RBC AUTO: 93.4 FL (ref 82–101)
MONOCYTES # BLD: 0.5 10^3/UL (ref 0.3–0.9)
MONOCYTES NFR BLD: 9.5 % (ref 0–11)
NEUTROPHILS # BLD: 3.8 10^3/UL (ref 1.6–7.5)
NEUTROPHILS NFR BLD AUTO: 76.4 % (ref 39–77)
NRBC # BLD MANUAL: 0 10^3/UL (ref 0–0)
NRBC BLD QL: 0 /100WBC (ref 0–0)
PLATELET # BLD: 105 10^3/UL (ref 140–440)
PMV BLD AUTO: 12.1 FL (ref 7.4–10.4)
POTASSIUM SERPL-SCNC: 3.7 MMOL/L (ref 3.5–5.1)
PROT SERPL-MCNC: 6.8 G/DL (ref 6.1–8.1)
PROTHROMBIN TIME: 16.4 SEC (ref 12.2–14.2)
PT RATIO: 1.3
RBC # BLD AUTO: 3.51 10^6/UL (ref 4.7–6.1)
SODIUM SERPL-SCNC: 134 MMOL/L (ref 135–144)
WBC # BLD AUTO: 5 10^3/UL (ref 4.8–10.8)

## 2017-04-20 PROCEDURE — 85730 THROMBOPLASTIN TIME PARTIAL: CPT

## 2017-04-20 PROCEDURE — 85610 PROTHROMBIN TIME: CPT

## 2017-04-20 PROCEDURE — 36415 COLL VENOUS BLD VENIPUNCTURE: CPT

## 2017-04-20 PROCEDURE — 96374 THER/PROPH/DIAG INJ IV PUSH: CPT

## 2017-04-20 PROCEDURE — 96375 TX/PRO/DX INJ NEW DRUG ADDON: CPT

## 2017-04-20 PROCEDURE — 80053 COMPREHEN METABOLIC PANEL: CPT

## 2017-04-20 PROCEDURE — 85025 COMPLETE CBC W/AUTO DIFF WBC: CPT

## 2017-04-20 NOTE — RADRPT
PROCEDURE:   US guided paracentesis 

 

CLINICAL INDICATION:   Ascites 

 

TECHNIQUE:   Multiple sonographic images were obtained through the patient's abdomen.  A site in the
 patient's RIGHT lower abdomen was selected and marked. The area was prepped and draped in the usual
 sterile fashion.  1% lidocaine was utilized. A 19-gauge Yueh needle was advanced into the peritonea
l space and the introducer was connected to a vacuum drainage bottle.  A total of 9400 cc of clear y
ellow fluid were drained at the end of the procedure. The patient tolerated the procedure well. The 
specimen was sent for laboratory evaluation.

 

 

COMPARISON:   None 

 

FINDINGS:

Ascites.

 

RPTAT: AA

 

IMPRESSION:

Successful ultrasound-guided paracentesis.

_____________________________________________ 

Physician Shawnee           Date    Time 

Electronically viewed and signed by Physician Shawnee on 04/20/2017 17:00 

 

D:  04/20/2017 17:00  T:  04/20/2017 17:00

KORIN/

## 2017-04-20 NOTE — ERD
ER Documentation


Chief Complaint


Date/Time


DATE: 4/20/17 


TIME: 13:48


Chief Complaint


needs paracenthesis





HPI


57-year-old male who presents with abdominal distention and fullness.  The 

patient has a history of end-stage liver disease with frequent paracentesis.  

He describes mild abdominal fullness but no significant pain.  No fevers or 

chills.  Symptoms are similar to episodes in the past.  Last paracentesis was 

Saturday.  No hematemesis or melena.  No confusion.





ROS


All systems reviewed and are negative except as per history of present illness.





Medications


Home Meds


Active Scripts


Insulin Aspart* (Novolog Insulin Pen*) 100 Unit/Ml Soln, 10 UNIT SC WITH MEALS 

BEDTIME for 28 Days


   Prov:HANNAH CASTELLANO MD         11/15/16


Reported Medications


Insulin Glargine* (Lantus*) 100 Unit/Ml Soln, 35 UNIT SC QHS, #1 VIAL


   11/11/16


Losartan Potassium* (Cozaar*) 25 Mg Tablet, 25 MG PO DAILY, #30 TAB


   11/11/16


Pantoprazole* (Protonix*) 40 Mg Tablet.dr, 40 MG PO DAILY, TAB


   11/11/16





Allergies


Allergies:  


Coded Allergies:  


     No Known Drug Allergy (Verified  Allergy, Unknown, 4/20/17)





PMhx/Soc


History of Surgery:  No


Anesthesia Reaction:  No


Hx Neurological Disorder:  No


Hx Respiratory Disorders:  No


Hx Cardiac Disorders:  No


Hx Psychiatric Problems:  No


Hx Miscellaneous Medical Probl:  No


Hx Alcohol Use:  No


Hx Substance Use:  No


Hx Tobacco Use:  No


Smoking Status:  Never smoker





FmHx


Family History:  No diabetes





Physical Exam


Vitals





Vital Signs








  Date Time  Temp Pulse Resp B/P Pulse Ox O2 Delivery O2 Flow Rate FiO2


 


4/20/17 11:22 97.7 87 22 110/71 98   








Physical Exam


General: Well developed, well nourished, no acute distress


Head: Normocephalic, atraumatic.


Eyes: Pupils equally reactive, EOM intact


ENT: Moist mucous membranes


Neck: Supple, no lymphadenopathy


Respiratory: Lungs clear bilaterally, no distress


Cardiovascular: RRR, no murmurs, rubs, or gallops


Abdominal: Soft, fluid wave, nontender, no peritonitis


: Deferred


MSK: No edema, no unilateral swelling, 5/5 strength


Neurologic: Alert and oriented, moving all extremities, normal speech, no focal 

weakness, no cerebellar signs


Skin: No rash


Psych: Normal mood


Result Diagram:  


4/20/17 1348





Results 24 hrs





 Laboratory Tests








Test


  4/20/17


13:48


 


White Blood Count 5.010^3/ul 


 


Red Blood Count 3.5110^6/ul 


 


Hemoglobin 10.6g/dl 


 


Hematocrit 32.8% 


 


Mean Corpuscular Volume 93.4fl 


 


Mean Corpuscular Hemoglobin 30.2pg 


 


Mean Corpuscular Hemoglobin


Concent 32.3g/dl 


 


 


Red Cell Distribution Width 18.6% 


 


Platelet Count 38218^3/UL 


 


Mean Platelet Volume 12.1fl 


 


Neutrophils % 76.4% 


 


Lymphocytes % 10.5% 


 


Monocytes % 9.5% 


 


Eosinophils % 2.2% 


 


Basophils % 1.0% 


 


Nucleated Red Blood Cells % 0.0/100WBC 


 


Neutrophils # 3.810^3/ul 


 


Lymphocytes # 0.510^3/ul 


 


Monocytes # 0.510^3/ul 


 


Eosinophils # 0.110^3/ul 


 


Basophils # 0.110^3/ul 


 


Nucleated Red Blood Cells # 0.010^3/ul 


 


Prothrombin Time 16.4Sec 


 


Prothrombin Time Ratio 1.3 


 


INR International Normalized


Ratio 1.31 


 


 


Activated Partial


Thromboplast Time 30.8Sec 


 








 Current Medications








 Medications


  (Trade)  Dose


 Ordered  Sig/Johnathan


 Route


 PRN Reason  Start Time


 Stop Time Status Last Admin


Dose Admin


 


 Morphine Sulfate


  (morphine)  4 mg  ONCE  STAT


 IV


   4/20/17 12:21


 4/20/17 12:22 DC 4/20/17 13:55


 


 


 Ondansetron HCl


  (Zofran Inj)  4 mg  ONCE  STAT


 IV


   4/20/17 12:21


 4/20/17 12:22 DC 4/20/17 13:54


 











Procedures/MDM


EKG, MONITORS, & DIAGNOSTIC IMAGING:


Large volume therapeutic paracentesis performed by interventional radiology.





LAB INTERPRETATION:


No significant coagulopathy noted.





MEDICAL DECISION MAKING:


The patient presents with abdominal ascites likely secondary to cirrhosis.  

Patient does not exhibit any signs or symptoms concerning for complications of 

cirrhosis such as GI bleed, hepatic encephalopathy or spontaneous bacterial 

peritonitis.  There is no indication currently for diagnostic paracentesis.  

The patient will benefit from large volume therapeutic paracentesis by 

interventional radiology.  If the patient remains stable without evidence of 

hemodynamic compromise secondary to fluid shifts the patient can be safely 

discharged home with close primary care and hepatology follow-up.





ER COURSE:


The patient had successful large volume therapeutic paracentesis.  The patient 

remained hemodynamically stable and otherwise well-appearing.  The patient is 

safe for discharge home.





I kept the patient and/or family informed of laboratory and diagnostic imaging 

results throughout the emergency room course.





DISPOSITION PLAN:


We discussed follow up with the patient's primary care doctor within 24 to 48 

hours as needed.  We also discussed return to the emergency room for worsening 

symptoms or worsening condition.





Discharge Medications:


None





Departure


Diagnosis:  


 Primary Impression:  


 Ascites


 Ascites type:  due to alcoholic cirrhosis  Qualified Code:  K70.31 - Ascites 

due to alcoholic cirrhosis


Condition:  Stable











JESSE EM MD Apr 20, 2017 13:50

## 2017-04-25 ENCOUNTER — HOSPITAL ENCOUNTER (EMERGENCY)
Dept: HOSPITAL 10 - E/R | Age: 57
Discharge: HOME | End: 2017-04-25
Payer: COMMERCIAL

## 2017-04-25 VITALS
DIASTOLIC BLOOD PRESSURE: 65 MMHG | HEART RATE: 80 BPM | TEMPERATURE: 98.6 F | SYSTOLIC BLOOD PRESSURE: 109 MMHG | RESPIRATION RATE: 20 BRPM

## 2017-04-25 VITALS
HEIGHT: 67 IN | HEIGHT: 67 IN | WEIGHT: 165.35 LBS | BODY MASS INDEX: 25.95 KG/M2 | BODY MASS INDEX: 25.95 KG/M2 | WEIGHT: 165.35 LBS

## 2017-04-25 DIAGNOSIS — Z79.4: ICD-10-CM

## 2017-04-25 DIAGNOSIS — R10.84: ICD-10-CM

## 2017-04-25 DIAGNOSIS — E11.9: ICD-10-CM

## 2017-04-25 DIAGNOSIS — R18.8: Primary | ICD-10-CM

## 2017-04-25 NOTE — EN
Date/Time of Note


Date/Time of Note


DATE: 4/25/17 


TIME: 15:39





ER Progress Note


Patient was complaining of some pain at the right upper quadrant catheter 

insertion site.  Patient had 2 insertion sites in the right abdomen.  He says 

he having some pain around the right most side described as sharp and constant.

  He was complaining of this just before discharge and nurse came to me asking 

me what to do.  The patient has some tenderness at this specific area is 

moderate in nature there is no leaking of fluid he has a nonsurgical abdomen 

there is no rebound or guarding only pain around the site.  I offered the 

patient a CT scan of the abdomen with contrast to rule out any type of intra-

abdominal injury including perforation of the intestinal tract that could have 

been done accident during the paracentesis.  Patient said he does not want to 

do this and wants to go home and will wait 2 or 3 hours to see how things go 

and if he still has pain he will return.  I told him that a perforation or 

liver injury can be very serious complication to reconsider however does not 

want to do this and wants to go home and wait to see how he feels











BERNIE COWAN DO Apr 25, 2017 15:40

## 2017-04-25 NOTE — RADRPT
PROCEDURE:   US guided paracentesis 

 

CLINICAL INDICATION:   Ascites 

 

TECHNIQUE:   Multiple sonographic images were obtained through the patient's abdomen.  A site in the
 patient's RIGHT lower abdomen was selected and marked. The area was prepped and draped in the usual
 sterile fashion.  1% lidocaine was utilized. A 19-gauge Yueh needle was advanced into the peritonea
l space and the introducer was connected to a vacuum drainage bottle.  A total of 4200 cc of clear y
ellow fluid were drained at the end of the procedure. The patient tolerated the procedure well. 

 

COMPARISON:   None 

 

FINDINGS:

Ascites.

 

RPTAT: AA

 

IMPRESSION:

Successful ultrasound-guided paracentesis.

_____________________________________________ 

Physician Shawnee           Date    Time 

Electronically viewed and signed by Physician Shawnee on 04/25/2017 15:46 

 

D:  04/25/2017 15:46  T:  04/25/2017 15:46

KORIN/

## 2017-04-25 NOTE — ERD
ER Documentation


Chief Complaint


Date/Time


DATE: 4/25/17 


TIME: 14:23


Chief Complaint


ABDOMINAL DISTENTION





HPI


Patient is a 57-year-old male with cirrhosis who presents for a paracentesis.  

The patient needs a paracentesis.  He is frequently in the emergency department 

for the same.  His last paracentesis was 5 days ago when he had laboratory 

studies done.  The patient has had no fevers.  He is in his usual state of 

health.





ROS


All systems reviewed and are negative except as per history of present illness.





Medications


Home Meds


Active Scripts


Insulin Aspart* (Novolog Insulin Pen*) 100 Unit/Ml Soln, 10 UNIT SC WITH MEALS 

BEDTIME for 28 Days


   Prov:HANNAH CASTELLANO MD         11/15/16


Reported Medications


Insulin Glargine* (Lantus*) 100 Unit/Ml Soln, 35 UNIT SC QHS, #1 VIAL


   11/11/16


Losartan Potassium* (Cozaar*) 25 Mg Tablet, 25 MG PO DAILY, #30 TAB


   11/11/16


Pantoprazole* (Protonix*) 40 Mg Tablet.dr, 40 MG PO DAILY, TAB


   11/11/16





Allergies


Allergies:  


Coded Allergies:  


     No Known Drug Allergy (Verified  Allergy, Unknown, 4/20/17)





PMhx/Soc


Positive for cirrhosis


History of Surgery:  No


Anesthesia Reaction:  No


Hx Neurological Disorder:  No


Hx Respiratory Disorders:  No


Hx Cardiac Disorders:  No


Hx Psychiatric Problems:  No


Hx Miscellaneous Medical Probl:  No


Hx Alcohol Use:  No


Hx Substance Use:  No


Hx Tobacco Use:  No


Smoking Status:  Never smoker





FmHx


Family History:  No diabetes





Physical Exam


Vitals





Vital Signs








  Date Time  Temp Pulse Resp B/P Pulse Ox O2 Delivery O2 Flow Rate FiO2


 


4/25/17 13:11 98.2 83 20 105/70 100 Room Air  


 


4/25/17 11:17 98.2 83 20 105/70 100   








Physical Exam


Const: No acute distress


Head:   Atraumatic 


Eyes:    Normal Conjunctiva


ENT:    Normal External Ears, Nose and Mouth.


Neck:               Full range of motion..~ No meningismus.


Resp:    Clear to auscultation bilaterally


Cardio:    Regular rate and rhythm, no murmurs


Abd:    Distended abdomen with positive fluid wave


Skin:    No petechiae or rashes


Back:    No midline or flank tenderness


Ext:    No cyanosis, or edema


Neur:    Awake and alert


Psych:    Normal Mood and Affect


Results 24 hrs





 Current Medications








 Medications


  (Trade)  Dose


 Ordered  Sig/Johnathan


 Route


 PRN Reason  Start Time


 Stop Time Status Last Admin


Dose Admin


 


 Lidocaine


  (Xylocaine 1%


  (Mpf))  5 ml  STK-MED ONCE


 .ROUTE


   4/25/17 13:44


 4/25/17 13:45 DC  


 











Procedures/MDM


Ultrasound-guided paracentesis to be done by radiology.





Patient is a 57-year-old male presents with acute ascites.  I doubt spontaneous 

bacterial peritonitis.  I believe outpatient management is appropriate.  The 

patient had an ultrasound-guided paracentesis performed by radiology.  The 

patient will be discharged and can follow-up with his primary doctor within 1 

week.  The patient can return sooner for any worsening symptoms.





Departure


Diagnosis:  


 Primary Impression:  


 Ascites


 Ascites type:  other type  Qualified Code:  R18.8 - Other ascites


 Additional Impression:  


 Abdominal pain


 Abdominal location:  generalized  Qualified Code:  R10.84 - Generalized 

abdominal pain


Condition:  Fair


Patient Instructions:  Ascites





Additional Instructions:  


Call your primary care doctor TOMORROW for an appointment during the next 1 

WEEK.Tell the  that you were referred from this facility.See the 

doctor sooner or return here if your  condition worsens before your appointment 

time.











FRANCESCA MONTOYA MD Apr 25, 2017 14:24

## 2017-04-27 ENCOUNTER — HOSPITAL ENCOUNTER (EMERGENCY)
Dept: HOSPITAL 10 - E/R | Age: 57
Discharge: HOME | End: 2017-04-27
Payer: COMMERCIAL

## 2017-04-27 VITALS
HEIGHT: 63 IN | WEIGHT: 167.55 LBS | BODY MASS INDEX: 29.69 KG/M2 | HEIGHT: 63 IN | WEIGHT: 167.55 LBS | BODY MASS INDEX: 29.69 KG/M2

## 2017-04-27 VITALS
RESPIRATION RATE: 17 BRPM | HEART RATE: 60 BPM | DIASTOLIC BLOOD PRESSURE: 56 MMHG | SYSTOLIC BLOOD PRESSURE: 111 MMHG | TEMPERATURE: 97.5 F

## 2017-04-27 DIAGNOSIS — E11.65: ICD-10-CM

## 2017-04-27 DIAGNOSIS — R18.8: Primary | ICD-10-CM

## 2017-04-27 DIAGNOSIS — E87.6: ICD-10-CM

## 2017-04-27 DIAGNOSIS — Z79.4: ICD-10-CM

## 2017-04-27 DIAGNOSIS — R10.9: ICD-10-CM

## 2017-04-27 LAB
ABNORMAL IP MESSAGE: 1
ADD SCAN DIFF: NO
ALBUMIN SERPL-MCNC: 2.5 G/DL (ref 3.3–4.9)
ALBUMIN/GLOB SERPL: 0.67 {RATIO}
ALP SERPL-CCNC: 158 IU/L (ref 42–121)
ALT SERPL-CCNC: 43 IU/L (ref 13–69)
ANION GAP SERPL CALC-SCNC: 13 MMOL/L (ref 8–16)
APTT BLD: 34.1 SEC (ref 25–35)
AST SERPL-CCNC: 58 IU/L (ref 15–46)
BASOPHILS # BLD AUTO: 0 10^3/UL (ref 0–0.1)
BASOPHILS NFR BLD: 0.7 % (ref 0–2)
BILIRUB DIRECT SERPL-MCNC: 0 MG/DL (ref 0–0.2)
BILIRUB SERPL-MCNC: 0.4 MG/DL (ref 0.2–1.3)
BUN SERPL-MCNC: 14 MG/DL (ref 7–20)
CALCIUM SERPL-MCNC: 8 MG/DL (ref 8.4–10.2)
CHLORIDE SERPL-SCNC: 105 MMOL/L (ref 97–110)
CO2 SERPL-SCNC: 16 MMOL/L (ref 21–31)
CREAT SERPL-MCNC: 0.78 MG/DL (ref 0.61–1.24)
EOSINOPHIL # BLD: 0.1 10^3/UL (ref 0–0.5)
EOSINOPHIL NFR BLD: 2.2 % (ref 0–7)
ERYTHROCYTE [DISTWIDTH] IN BLOOD BY AUTOMATED COUNT: 17.7 % (ref 11.5–14.5)
GLOBULIN SER-MCNC: 3.7 G/DL (ref 1.3–3.2)
GLUCOSE SERPL-MCNC: 366 MG/DL (ref 70–220)
HCT VFR BLD CALC: 30.2 % (ref 42–52)
HGB BLD-MCNC: 10.1 G/DL (ref 14–18)
INR PPP: 1.26
LYMPHOCYTES # BLD AUTO: 0.4 10^3/UL (ref 0.8–2.9)
LYMPHOCYTES NFR BLD AUTO: 10.9 % (ref 15–51)
MCH RBC QN AUTO: 30.9 PG (ref 29–33)
MCHC RBC AUTO-ENTMCNC: 33.4 G/DL (ref 32–37)
MCV RBC AUTO: 92.4 FL (ref 82–101)
MONOCYTES # BLD: 0.5 10^3/UL (ref 0.3–0.9)
MONOCYTES NFR BLD: 11.7 % (ref 0–11)
NEUTROPHILS # BLD: 3 10^3/UL (ref 1.6–7.5)
NEUTROPHILS NFR BLD AUTO: 74 % (ref 39–77)
NRBC # BLD MANUAL: 0 10^3/UL (ref 0–0)
NRBC BLD QL: 0 /100WBC (ref 0–0)
PLATELET # BLD: 122 10^3/UL (ref 140–415)
PMV BLD AUTO: 11.3 FL (ref 7.4–10.4)
POTASSIUM SERPL-SCNC: 3.2 MMOL/L (ref 3.5–5.1)
PROT SERPL-MCNC: 6.2 G/DL (ref 6.1–8.1)
PROTHROMBIN TIME: 15.9 SEC (ref 12.2–14.2)
PT RATIO: 1.2
RBC # BLD AUTO: 3.27 10^6/UL (ref 4.7–6.1)
SODIUM SERPL-SCNC: 131 MMOL/L (ref 135–144)
WBC # BLD AUTO: 4 10^3/UL (ref 4.8–10.8)

## 2017-04-27 PROCEDURE — 74176 CT ABD & PELVIS W/O CONTRAST: CPT

## 2017-04-27 PROCEDURE — 83690 ASSAY OF LIPASE: CPT

## 2017-04-27 PROCEDURE — 85610 PROTHROMBIN TIME: CPT

## 2017-04-27 PROCEDURE — 80053 COMPREHEN METABOLIC PANEL: CPT

## 2017-04-27 PROCEDURE — 85025 COMPLETE CBC W/AUTO DIFF WBC: CPT

## 2017-04-27 PROCEDURE — 36415 COLL VENOUS BLD VENIPUNCTURE: CPT

## 2017-04-27 PROCEDURE — 85730 THROMBOPLASTIN TIME PARTIAL: CPT

## 2017-04-27 PROCEDURE — 82962 GLUCOSE BLOOD TEST: CPT

## 2017-04-27 PROCEDURE — 71010: CPT

## 2017-04-27 PROCEDURE — 96374 THER/PROPH/DIAG INJ IV PUSH: CPT

## 2017-04-27 NOTE — RADRPT
PROCEDURE:   CT scan of the abdomen and pelvis without IV contrast.

 

CLINICAL INDICATION:   Abdominal pain.

 

TECHNIQUE:   Thin section axial, coronal and sagittal images were performed through the abdomen and 
pelvis without contrast. 

 

Radiation Dose: CTDI: 14.3 and DLP: 838.2

 

One or more of the following dose reduction techniques were used:

- Automated exposure control.

- Adjustment of the mA and/or kV according to patient size.

Use of iterative reconstruction technique.

 

COMPARISON:   Chest x-ray 02/03/2017 06:18 a.m. 

 

FINDINGS:

 

Lungs and pleural spaces: The lungs are hyperinflated.  The main pulmonary artery and pulmonary saeid
ry outflow tracts are normal.  There are vascular calcifications in the coronary arteries.  The hear
t is normal in size.  There is a trace pericardial effusion.  No pulmonary contusion is identified. 
 There are peripheral ground-glass infiltrates in the right lower lobe consistent with atelectasis. 
 There is plate-like atelectasis in the right middle lobe adjacent to the fissure.  There is additio
nal plate-like atelectasis in the periphery of the lingula and left lower lobe. No pleural effusion 
is noted..

 

Soft tissues : There are bilateral inguinal hernias containing fat.

 

Heart: Heart is normal in size.

 

The liver, common bile duct and gallbladder: The liver has a nodular contour and is inhomogeneous.  
The portal vein is enlarged.

 

There are gallstones in the gallbladder.  No gallbladder wall thickening is noted.  There is a large
 amount of ascites.

 

Gastrointestinal: The stomach is incompletely distended and this accounts for gastric wall thickenin
g. The small bowel loops have a normal caliber. There is fecal material throughout the colon.

 

Pancreas: There are calcifications in the pancreas consistent with chronic pancreatitis.  No pancrea
tic masses identified.  

 

Kidneys, bladder and adrenal glands : The adrenal glands are normal. A subcapsular 8 mm cyst is note
d off the dorsal medial mid pole of the right kidney.  A 2 cm benign cyst arises off the upper pole 
of the right kidney.  There is no evidence of a solid mass or hydronephrosis involving either kidney
.  There is no evidence of an obstructing nephrolith or ureterolith.  There is no evidence of bladde
r wall thickening or of the bladder stone. 

 

Spleen: The is enlarged measuring 14.5 cm.

 

Lymph nodes: No enlarged retroperitoneal lymph nodes are identified.

 

Reproductive system and pelvis : There are calcifications in the vas deferens.  The prostate gland a
nd seminal vesicles are normal.

 

There are bilateral inguinal hernias containing fat.

 

Bony elements: There are degenerative osteophytes in the thoracic and lumbar spine.  There are degen
erative changes involving the SI joints.  The hip joints are bilaterally symmetric.

 

Vasculature: Atherosclerotic vascular calcifications are identified in the abdominal aorta and its b
ranches.

 

 

IMPRESSION:

 

1.  Pulmonary hyperinflation with scattered areas of plate-like atelectasis and ground-glass periphe
ral atelectasis in the bases of the lungs.

2.  Cirrhosis of the liver with ascites.

3.  Hepatosplenomegaly.

4.  Cholelithiasis.

5.  Chronic pancreatitis.

6.  Constipation.

7.  Benign renal cysts.  Largest right renal cyst is in the upper pole measuring 2.2 cm. 

8.  Atherosclerotic vascular disease.

9.  Spondylosis of the thoracic and lumbosacral spine.

10.  Calcifications of the vas deferens.

11.  Bilateral inguinal hernias containing fat.

 

RPTAT:AAJJ

_____________________________________________ 

Physician Cindy           Date    Time 

Electronically viewed and signed by Gerard Evans Physician on 04/27/2017 14:10 

 

D:  04/27/2017 14:10  T:  04/27/2017 14:10

/

## 2017-04-27 NOTE — ERD
ER Documentation


Chief Complaint


Date/Time


DATE: 4/27/17 


TIME: 18:51


Chief Complaint


CAME IN VIA INTAKE DUE TO NEED FOR PARACENTHESIS





HPI


57-year-old male comes in for a paracentesis saying that he is got abdominal 

distention second to his liver failure.  He denies any fevers or chills.  

States this is the same stretching like pain that he gets whenever he has too 

much fluid in his abdomen.  He states he was here 2 days ago and they were not 

able to remove fluid.  Denies shortness of breath





I reviewed the patient's EMR and I see that there were complications in the ER 

doctor wanted to get a CAT scan on the patient with the patient declines CAT 

scan and decided to go home and see if he felt better instead.





ROS


All systems reviewed and are negative except as per history of present illness.





Medications


Home Meds


Active Scripts


Insulin Aspart* (Novolog Insulin Pen*) 100 Unit/Ml Soln, 10 UNIT SC WITH MEALS 

BEDTIME for 28 Days


   Prov:HANNAH CASTLELANO MD         11/15/16


Reported Medications


Insulin Glargine* (Lantus*) 100 Unit/Ml Soln, 35 UNIT SC QHS, #1 VIAL


   11/11/16


Losartan Potassium* (Cozaar*) 25 Mg Tablet, 25 MG PO DAILY, #30 TAB


   11/11/16


Pantoprazole* (Protonix*) 40 Mg Tablet.dr, 40 MG PO DAILY, TAB


   11/11/16





Allergies


Allergies:  


Coded Allergies:  


     No Known Drug Allergy (Verified  Allergy, Unknown, 4/20/17)





PMhx/Soc


History of Surgery:  No


Anesthesia Reaction:  No


Hx Neurological Disorder:  No


Hx Respiratory Disorders:  No


Hx Cardiac Disorders:  No


Hx Psychiatric Problems:  No


Hx Miscellaneous Medical Probl:  No


Hx Alcohol Use:  No


Hx Substance Use:  No


Hx Tobacco Use:  No





Physical Exam


Vitals





Vital Signs








  Date Time  Temp Pulse Resp B/P Pulse Ox O2 Delivery O2 Flow Rate FiO2


 


4/27/17 18:01 97.5 60 17 111/56 100 Room Air  


 


4/27/17 11:08 98.3 101 18 110/75 99   








Physical Exam


Const:  []           Mild distress, uncomfortable holding his abdomen


Head:   Atraumatic 


Eyes:    Normal Conjunctiva


ENT:    Normal External Ears, Nose and Mouth.


Neck:               Full range of motion..~ No meningismus.


Resp:    Clear to auscultation bilaterally


Cardio:    Regular rate and rhythm, no murmurs


Abd:    Soft, no specific tenderness, marked symmetrical distention of entire 

abdomen,. 


Skin:    No petechiae or rashes


Back:    No midline or flank tenderness


Ext:    No cyanosis, or edema


Neur:    Awake and alert and oriented 3, no focal deficits


Psych:    Normal Mood and Affect


Result Diagram:  


4/27/17 1220                                                                   

             4/27/17 1220





Results 24 hrs





 Laboratory Tests








Test


  4/27/17


12:20 4/27/17


14:30 4/27/17


18:26


 


White Blood Count 4.010^3/ul   


 


Red Blood Count 3.2710^6/ul   


 


Hemoglobin 10.1g/dl   


 


Hematocrit 30.2%   


 


Mean Corpuscular Volume 92.4fl   


 


Mean Corpuscular Hemoglobin 30.9pg   


 


Mean Corpuscular Hemoglobin


Concent 33.4g/dl 


  


  


 


 


Red Cell Distribution Width 17.7%   


 


Platelet Count 79076^3/UL   


 


Mean Platelet Volume 11.3fl   


 


Neutrophils % 74.0%   


 


Lymphocytes % 10.9%   


 


Monocytes % 11.7%   


 


Eosinophils % 2.2%   


 


Basophils % 0.7%   


 


Nucleated Red Blood Cells % 0.0/100WBC   


 


Neutrophils # 3.010^3/ul   


 


Lymphocytes # 0.410^3/ul   


 


Monocytes # 0.510^3/ul   


 


Eosinophils # 0.110^3/ul   


 


Basophils # 0.010^3/ul   


 


Nucleated Red Blood Cells # 0.010^3/ul   


 


Sodium Level 131mmol/L   


 


Potassium Level 3.2mmol/L   


 


Chloride Level 105mmol/L   


 


Carbon Dioxide Level 16mmol/L   


 


Anion Gap 13   


 


Blood Urea Nitrogen 14mg/dl   


 


Creatinine 0.78mg/dl   


 


Glucose Level 366mg/dl   


 


Calcium Level 8.0mg/dl   


 


Total Bilirubin 0.4mg/dl   


 


Direct Bilirubin 0.00mg/dl   


 


Indirect Bilirubin 0.4mg/dl   


 


Aspartate Amino Transf


(AST/SGOT) 58IU/L 


  


  


 


 


Alanine Aminotransferase


(ALT/SGPT) 43IU/L 


  


  


 


 


Alkaline Phosphatase 158IU/L   


 


Total Protein 6.2g/dl   


 


Albumin 2.5g/dl   


 


Globulin 3.70g/dl   


 


Albumin/Globulin Ratio 0.67   


 


Lipase 25U/L   


 


Prothrombin Time  15.9Sec  


 


Prothrombin Time Ratio  1.2  


 


INR International Normalized


Ratio 


  1.26 


  


 


 


Activated Partial


Thromboplast Time 


  34.1Sec 


  


 


 


Bedside Glucose   243mg/dL 








 Current Medications








 Medications


  (Trade)  Dose


 Ordered  Sig/Johnathan


 Route


 PRN Reason  Start Time


 Stop Time Status Last Admin


Dose Admin


 


 Morphine Sulfate


  (morphine)  2 mg  ONCE  STAT


 IV


   4/27/17 12:05


 4/27/17 12:09 DC 4/27/17 13:06


 


 


 Lidocaine


  (Xylocaine 1%


  (Mpf))  5 ml  STK-MED ONCE


 .ROUTE


   4/27/17 16:49


 4/27/17 16:50 DC  


 


 


 Potassium Chloride


  (Klor-Con 20)  20 meq  ONCE  STAT


 PO


   4/27/17 18:21


 4/27/17 18:30 DC 4/27/17 18:34


 


 


 Insulin Human


 Lispro


  (Humalog)  3 unit  ONCE  STAT


 SC


   4/27/17 18:21


 4/27/17 18:30 DC  


 











Procedures/MDM


Ascites secondary to cirrhosis.  This patient comes in often for his 

paracentesis.  He does say that he has care established at [facility] but this is 

easier to come to for paracenteses.  He is on a transplant list currently.  So 

that his pain is the same as usual pain.  I ordered a workup including a CAT 

scan because of the difficulty obtaining proper fluid last time.  He wanted to 

make sure there was nothing out of the ordinary anatomically in his abdomen 

that would cause an acute problem obtaining fluid.  CT showed ascites with no 

other changes from prior CT. I have very low suspicion for SBP as the patient 

feels well.  A paracentesis was performed with ultrasound guidance in radiology 

after which the patient said he is completely asymptomatic.  His laboratories 

are stable with prior values with his mild pancytopenia.  He is smiling happy 

and says that his symptoms are resolved.  Was going to give him some insulin 

for his hyperglycemia but he said that he is going to go home and take his own 

insulin.  Sugar was rechecked and was already decreasing.  I did give him a 

potassium pill for his low potassium.  Hearing and primary care follow-up and 

once again I am giving him instructions to obtain a referral for obtaining 

outpatient paracenteses.





CT abdomen pelvis interpretation: Cirrhosis with ascites.  I see no obstruction

, no free air, no abnormal fat stranding, no fracture





Departure


Diagnosis:  


 Primary Impression:  


 Ascites


 Additional Impressions:  


 Hyperglycemia


 Abdominal pain


 Hypokalemia


Condition:  Stable


Patient Instructions:  Ascites, Diabetic Hyperglycemia





Additional Instructions:  


Call your primary care doctor TOMORROW for an appointment during the next 1-2 

days.  Get a referral for a gastroenterologist for outpatient paracentesis.  

See the doctor sooner or return here if your condition worsens before your 

appointment time.











WILMA MCLEOD DO Apr 27, 2017 19:02

## 2017-04-27 NOTE — RADRPT
PROCEDURE:   Chest x-ray 

 

CLINICAL INDICATION:   Shortness of breath

 

TECHNIQUE:   Chest single view

 

COMPARISON:   To 02/17/2017 

 

FINDINGS:

The heart is normal in size.  The pulmonary vessels are normal in caliber.  There is persistent elev
ation right hemidiaphragm with right lower lobe linear atelectasis which is improved from prior exam
ination.  Lungs otherwise clear.  Costophrenic angles sharp.  Bony thorax is unremarkable. 

 

IMPRESSION:

 

 

1.  Persistent elevation of right hemidiaphragm with right lower lobe atelectasis.

2.  Lungs otherwise clear

 

 

RPTAT: HH

_____________________________________________ 

.Yobany Uriostegui MD, MD           Date    Time 

Electronically viewed and signed by .Yobany Uriostegui MD, MD on 04/27/2017 14:45 

 

D:  04/27/2017 14:45  T:  04/27/2017 14:45

.W/

## 2017-04-27 NOTE — RADRPT
PROCEDURE:   Ultrasound guided paracentesis. 

 

CLINICAL INDICATION:    Ascites and shortness of breath.  

 

COMPARISON:   04/25/2017.  

 

TECHNIQUE:   

The risks, benefits, and alternatives were explained to the patient and/or the patient's family, inc
luding but not limited to bleeding, infection, pain, visceral or vascular damage, shock, and death. 
 The patient and/or the patient's family understood the risks and the alternatives and wished to pro
ceed with the procedure.  Informed written consent was obtained. A procedural time out was performed
.  The patient's name, date of birth, and procedure to be performed were verified.

 

Utilizing ultrasound guidance, optimal location for entry to the peritoneal cavity was ascertained. 
 The overlying skin was prepped and draped in the usual sterile fashion.  Approximately 10 ml of 1% 
Xylocaine was injected locally for pain control.  Using ultrasound guidance, an 8 Indian catheter wa
s introduced into the peritoneal cavity in the right lower quadrant without difficulty.  

 

FINDINGS:

Initial images demonstrate ascites.  Approximately 7.65 liters of serous fluid was aspirated and dis
carded. The patient tolerated the procedure well without complication.

 

IMPRESSION:

1.  Successful ultrasound-guided paracentesis.  

 

RPTAT: QQ

_____________________________________________ 

.Rene De La Cruz MD, MD           Date    Time 

Electronically viewed and signed by .Rene De La Cruz MD, MD on 04/27/2017 17:27 

 

D:  04/27/2017 17:27  T:  04/27/2017 17:27

.R/

## 2017-05-01 ENCOUNTER — HOSPITAL ENCOUNTER (EMERGENCY)
Dept: HOSPITAL 10 - E/R | Age: 57
Discharge: HOME | End: 2017-05-01
Payer: COMMERCIAL

## 2017-05-01 VITALS
WEIGHT: 165.35 LBS | HEIGHT: 66 IN | BODY MASS INDEX: 26.57 KG/M2 | WEIGHT: 165.35 LBS | HEIGHT: 66 IN | BODY MASS INDEX: 26.57 KG/M2

## 2017-05-01 VITALS
DIASTOLIC BLOOD PRESSURE: 86 MMHG | SYSTOLIC BLOOD PRESSURE: 144 MMHG | TEMPERATURE: 97.9 F | RESPIRATION RATE: 19 BRPM | HEART RATE: 74 BPM

## 2017-05-01 DIAGNOSIS — E11.9: ICD-10-CM

## 2017-05-01 DIAGNOSIS — I10: ICD-10-CM

## 2017-05-01 DIAGNOSIS — Z79.4: ICD-10-CM

## 2017-05-01 DIAGNOSIS — R18.8: Primary | ICD-10-CM

## 2017-05-01 NOTE — ERD
ER Documentation


Chief Complaint


Date/Time


DATE: 5/1/17 


TIME: 14:39


Chief Complaint


Patient here for fluid removal Hx of ascites





HPI


57-year-old man complains of increased abdominal distention over the last few 

days and is requesting paracentesis.  Is a long history of recurrent ascites 

and requires paracentesis on a weekly basis.  He denies fevers or chills, no 

melena, no blood per rectum, no chest pain or shortness of breath, no recent 

confusion.





ROS


All systems reviewed and are negative except as per history of present illness.





Medications


Home Meds


Active Scripts


Insulin Aspart* (Novolog Insulin Pen*) 100 Unit/Ml Soln, 10 UNIT SC WITH MEALS 

BEDTIME for 28 Days


   Prov:HANNAH CASTELLANO MD         11/15/16


Reported Medications


Insulin Glargine* (Lantus*) 100 Unit/Ml Soln, 35 UNIT SC QHS, #1 VIAL


   11/11/16


Losartan Potassium* (Cozaar*) 25 Mg Tablet, 25 MG PO DAILY, #30 TAB


   11/11/16


Pantoprazole* (Protonix*) 40 Mg Tablet.dr, 40 MG PO DAILY, TAB


   11/11/16





Allergies


Allergies:  


Coded Allergies:  


     No Known Drug Allergy (Verified  Allergy, Unknown, 4/20/17)





PMhx/Soc


Alcoholic cirrhosis, hypertension, gastritis, pancreatitis, diabetes mellitus, 

recurrent ascites


History of Surgery:  No


Anesthesia Reaction:  No


Hx Neurological Disorder:  No


Hx Respiratory Disorders:  No


Hx Cardiac Disorders:  No


Hx Psychiatric Problems:  No


Hx Miscellaneous Medical Probl:  No


Hx Alcohol Use:  No


Hx Substance Use:  No


Hx Tobacco Use:  No


Smoking Status:  Never smoker





FmHx


Family History:  diabetes





Physical Exam


Vitals





Vital Signs








  Date Time  Temp Pulse Resp B/P Pulse Ox O2 Delivery O2 Flow Rate FiO2


 


5/1/17 13:20 97.9 74 19 144/86 100 Room Air  


 


5/1/17 10:14 97.1 97 20 110/75 98   








Physical Exam


GENERAL: Well-developed, well-nourished, well-hydrated, in no apparent distress

, looks nontoxic in appearance


HEENT: Moist mucous membranes, pink conjunctiva, no cervical spine tenderness 

or step-off deformities, no goiter, no jaundice or icterus, extraocular 

movements intact without pain. No submandibular induration, and no pharyngeal 

erythema


NEURO: Alert and oriented 3, cranial nerves II through XII intact bilaterally, 

pupils equal round reactive to light, no focal deficits or facial asymmetry, 

sensation intact distally Strength 5/5 in upper and lower extremities 

bilaterally


CARDIAC: Regular rate and rhythm, no murmurs rubs or gallops


LUNGS: Clear bilaterally no wheezing crackles or stridor


ABDOMEN: Protuberant, tense, no rebound, no tenderness, no psoas sign no 

obturator sign. Normoactive bowel sounds


SKIN: Warm and dry to touch, no abrasions, contusions, or hematomas, no 

lacerations, no ecchymosis, no target lesions, and without ulcers


EXTREMITIES: No clubbing cyanosis or edema, calves are bilaterally symmetrical, 

no Homans sign, no popliteal cord sign. Distal pulses equal and bilateral


PSYCH: Normal affect without agitation or irritability


Results 24 hrs





 Current Medications








 Medications


  (Trade)  Dose


 Ordered  Sig/Johnathan


 Route


 PRN Reason  Start Time


 Stop Time Status Last Admin


Dose Admin


 


 Ondansetron HCl


  (Zofran Odt)  4 mg  ONCE  STAT


 ODT


   5/1/17 11:25


 5/1/17 11:26 DC  


 


 


 Lidocaine


  (Xylocaine 1%


  (Mpf))  5 ml  STK-MED ONCE


 .ROUTE


   5/1/17 13:17


 5/1/17 13:18 DC  


 











Procedures/MDM


Ultrasound-guided paracentesis was performed and we removed about 9 L of 

ascitic fluid.  Patient felt much better.





Repeat abdominal examination revealed a soft nontender belly.





Patient feels much better at this time, and vital signs are normal, symptoms 

have improved. I did give strict instructions to return to the ED if symptoms 

continue or worsen, patient will otherwise follow-up with primary care 

physician. Patient understood instructions and agreed to plan.





Departure


Diagnosis:  


 Primary Impression:  


 Ascites


 Ascites type:  other type  Qualified Code:  R18.8 - Other ascites


Condition:  Good


Patient Instructions:  Ascites


Referrals:  


DAVID ARCE (PCP)











HOLDEN CORONADO MD May 1, 2017 14:42

## 2017-05-01 NOTE — RADRPT
PROCEDURE:   Ultrasound guided paracentesis. 

 

CLINICAL INDICATION:    Ascites and shortness of breath.  

 

COMPARISON:   No prior studies are available for comparison.  

 

TECHNIQUE:   

The risks, benefits, and alternatives were explained to the patient and/or the patient's family, inc
luding but not limited to bleeding, infection, pain, visceral or vascular damage, shock, and death. 
 The patient and/or the patient's family understood the risks and the alternatives and wished to pro
ceed with the procedure.  Informed written consent was obtained. A procedural time out was performed
.  The patient's name, date of birth, and procedure to be performed were verified.

 

Utilizing ultrasound guidance, optimal location for entry to the peritoneal cavity was ascertained. 
 The overlying skin was prepped and draped in the usual sterile fashion.  Approximately 10 ml of 1% 
Xylocaine was injected locally for pain control.  Using ultrasound guidance, an 8 Arabic catheter wa
s introduced into the peritoneal cavity in the right lower quadrant without difficulty.  

 

FINDINGS:

Initial images demonstrate ascites.  Approximately 8.9 liters of serous fluid was aspirated and disc
arded. The patient tolerated the procedure well without complication.

 

IMPRESSION:

1.  Successful ultrasound-guided paracentesis.  

 

RPTAT: QQ

_____________________________________________ 

.Rene De La Cruz MD, MD           Date    Time 

Electronically viewed and signed by .Rene De La Cruz MD, MD on 05/01/2017 14:07 

 

D:  05/01/2017 14:07  T:  05/01/2017 14:07

.R/

## 2017-05-05 ENCOUNTER — HOSPITAL ENCOUNTER (EMERGENCY)
Dept: HOSPITAL 10 - E/R | Age: 57
Discharge: HOME | End: 2017-05-05
Payer: COMMERCIAL

## 2017-05-05 VITALS — HEIGHT: 60 IN | WEIGHT: 162.04 LBS | BODY MASS INDEX: 31.81 KG/M2

## 2017-05-05 VITALS
RESPIRATION RATE: 20 BRPM | HEART RATE: 102 BPM | DIASTOLIC BLOOD PRESSURE: 73 MMHG | SYSTOLIC BLOOD PRESSURE: 114 MMHG | TEMPERATURE: 98 F

## 2017-05-05 DIAGNOSIS — R18.8: Primary | ICD-10-CM

## 2017-05-05 DIAGNOSIS — Z79.4: ICD-10-CM

## 2017-05-05 DIAGNOSIS — Z87.891: ICD-10-CM

## 2017-05-05 DIAGNOSIS — E11.9: ICD-10-CM

## 2017-05-05 RX ADMIN — LIDOCAINE HYDROCHLORIDE ONE ML: 10 INJECTION, SOLUTION EPIDURAL; INFILTRATION; INTRACAUDAL; PERINEURAL at 12:10

## 2017-05-05 RX ADMIN — LIDOCAINE HYDROCHLORIDE ONE ML: 10 INJECTION, SOLUTION EPIDURAL; INFILTRATION; INTRACAUDAL; PERINEURAL at 12:23

## 2017-05-05 NOTE — ERD
ER Documentation


Chief Complaint


Date/Time


DATE: 5/5/17 


TIME: 13:20


Chief Complaint


HERE FOR PARACENTHESIS, LAST ONE 3 DAYS AGO .





HPI


57-year-old male with ascites and diabetes who presents with ascites.  The 

patient gets paracentesis frequently usually every 3-5 days.  His last 

paracentesis was on Monday.  He had subjective fever yesterday but did not take 

his temperature.  He has abdominal distention but no pain.  Upon review of old 

medical records the patient has multiple visits with similar presentations.





ROS


All systems reviewed and are negative except as per history of present illness.





Medications


Home Meds


Active Scripts


Insulin Aspart* (Novolog Insulin Pen*) 100 Unit/Ml Soln, 10 UNIT SC WITH MEALS 

BEDTIME for 28 Days


   Prov:HANNAH CASTELLANO MD         11/15/16


Reported Medications


Insulin Glargine* (Lantus*) 100 Unit/Ml Soln, 35 UNIT SC QHS, #1 VIAL


   11/11/16


Losartan Potassium* (Cozaar*) 25 Mg Tablet, 25 MG PO DAILY, #30 TAB


   11/11/16


Pantoprazole* (Protonix*) 40 Mg Tablet.dr, 40 MG PO DAILY, TAB


   11/11/16





Allergies


Allergies:  


Coded Allergies:  


     No Known Drug Allergy (Verified  Allergy, Unknown, 5/5/17)





PMhx/Soc


History of Surgery:  No


Anesthesia Reaction:  No


Hx Neurological Disorder:  No


Hx Respiratory Disorders:  No


Hx Cardiac Disorders:  No


Hx Psychiatric Problems:  No


Hx Miscellaneous Medical Probl:  Yes (liver cirrhosis)


Hx Alcohol Use:  No


Hx Substance Use:  No


Hx Tobacco Use:  No


Smoking Status:  Former smoker





FmHx


Family History:  diabetes





Physical Exam


Vitals





Vital Signs








  Date Time  Temp Pulse Resp B/P Pulse Ox O2 Delivery O2 Flow Rate FiO2


 


5/5/17 13:12 98.0 102 20 114/73 99 Room Air  


 


5/5/17 11:17 98.0 94 21 111/71 99   








Physical Exam


Const: No acute distress


Head:   Atraumatic 


Eyes:    Normal Conjunctiva


ENT:    Normal External Ears, Nose and Mouth.


Neck:               Full range of motion..~ No meningismus.


Resp:    Clear to auscultation bilaterally


Cardio:    Regular rate and rhythm, no murmurs


Abd:    Distended abdomen with positive fluid wave


Skin:    No petechiae or rashes


Back:    No midline or flank tenderness


Ext:    No cyanosis, or edema


Neur:    Awake and alert


Psych:    Normal Mood and Affect


Results 24 hrs





 Current Medications








 Medications


  (Trade)  Dose


 Ordered  Sig/Johnathan


 Route


 PRN Reason  Start Time


 Stop Time Status Last Admin


Dose Admin


 


 Lidocaine


  (Xylocaine 1%


  (Mpf))  5 ml  STK-MED ONCE


 .ROUTE


   5/5/17 12:10


 5/5/17 12:11 DC  


 











Procedures/MDM


Patient is a 57-year-old male with ascites who presents for paracentesis.  He 

had laboratory studies done on April 27 and I do not believe your needs repeat 

laboratory studies at this time.  The patient does not have any signs of 

spontaneous bacterial peritonitis.  I believe outpatient management is 

appropriate.  The patient can follow-up with his primary doctor within 1 week.  

He can return for any worsening symptoms.  He had an ultrasound-guided 

paracentesis performed by radiology.





Departure


Diagnosis:  


 Primary Impression:  


 Ascites


 Ascites type:  other type  Qualified Code:  R18.8 - Other ascites


Condition:  Fair


Patient Instructions:  Ascites


Referrals:  


DAVID ARCE (PCP)





Additional Instructions:  


Call your primary care doctor TOMORROW for an appointment during the next 1-2 

days.See the doctor sooner or return here if your condition worsens before your 

appointment time.











FRANCESCA MONTOYA MD May 5, 2017 13:22

## 2017-05-05 NOTE — RADRPT
PROCEDURE:   Ultrasound guided paracentesis. 

 

CLINICAL INDICATION:    Ascites and shortness of breath.  

 

COMPARISON:   05/01/2017.  

 

TECHNIQUE:   

The risks, benefits, and alternatives were explained to the patient and/or the patient's family, inc
luding but not limited to bleeding, infection, pain, visceral or vascular damage, shock, and death. 
 The patient and/or the patient's family understood the risks and the alternatives and wished to pro
ceed with the procedure.  Informed written consent was obtained. A procedural time out was performed
.  The patient's name, date of birth, and procedure to be performed were verified.

 

Utilizing ultrasound guidance, optimal location for entry to the peritoneal cavity was ascertained. 
 The overlying skin was prepped and draped in the usual sterile fashion.  Approximately 10 ml of 1% 
Xylocaine was injected locally for pain control.  Using ultrasound guidance, an 8 Georgian catheter wa
s introduced into the peritoneal cavity in the right lower quadrant without difficulty.  

 

FINDINGS:

Initial images demonstrate ascites.  Approximately 8.2 liters of serous fluid was aspirated and disc
arded. The patient tolerated the procedure well without complication.

 

IMPRESSION:

1.  Successful ultrasound-guided paracentesis.  

 

RPTAT: QQ

_____________________________________________ 

.Rene De La Cruz MD, MD           Date    Time 

Electronically viewed and signed by .Rene De La Cruz MD, MD on 05/05/2017 13:28 

 

D:  05/05/2017 13:28  T:  05/05/2017 13:28

.R/

## 2017-05-08 ENCOUNTER — HOSPITAL ENCOUNTER (EMERGENCY)
Dept: HOSPITAL 10 - E/R | Age: 57
Discharge: HOME | End: 2017-05-08
Payer: COMMERCIAL

## 2017-05-08 VITALS — BODY MASS INDEX: 31.16 KG/M2 | WEIGHT: 158.73 LBS | HEIGHT: 60 IN

## 2017-05-08 VITALS
HEART RATE: 89 BPM | RESPIRATION RATE: 20 BRPM | TEMPERATURE: 98.2 F | DIASTOLIC BLOOD PRESSURE: 88 MMHG | SYSTOLIC BLOOD PRESSURE: 120 MMHG

## 2017-05-08 DIAGNOSIS — E11.9: ICD-10-CM

## 2017-05-08 DIAGNOSIS — Z79.4: ICD-10-CM

## 2017-05-08 DIAGNOSIS — N39.0: ICD-10-CM

## 2017-05-08 DIAGNOSIS — R10.9: ICD-10-CM

## 2017-05-08 DIAGNOSIS — D64.9: ICD-10-CM

## 2017-05-08 DIAGNOSIS — R18.8: Primary | ICD-10-CM

## 2017-05-08 LAB
ADD SCAN DIFF: NO
ANION GAP SERPL CALC-SCNC: 9 MMOL/L
APTT BLD: 33.7 SEC
BASOPHILS # BLD AUTO: 0.1 10^3/UL
BASOPHILS NFR BLD: 1.2 %
BUN SERPL-MCNC: 15 MG/DL
CALCIUM SERPL-MCNC: 8.7 MG/DL
CHLORIDE SERPL-SCNC: 104 MMOL/L
CO2 SERPL-SCNC: 21 MMOL/L
CREAT SERPL-MCNC: 0.95 MG/DL
EOSINOPHIL # BLD: 0.1 10^3/UL
EOSINOPHIL NFR BLD: 2.3 %
ERYTHROCYTE [DISTWIDTH] IN BLOOD BY AUTOMATED COUNT: 16.6 %
GLUCOSE SERPL-MCNC: 88 MG/DL
HCT VFR BLD CALC: 34.4 %
HGB BLD-MCNC: 11.6 G/DL
INR PPP: 1.36
LYMPHOCYTES # BLD AUTO: 0.7 10^3/UL
LYMPHOCYTES NFR BLD AUTO: 12.5 %
MCH RBC QN AUTO: 30.6 PG
MCHC RBC AUTO-ENTMCNC: 33.7 G/DL
MCV RBC AUTO: 90.8 FL
MONOCYTES # BLD: 0.7 10^3/UL
MONOCYTES NFR BLD: 12.9 %
NEUTROPHILS # BLD: 4 10^3/UL
NEUTROPHILS NFR BLD AUTO: 70.6 %
NRBC # BLD MANUAL: 0 10^3/UL
NRBC BLD QL: 0 /100WBC
PLATELET # BLD: 179 10^3/UL
PMV BLD AUTO: 11 FL
POTASSIUM SERPL-SCNC: 4 MMOL/L
PROTHROMBIN TIME: 16.8 SEC
PT RATIO: 1.3
RBC # BLD AUTO: 3.79 10^6/UL
SODIUM SERPL-SCNC: 130 MMOL/L
URINE BLOOD (DIP) POC: (no result)
WBC # BLD AUTO: 5.7 10^3/UL

## 2017-05-08 PROCEDURE — 85610 PROTHROMBIN TIME: CPT

## 2017-05-08 PROCEDURE — 85025 COMPLETE CBC W/AUTO DIFF WBC: CPT

## 2017-05-08 PROCEDURE — 81003 URINALYSIS AUTO W/O SCOPE: CPT

## 2017-05-08 PROCEDURE — 85730 THROMBOPLASTIN TIME PARTIAL: CPT

## 2017-05-08 PROCEDURE — 80048 BASIC METABOLIC PNL TOTAL CA: CPT

## 2017-05-08 PROCEDURE — 36415 COLL VENOUS BLD VENIPUNCTURE: CPT

## 2017-05-08 PROCEDURE — 96374 THER/PROPH/DIAG INJ IV PUSH: CPT

## 2017-05-08 PROCEDURE — 96375 TX/PRO/DX INJ NEW DRUG ADDON: CPT

## 2017-05-08 NOTE — ERA
ER Documentation


Chief Complaint


Date/Time


DATE: 5/8/17 


TIME: 19:00


Chief Complaint


Abdominal distention





HPI


The patient is a 57-year-old male, presenting to the ER because of recurrent 

abdominal distention for the last couple days.  He normally has some 

abdominocentesis every 3-5 days.  He had abdominocentesis on August 5, 2017 

when he had about 8 x 2 L removed.  He denies fever, chills, neck pain, chest 

pain, dyspnea, dysuria, diarrhea, constipation





Past medical history: Diabetes mellitus, cirrhosis





ROS


All systems reviewed and are negative except as per history of present illness.





Medications


Home Meds


Active Scripts


Ciprofloxacin Hcl* (Ciprofloxacin Hcl*) 500 Mg Tablet, 500 MG PO BID for 10 Days

, TAB


   Prov:NICOLLE OWENS MD         5/8/17


Ondansetron (Ondansetron Odt) 4 Mg Tab.rapdis, 4 MG PO Q6H Y for NAUSEA AND/OR 

VOMITING, #10 TAB


   Prov:NICOLLE OWENS MD         5/8/17


Hydrocodone/Acetaminophen (Norco 5-325 Tablet) 1 Each Tablet, 1 TAB PO Q6H Y 

for PAIN, #7 TAB


   Prov:NICOLLE OWENS MD         5/8/17


Insulin Aspart* (Novolog Insulin Pen*) 100 Unit/Ml Soln, 10 UNIT SC WITH MEALS 

BEDTIME for 28 Days


   Prov:HANNAH CASTELLANO MD         11/15/16


Reported Medications


Insulin Glargine* (Lantus*) 100 Unit/Ml Soln, 35 UNIT SC QHS, #1 VIAL


   11/11/16


Losartan Potassium* (Cozaar*) 25 Mg Tablet, 25 MG PO DAILY, #30 TAB


   11/11/16


Pantoprazole* (Protonix*) 40 Mg Tablet.dr, 40 MG PO DAILY, TAB


   11/11/16





Allergies


Allergies:  


Coded Allergies:  


     No Known Drug Allergy (Verified  Allergy, Unknown, 5/5/17)





PMhx/Soc


History of Surgery:  No


Anesthesia Reaction:  No


Hx Neurological Disorder:  No


Hx Respiratory Disorders:  No


Hx Cardiac Disorders:  No


Hx Psychiatric Problems:  No


Hx Miscellaneous Medical Probl:  Yes (liver cirrhosis)


Hx Alcohol Use:  No


Hx Substance Use:  No


Hx Tobacco Use:  No





Physical Exam


Vitals





Vital Signs








  Date Time  Temp Pulse Resp B/P Pulse Ox O2 Delivery O2 Flow Rate FiO2


 


5/8/17 20:53 98.2 89 20 120/88 99 Room Air  


 


5/8/17 17:25 98.0 79 18 111/76 99   








Physical Exam


 Const:      No acute distress.


 Head:        Atraumatic.


 Eyes:       Normal Conjunctiva.


 ENT:         Normal External Ears, Nose and Mouth.


 Neck:        Full range of motion.  No meningismus.


 Resp:         Clear to auscultation bilaterally.


 Cardio:       Regular rate and rhythm, no murmurs.


 Abd:         Soft, ascites, normal bowel sounds, vague and minimal tenderness


 Skin:         No petechiae or rashes.


 Back:        No midline or flank tenderness.


 Ext:          No cyanosis, or edema.


 Neur:        Awake and alert. No focal deficit


 Psych:        Normal Mood and Affect.


Result Diagram:  


5/8/17 1902 5/8/17 1902





Results 24 hrs








 Laboratory Tests








Test


  5/8/17


19:02 5/8/17


19:14


 


White Blood Count 5.710^3/ul  


 


Red Blood Count 3.7910^6/ul  


 


Hemoglobin 11.6g/dl  


 


Hematocrit 34.4%  


 


Mean Corpuscular Volume 90.8fl  


 


Mean Corpuscular Hemoglobin 30.6pg  


 


Mean Corpuscular Hemoglobin


Concent 33.7g/dl 


  


 


 


Red Cell Distribution Width 16.6%  


 


Platelet Count 67410^3/UL  


 


Mean Platelet Volume 11.0fl  


 


Neutrophils % 70.6%  


 


Lymphocytes % 12.5%  


 


Monocytes % 12.9%  


 


Eosinophils % 2.3%  


 


Basophils % 1.2%  


 


Nucleated Red Blood Cells % 0.0/100WBC  


 


Neutrophils # 4.010^3/ul  


 


Lymphocytes # 0.710^3/ul  


 


Monocytes # 0.710^3/ul  


 


Eosinophils # 0.110^3/ul  


 


Basophils # 0.110^3/ul  


 


Nucleated Red Blood Cells # 0.010^3/ul  


 


Prothrombin Time 16.8Sec  


 


Prothrombin Time Ratio 1.3  


 


INR International Normalized


Ratio 1.36 


  


 


 


Activated Partial


Thromboplast Time 33.7Sec 


  


 


 


Sodium Level 130mmol/L  


 


Potassium Level 4.0mmol/L  


 


Chloride Level 104mmol/L  


 


Carbon Dioxide Level 21mmol/L  


 


Anion Gap 9  


 


Blood Urea Nitrogen 15mg/dl  


 


Creatinine 0.95mg/dl  


 


Glucose Level 88mg/dl  


 


Calcium Level 8.7mg/dl  


 


Bedside Urine pH (LAB)  6.0 


 


Bedside Urine Protein (LAB)  Trace 


 


Bedside Urine Glucose (UA)  Negative 


 


Bedside Urine Ketones (LAB)  Trace 


 


Bedside Urine Blood  1+ 


 


Bedside Urine Nitrite (LAB)  Negative 


 


Bedside Urine Leukocyte


Esterase (L 


  3+ 


 








 Current Medications








 Medications


  (Trade)  Dose


 Ordered  Sig/Johnathan


 Route


 PRN Reason  Start Time


 Stop Time Status Last Admin


Dose Admin


 


 Morphine Sulfate


  (morphine)  2 mg  ONCE  ONCE


 IV


   5/8/17 19:30


 5/8/17 19:31 DC 5/8/17 19:34


 


 


 Ondansetron HCl


  (Zofran Inj)  4 mg  ONCE  STAT


 IV


   5/8/17 19:11


 5/8/17 19:12 DC 5/8/17 19:34


 














Procedures/MDM


MEDICAL MAKING DECISION: The patient is a 57-year-old male, presenting with 

recurrent ascites, acute cystitis.  He was treated with morphine 2 IV for pain, 

Zofran 4 IV for nausea with good response.





The differential diagnoses considered include but are not limited to 

cholelithiasis, cholecystitis, cystitis, pancreatitis, hepatitis, gastritis, 

peptic ulcer disease, gastric ulcer, appendicitis, diverticulitis, cholangitis, 

choledocholithiasis, partial small bowel obstruction.





Departure


Diagnosis:  


 Primary Impression:  


 Ascites


 Additional Impressions:  


 UTI (urinary tract infection)


 Anemia


Condition:  Good


Comments


He was treated with Norco and Cipro





He was advised to return tomorrow for abdominal paracentesis











NICOLLE OWENS MD May 8, 2017 19:00

## 2017-05-09 ENCOUNTER — HOSPITAL ENCOUNTER (EMERGENCY)
Dept: HOSPITAL 10 - E/R | Age: 57
Discharge: HOME | End: 2017-05-09
Payer: COMMERCIAL

## 2017-05-09 VITALS — SYSTOLIC BLOOD PRESSURE: 126 MMHG | DIASTOLIC BLOOD PRESSURE: 81 MMHG | RESPIRATION RATE: 18 BRPM | HEART RATE: 92 BPM

## 2017-05-09 VITALS — HEIGHT: 60 IN | WEIGHT: 172.18 LBS | BODY MASS INDEX: 33.8 KG/M2

## 2017-05-09 DIAGNOSIS — Z79.4: ICD-10-CM

## 2017-05-09 DIAGNOSIS — R18.8: ICD-10-CM

## 2017-05-09 DIAGNOSIS — R10.84: Primary | ICD-10-CM

## 2017-05-09 DIAGNOSIS — E11.9: ICD-10-CM

## 2017-05-09 NOTE — ERD
ER Documentation


Chief Complaint


Date/Time


DATE: 5/9/17 


TIME: 14:02


Chief Complaint


HERE FOR ABD PAIN AND DISTENTION . NEEDS PARACENTHESIS TODAY.





HPI


Patient is a 57-year-old male with cirrhosis and ascites who presents with 

abdominal distention and pain.  He had vomiting last night.  He denies fevers.  

He is requesting a paracentesis.  Upon review of old medical records the 

patient has frequent visits to the emergency department for similar complaints.

  He is well-known to myself into our staff.





ROS


All systems reviewed and are negative except as per history of present illness.





Medications


Home Meds


Active Scripts


Ciprofloxacin Hcl* (Ciprofloxacin Hcl*) 500 Mg Tablet, 500 MG PO BID for 10 Days

, TAB


   Prov:NICOLLE OWENS MD         5/8/17


Ondansetron (Ondansetron Odt) 4 Mg Tab.rapdis, 4 MG PO Q6H Y for NAUSEA AND/OR 

VOMITING, #10 TAB


   Prov:NICOLLE OWENS MD         5/8/17


Hydrocodone/Acetaminophen (Norco 5-325 Tablet) 1 Each Tablet, 1 TAB PO Q6H Y 

for PAIN, #7 TAB


   Prov:NICOLLE OWENS MD         5/8/17


Insulin Aspart* (Novolog Insulin Pen*) 100 Unit/Ml Soln, 10 UNIT SC WITH MEALS 

BEDTIME for 28 Days


   Prov:HANNAH CASTELLANO MD         11/15/16


Reported Medications


Insulin Glargine* (Lantus*) 100 Unit/Ml Soln, 35 UNIT SC QHS, #1 VIAL


   11/11/16


Losartan Potassium* (Cozaar*) 25 Mg Tablet, 25 MG PO DAILY, #30 TAB


   11/11/16


Pantoprazole* (Protonix*) 40 Mg Tablet.dr, 40 MG PO DAILY, TAB


   11/11/16





Allergies


Allergies:  


Coded Allergies:  


     No Known Drug Allergy (Verified  Allergy, Unknown, 5/5/17)





PMhx/Soc


History of Surgery:  No


Anesthesia Reaction:  No


Hx Neurological Disorder:  No


Hx Respiratory Disorders:  No


Hx Cardiac Disorders:  No


Hx Psychiatric Problems:  No


Hx Miscellaneous Medical Probl:  Yes (liver cirrhosis)


Hx Alcohol Use:  Yes


Hx Substance Use:  No


Hx Tobacco Use:  No





FmHx


Family History:  No diabetes





Physical Exam


Vitals





Vital Signs








  Date Time  Temp Pulse Resp B/P Pulse Ox O2 Delivery O2 Flow Rate FiO2


 


5/9/17 10:57 98.5 105 21 101/76 98   








Physical Exam


Const: No acute distress


Head:   Atraumatic 


Eyes:    Normal Conjunctiva


ENT:    Normal External Ears, Nose and Mouth.


Neck:               Full range of motion..~ No meningismus.


Resp:    Clear to auscultation bilaterally


Cardio:    Regular rate and rhythm, no murmurs


Abd:    Distended abdomen with positive fluid wave


Skin:    No petechiae or rashes


Back:    No midline or flank tenderness


Ext:    No cyanosis, or edema


Neur:    Awake and alert


Psych:    Normal Mood and Affect





Procedures/MDM


Patient is a 57-year-old male presents for paracentesis.  Laboratory studies 

were performed yesterday but there was no available radiologist to perform the 

paracentesis.  Therefore he came back today for paracentesis.  Paracentesis was 

performed that he feels better.  I doubt spontaneous bacterial peritonitis.  I 

believe outpatient management is appropriate.  He can return for any worsening 

symptoms.





Departure


Diagnosis:  


 Primary Impression:  


 Abdominal pain


 Abdominal location:  generalized  Qualified Code:  R10.84 - Generalized 

abdominal pain


 Additional Impression:  


 Ascites


 Ascites type:  other type  Qualified Code:  R18.8 - Other ascites


Condition:  Fair


Patient Instructions:  Ascites


Referrals:  


DAVID ARCE (PCP)





Additional Instructions:  


Call your primary care doctor TOMORROW for an appointment during the next 1-2 

days.See the doctor sooner or return here if your condition worsens before your 

appointment time.











FRANCESCA MONTOYA MD May 9, 2017 14:03

## 2017-05-09 NOTE — RADRPT
PROCEDURE:   Ultrasound guided paracentesis. 

 

CLINICAL INDICATION:    Ascites and shortness of breath.  

 

COMPARISON:   05/05/2017.  

 

TECHNIQUE:   

The risks, benefits, and alternatives were explained to the patient and/or the patient's family, inc
luding but not limited to bleeding, infection, pain, visceral or vascular damage, shock, and death. 
 The patient and/or the patient's family understood the risks and the alternatives and wished to pro
ceed with the procedure.  Informed written consent was obtained. A procedural time out was performed
.  The patient's name, date of birth, and procedure to be performed were verified.

 

Utilizing ultrasound guidance, optimal location for entry to the peritoneal cavity was ascertained. 
 The overlying skin was prepped and draped in the usual sterile fashion.  Approximately 10 ml of 1% 
Xylocaine was injected locally for pain control.  Using ultrasound guidance, an 8 Slovenian catheter wa
s introduced into the peritoneal cavity in the right lower quadrant without difficulty.  

 

FINDINGS:

Initial images demonstrate ascites.  Approximately 7.25 liters of serous fluid was aspirated and dis
carded. The patient tolerated the procedure well without complication.

 

IMPRESSION:

1.  Successful ultrasound-guided paracentesis.  

 

RPTAT: QQ

_____________________________________________ 

.Rene De La Cruz MD, MD           Date    Time 

Electronically viewed and signed by .Rene De La Cruz MD, MD on 05/09/2017 13:59 

 

D:  05/09/2017 13:59  T:  05/09/2017 13:59

.R/

## 2017-05-18 ENCOUNTER — HOSPITAL ENCOUNTER (EMERGENCY)
Dept: HOSPITAL 10 - E/R | Age: 57
Discharge: HOME | End: 2017-05-18
Payer: COMMERCIAL

## 2017-05-18 VITALS
HEIGHT: 63 IN | HEIGHT: 63 IN | WEIGHT: 155.43 LBS | BODY MASS INDEX: 27.54 KG/M2 | WEIGHT: 155.43 LBS | BODY MASS INDEX: 27.54 KG/M2

## 2017-05-18 VITALS
HEART RATE: 72 BPM | RESPIRATION RATE: 20 BRPM | DIASTOLIC BLOOD PRESSURE: 54 MMHG | SYSTOLIC BLOOD PRESSURE: 115 MMHG | TEMPERATURE: 97.8 F

## 2017-05-18 DIAGNOSIS — Z79.4: ICD-10-CM

## 2017-05-18 DIAGNOSIS — D64.9: ICD-10-CM

## 2017-05-18 DIAGNOSIS — K70.31: Primary | ICD-10-CM

## 2017-05-18 DIAGNOSIS — E11.9: ICD-10-CM

## 2017-05-18 LAB
ABNORMAL IP MESSAGE: 1
ADD SCAN DIFF: NO
ALBUMIN SERPL-MCNC: 2.6 G/DL (ref 3.3–4.9)
ALBUMIN/GLOB SERPL: 0.76 {RATIO}
ALP SERPL-CCNC: 202 IU/L (ref 42–121)
ALT SERPL-CCNC: 54 IU/L (ref 13–69)
ANION GAP SERPL CALC-SCNC: 15 MMOL/L (ref 8–16)
APTT BLD: 31.2 SEC (ref 25–35)
AST SERPL-CCNC: 93 IU/L (ref 15–46)
BASOPHILS # BLD AUTO: 0.1 10^3/UL (ref 0–0.1)
BASOPHILS NFR BLD: 0.8 % (ref 0–2)
BILIRUB DIRECT SERPL-MCNC: 0 MG/DL (ref 0–0.2)
BILIRUB SERPL-MCNC: 0.3 MG/DL (ref 0.2–1.3)
BUN SERPL-MCNC: 18 MG/DL (ref 7–20)
CALCIUM SERPL-MCNC: 8.3 MG/DL (ref 8.4–10.2)
CHLORIDE SERPL-SCNC: 99 MMOL/L (ref 97–110)
CO2 SERPL-SCNC: 21 MMOL/L (ref 21–31)
CREAT SERPL-MCNC: 0.97 MG/DL (ref 0.61–1.24)
EOSINOPHIL # BLD: 0.1 10^3/UL (ref 0–0.5)
EOSINOPHIL NFR BLD: 1 % (ref 0–7)
ERYTHROCYTE [DISTWIDTH] IN BLOOD BY AUTOMATED COUNT: 15.6 % (ref 11.5–14.5)
GLOBULIN SER-MCNC: 3.4 G/DL (ref 1.3–3.2)
GLUCOSE SERPL-MCNC: 257 MG/DL (ref 70–220)
HCT VFR BLD CALC: 28.1 % (ref 42–52)
HGB BLD-MCNC: 9.2 G/DL (ref 14–18)
INR PPP: 1.3
LYMPHOCYTES # BLD AUTO: 0.4 10^3/UL (ref 0.8–2.9)
LYMPHOCYTES NFR BLD AUTO: 7.1 % (ref 15–51)
MCH RBC QN AUTO: 30.2 PG (ref 29–33)
MCHC RBC AUTO-ENTMCNC: 32.7 G/DL (ref 32–37)
MCV RBC AUTO: 92.1 FL (ref 82–101)
MONOCYTES # BLD: 0.9 10^3/UL (ref 0.3–0.9)
MONOCYTES NFR BLD: 15.4 % (ref 0–11)
NEUTROPHILS # BLD: 4.4 10^3/UL (ref 1.6–7.5)
NEUTROPHILS NFR BLD AUTO: 75.2 % (ref 39–77)
NRBC # BLD MANUAL: 0 10^3/UL (ref 0–0)
NRBC BLD QL: 0 /100WBC (ref 0–0)
PLATELET # BLD: 134 10^3/UL (ref 140–415)
PMV BLD AUTO: 12.4 FL (ref 7.4–10.4)
POTASSIUM SERPL-SCNC: 4.8 MMOL/L (ref 3.5–5.1)
PROT SERPL-MCNC: 6 G/DL (ref 6.1–8.1)
PROTHROMBIN TIME: 16.3 SEC (ref 12.2–14.2)
PT RATIO: 1.3
RBC # BLD AUTO: 3.05 10^6/UL (ref 4.7–6.1)
SODIUM SERPL-SCNC: 130 MMOL/L (ref 135–144)
WBC # BLD AUTO: 5.9 10^3/UL (ref 4.8–10.8)

## 2017-05-18 PROCEDURE — 85730 THROMBOPLASTIN TIME PARTIAL: CPT

## 2017-05-18 PROCEDURE — 85025 COMPLETE CBC W/AUTO DIFF WBC: CPT

## 2017-05-18 PROCEDURE — 80053 COMPREHEN METABOLIC PANEL: CPT

## 2017-05-18 PROCEDURE — 36415 COLL VENOUS BLD VENIPUNCTURE: CPT

## 2017-05-18 PROCEDURE — 85610 PROTHROMBIN TIME: CPT

## 2017-05-18 NOTE — RADRPT
PROCEDURE:   Ultrasound guided paracentesis. 

 

CLINICAL INDICATION:    Ascites and shortness of breath.  

 

COMPARISON:   05/09/2017.  

 

TECHNIQUE:   

The risks, benefits, and alternatives were explained to the patient and/or the patient's family, inc
luding but not limited to bleeding, infection, pain, visceral or vascular damage, shock, and death. 
 The patient and/or the patient's family understood the risks and the alternatives and wished to pro
ceed with the procedure.  Informed written consent was obtained. A procedural time out was performed
.  The patient's name, date of birth, and procedure to be performed were verified.

 

Utilizing ultrasound guidance, optimal location for entry to the peritoneal cavity was ascertained. 
 The overlying skin was prepped and draped in the usual sterile fashion.  Approximately 10 ml of 1% 
Xylocaine was injected locally for pain control.  Using ultrasound guidance, an 8 Italian catheter wa
s introduced into the peritoneal cavity in the right lower quadrant without difficulty.  

 

FINDINGS:

Initial images demonstrate ascites.  Approximately 5.3 liters of serous fluid was aspirated and disc
arded. The patient tolerated the procedure well without complication.

 

IMPRESSION:

1.  Successful ultrasound-guided paracentesis.  

 

RPTAT: QQ

_____________________________________________ 

.Rene De La Cruz MD, MD           Date    Time 

Electronically viewed and signed by .Rene De La Cruz MD, MD on 05/18/2017 14:44 

 

D:  05/18/2017 14:44  T:  05/18/2017 14:44

.R/

## 2017-05-18 NOTE — ERD
ER Documentation


Chief Complaint


Date/Time


DATE: 5/18/17 


TIME: 11:33


Chief Complaint


abdominal distention; last paracentesis was 05/13/2017





HPI


Very pleasant 57-year-old gentleman history of alcoholic cirrhosis on the 

transplant list at [facility].  The patient presents with abdominal distention.  He 

describes his last paracentesis was on Saturday at [facility].  He denies any 

hematemesis, no melena, no abdominal pain, no fevers.  Patient states that he 

is in his usual state of health.





ROS


All systems reviewed and are negative except as per history of present illness.





Medications


Home Meds


Active Scripts


Ciprofloxacin Hcl* (Ciprofloxacin Hcl*) 500 Mg Tablet, 500 MG PO BID for 10 Days

, TAB


   Prov:NICOLLE OWENS MD         5/8/17


Ondansetron (Ondansetron Odt) 4 Mg Tab.rapdis, 4 MG PO Q6H Y for NAUSEA AND/OR 

VOMITING, #10 TAB


   Prov:NICOLLE OWENS MD         5/8/17


Hydrocodone/Acetaminophen (Norco 5-325 Tablet) 1 Each Tablet, 1 TAB PO Q6H Y 

for PAIN, #7 TAB


   Prov:NICOLLE OWENS MD         5/8/17


Insulin Aspart* (Novolog Insulin Pen*) 100 Unit/Ml Soln, 10 UNIT SC WITH MEALS 

BEDTIME for 28 Days


   Prov:HANNAH CASTELLANO MD         11/15/16


Reported Medications


Insulin Glargine* (Lantus*) 100 Unit/Ml Soln, 35 UNIT SC QHS, #1 VIAL


   11/11/16


Losartan Potassium* (Cozaar*) 25 Mg Tablet, 25 MG PO DAILY, #30 TAB


   11/11/16


Pantoprazole* (Protonix*) 40 Mg Tablet.dr, 40 MG PO DAILY, TAB


   11/11/16





Allergies


Allergies:  


Coded Allergies:  


     No Known Drug Allergy (Verified  Allergy, Unknown, 5/18/17)





PMhx/Soc


Medical and Surgical Hx:  pt denies Surgical Hx


History of Surgery:  No


Anesthesia Reaction:  No


Hx Neurological Disorder:  No


Hx Respiratory Disorders:  No


Hx Cardiac Disorders:  No


Hx Psychiatric Problems:  No


Hx Miscellaneous Medical Probl:  Yes (liver cirrhosis)


Hx Alcohol Use:  Yes


Hx Substance Use:  No


Hx Tobacco Use:  No


Smoking Status:  Never smoker





FmHx


Family History:  No diabetes





Physical Exam


Vitals





Vital Signs








  Date Time  Temp Pulse Resp B/P Pulse Ox O2 Delivery O2 Flow Rate FiO2


 


5/18/17 10:58 97.3 96 20 94/57 98   








Physical Exam


General: Well developed, well nourished, no acute distress


Head: Normocephalic, atraumatic.


Eyes: Pupils equally reactive, EOM intact


ENT: Moist mucous membranes


Neck: Supple, no lymphadenopathy


Respiratory: Lungs clear bilaterally, no distress


Cardiovascular: RRR, no murmurs, rubs, or gallops


Abdominal: Soft, protuberant abdomen with fluid wave, nontender, no peritonitis


: Deferred


MSK: No edema, no unilateral swelling, 5/5 strength


Neurologic: Alert and oriented, moving all extremities, normal speech, no focal 

weakness, no cerebellar signs


Skin: No rash


Psych: Normal mood


Result Diagram:  


5/18/17 1128                                                                   

             5/18/17 1128





Results 24 hrs








 Laboratory Tests








Test


  5/18/17


11:28


 


White Blood Count 5.910^3/ul 


 


Red Blood Count 3.0510^6/ul 


 


Hemoglobin 9.2g/dl 


 


Hematocrit 28.1% 


 


Mean Corpuscular Volume 92.1fl 


 


Mean Corpuscular Hemoglobin 30.2pg 


 


Mean Corpuscular Hemoglobin


Concent 32.7g/dl 


 


 


Red Cell Distribution Width 15.6% 


 


Platelet Count 91126^3/UL 


 


Mean Platelet Volume 12.4fl 


 


Neutrophils % 75.2% 


 


Lymphocytes % 7.1% 


 


Monocytes % 15.4% 


 


Eosinophils % 1.0% 


 


Basophils % 0.8% 


 


Nucleated Red Blood Cells % 0.0/100WBC 


 


Neutrophils # 4.410^3/ul 


 


Lymphocytes # 0.410^3/ul 


 


Monocytes # 0.910^3/ul 


 


Eosinophils # 0.110^3/ul 


 


Basophils # 0.110^3/ul 


 


Nucleated Red Blood Cells # 0.010^3/ul 


 


Prothrombin Time 16.3Sec 


 


Prothrombin Time Ratio 1.3 


 


INR International Normalized


Ratio 1.30 


 


 


Activated Partial


Thromboplast Time 31.2Sec 


 


 


Sodium Level 130mmol/L 


 


Potassium Level 4.8mmol/L 


 


Chloride Level 99mmol/L 


 


Carbon Dioxide Level 21mmol/L 


 


Anion Gap 15 


 


Blood Urea Nitrogen 18mg/dl 


 


Creatinine 0.97mg/dl 


 


Glucose Level 257mg/dl 


 


Calcium Level 8.3mg/dl 


 


Total Bilirubin 0.3mg/dl 


 


Direct Bilirubin 0.00mg/dl 


 


Indirect Bilirubin 0.3mg/dl 


 


Aspartate Amino Transf


(AST/SGOT) 93IU/L 


 


 


Alanine Aminotransferase


(ALT/SGPT) 54IU/L 


 


 


Alkaline Phosphatase 202IU/L 


 


Total Protein 6.0g/dl 


 


Albumin 2.6g/dl 


 


Globulin 3.40g/dl 


 


Albumin/Globulin Ratio 0.76 








 Current Medications








 Medications


  (Trade)  Dose


 Ordered  Sig/Johnathan


 Route


 PRN Reason  Start Time


 Stop Time Status Last Admin


Dose Admin


 


 Lidocaine


  (Xylocaine 1%


  (Mpf))  5 ml  STK-MED ONCE


 .ROUTE


   5/18/17 14:09


 5/18/17 14:10 DC  


 














Procedures/MDM


EKG, MONITORS, & DIAGNOSTIC IMAGING:


Large volume therapeutic paracentesis performed by interventional radiology.





LAB INTERPRETATION:


No significant coagulopathy noted.





MEDICAL DECISION MAKING:


The patient presents with abdominal ascites likely secondary to cirrhosis.  

Patient does not exhibit any signs or symptoms concerning for complications of 

cirrhosis such as GI bleed, hepatic encephalopathy or spontaneous bacterial 

peritonitis.  There is no indication currently for diagnostic paracentesis.  

The patient will benefit from large volume therapeutic paracentesis by 

interventional radiology.  If the patient remains stable without evidence of 

hemodynamic compromise secondary to fluid shifts the patient can be safely 

discharged home with close primary care and hepatology follow-up.





ER COURSE:


The patient had successful large volume therapeutic paracentesis.  The patient 

remained hemodynamically stable and otherwise well-appearing.  The patient is 

safe for discharge home.





I kept the patient and/or family informed of laboratory and diagnostic imaging 

results throughout the emergency room course.





DISPOSITION PLAN:


We discussed follow up with the patient's primary care doctor within 24 to 48 

hours as needed.  We also discussed return to the emergency room for worsening 

symptoms or worsening condition.





Discharge Medications:


None





Departure


Diagnosis:  


 Primary Impression:  


 Ascites


 Ascites type:  due to alcoholic cirrhosis  Qualified Code:  K70.31 - Ascites 

due to alcoholic cirrhosis


 Additional Impression:  


 Anemia


 Anemia type:  unspecified type  Qualified Code:  D64.9 - Anemia, unspecified 

type


Condition:  Stable











JESSE EM MD May 18, 2017 11:34

## 2017-05-22 ENCOUNTER — HOSPITAL ENCOUNTER (EMERGENCY)
Dept: HOSPITAL 10 - E/R | Age: 57
Discharge: HOME | End: 2017-05-22
Payer: COMMERCIAL

## 2017-05-22 VITALS
TEMPERATURE: 97.5 F | SYSTOLIC BLOOD PRESSURE: 106 MMHG | DIASTOLIC BLOOD PRESSURE: 69 MMHG | RESPIRATION RATE: 18 BRPM | HEART RATE: 64 BPM

## 2017-05-22 VITALS — WEIGHT: 182.98 LBS | HEIGHT: 60 IN | BODY MASS INDEX: 35.92 KG/M2

## 2017-05-22 DIAGNOSIS — Z79.4: ICD-10-CM

## 2017-05-22 DIAGNOSIS — R18.8: Primary | ICD-10-CM

## 2017-05-22 DIAGNOSIS — E11.9: ICD-10-CM

## 2017-05-22 NOTE — RADRPT
PROCEDURE:   Ultrasound guided paracentesis. 

 

CLINICAL INDICATION:    Ascites and shortness of breath.  

 

COMPARISON:   No prior studies are available for comparison.  

 

TECHNIQUE:   

The risks, benefits, and alternatives were explained to the patient and/or the patient's family, inc
luding but not limited to bleeding, infection, pain, visceral or vascular damage, shock, and death. 
 The patient and/or the patient's family understood the risks and the alternatives and wished to pro
ceed with the procedure.  Informed written consent was obtained. A procedural time out was performed
.  The patient's name, date of birth, and procedure to be performed were verified.

 

Utilizing ultrasound guidance, optimal location for entry to the peritoneal cavity was ascertained. 
 The overlying skin was prepped and draped in the usual sterile fashion.  Approximately 10 ml of 1% 
Xylocaine was injected locally for pain control.  Using ultrasound guidance, an 8 English catheter wa
s introduced into the peritoneal cavity in the right lower quadrant without difficulty.  

 

FINDINGS:

Initial images demonstrate ascites.  Approximately 7.85 liters of serous fluid was aspirated and dis
carded. The patient tolerated the procedure well without complication.

 

IMPRESSION:

1.  Successful ultrasound-guided paracentesis.  

 

RPTAT: QQ

_____________________________________________ 

.Rene De La Cruz MD, MD           Date    Time 

Electronically viewed and signed by .Rene De La Cruz MD, MD on 05/22/2017 13:57 

 

D:  05/22/2017 13:57  T:  05/22/2017 13:57

.R/

## 2017-05-22 NOTE — ERD
ER Documentation


Chief Complaint


Date/Time


DATE: 5/22/17 


TIME: 14:06


Chief Complaint


PARACENTISIS





HPI


This is a 37-year-old male well-known to the emergency room or presents to the 

emergency room for evaluation of ascites.  This patient has had multiple 

ultrasound paracentesis drainage in the emergency room.  He presents today for 

the same.  He denies any fevers or abdominal pain but states she has abdominal 

distention.





ROS


All systems reviewed and are negative except as per history of present illness.





Medications


Home Meds


Active Scripts


Ciprofloxacin Hcl* (Ciprofloxacin Hcl*) 500 Mg Tablet, 500 MG PO BID for 10 Days

, TAB


   Prov:NICOLLE OWENS MD         5/8/17


Ondansetron (Ondansetron Odt) 4 Mg Tab.rapdis, 4 MG PO Q6H Y for NAUSEA AND/OR 

VOMITING, #10 TAB


   Prov:NICOLLE OWENS MD         5/8/17


Hydrocodone/Acetaminophen (Norco 5-325 Tablet) 1 Each Tablet, 1 TAB PO Q6H Y 

for PAIN, #7 TAB


   Prov:NICOLLE OWENS MD         5/8/17


Insulin Aspart* (Novolog Insulin Pen*) 100 Unit/Ml Soln, 10 UNIT SC WITH MEALS 

BEDTIME for 28 Days


   Prov:HANNAH CASTELLANO MD         11/15/16


Reported Medications


Insulin Glargine* (Lantus*) 100 Unit/Ml Soln, 25 UNIT SC QHS, #1 VIAL


   11/11/16


Losartan Potassium* (Cozaar*) 25 Mg Tablet, 25 MG PO DAILY, #30 TAB


   11/11/16


Pantoprazole* (Protonix*) 40 Mg Tablet.dr, 40 MG PO DAILY, TAB


   11/11/16





Allergies


Allergies:  


Coded Allergies:  


     No Known Drug Allergy (Verified  Allergy, Unknown, 5/18/17)





PMhx/Soc


History of Surgery:  No


Anesthesia Reaction:  No


Hx Neurological Disorder:  No


Hx Respiratory Disorders:  No


Hx Cardiac Disorders:  No


Hx Psychiatric Problems:  No


Hx Miscellaneous Medical Probl:  Yes (liver cirrhosis)


Hx Alcohol Use:  Yes (NO LONGER DRINKS)


Hx Substance Use:  No


Hx Tobacco Use:  No


Smoking Status:  Never smoker





Physical Exam


Vitals





Vital Signs








  Date Time  Temp Pulse Resp B/P Pulse Ox O2 Delivery O2 Flow Rate FiO2


 


5/22/17 10:08 98.0 86 18 97/59 97   








Physical Exam


Const: No acute distress


Head:   Atraumatic 


Eyes:    Normal Conjunctiva


ENT:    Normal External Ears, Nose and Mouth.


Neck:               Full range of motion..~ No meningismus.


Resp:    Clear to auscultation bilaterally


Cardio:    Regular rate and rhythm, no murmurs


Abd:    Soft, non tender, mild distention. Normal bowel sounds


Skin:    No petechiae or rashes


Back:    No midline or flank tenderness


Ext:    No cyanosis, or edema


Neur:    Awake and alert


Psych:    Normal Mood and Affect


Results 24 hrs





 Current Medications








 Medications


  (Trade)  Dose


 Ordered  Sig/Johnathan


 Route


 PRN Reason  Start Time


 Stop Time Status Last Admin


Dose Admin


 


 Lidocaine


  (Xylocaine 1%


  (Mpf))  5 ml  STK-MED ONCE


 .ROUTE


   5/22/17 13:32


 5/22/17 13:33 DC 5/22/17 13:54


 











Procedures/MDM


This 37-year-old male presents to the emergency room for evaluation of ascites.

  This patient is well-known to the emergency room.  This patient did have an 

ultrasound drainage with greater than 7 L of fluid removed.  This patient's 

blood pressure is stable at this time and he will be discharged home.





Departure


Diagnosis:  


 Primary Impression:  


 Ascites


Condition:  Stable











TAVO JENKINS DO May 22, 2017 14:07

## 2017-05-25 ENCOUNTER — HOSPITAL ENCOUNTER (EMERGENCY)
Dept: HOSPITAL 10 - E/R | Age: 57
Discharge: HOME | End: 2017-05-25
Payer: COMMERCIAL

## 2017-05-25 VITALS
RESPIRATION RATE: 16 BRPM | SYSTOLIC BLOOD PRESSURE: 110 MMHG | DIASTOLIC BLOOD PRESSURE: 70 MMHG | TEMPERATURE: 97.2 F | HEART RATE: 76 BPM

## 2017-05-25 VITALS — WEIGHT: 156.53 LBS | BODY MASS INDEX: 30.73 KG/M2 | HEIGHT: 60 IN

## 2017-05-25 DIAGNOSIS — Z87.891: ICD-10-CM

## 2017-05-25 DIAGNOSIS — R10.84: ICD-10-CM

## 2017-05-25 DIAGNOSIS — Z79.4: ICD-10-CM

## 2017-05-25 DIAGNOSIS — R18.8: Primary | ICD-10-CM

## 2017-05-25 DIAGNOSIS — E11.9: ICD-10-CM

## 2017-05-25 NOTE — RADRPT
PROCEDURE:   Ultrasound guided paracentesis. 

 

CLINICAL INDICATION:    Ascites and shortness of breath.  

 

COMPARISON:   05/22/2017.  

 

TECHNIQUE:   

The risks, benefits, and alternatives were explained to the patient and/or the patient's family, inc
luding but not limited to bleeding, infection, pain, visceral or vascular damage, shock, and death. 
 The patient and/or the patient's family understood the risks and the alternatives and wished to pro
ceed with the procedure.  Informed written consent was obtained. A procedural time out was performed
.  The patient's name, date of birth, and procedure to be performed were verified.

 

Utilizing ultrasound guidance, optimal location for entry to the peritoneal cavity was ascertained. 
 The overlying skin was prepped and draped in the usual sterile fashion.  Approximately 10 ml of 1% 
Xylocaine was injected locally for pain control.  Using ultrasound guidance, an 8 Ghanaian catheter wa
s introduced into the peritoneal cavity in the right lower quadrant without difficulty.  

 

FINDINGS:

Initial images demonstrate ascites.  Approximately 7.0 liters of serous fluid was aspirated and disc
arded. The patient tolerated the procedure well without complication.

 

IMPRESSION:

1.  Successful ultrasound-guided paracentesis.  

 

RPTAT: QQ

_____________________________________________ 

.Rene De La Cruz MD, MD           Date    Time 

Electronically viewed and signed by .Rene De La Cruz MD, MD on 05/25/2017 15:41 

 

D:  05/25/2017 15:41  T:  05/25/2017 15:41

.R/

## 2017-05-29 ENCOUNTER — HOSPITAL ENCOUNTER (EMERGENCY)
Dept: HOSPITAL 10 - E/R | Age: 57
Discharge: HOME | End: 2017-05-29
Payer: COMMERCIAL

## 2017-05-29 VITALS
HEIGHT: 67 IN | BODY MASS INDEX: 24.57 KG/M2 | BODY MASS INDEX: 24.57 KG/M2 | WEIGHT: 156.53 LBS | WEIGHT: 156.53 LBS | HEIGHT: 67 IN

## 2017-05-29 VITALS — HEART RATE: 92 BPM | SYSTOLIC BLOOD PRESSURE: 113 MMHG | DIASTOLIC BLOOD PRESSURE: 61 MMHG | RESPIRATION RATE: 18 BRPM

## 2017-05-29 DIAGNOSIS — R10.9: ICD-10-CM

## 2017-05-29 DIAGNOSIS — R18.8: Primary | ICD-10-CM

## 2017-05-29 PROCEDURE — 96372 THER/PROPH/DIAG INJ SC/IM: CPT

## 2017-05-29 PROCEDURE — 49083 ABD PARACENTESIS W/IMAGING: CPT

## 2017-05-29 NOTE — ERD
ER Documentation


Chief Complaint


Date/Time


DATE: 5/29/17 


TIME: 14:04


Chief Complaint


Patient here for paracentesis





HPI


This 57-year-old male presents for a paracentesis.  He was last here 5 days ago 

for a paracentesis.  Routinely has come to the emergency room for his 

paracenteses.  Very familiar with this patient.  He is having feeling of 

tension in his abdomen again that he believes this time for paracentesis.  He 

has no fevers chills or weakness.  He states that he does occasionally have 

nausea but no and get some medication for it.  He has specialists at [facility] who 

are discussing surgery possibly next year.





ROS


All systems reviewed and are negative except as per history of present illness.





Medications


Home Meds


Active Scripts


Ondansetron (Zofran Odt) 4 Mg Tab.rapdis, 4 MG PO Q6 for NAUSEA, #14


   Prov:WILMA MCLEOD DO         5/29/17


Insulin Aspart* (Novolog Insulin Pen*) 100 Unit/Ml Soln, 10 UNIT SC WITH MEALS 

BEDTIME for 28 Days


   Prov:HANNAH CASTELLANO MD         11/15/16


Reported Medications


Insulin Glargine* (Lantus*) 100 Unit/Ml Soln, 25 UNIT SC QHS, #1 VIAL


   11/11/16


Losartan Potassium* (Cozaar*) 25 Mg Tablet, 25 MG PO DAILY, #30 TAB


   11/11/16


Pantoprazole* (Protonix*) 40 Mg Tablet.dr, 40 MG PO DAILY, TAB


   11/11/16


Discontinued Scripts


Ciprofloxacin Hcl* (Ciprofloxacin Hcl*) 500 Mg Tablet, 500 MG PO BID for 10 Days

, TAB


   Prov:NICOLLE OWENS MD         5/8/17


Ondansetron (Ondansetron Odt) 4 Mg Tab.rapdis, 4 MG PO Q6H Y for NAUSEA AND/OR 

VOMITING, #10 TAB


   Prov:NICOLLE OWENS MD         5/8/17


Hydrocodone/Acetaminophen (Norco 5-325 Tablet) 1 Each Tablet, 1 TAB PO Q6H Y 

for PAIN, #7 TAB


   Prov:NICOLLE OWENS MD         5/8/17





Allergies


Allergies:  


Coded Allergies:  


     No Known Drug Allergy (Verified  Allergy, Unknown, 5/29/17)





PMhx/Soc


History of Surgery:  No


Anesthesia Reaction:  No


Hx Neurological Disorder:  No


Hx Respiratory Disorders:  No


Hx Cardiac Disorders:  No


Hx Psychiatric Problems:  No


Hx Miscellaneous Medical Probl:  Yes (liver cirrhosis)


Hx Alcohol Use:  Yes (NO LONGER DRINKS)


Hx Substance Use:  No


Hx Tobacco Use:  No


Smoking Status:  Unknown if ever smoked





Physical Exam


Vitals





Vital Signs








  Date Time  Temp Pulse Resp B/P Pulse Ox O2 Delivery O2 Flow Rate FiO2


 


5/29/17 11:41 97.6 95 20 123/79 99   








Physical Exam


Const:  []             No distress, conversive, chewing gum


Head:   Atraumatic 


Eyes:    Normal Conjunctiva


ENT:    Normal External Ears, Nose and Mouth.


Neck:               Full range of motion..~ No meningismus.


Resp:    Clear to auscultation bilaterally


Cardio:    Regular rate and rhythm, no murmurs


Abd:    Soft, distended, firm abdomen with no specific tenderness,. 


Skin:    No petechiae or rashes


Back:    No midline or flank tenderness


Ext:    No cyanosis, or edema


Neur:    Awake and alert and oriented 3, no focal deficits


Psych:    Normal Mood and Affect


Results 24 hrs





 Current Medications








 Medications


  (Trade)  Dose


 Ordered  Sig/Johnathan


 Route


 PRN Reason  Start Time


 Stop Time Status Last Admin


Dose Admin


 


 Ondansetron HCl


  (Zofran Inj)  8 mg  ONCE  STAT


 IM


   5/29/17 12:12


 5/29/17 12:14 DC 5/29/17 12:27


 











Procedures/MDM


Ultrasound-guided paracentesis note: Ultrasound guidance was used to locate 

large area of fluid with approximately 5 cm between the peritoneal wall and the 

intestines.  Patient was prepped and draped in sterile fashion, gown mask 

Gloves ChloraPrep drape was used.  Anesthetized with 6 mL of lidocaine using Z 

tracking.  See tracking was also used when thoracentesis kit was used to 

advance flexible catheter over large-bore needle through the abdominal wall.  5-

1/4 L of fluid were easily obtained.  Fluid was clear and yellow in appearance.

  Patient tolerated procedure well there no complications.  Pressure dressing 

was placed.











Patient with liver failure receiving a paracentesis.  No signs of spontaneous 

bacterial peritonitis and afebrile patient who is given instant relief after 

the procedure.  He was given an IM injection of Zofran for nausea that he feels 

on and off.  Is cured his nausea and he continued to chew his, and take p.o. 

without troubles.  Giving him written instructions to instruct [facility] that he is 

coming here every 4-7 days for paracenteses and ask them to arrange clinic 

follow-up for him.  Patient states that he will comply with these instructions.





Departure


Diagnosis:  


 Primary Impression:  


 Ascites of liver


 Additional Impression:  


 Abdominal pain


Condition:  Stable


Patient Instructions:  Ascites





Additional Instructions:  


You need to set up follow up in a clinic for routine paracenteses with a 

frequency of every 4 to 5 days.  Let your specialist at [facility] know you have been 

coming to the emergency room this often for paracenteses.





Call your primary care doctor TOMORROW for an appointment during the next 1-2 

days.See the doctor sooner or return here if your condition worsens before your 

appointment time.











WILMA MCLEOD DO May 29, 2017 14:09

## 2017-06-01 ENCOUNTER — HOSPITAL ENCOUNTER (EMERGENCY)
Dept: HOSPITAL 10 - E/R | Age: 57
Discharge: HOME | End: 2017-06-01
Payer: COMMERCIAL

## 2017-06-01 VITALS — DIASTOLIC BLOOD PRESSURE: 80 MMHG | RESPIRATION RATE: 18 BRPM | SYSTOLIC BLOOD PRESSURE: 120 MMHG | HEART RATE: 95 BPM

## 2017-06-01 VITALS — WEIGHT: 163.14 LBS | HEIGHT: 60 IN | BODY MASS INDEX: 32.03 KG/M2

## 2017-06-01 DIAGNOSIS — R18.8: Primary | ICD-10-CM

## 2017-06-01 DIAGNOSIS — E11.9: ICD-10-CM

## 2017-06-01 DIAGNOSIS — Z87.891: ICD-10-CM

## 2017-06-01 DIAGNOSIS — Z79.4: ICD-10-CM

## 2017-06-01 NOTE — RADRPT
PROCEDURE:   US guided paracentesis 

 

CLINICAL INDICATION:   Ascites 

 

TECHNIQUE:   Multiple sonographic images were obtained through the patient's abdomen.  A site in the
 patient's RIGHT lower abdomen was selected and marked. The area was prepped and draped in the usual
 sterile fashion.  1% lidocaine was utilized. A 19-gauge Yueh needle was advanced into the peritonea
l space and the introducer was connected to a vacuum drainage bottle.  A total of 7200 cc of clear y
ellow fluid were drained at the end of the procedure. The patient tolerated the procedure well. 

 

 

COMPARISON:   None 

 

FINDINGS:

Ascites.

 

RPTAT: AA

 

IMPRESSION:

Successful ultrasound-guided paracentesis.

_____________________________________________ 

Physician Shawnee           Date    Time 

Electronically viewed and signed by Physician Shawnee on 06/01/2017 14:15 

 

D:  06/01/2017 14:15  T:  06/01/2017 14:15

KORIN/

## 2017-06-01 NOTE — ERD
ER Documentation


Chief Complaint


Date/Time


DATE: 6/1/17 


TIME: 14:17


Chief Complaint


PT HERE FOR PARACENTESIS





HPI


This is a 37-year-old male well-known to the emergency room or presents to the 

emergency room again for drainage of ascites.  The patient denies any fever or 

chills.  He states that he feels like his abdomen is distended





ROS


All systems reviewed and are negative except as per history of present illness.





Medications


Home Meds


Active Scripts


Ondansetron (Zofran Odt) 4 Mg Tab.rapdis, 4 MG PO Q6 for NAUSEA, #14


   Prov:WILMA MCLEOD DO         5/29/17


Insulin Aspart* (Novolog Insulin Pen*) 100 Unit/Ml Soln, 10 UNIT SC WITH MEALS 

BEDTIME for 28 Days


   Prov:HANNAH CASTELLANO MD         11/15/16


Reported Medications


Insulin Glargine* (Lantus*) 100 Unit/Ml Soln, 25 UNIT SC QHS, #1 VIAL


   11/11/16


Losartan Potassium* (Cozaar*) 25 Mg Tablet, 25 MG PO DAILY, #30 TAB


   11/11/16


Pantoprazole* (Protonix*) 40 Mg Tablet.dr, 40 MG PO DAILY, TAB


   11/11/16


Discontinued Scripts


Ciprofloxacin Hcl* (Ciprofloxacin Hcl*) 500 Mg Tablet, 500 MG PO BID for 10 Days

, TAB


   Prov:NICOLLE OWENS MD         5/8/17


Ondansetron (Ondansetron Odt) 4 Mg Tab.rapdis, 4 MG PO Q6H Y for NAUSEA AND/OR 

VOMITING, #10 TAB


   Prov:NICOLLE OWENS MD         5/8/17


Hydrocodone/Acetaminophen (Norco 5-325 Tablet) 1 Each Tablet, 1 TAB PO Q6H Y 

for PAIN, #7 TAB


   Prov:NICOLLE OWENS MD         5/8/17





Allergies


Allergies:  


Coded Allergies:  


     No Known Drug Allergy (Verified  Allergy, Unknown, 6/1/17)





PMhx/Soc


History of Surgery:  No


Anesthesia Reaction:  No


Hx Neurological Disorder:  No


Hx Respiratory Disorders:  No


Hx Cardiac Disorders:  No


Hx Psychiatric Problems:  No


Hx Miscellaneous Medical Probl:  Yes (liver cirrhosis, regular paracentises)


Hx Alcohol Use:  Yes (NO LONGER DRINKS)


Hx Substance Use:  No


Hx Tobacco Use:  No


Smoking Status:  Former smoker





Physical Exam


Vitals





Vital Signs








  Date Time  Temp Pulse Resp B/P Pulse Ox O2 Delivery O2 Flow Rate FiO2


 


6/1/17 12:02 97.8 99 18 123/79 99   








Physical Exam


Const: No acute distress


Head:   Atraumatic 


Eyes:    Normal Conjunctiva


ENT:    Normal External Ears, Nose and Mouth.


Neck:               Full range of motion..~ No meningismus.


Resp:    Clear to auscultation bilaterally


Cardio:    Regular rate and rhythm, no murmurs


Abd:    Mild abdominal distention, nontender, normal bowel sounds


Skin:    No petechiae or rashes


Back:    No midline or flank tenderness


Ext:    No cyanosis, or edema


Neur:    Awake and alert


Psych:    Normal Mood and Affect


Results 24 hrs





 Current Medications








 Medications


  (Trade)  Dose


 Ordered  Sig/Johnathan


 Route


 PRN Reason  Start Time


 Stop Time Status Last Admin


Dose Admin


 


 Lidocaine


  (Xylocaine 1%


  (Mpf))  5 ml  STK-MED ONCE


 .ROUTE


   6/1/17 13:30


 6/1/17 13:31 DC  


 











Procedures/MDM


This 47-year-old male presents to the ER for evaluation of abdominal 

distention.  This patient is well-known to the emergency room is been seen 

multiple times in the past for drainage of ascites.  The patient did have 7 L 

drained today.  He is asymptomatic at this time, not hypotensive, will be 

discharged home.





Departure


Diagnosis:  


 Primary Impression:  


 Ascites


Condition:  Stable











TAVO JENKINS DO Jun 1, 2017 14:19

## 2017-06-04 ENCOUNTER — HOSPITAL ENCOUNTER (EMERGENCY)
Dept: HOSPITAL 10 - E/R | Age: 57
Discharge: HOME | End: 2017-06-04
Payer: COMMERCIAL

## 2017-06-04 VITALS
HEIGHT: 66 IN | BODY MASS INDEX: 27.64 KG/M2 | WEIGHT: 171.96 LBS | WEIGHT: 171.96 LBS | BODY MASS INDEX: 27.64 KG/M2 | HEIGHT: 66 IN

## 2017-06-04 VITALS
SYSTOLIC BLOOD PRESSURE: 114 MMHG | TEMPERATURE: 98.6 F | RESPIRATION RATE: 20 BRPM | DIASTOLIC BLOOD PRESSURE: 71 MMHG | HEART RATE: 86 BPM

## 2017-06-04 DIAGNOSIS — K70.31: Primary | ICD-10-CM

## 2017-06-04 DIAGNOSIS — Z79.4: ICD-10-CM

## 2017-06-04 DIAGNOSIS — E11.65: ICD-10-CM

## 2017-06-04 LAB
ABNORMAL IP MESSAGE: 1
ADD SCAN DIFF: NO
ALBUMIN SERPL-MCNC: 3.2 G/DL (ref 3.3–4.9)
ALBUMIN/GLOB SERPL: 0.96 {RATIO}
ALP SERPL-CCNC: 277 IU/L (ref 42–121)
ALT SERPL-CCNC: 70 IU/L (ref 13–69)
ANION GAP SERPL CALC-SCNC: 13 MMOL/L (ref 8–16)
APTT BLD: 29.2 SEC (ref 25–35)
AST SERPL-CCNC: 94 IU/L (ref 15–46)
BASOPHILS # BLD AUTO: 0.1 10^3/UL (ref 0–0.1)
BASOPHILS NFR BLD: 0.8 % (ref 0–2)
BILIRUB DIRECT SERPL-MCNC: 0 MG/DL (ref 0–0.2)
BILIRUB SERPL-MCNC: 0.6 MG/DL (ref 0.2–1.3)
BUN SERPL-MCNC: 21 MG/DL (ref 7–20)
CALCIUM SERPL-MCNC: 8.5 MG/DL (ref 8.4–10.2)
CHLORIDE SERPL-SCNC: 94 MMOL/L (ref 97–110)
CO2 SERPL-SCNC: 22 MMOL/L (ref 21–31)
CREAT SERPL-MCNC: 1.03 MG/DL (ref 0.61–1.24)
EOSINOPHIL # BLD: 0.1 10^3/UL (ref 0–0.5)
EOSINOPHIL NFR BLD: 0.8 % (ref 0–7)
ERYTHROCYTE [DISTWIDTH] IN BLOOD BY AUTOMATED COUNT: 14.1 % (ref 11.5–14.5)
GLOBULIN SER-MCNC: 3.3 G/DL (ref 1.3–3.2)
GLUCOSE SERPL-MCNC: 465 MG/DL (ref 70–220)
HCT VFR BLD CALC: 29.8 % (ref 42–52)
HGB BLD-MCNC: 9.8 G/DL (ref 14–18)
INR PPP: 1.09
LYMPHOCYTES # BLD AUTO: 0.4 10^3/UL (ref 0.8–2.9)
LYMPHOCYTES NFR BLD AUTO: 6.8 % (ref 15–51)
MCH RBC QN AUTO: 28.8 PG (ref 29–33)
MCHC RBC AUTO-ENTMCNC: 32.9 G/DL (ref 32–37)
MCV RBC AUTO: 87.6 FL (ref 82–101)
MONOCYTES # BLD: 0.9 10^3/UL (ref 0.3–0.9)
MONOCYTES NFR BLD: 14.4 % (ref 0–11)
NEUTROPHILS # BLD: 4.6 10^3/UL (ref 1.6–7.5)
NEUTROPHILS NFR BLD AUTO: 76.7 % (ref 39–77)
NRBC # BLD MANUAL: 0 10^3/UL (ref 0–0)
NRBC BLD QL: 0 /100WBC (ref 0–0)
PLATELET # BLD: 179 10^3/UL (ref 140–415)
PMV BLD AUTO: 11.7 FL (ref 7.4–10.4)
POTASSIUM SERPL-SCNC: 5.7 MMOL/L (ref 3.5–5.1)
PROT SERPL-MCNC: 6.5 G/DL (ref 6.1–8.1)
PROTHROMBIN TIME: 14.1 SEC (ref 12.2–14.2)
PT RATIO: 1.1
RBC # BLD AUTO: 3.4 10^6/UL (ref 4.7–6.1)
SODIUM SERPL-SCNC: 123 MMOL/L (ref 135–144)
WBC # BLD AUTO: 6 10^3/UL (ref 4.8–10.8)

## 2017-06-04 PROCEDURE — 82962 GLUCOSE BLOOD TEST: CPT

## 2017-06-04 PROCEDURE — 80053 COMPREHEN METABOLIC PANEL: CPT

## 2017-06-04 PROCEDURE — 83690 ASSAY OF LIPASE: CPT

## 2017-06-04 PROCEDURE — 85610 PROTHROMBIN TIME: CPT

## 2017-06-04 PROCEDURE — 96372 THER/PROPH/DIAG INJ SC/IM: CPT

## 2017-06-04 PROCEDURE — 85025 COMPLETE CBC W/AUTO DIFF WBC: CPT

## 2017-06-04 PROCEDURE — 85730 THROMBOPLASTIN TIME PARTIAL: CPT

## 2017-06-04 NOTE — ERD
ER Documentation


Chief Complaint


Date/Time


DATE: 6/4/17 


TIME: 15:24


Chief Complaint








HPI


57-year-old male well-known to this emergency department with a history of 

cirrhosis who presents for paracentesis.  He describes abdominal bloating and 

swelling.  Last paracentesis on Thursday.  He denies hematemesis melena fevers 

or chills.  No other complaints.





ROS


All systems reviewed and are negative except as per history of present illness.





Medications


Home Meds


Active Scripts


Ondansetron (Zofran Odt) 4 Mg Tab.rapdis, 4 MG PO Q6 for NAUSEA, #14


   Prov:WILMA MCLEOD DO         5/29/17


Insulin Aspart* (Novolog Insulin Pen*) 100 Unit/Ml Soln, 10 UNIT SC WITH MEALS 

BEDTIME for 28 Days


   Prov:HANNAH CASTELLANO MD         11/15/16


Reported Medications


Insulin Glargine* (Lantus*) 100 Unit/Ml Soln, 25 UNIT SC QHS, #1 VIAL


   11/11/16


Losartan Potassium* (Cozaar*) 25 Mg Tablet, 25 MG PO DAILY, #30 TAB


   11/11/16


Pantoprazole* (Protonix*) 40 Mg Tablet.dr, 40 MG PO DAILY, TAB


   11/11/16


Discontinued Scripts


Ciprofloxacin Hcl* (Ciprofloxacin Hcl*) 500 Mg Tablet, 500 MG PO BID for 10 Days

, TAB


   Prov:NICOLLE OWENS MD         5/8/17





Allergies


Allergies:  


Coded Allergies:  


     No Known Drug Allergy (Verified  Allergy, Unknown, 6/1/17)





PMhx/Soc


History of Surgery:  No


Anesthesia Reaction:  No


Hx Neurological Disorder:  No


Hx Respiratory Disorders:  No


Hx Cardiac Disorders:  No


Hx Psychiatric Problems:  No


Hx Miscellaneous Medical Probl:  Yes (liver cirrhosis, regular paracentises)


Hx Alcohol Use:  Yes (NO LONGER DRINKS)


Hx Substance Use:  No


Hx Tobacco Use:  No





FmHx


Family History:  No diabetes





Physical Exam


Vitals








Vital Signs








  Date Time  Temp Pulse Resp B/P Pulse Ox O2 Delivery O2 Flow Rate FiO2


 


6/4/17 13:10 98.6 74 20 128/77 98   











Physical Exam


General: Well developed, well nourished, no acute distress


Head: Normocephalic, atraumatic.


Eyes: Pupils equally reactive, EOM intact


ENT: Moist mucous membranes


Neck: Supple, no lymphadenopathy


Respiratory: Lungs clear bilaterally, no distress


Cardiovascular: RRR, no murmurs, rubs, or gallops


Abdominal: Soft, protuberant with fluid wave, nontender, no peritonitis


: Deferred


MSK: No edema, no unilateral swelling, 5/5 strength


Neurologic: Alert and oriented, moving all extremities, normal speech, no focal 

weakness, no cerebellar signs


Skin: No rash


Psych: Normal mood


Result Diagram:  


6/4/17 1520                                                                    

            6/4/17 1520





Results 24 hrs








 Laboratory Tests








Test


  6/4/17


15:20 6/4/17


18:39


 


White Blood Count 6.010^3/ul  


 


Red Blood Count 3.4010^6/ul  


 


Hemoglobin 9.8g/dl  


 


Hematocrit 29.8%  


 


Mean Corpuscular Volume 87.6fl  


 


Mean Corpuscular Hemoglobin 28.8pg  


 


Mean Corpuscular Hemoglobin


Concent 32.9g/dl 


  


 


 


Red Cell Distribution Width 14.1%  


 


Platelet Count 26348^3/UL  


 


Mean Platelet Volume 11.7fl  


 


Neutrophils % 76.7%  


 


Lymphocytes % 6.8%  


 


Monocytes % 14.4%  


 


Eosinophils % 0.8%  


 


Basophils % 0.8%  


 


Nucleated Red Blood Cells % 0.0/100WBC  


 


Neutrophils # 4.610^3/ul  


 


Lymphocytes # 0.410^3/ul  


 


Monocytes # 0.910^3/ul  


 


Eosinophils # 0.110^3/ul  


 


Basophils # 0.110^3/ul  


 


Nucleated Red Blood Cells # 0.010^3/ul  


 


Prothrombin Time 14.1Sec  


 


Prothrombin Time Ratio 1.1  


 


INR International Normalized


Ratio 1.09 


  


 


 


Activated Partial


Thromboplast Time 29.2Sec 


  


 


 


Sodium Level 123mmol/L  


 


Potassium Level 5.7mmol/L  


 


Chloride Level 94mmol/L  


 


Carbon Dioxide Level 22mmol/L  


 


Anion Gap 13  


 


Blood Urea Nitrogen 21mg/dl  


 


Creatinine 1.03mg/dl  


 


Glucose Level 465mg/dl  


 


Calcium Level 8.5mg/dl  


 


Total Bilirubin 0.6mg/dl  


 


Direct Bilirubin 0.00mg/dl  


 


Indirect Bilirubin 0.6mg/dl  


 


Aspartate Amino Transf


(AST/SGOT) 94IU/L 


  


 


 


Alanine Aminotransferase


(ALT/SGPT) 70IU/L 


  


 


 


Alkaline Phosphatase 277IU/L  


 


Total Protein 6.5g/dl  


 


Albumin 3.2g/dl  


 


Globulin 3.30g/dl  


 


Albumin/Globulin Ratio 0.96  


 


Lipase 31U/L  


 


Bedside Glucose  400mg/dL 








 Current Medications








 Medications


  (Trade)  Dose


 Ordered  Sig/Johnathan


 Route


 PRN Reason  Start Time


 Stop Time Status Last Admin


Dose Admin


 


 Insulin Human


 Lispro


  (Humalog)  6 unit  ONCE  STAT


 SC


   6/4/17 16:46


 6/4/17 16:47 DC  


 


 


 Lidocaine


  (Xylocaine 1%


  (Mpf))  5 ml  STK-MED ONCE


 .ROUTE


   6/4/17 18:33


 6/4/17 18:34 DC 6/4/17 18:39


 














Procedures/MDM


EKG, MONITORS, & DIAGNOSTIC IMAGING:


Large volume therapeutic paracentesis performed by interventional radiology.





LAB INTERPRETATION:


No significant coagulopathy noted.





MEDICAL DECISION MAKING:


The patient presents with abdominal ascites likely secondary to cirrhosis.  

Patient does not exhibit any signs or symptoms concerning for complications of 

cirrhosis such as GI bleed, hepatic encephalopathy or spontaneous bacterial 

peritonitis.  There is no indication currently for diagnostic paracentesis.  

The patient will benefit from large volume therapeutic paracentesis by 

interventional radiology.  If the patient remains stable without evidence of 

hemodynamic compromise secondary to fluid shifts the patient can be safely 

discharged home with close primary care and hepatology follow-up.





ER COURSE:


The patient had successful large volume therapeutic paracentesis.  The patient 

remained hemodynamically stable and otherwise well-appearing.  The patient is 

safe for discharge home.





Hyperglycemia without DKA.  Patient given 6 units of Humalog.





I kept the patient and/or family informed of laboratory and diagnostic imaging 

results throughout the emergency room course.





DISPOSITION PLAN:


We discussed follow up with the patient's primary care doctor within 24 to 48 

hours as needed.  We also discussed return to the emergency room for worsening 

symptoms or worsening condition.





Discharge Medications:


None





Departure


Diagnosis:  


 Primary Impression:  


 Ascites due to alcoholic cirrhosis


 Additional Impression:  


 Hyperglycemia


Condition:  Stable











JESSE EM MD Jun 4, 2017 15:25

## 2017-06-04 NOTE — RADRPT
PROCEDURE:   Ultrasound-guided paracentesis. 

 

CLINICAL INDICATION:   Ascites . Therapeutic.

 

TECHNIQUE:   The patient was informed of the benefits and risks of the procedure and signed consent 
was obtained.  Risks include bleeding, infection, visceral organ or bowel injury.  The abdomen was s
terilely prepped and draped.  Local anesthesia with 1% lidocaine was administered.  Initial ultrasou
nd scanning of the abdomen is performed to locate a pocket of ascites suitable for paracentesis.  Un
lefty ultrasound guidance, a 19-gauge Yueh multi side-hole centesis needle and sheath was advanced int
o the pocket of ascites. Paracentesis performed with vacuum.  The patient tolerated procedure well a
nd there no complications.  Patient left department in stable condition. 

 

COMPARISON:   06/01/2017

 

FINDINGS:

Initial scanning shows ascites in the bilateral lower quadrants. 

 

IMPRESSION:

Ultrasound-guided paracentesis of 6.6 liters of clear yellow ascites from the right abdomen. 

 

 

RPTAT: QQ

_____________________________________________ 

.Kentrell Gorman MD, MD           Date    Time 

Electronically viewed and signed by .Kentrell Gorman MD, MD on 06/04/2017 18:33 

 

D:  06/04/2017 18:33  T:  06/04/2017 18:33

.L/

## 2017-06-26 ENCOUNTER — HOSPITAL ENCOUNTER (EMERGENCY)
Dept: HOSPITAL 10 - E/R | Age: 57
Discharge: HOME | End: 2017-06-26
Payer: COMMERCIAL

## 2017-06-26 VITALS
HEIGHT: 65 IN | HEIGHT: 65 IN | BODY MASS INDEX: 22.04 KG/M2 | WEIGHT: 132.28 LBS | WEIGHT: 132.28 LBS | BODY MASS INDEX: 22.04 KG/M2

## 2017-06-26 DIAGNOSIS — E11.9: ICD-10-CM

## 2017-06-26 DIAGNOSIS — Z79.4: ICD-10-CM

## 2017-06-26 DIAGNOSIS — K70.31: Primary | ICD-10-CM

## 2017-06-26 LAB
ABNORMAL IP MESSAGE: 1
ADD SCAN DIFF: NO
ALBUMIN SERPL-MCNC: 2.7 G/DL (ref 3.3–4.9)
ALBUMIN/GLOB SERPL: 0.81 {RATIO}
ALP SERPL-CCNC: 686 IU/L (ref 42–121)
ALT SERPL-CCNC: 68 IU/L (ref 13–69)
ANION GAP SERPL CALC-SCNC: 10 MMOL/L (ref 8–16)
APTT BLD: 37.7 SEC (ref 25–35)
AST SERPL-CCNC: 130 IU/L (ref 15–46)
BASOPHILS # BLD AUTO: 0.1 10^3/UL (ref 0–0.1)
BASOPHILS NFR BLD: 1.2 % (ref 0–2)
BILIRUB DIRECT SERPL-MCNC: 0.1 MG/DL (ref 0–0.2)
BILIRUB SERPL-MCNC: 1.3 MG/DL (ref 0.2–1.3)
BUN SERPL-MCNC: 15 MG/DL (ref 7–20)
CALCIUM SERPL-MCNC: 8 MG/DL (ref 8.4–10.2)
CHLORIDE SERPL-SCNC: 96 MMOL/L (ref 97–110)
CO2 SERPL-SCNC: 25 MMOL/L (ref 21–31)
CREAT SERPL-MCNC: 1 MG/DL (ref 0.61–1.24)
EOSINOPHIL # BLD: 0.1 10^3/UL (ref 0–0.5)
EOSINOPHIL NFR BLD: 1.7 % (ref 0–7)
ERYTHROCYTE [DISTWIDTH] IN BLOOD BY AUTOMATED COUNT: 17.5 % (ref 11.5–14.5)
GLOBULIN SER-MCNC: 3.3 G/DL (ref 1.3–3.2)
GLUCOSE SERPL-MCNC: 217 MG/DL (ref 70–220)
HCT VFR BLD CALC: 27.6 % (ref 42–52)
HGB BLD-MCNC: 9.2 G/DL (ref 14–18)
INR PPP: 1.68
LYMPHOCYTES # BLD AUTO: 0.6 10^3/UL (ref 0.8–2.9)
LYMPHOCYTES NFR BLD AUTO: 7.7 % (ref 15–51)
MCH RBC QN AUTO: 29.6 PG (ref 29–33)
MCHC RBC AUTO-ENTMCNC: 33.3 G/DL (ref 32–37)
MCV RBC AUTO: 88.7 FL (ref 82–101)
MONOCYTES # BLD: 1 10^3/UL (ref 0.3–0.9)
MONOCYTES NFR BLD: 13.1 % (ref 0–11)
NEUTROPHILS # BLD: 5.5 10^3/UL (ref 1.6–7.5)
NEUTROPHILS NFR BLD AUTO: 75.9 % (ref 39–77)
NRBC # BLD MANUAL: 0 10^3/UL (ref 0–0)
NRBC BLD QL: 0 /100WBC (ref 0–0)
PLATELET # BLD: 241 10^3/UL (ref 140–415)
PMV BLD AUTO: 10.5 FL (ref 7.4–10.4)
POTASSIUM SERPL-SCNC: 4.2 MMOL/L (ref 3.5–5.1)
PROT SERPL-MCNC: 6 G/DL (ref 6.1–8.1)
PROTHROMBIN TIME: 19.9 SEC (ref 12.2–14.2)
PT RATIO: 1.6
RBC # BLD AUTO: 3.11 10^6/UL (ref 4.7–6.1)
SODIUM SERPL-SCNC: 127 MMOL/L (ref 135–144)
WBC # BLD AUTO: 7.3 10^3/UL (ref 4.8–10.8)

## 2017-06-26 PROCEDURE — 85730 THROMBOPLASTIN TIME PARTIAL: CPT

## 2017-06-26 PROCEDURE — 85025 COMPLETE CBC W/AUTO DIFF WBC: CPT

## 2017-06-26 PROCEDURE — 83690 ASSAY OF LIPASE: CPT

## 2017-06-26 PROCEDURE — 80053 COMPREHEN METABOLIC PANEL: CPT

## 2017-06-26 PROCEDURE — 85610 PROTHROMBIN TIME: CPT

## 2017-06-26 PROCEDURE — 36415 COLL VENOUS BLD VENIPUNCTURE: CPT

## 2017-06-26 NOTE — ERD
ER Documentation


Chief Complaint


Date/Time


DATE: 6/26/17 


TIME: 12:50


Chief Complaint


Complains of abdominal pain Hx of ascites





HPI


57-year-old male well-known to this emergency department with alcoholic 

cirrhosis on the transplant list at [facility] with recent stents at [facility] for 

pretransplant workup.  The patient presents with abdominal swelling consistent 

with his ascites.  He denies any fevers or chills or significant abdominal 

pain.  Mild ankle swelling since his stent procedure but no chest pain or 

shortness of breath or dyspnea on exertion.  No hematemesis melena or confusion.





ROS


All systems reviewed and are negative except as per history of present illness.





Medications


Home Meds


Active Scripts


Ondansetron (Zofran Odt) 4 Mg Tab.rapdis, 4 MG PO Q6 for NAUSEA, #14


   Prov:WILMA MCLEOD DO         5/29/17


Insulin Aspart* (Novolog Insulin Pen*) 100 Unit/Ml Soln, 10 UNIT SC WITH MEALS 

BEDTIME for 28 Days


   Prov:HANNAH CASTELLANO MD         11/15/16


Reported Medications


Insulin Glargine* (Lantus*) 100 Unit/Ml Soln, 25 UNIT SC QHS, #1 VIAL


   11/11/16


Losartan Potassium* (Cozaar*) 25 Mg Tablet, 25 MG PO DAILY, #30 TAB


   11/11/16


Pantoprazole* (Protonix*) 40 Mg Tablet.dr, 40 MG PO DAILY, TAB


   11/11/16





Allergies


Allergies:  


Coded Allergies:  


     No Known Drug Allergy (Verified  Allergy, Unknown, 6/8/17)





PMhx/Soc


History of Surgery:  No


Anesthesia Reaction:  No


Hx Neurological Disorder:  No


Hx Respiratory Disorders:  No


Hx Cardiac Disorders:  No


Hx Psychiatric Problems:  No


Hx Miscellaneous Medical Probl:  Yes (liver cirrhosis, regular paracentises, DM)


Hx Alcohol Use:  Yes (NO LONGER DRINKS)


Hx Substance Use:  No


Hx Tobacco Use:  No





Physical Exam


Vitals





Vital Signs








  Date Time  Temp Pulse Resp B/P Pulse Ox O2 Delivery O2 Flow Rate FiO2


 


6/26/17 10:28 98.3 86 20 107/70 98   








Physical Exam


General: Well developed, well nourished, no acute distress


Head: Normocephalic, atraumatic.


Eyes: Pupils equally reactive, EOM intact


ENT: Moist mucous membranes


Neck: Supple, no lymphadenopathy


Respiratory: Lungs clear bilaterally, no distress


Cardiovascular: RRR, no murmurs, rubs, or gallops


Abdominal: Soft, protuberant with ascites with fluid wave, non-distended, no 

peritoneal signs


: Deferred


MSK: Bilateral lower extremity pitting edema slightly worse than baseline, no 

unilateral swelling, 5/5 strength


Neurologic: Alert and oriented, moving all extremities, normal speech, no focal 

weakness, no cerebellar signs


Skin: No rash


Psych: Normal mood


Result Diagram:  


6/26/17 1128                                                                   

             6/26/17 1128





Results 24 hrs





 Laboratory Tests








Test


  6/26/17


11:28


 


White Blood Count 7.310^3/ul 


 


Red Blood Count 3.1110^6/ul 


 


Hemoglobin 9.2g/dl 


 


Hematocrit 27.6% 


 


Mean Corpuscular Volume 88.7fl 


 


Mean Corpuscular Hemoglobin 29.6pg 


 


Mean Corpuscular Hemoglobin


Concent 33.3g/dl 


 


 


Red Cell Distribution Width 17.5% 


 


Platelet Count 52637^3/UL 


 


Mean Platelet Volume 10.5fl 


 


Neutrophils % 75.9% 


 


Lymphocytes % 7.7% 


 


Monocytes % 13.1% 


 


Eosinophils % 1.7% 


 


Basophils % 1.2% 


 


Nucleated Red Blood Cells % 0.0/100WBC 


 


Neutrophils # 5.510^3/ul 


 


Lymphocytes # 0.610^3/ul 


 


Monocytes # 1.010^3/ul 


 


Eosinophils # 0.110^3/ul 


 


Basophils # 0.110^3/ul 


 


Nucleated Red Blood Cells # 0.010^3/ul 


 


Prothrombin Time 19.9Sec 


 


Prothrombin Time Ratio 1.6 


 


INR International Normalized


Ratio 1.68 


 


 


Activated Partial


Thromboplast Time 37.7Sec 


 


 


Sodium Level 127mmol/L 


 


Potassium Level 4.2mmol/L 


 


Chloride Level 96mmol/L 


 


Carbon Dioxide Level 25mmol/L 


 


Anion Gap 10 


 


Blood Urea Nitrogen 15mg/dl 


 


Creatinine 1.00mg/dl 


 


Glucose Level 217mg/dl 


 


Calcium Level 8.0mg/dl 


 


Total Bilirubin 1.3mg/dl 


 


Direct Bilirubin 0.10mg/dl 


 


Indirect Bilirubin 1.2mg/dl 


 


Aspartate Amino Transf


(AST/SGOT) 130IU/L 


 


 


Alanine Aminotransferase


(ALT/SGPT) 68IU/L 


 


 


Alkaline Phosphatase 686IU/L 


 


Total Protein 6.0g/dl 


 


Albumin 2.7g/dl 


 


Globulin 3.30g/dl 


 


Albumin/Globulin Ratio 0.81 


 


Lipase 22U/L 








 Current Medications








 Medications


  (Trade)  Dose


 Ordered  Sig/Johnathan


 Route


 PRN Reason  Start Time


 Stop Time Status Last Admin


Dose Admin


 


 Lidocaine


  (Xylocaine 1%


  (Mpf))  5 ml  STK-MED ONCE


 .ROUTE


   6/26/17 13:48


 6/26/17 13:49 DC  


 











Procedures/MDM


EKG, MONITORS, & DIAGNOSTIC IMAGING:


Large volume therapeutic paracentesis performed by interventional radiology.





LAB INTERPRETATION:


No significant coagulopathy noted.





MEDICAL DECISION MAKING:


The patient presents with abdominal ascites likely secondary to cirrhosis.  

Patient does not exhibit any signs or symptoms concerning for complications of 

cirrhosis such as GI bleed, hepatic encephalopathy or spontaneous bacterial 

peritonitis.  There is no indication currently for diagnostic paracentesis.  

The patient will benefit from large volume therapeutic paracentesis by 

interventional radiology.  If the patient remains stable without evidence of 

hemodynamic compromise secondary to fluid shifts the patient can be safely 

discharged home with close primary care and hepatology follow-up.





ER COURSE:


The patient had successful large volume therapeutic paracentesis.  The patient 

remained hemodynamically stable and otherwise well-appearing.  The patient is 

safe for discharge home.





I kept the patient and/or family informed of laboratory and diagnostic imaging 

results throughout the emergency room course.





DISPOSITION PLAN:


We discussed follow up with the patient's primary care doctor within 24 to 48 

hours as needed.  We also discussed return to the emergency room for worsening 

symptoms or worsening condition.





Discharge Medications:


None





Departure


Diagnosis:  


 Primary Impression:  


 Ascites due to alcoholic cirrhosis


Condition:  Stable











JESSE EM MD Jun 26, 2017 12:52

## 2017-06-26 NOTE — RADRPT
PROCEDURE:   Ultrasound guided paracentesis. 

 

CLINICAL INDICATION:    Ascites and shortness of breath.  

 

COMPARISON:   No prior studies are available for comparison.  

 

TECHNIQUE:   

The risks, benefits, and alternatives were explained to the patient and/or the patient's family, inc
luding but not limited to bleeding, infection, pain, visceral or vascular damage, shock, and death. 
 The patient and/or the patient's family understood the risks and the alternatives and wished to pro
ceed with the procedure.  Informed written consent was obtained. A procedural time out was performed
.  The patient's name, date of birth, and procedure to be performed were verified.

 

Utilizing ultrasound guidance, optimal location for entry to the peritoneal cavity was ascertained. 
 The overlying skin was prepped and draped in the usual sterile fashion.  Approximately 10 ml of 1% 
Xylocaine was injected locally for pain control.  Using ultrasound guidance, an 8 [place] catheter wa
s introduced into the peritoneal cavity in the right lower quadrant without difficulty.  

 

FINDINGS:

Initial images demonstrate ascites.  Approximately 3.2 liters of serous fluid was aspirated and disc
arded. The patient tolerated the procedure well without complication.

 

IMPRESSION:

1.  Successful ultrasound-guided paracentesis.  

 

RPTAT: QQ

_____________________________________________ 

.Rene De La Cruz MD, MD           Date    Time 

Electronically viewed and signed by .Rene De La Cruz MD, MD on 06/26/2017 14:42 

 

D:  06/26/2017 14:42  T:  06/26/2017 14:42

.R/

## 2017-07-01 ENCOUNTER — HOSPITAL ENCOUNTER (EMERGENCY)
Dept: HOSPITAL 10 - E/R | Age: 57
Discharge: HOME | End: 2017-07-01
Payer: MEDICAID

## 2017-07-01 VITALS
BODY MASS INDEX: 28.12 KG/M2 | BODY MASS INDEX: 28.12 KG/M2 | HEIGHT: 63 IN | WEIGHT: 158.73 LBS | WEIGHT: 158.73 LBS | HEIGHT: 63 IN

## 2017-07-01 DIAGNOSIS — Z79.4: ICD-10-CM

## 2017-07-01 DIAGNOSIS — E11.65: ICD-10-CM

## 2017-07-01 DIAGNOSIS — E87.5: ICD-10-CM

## 2017-07-01 DIAGNOSIS — R18.8: Primary | ICD-10-CM

## 2017-07-01 LAB
ABNORMAL IP MESSAGE: 1
ALBUMIN SERPL-MCNC: 2.4 G/DL (ref 3.3–4.9)
ALBUMIN/GLOB SERPL: 0.77 {RATIO}
ALP SERPL-CCNC: 637 IU/L (ref 42–121)
ALT SERPL-CCNC: 63 IU/L (ref 13–69)
ANION GAP SERPL CALC-SCNC: 10 MMOL/L (ref 8–16)
APTT BLD: 35.9 SEC (ref 25–35)
AST SERPL-CCNC: 116 IU/L (ref 15–46)
BASOPHILS # BLD AUTO: 0.1 10^3/UL (ref 0–0.1)
BASOPHILS NFR BLD: 1 % (ref 0–2)
BILIRUB DIRECT SERPL-MCNC: 0 MG/DL (ref 0–0.2)
BILIRUB SERPL-MCNC: 0.9 MG/DL (ref 0.2–1.3)
BUN SERPL-MCNC: 15 MG/DL (ref 7–20)
CALCIUM SERPL-MCNC: 7.6 MG/DL (ref 8.4–10.2)
CHLORIDE SERPL-SCNC: 97 MMOL/L (ref 97–110)
CO2 SERPL-SCNC: 22 MMOL/L (ref 21–31)
CREAT SERPL-MCNC: 0.94 MG/DL (ref 0.61–1.24)
EOSINOPHIL # BLD: 0.1 10^3/UL (ref 0–0.5)
EOSINOPHIL NFR BLD: 1.9 % (ref 0–7)
ERYTHROCYTE [DISTWIDTH] IN BLOOD BY AUTOMATED COUNT: 17 % (ref 11.5–14.5)
GLOBULIN SER-MCNC: 3.1 G/DL (ref 1.3–3.2)
GLUCOSE SERPL-MCNC: 295 MG/DL (ref 70–220)
HCT VFR BLD CALC: 27.1 % (ref 42–52)
HGB BLD-MCNC: 8.6 G/DL (ref 14–18)
INR PPP: 1.65
LYMPHOCYTES # BLD AUTO: 0.6 10^3/UL (ref 0.8–2.9)
LYMPHOCYTES NFR BLD AUTO: 7.5 % (ref 15–51)
MCH RBC QN AUTO: 28.1 PG (ref 29–33)
MCHC RBC AUTO-ENTMCNC: 31.7 G/DL (ref 32–37)
MCV RBC AUTO: 88.6 FL (ref 82–101)
MONOCYTES # BLD: 0.7 10^3/UL (ref 0.3–0.9)
MONOCYTES NFR BLD: 9 % (ref 0–11)
NEUTROPHILS # BLD: 5.9 10^3/UL (ref 1.6–7.5)
NEUTROPHILS NFR BLD AUTO: 80.2 % (ref 39–77)
NRBC # BLD MANUAL: 0 10^3/UL (ref 0–0)
NRBC BLD QL: 0 /100WBC (ref 0–0)
PLATELET # BLD: 185 10^3/UL (ref 140–415)
PMV BLD AUTO: 10.8 FL (ref 7.4–10.4)
POTASSIUM SERPL-SCNC: 5.3 MMOL/L (ref 3.5–5.1)
PROT SERPL-MCNC: 5.5 G/DL (ref 6.1–8.1)
PROTHROMBIN TIME: 19.6 SEC (ref 12.2–14.2)
PT RATIO: 1.5
RBC # BLD AUTO: 3.06 10^6/UL (ref 4.7–6.1)
SODIUM SERPL-SCNC: 124 MMOL/L (ref 135–144)
WBC # BLD AUTO: 7.3 10^3/UL (ref 4.8–10.8)

## 2017-07-01 PROCEDURE — 76705 ECHO EXAM OF ABDOMEN: CPT

## 2017-07-01 PROCEDURE — 83690 ASSAY OF LIPASE: CPT

## 2017-07-01 PROCEDURE — 36415 COLL VENOUS BLD VENIPUNCTURE: CPT

## 2017-07-01 PROCEDURE — 96374 THER/PROPH/DIAG INJ IV PUSH: CPT

## 2017-07-01 PROCEDURE — 85610 PROTHROMBIN TIME: CPT

## 2017-07-01 PROCEDURE — 96375 TX/PRO/DX INJ NEW DRUG ADDON: CPT

## 2017-07-01 PROCEDURE — 85025 COMPLETE CBC W/AUTO DIFF WBC: CPT

## 2017-07-01 PROCEDURE — 80053 COMPREHEN METABOLIC PANEL: CPT

## 2017-07-01 PROCEDURE — 85730 THROMBOPLASTIN TIME PARTIAL: CPT

## 2017-07-01 RX ADMIN — DEXAMETHASONE SODIUM PHOSPHATE STA MG: 10 INJECTION, SOLUTION INTRAMUSCULAR; INTRAVENOUS at 12:28

## 2017-07-01 RX ADMIN — DEXAMETHASONE SODIUM PHOSPHATE STA MG: 10 INJECTION, SOLUTION INTRAMUSCULAR; INTRAVENOUS at 11:09

## 2017-07-01 NOTE — ERD
ER Documentation


Chief Complaint


Date/Time


DATE: 7/1/17 


TIME: 14:32


Chief Complaint


generalized abdominal pain, abdominal distention - for paracentesis





HPI


This 57-year-old male presents for abdominal pain secondary distention from his 

ascites.  He has liver failure and presents routinely to this ER for 

paracenteses.  He gets most of his treatment done including liver stenting done 

at [facility].  They have not arrange proper clinic follow-up yet.  He states that he 

has his normal pain with no fevers or chills.  No increasing abdominal pain.  

He also has bilateral lower extremity edema is been going on for a few weeks.  

He is on spironolactone and Lasix at home.  He has not taken his diabetic 

medications yet.  Otherwise feels well.  He does request something for pain 

while he waits for the paracentesis to be performed.





ROS


All systems reviewed and are negative except as per history of present illness.





Medications


Home Meds


Active Scripts


Insulin Aspart* (Novolog Insulin Pen*) 100 Unit/Ml Soln, 10 UNIT SC WITH MEALS 

BEDTIME for 28 Days


   Prov:HANNAH CASTELLANO MD         11/15/16


Reported Medications


Insulin Glargine* (Lantus*) 100 Unit/Ml Soln, 25 UNIT SC QHS, #1 VIAL


   11/11/16


Losartan Potassium* (Cozaar*) 25 Mg Tablet, 25 MG PO DAILY, #30 TAB


   11/11/16


Pantoprazole* (Protonix*) 40 Mg Tablet.dr, 40 MG PO DAILY, TAB


   11/11/16


Discontinued Scripts


Ondansetron (Zofran Odt) 4 Mg Tab.rapdis, 4 MG PO Q6 for NAUSEA, #14


   Prov:WILMA MCLEOD DO         5/29/17





Allergies


Allergies:  


Coded Allergies:  


     No Known Drug Allergy (Verified  Allergy, Unknown, 7/1/17)





PMhx/Soc


History of Surgery:  Yes (stent placement)


Anesthesia Reaction:  No


Hx Neurological Disorder:  No


Hx Respiratory Disorders:  No


Hx Cardiac Disorders:  No


Hx Psychiatric Problems:  No


Hx Miscellaneous Medical Probl:  Yes (liver cirrhosis, regular paracentises, DM)


Hx Alcohol Use:  Yes (NO LONGER DRINKS)


Hx Substance Use:  No


Hx Tobacco Use:  No


Smoking Status:  Never smoker





Physical Exam


Vitals





Vital Signs








  Date Time  Temp Pulse Resp B/P Pulse Ox O2 Delivery O2 Flow Rate FiO2


 


7/1/17 11:03 97.7 83 19 108/69 100   








Physical Exam


Const:  []            No distress, appears comfortable


Head:   Atraumatic 


Eyes:    Normal Conjunctiva


ENT:    Normal External Ears, Nose and Mouth.


Neck:               Full range of motion..~ No meningismus.


Resp:    Clear to auscultation bilaterally


Cardio:    Regular rate and rhythm, no murmurs


Abd:    Soft, non tender, non distended. Normal bowel sounds


Skin:    No petechiae or rashes


Ext:    No cyanosis, 2+ bilateral lower extremity pitting edema


Neur:    Awake and alert and oriented 3, no focal deficits


Psych:    Normal Mood and Affect


Result Diagram:  


7/1/17 1135                                                                    

            7/1/17 1135





Results 24 hrs





 Laboratory Tests








Test


  7/1/17


11:35


 


White Blood Count 7.310^3/ul 


 


Red Blood Count 3.0610^6/ul 


 


Hemoglobin 8.6g/dl 


 


Hematocrit 27.1% 


 


Mean Corpuscular Volume 88.6fl 


 


Mean Corpuscular Hemoglobin 28.1pg 


 


Mean Corpuscular Hemoglobin


Concent 31.7g/dl 


 


 


Red Cell Distribution Width 17.0% 


 


Platelet Count 19763^3/UL 


 


Mean Platelet Volume 10.8fl 


 


Neutrophils % 80.2% 


 


Lymphocytes % 7.5% 


 


Monocytes % 9.0% 


 


Eosinophils % 1.9% 


 


Basophils % 1.0% 


 


Nucleated Red Blood Cells % 0.0/100WBC 


 


Neutrophils # 5.910^3/ul 


 


Lymphocytes # 0.610^3/ul 


 


Monocytes # 0.710^3/ul 


 


Eosinophils # 0.110^3/ul 


 


Basophils # 0.110^3/ul 


 


Nucleated Red Blood Cells # 0.010^3/ul 


 


Prothrombin Time 19.6Sec 


 


Prothrombin Time Ratio 1.5 


 


INR International Normalized


Ratio 1.65 


 


 


Activated Partial


Thromboplast Time 35.9Sec 


 


 


Sodium Level 124mmol/L 


 


Potassium Level 5.3mmol/L 


 


Chloride Level 97mmol/L 


 


Carbon Dioxide Level 22mmol/L 


 


Anion Gap 10 


 


Blood Urea Nitrogen 15mg/dl 


 


Creatinine 0.94mg/dl 


 


Glucose Level 295mg/dl 


 


Calcium Level 7.6mg/dl 


 


Total Bilirubin 0.9mg/dl 


 


Direct Bilirubin 0.00mg/dl 


 


Indirect Bilirubin 0.9mg/dl 


 


Aspartate Amino Transf


(AST/SGOT) 116IU/L 


 


 


Alanine Aminotransferase


(ALT/SGPT) 63IU/L 


 


 


Alkaline Phosphatase 637IU/L 


 


Total Protein 5.5g/dl 


 


Albumin 2.4g/dl 


 


Globulin 3.10g/dl 


 


Albumin/Globulin Ratio 0.77 


 


Lipase 42U/L 








 Current Medications








 Medications


  (Trade)  Dose


 Ordered  Sig/Johnathan


 Route


 PRN Reason  Start Time


 Stop Time Status Last Admin


Dose Admin


 


 Ondansetron HCl


  (Zofran Inj)  4 mg  ONCE  STAT


 IV


   7/1/17 11:09


 7/1/17 11:11 DC 7/1/17 12:28


 


 


 Acetaminophen/


 Hydrocodone Bitart


  (Norco (5/325))  1 tab  ONCE  ONCE


 PO


   7/1/17 11:30


 7/1/17 11:31 DC  


 


 


 Morphine Sulfate


  (morphine)  4 mg  ONCE  STAT


 IV


   7/1/17 12:16


 7/1/17 12:18 DC 7/1/17 12:29


 


 


 Furosemide


  (Lasix)  20 mg  ONCE  ONCE


 IV


   7/1/17 12:30


 7/1/17 12:31 DC 7/1/17 12:29


 











Procedures/MDM


57-year-old male with recurrent ascites as well as hyperkalemia, hyperglycemia 

and bilateral lower extremity edema.  Paracentesis was was not performed by 

radiology because the patient did have significant ascites.  This is consistent 

with him having stenting to help remove the abdominal fluid.  According to the 

patient this is increased his peripheral edema in his feet.  Patient's pain is 

completely resolved.  I had given him 4 of morphine along with 4 of Zofran 

initially.  I have very low concern for spontaneous bacterial peritonitis 

patient has no white count fever chills or other symptoms out of the ordinary.  

He does have a mildly elevated potassium of 5.3.  I am prescribing him 

Kayexalate as he is responsible patient and agrees to take it at home.  He has 

hyponatremia approximately at his baseline of 126.  Has hyperglycemia and is 

going to take his diabetic medications when he goes home so I do not want to 

give him any insulin or treated here aware that he may become hypoglycemic if I 

do so when he takes his normal medications.  Recommending primary care follow-

up in 1-2 days as well as strict return precautions to the ER





Ultrasound interpretation: Minimal ascitic fluid.





Departure


Diagnosis:  


 Primary Impression:  


 Peripheral edema


 Additional Impressions:  


 Ascites of liver


 Hyperglycemia


 Hyperkalemia


Condition:  Stable


Patient Instructions:  Ascites, Peripheral Edema, Bilateral





Additional Instructions:  


Call your primary care doctor TOMORROW for an appointment during the next 1-2 

days.See the doctor sooner or return here if your condition worsens before your 

appointment time.











WILMA MCLEOD DO Jul 1, 2017 14:40

## 2017-07-01 NOTE — RADRPT
PROCEDURE:   US Abdomen. 

 

CLINICAL INDICATION:  Ascites

 

TECHNIQUE:   Multiple real-time images were acquired of the patient's abdomen using a high resolutio
n linear transducer. 

 

COMPARISON:   June 26, 2017 

 

FINDINGS:

At this time, no significant pocket of fluid was identified within the abdomen.  Abundant distended 
bowel loops are present.  At this time, ultrasound-guided paracentesis is not safe for the patient.

 

IMPRESSION:

Minimal ascites.  No definite pocket of fluid was seen for safe ultrasound-guided paracentesis.

 

These findings were discussed with the patient at the completion of the exam.

 

RPTAT: QQ

_____________________________________________ 

.Do Maravilla MD, MD           Date    Time 

Electronically viewed and signed by .Do Maravilla MD, MD on 07/01/2017 14:24 

 

D:  07/01/2017 14:24  T:  07/01/2017 14:24

.F/

## 2017-08-12 ENCOUNTER — HOSPITAL ENCOUNTER (INPATIENT)
Dept: HOSPITAL 10 - E/R | Age: 57
LOS: 13 days | Discharge: HOME HEALTH | DRG: 871 | End: 2017-08-25
Attending: INTERNAL MEDICINE | Admitting: INTERNAL MEDICINE
Payer: MEDICAID

## 2017-08-12 VITALS — HEART RATE: 78 BPM

## 2017-08-12 VITALS
WEIGHT: 129.63 LBS | WEIGHT: 129.63 LBS | HEIGHT: 63 IN | HEIGHT: 63 IN | BODY MASS INDEX: 22.97 KG/M2 | BODY MASS INDEX: 22.97 KG/M2

## 2017-08-12 VITALS — SYSTOLIC BLOOD PRESSURE: 137 MMHG | DIASTOLIC BLOOD PRESSURE: 68 MMHG | RESPIRATION RATE: 16 BRPM | HEART RATE: 77 BPM

## 2017-08-12 VITALS — DIASTOLIC BLOOD PRESSURE: 93 MMHG | SYSTOLIC BLOOD PRESSURE: 149 MMHG | RESPIRATION RATE: 20 BRPM

## 2017-08-12 VITALS — HEART RATE: 86 BPM | SYSTOLIC BLOOD PRESSURE: 140 MMHG | DIASTOLIC BLOOD PRESSURE: 70 MMHG | RESPIRATION RATE: 16 BRPM

## 2017-08-12 VITALS — SYSTOLIC BLOOD PRESSURE: 138 MMHG | DIASTOLIC BLOOD PRESSURE: 103 MMHG | RESPIRATION RATE: 18 BRPM

## 2017-08-12 VITALS — SYSTOLIC BLOOD PRESSURE: 163 MMHG | RESPIRATION RATE: 16 BRPM | DIASTOLIC BLOOD PRESSURE: 78 MMHG | HEART RATE: 91 BPM

## 2017-08-12 VITALS — HEART RATE: 75 BPM

## 2017-08-12 VITALS — HEART RATE: 90 BPM

## 2017-08-12 VITALS — RESPIRATION RATE: 20 BRPM | SYSTOLIC BLOOD PRESSURE: 146 MMHG | DIASTOLIC BLOOD PRESSURE: 83 MMHG

## 2017-08-12 VITALS — SYSTOLIC BLOOD PRESSURE: 135 MMHG | HEART RATE: 76 BPM | DIASTOLIC BLOOD PRESSURE: 66 MMHG | RESPIRATION RATE: 16 BRPM

## 2017-08-12 VITALS — DIASTOLIC BLOOD PRESSURE: 72 MMHG | SYSTOLIC BLOOD PRESSURE: 142 MMHG | HEART RATE: 75 BPM | RESPIRATION RATE: 16 BRPM

## 2017-08-12 VITALS — HEART RATE: 74 BPM | SYSTOLIC BLOOD PRESSURE: 148 MMHG | RESPIRATION RATE: 16 BRPM | DIASTOLIC BLOOD PRESSURE: 69 MMHG

## 2017-08-12 VITALS — HEART RATE: 95 BPM

## 2017-08-12 DIAGNOSIS — I25.10: ICD-10-CM

## 2017-08-12 DIAGNOSIS — R04.0: ICD-10-CM

## 2017-08-12 DIAGNOSIS — K72.90: ICD-10-CM

## 2017-08-12 DIAGNOSIS — E11.43: ICD-10-CM

## 2017-08-12 DIAGNOSIS — K70.30: ICD-10-CM

## 2017-08-12 DIAGNOSIS — F10.21: ICD-10-CM

## 2017-08-12 DIAGNOSIS — B95.61: ICD-10-CM

## 2017-08-12 DIAGNOSIS — R65.20: ICD-10-CM

## 2017-08-12 DIAGNOSIS — K31.84: ICD-10-CM

## 2017-08-12 DIAGNOSIS — Z95.5: ICD-10-CM

## 2017-08-12 DIAGNOSIS — G92: ICD-10-CM

## 2017-08-12 DIAGNOSIS — A41.9: Primary | ICD-10-CM

## 2017-08-12 DIAGNOSIS — Z96.89: ICD-10-CM

## 2017-08-12 DIAGNOSIS — M50.222: ICD-10-CM

## 2017-08-12 DIAGNOSIS — Z79.4: ICD-10-CM

## 2017-08-12 DIAGNOSIS — K31.89: ICD-10-CM

## 2017-08-12 DIAGNOSIS — E11.65: ICD-10-CM

## 2017-08-12 DIAGNOSIS — Z95.1: ICD-10-CM

## 2017-08-12 DIAGNOSIS — K21.9: ICD-10-CM

## 2017-08-12 DIAGNOSIS — D69.6: ICD-10-CM

## 2017-08-12 DIAGNOSIS — D64.9: ICD-10-CM

## 2017-08-12 DIAGNOSIS — B37.49: ICD-10-CM

## 2017-08-12 DIAGNOSIS — Z87.891: ICD-10-CM

## 2017-08-12 LAB
ABNORMAL IP MESSAGE: 1
ABNORMAL IP MESSAGE: 1
ADD UMIC: YES
ALBUMIN SERPL-MCNC: 4.3 G/DL (ref 3.3–4.9)
ALBUMIN/GLOB SERPL: 0.91 {RATIO}
ALP SERPL-CCNC: 276 IU/L (ref 42–121)
ALT SERPL-CCNC: 111 IU/L (ref 13–69)
ANION GAP SERPL CALC-SCNC: 17 MMOL/L (ref 8–16)
APTT BLD: 34.2 SEC (ref 25–35)
AST SERPL-CCNC: 83 IU/L (ref 15–46)
BASOPHILS # BLD AUTO: 0 10^3/UL (ref 0–0.1)
BASOPHILS # BLD AUTO: 0 10^3/UL (ref 0–0.1)
BASOPHILS NFR BLD: 0.2 % (ref 0–2)
BASOPHILS NFR BLD: 0.4 % (ref 0–2)
BILIRUB DIRECT SERPL-MCNC: 0 MG/DL (ref 0–0.2)
BILIRUB SERPL-MCNC: 1.8 MG/DL (ref 0.2–1.3)
BUN SERPL-MCNC: 18 MG/DL (ref 7–20)
CALCIUM SERPL-MCNC: 10.2 MG/DL (ref 8.4–10.2)
CHLORIDE SERPL-SCNC: 95 MMOL/L (ref 97–110)
CO2 SERPL-SCNC: 25 MMOL/L (ref 21–31)
COLOR UR: (no result)
CREAT SERPL-MCNC: 0.94 MG/DL (ref 0.61–1.24)
EOSINOPHIL # BLD: 0 10^3/UL (ref 0–0.5)
EOSINOPHIL # BLD: 0.1 10^3/UL (ref 0–0.5)
EOSINOPHIL NFR BLD: 0 % (ref 0–7)
EOSINOPHIL NFR BLD: 0.6 % (ref 0–7)
ERYTHROCYTE [DISTWIDTH] IN BLOOD BY AUTOMATED COUNT: 17.3 % (ref 11.5–14.5)
ERYTHROCYTE [DISTWIDTH] IN BLOOD BY AUTOMATED COUNT: 17.4 % (ref 11.5–14.5)
GLOBULIN SER-MCNC: 4.7 G/DL (ref 1.3–3.2)
GLUCOSE SERPL-MCNC: 198 MG/DL (ref 70–220)
GLUCOSE UR STRIP-MCNC: NEGATIVE MG/DL
HCT VFR BLD CALC: 31.1 % (ref 42–52)
HCT VFR BLD CALC: 35.2 % (ref 42–52)
HGB BLD-MCNC: 10.3 G/DL (ref 14–18)
HGB BLD-MCNC: 11.6 G/DL (ref 14–18)
INR PPP: 1.51
KETONES UR STRIP.AUTO-MCNC: NEGATIVE MG/DL
LYMPHOCYTES # BLD AUTO: 0.5 10^3/UL (ref 0.8–2.9)
LYMPHOCYTES # BLD AUTO: 0.5 10^3/UL (ref 0.8–2.9)
LYMPHOCYTES NFR BLD AUTO: 2.7 % (ref 15–51)
LYMPHOCYTES NFR BLD AUTO: 4.2 % (ref 15–51)
MCH RBC QN AUTO: 27.8 PG (ref 29–33)
MCH RBC QN AUTO: 28.1 PG (ref 29–33)
MCHC RBC AUTO-ENTMCNC: 33 G/DL (ref 32–37)
MCHC RBC AUTO-ENTMCNC: 33.1 G/DL (ref 32–37)
MCV RBC AUTO: 84.4 FL (ref 82–101)
MCV RBC AUTO: 84.7 FL (ref 82–101)
MONOCYTES # BLD: 0.7 10^3/UL (ref 0.3–0.9)
MONOCYTES # BLD: 1 10^3/UL (ref 0.3–0.9)
MONOCYTES NFR BLD: 5.5 % (ref 0–11)
MONOCYTES NFR BLD: 6.7 % (ref 0–11)
NEUTROPHILS # BLD: 16.6 10^3/UL (ref 1.6–7.5)
NEUTROPHILS # BLD: 9.7 10^3/UL (ref 1.6–7.5)
NEUTROPHILS NFR BLD AUTO: 87.7 % (ref 39–77)
NEUTROPHILS NFR BLD AUTO: 91.1 % (ref 39–77)
NITRITE UR QL STRIP.AUTO: NEGATIVE MG/DL
NRBC # BLD MANUAL: 0 10^3/UL (ref 0–0)
NRBC # BLD MANUAL: 0 10^3/UL (ref 0–0)
NRBC BLD AUTO-RTO: 0 /100WBC (ref 0–0)
NRBC BLD AUTO-RTO: 0 /100WBC (ref 0–0)
PLATELET # BLD: 147 10^3/UL (ref 140–415)
PLATELET # BLD: 154 10^3/UL (ref 140–415)
PMV BLD AUTO: 11 FL (ref 7.4–10.4)
PMV BLD AUTO: 11.5 FL (ref 7.4–10.4)
POSITIVE DIFF: (no result)
POSITIVE DIFF: (no result)
POTASSIUM SERPL-SCNC: 4.2 MMOL/L (ref 3.5–5.1)
PROT SERPL-MCNC: 9 G/DL (ref 6.1–8.1)
PROTHROMBIN TIME: 18.4 SEC (ref 12.2–14.2)
PT RATIO: 1.4
RBC # BLD AUTO: 3.67 10^6/UL (ref 4.7–6.1)
RBC # BLD AUTO: 4.17 10^6/UL (ref 4.7–6.1)
RBC # UR AUTO: (no result) MG/DL
SODIUM SERPL-SCNC: 133 MMOL/L (ref 135–144)
TROPONIN I SERPL-MCNC: 0.04 NG/ML (ref 0–0.12)
UR ASCORBIC ACID: NEGATIVE MG/DL
UR BILIRUBIN (DIP): NEGATIVE MG/DL
UR CLARITY: CLEAR
UR PH (DIP): 8 (ref 5–9)
UR RBC: 111 /HPF (ref 0–5)
UR SPECIFIC GRAVITY (DIP): 1.01 (ref 1–1.03)
UR TOTAL PROTEIN (DIP): NEGATIVE MG/DL
UROBILINOGEN UR STRIP-ACNC: NEGATIVE MG/DL
WBC # BLD AUTO: 11 10^3/UL (ref 4.8–10.8)
WBC # BLD AUTO: 18.2 10^3/UL (ref 4.8–10.8)
WBC # UR STRIP: NEGATIVE LEU/UL

## 2017-08-12 PROCEDURE — 97162 PT EVAL MOD COMPLEX 30 MIN: CPT

## 2017-08-12 PROCEDURE — 81001 URINALYSIS AUTO W/SCOPE: CPT

## 2017-08-12 PROCEDURE — 86901 BLOOD TYPING SEROLOGIC RH(D): CPT

## 2017-08-12 PROCEDURE — 80048 BASIC METABOLIC PNL TOTAL CA: CPT

## 2017-08-12 PROCEDURE — 96375 TX/PRO/DX INJ NEW DRUG ADDON: CPT

## 2017-08-12 PROCEDURE — P9016 RBC LEUKOCYTES REDUCED: HCPCS

## 2017-08-12 PROCEDURE — 76705 ECHO EXAM OF ABDOMEN: CPT

## 2017-08-12 PROCEDURE — 87086 URINE CULTURE/COLONY COUNT: CPT

## 2017-08-12 PROCEDURE — 36569 INSJ PICC 5 YR+ W/O IMAGING: CPT

## 2017-08-12 PROCEDURE — 74000: CPT

## 2017-08-12 PROCEDURE — 82270 OCCULT BLOOD FECES: CPT

## 2017-08-12 PROCEDURE — 97530 THERAPEUTIC ACTIVITIES: CPT

## 2017-08-12 PROCEDURE — 74177 CT ABD & PELVIS W/CONTRAST: CPT

## 2017-08-12 PROCEDURE — 82310 ASSAY OF CALCIUM: CPT

## 2017-08-12 PROCEDURE — 70450 CT HEAD/BRAIN W/O DYE: CPT

## 2017-08-12 PROCEDURE — 93306 TTE W/DOPPLER COMPLETE: CPT

## 2017-08-12 PROCEDURE — P9612 CATHETERIZE FOR URINE SPEC: HCPCS

## 2017-08-12 PROCEDURE — P9059 PLASMA, FRZ BETWEEN 8-24HOUR: HCPCS

## 2017-08-12 PROCEDURE — A4310 INSERT TRAY W/O BAG/CATH: HCPCS

## 2017-08-12 PROCEDURE — C9113 INJ PANTOPRAZOLE SODIUM, VIA: HCPCS

## 2017-08-12 PROCEDURE — 93005 ELECTROCARDIOGRAM TRACING: CPT

## 2017-08-12 PROCEDURE — 86900 BLOOD TYPING SEROLOGIC ABO: CPT

## 2017-08-12 PROCEDURE — 84484 ASSAY OF TROPONIN QUANT: CPT

## 2017-08-12 PROCEDURE — 85025 COMPLETE CBC W/AUTO DIFF WBC: CPT

## 2017-08-12 PROCEDURE — P9047 ALBUMIN (HUMAN), 25%, 50ML: HCPCS

## 2017-08-12 PROCEDURE — 82140 ASSAY OF AMMONIA: CPT

## 2017-08-12 PROCEDURE — 80053 COMPREHEN METABOLIC PANEL: CPT

## 2017-08-12 PROCEDURE — 96374 THER/PROPH/DIAG INJ IV PUSH: CPT

## 2017-08-12 PROCEDURE — 30233K1 TRANSFUSION OF NONAUTOLOGOUS FROZEN PLASMA INTO PERIPHERAL VEIN, PERCUTANEOUS APPROACH: ICD-10-PCS | Performed by: INTERNAL MEDICINE

## 2017-08-12 PROCEDURE — 78806: CPT

## 2017-08-12 PROCEDURE — 83690 ASSAY OF LIPASE: CPT

## 2017-08-12 PROCEDURE — 86850 RBC ANTIBODY SCREEN: CPT

## 2017-08-12 PROCEDURE — 83036 HEMOGLOBIN GLYCOSYLATED A1C: CPT

## 2017-08-12 PROCEDURE — 85014 HEMATOCRIT: CPT

## 2017-08-12 PROCEDURE — 85730 THROMBOPLASTIN TIME PARTIAL: CPT

## 2017-08-12 PROCEDURE — 86920 COMPATIBILITY TEST SPIN: CPT

## 2017-08-12 PROCEDURE — 76937 US GUIDE VASCULAR ACCESS: CPT

## 2017-08-12 PROCEDURE — 72149 MRI LUMBAR SPINE W/DYE: CPT

## 2017-08-12 PROCEDURE — 72146 MRI CHEST SPINE W/O DYE: CPT

## 2017-08-12 PROCEDURE — 87040 BLOOD CULTURE FOR BACTERIA: CPT

## 2017-08-12 PROCEDURE — 82962 GLUCOSE BLOOD TEST: CPT

## 2017-08-12 PROCEDURE — 36430 TRANSFUSION BLD/BLD COMPNT: CPT

## 2017-08-12 PROCEDURE — 85018 HEMOGLOBIN: CPT

## 2017-08-12 PROCEDURE — 84100 ASSAY OF PHOSPHORUS: CPT

## 2017-08-12 PROCEDURE — A9570 INDIUM IN-111 AUTO WBC: HCPCS

## 2017-08-12 PROCEDURE — 71010: CPT

## 2017-08-12 PROCEDURE — 83735 ASSAY OF MAGNESIUM: CPT

## 2017-08-12 PROCEDURE — 85610 PROTHROMBIN TIME: CPT

## 2017-08-12 PROCEDURE — 80202 ASSAY OF VANCOMYCIN: CPT

## 2017-08-12 PROCEDURE — 36415 COLL VENOUS BLD VENIPUNCTURE: CPT

## 2017-08-12 RX ADMIN — CEFEPIME SCH MLS/HR: 1 INJECTION, POWDER, FOR SOLUTION INTRAMUSCULAR; INTRAVENOUS at 21:19

## 2017-08-12 RX ADMIN — LORAZEPAM ONE MG: 2 INJECTION, SOLUTION INTRAMUSCULAR; INTRAVENOUS at 04:10

## 2017-08-12 RX ADMIN — LORAZEPAM ONE MG: 2 INJECTION, SOLUTION INTRAMUSCULAR; INTRAVENOUS at 04:08

## 2017-08-12 RX ADMIN — PANTOPRAZOLE SODIUM SCH MG: 40 INJECTION, POWDER, FOR SOLUTION INTRAVENOUS at 10:45

## 2017-08-12 RX ADMIN — PANTOPRAZOLE SODIUM SCH MG: 40 INJECTION, POWDER, FOR SOLUTION INTRAVENOUS at 18:23

## 2017-08-12 RX ADMIN — LACTULOSE SCH ML: 10 SOLUTION ORAL; RECTAL at 10:45

## 2017-08-12 RX ADMIN — LORAZEPAM PRN MG: 2 INJECTION, SOLUTION INTRAMUSCULAR; INTRAVENOUS at 15:51

## 2017-08-12 RX ADMIN — LACTULOSE SCH ML: 10 SOLUTION ORAL; RECTAL at 21:45

## 2017-08-12 RX ADMIN — FOLIC ACID SCH MLS/HR: 5 INJECTION, SOLUTION INTRAMUSCULAR; INTRAVENOUS; SUBCUTANEOUS at 10:26

## 2017-08-12 NOTE — ERA
ER Documentation


Chief Complaint


Date/Time


DATE: 8/12/17 


TIME: 04:06


Chief Complaint


BIBA [id number],confused per family report





HPI


This 57-year-old male is brought in by ambulance after family called 911 

because the patient is more altered than usual.  He does have a history of 

liver failure and has had hepatic encephalopathy in the past.  Patient himself 

is unable to answer questions appropriately.  Family reports no fevers or 

trauma.





ROS


Unobtainable





Medications


Home Meds


Active Scripts


Sodium Polystyrene Sulfonate (Kayexalate) 15 Gm/60 Ml Susp, 15 GM PO BID for 3 

Days, ML


   Prov:WILMA MCLEOD DO         7/1/17


Reported Medications


Insulin Glargine* (Lantus*) 100 Unit/Ml Soln, 0 SC BID, #1 VIAL


   8/12/17


Losartan Potassium* (Cozaar*) 25 Mg Tablet, 25 MG PO DAILY, #30 TAB


   11/11/16


Pantoprazole* (Protonix*) 40 Mg Tablet.dr, 40 MG PO DAILY, TAB


   11/11/16


Discontinued Reported Medications


Insulin Glargine* (Lantus*) 100 Unit/Ml Soln, 25 UNIT SC QHS, #1 VIAL


   11/11/16


Discontinued Scripts


Insulin Aspart* (Novolog Insulin Pen*) 100 Unit/Ml Soln, 10 UNIT SC WITH MEALS 

BEDTIME for 28 Days


   Prov:HANNAH CASTELLANO MD         11/15/16





Allergies


Allergies:  


Coded Allergies:  


     No Known Drug Allergy (Verified  Allergy, Unknown, 7/1/17)





PMhx/Soc


History of Surgery:  Yes (stent placement)


Anesthesia Reaction:  No


Hx Neurological Disorder:  No


Hx Respiratory Disorders:  No


Hx Cardiac Disorders:  No


Hx Psychiatric Problems:  No


Hx Miscellaneous Medical Probl:  Yes (liver cirrhosis, regular paracentises, DM)


Hx Alcohol Use:  Yes (NO LONGER DRINKS)


Hx Substance Use:  No


Hx Tobacco Use:  No


Smoking Status:  Never smoker





Physical Exam


Vitals





Vital Signs








  Date Time  Temp Pulse Resp B/P Pulse Ox O2 Delivery O2 Flow Rate FiO2


 


8/12/17 04:18  91 11 152/91 99 Room Air  


 


8/12/17 03:28 98.1 67 19 148/85 100   








Physical Exam


Const:  []           Mild distress, altered and agitated


Head:   Atraumatic 


Eyes:    Normal Conjunctiva mild scleral icterus


ENT:    Normal External Ears, Nose and Mouth.


Neck:               Full range of motion..~ No meningismus.


Resp:    Clear to auscultation bilaterally


Cardio:    Regular rate and rhythm, no murmurs


Abd:    Soft, no apparent specific tenderness, mild distention with fluid wave. 

Normal bowel sounds


Skin:    No petechiae or rashes


Back:    No midline or flank tenderness


Ext:    No cyanosis, or edema, distal pulses intact all 4 extreme


Neur:    Awake and alert, moves all 4 extremities, answers some questions.,  

Coordinated with purposeful movements per


Psych:    Anxiety and agitation


Result Diagram:  


8/12/17 0351                                                                   

             8/12/17 0351





Results 24 hrs





 Laboratory Tests








Test


  8/12/17


03:51 8/12/17


04:30


 


White Blood Count 11.010^3/ul  


 


Red Blood Count 3.6710^6/ul  


 


Hemoglobin 10.3g/dl  


 


Hematocrit 31.1%  


 


Mean Corpuscular Volume 84.7fl  


 


Mean Corpuscular Hemoglobin 28.1pg  


 


Mean Corpuscular Hemoglobin


Concent 33.1g/dl 


  


 


 


Red Cell Distribution Width 17.3%  


 


Platelet Count 66025^3/UL  


 


Mean Platelet Volume 11.5fl  


 


Neutrophils % 87.7%  


 


Lymphocytes % 4.2%  


 


Monocytes % 6.7%  


 


Eosinophils % 0.6%  


 


Basophils % 0.4%  


 


Nucleated Red Blood Cells % 0.0/100WBC  


 


Neutrophils # 9.710^3/ul  


 


Lymphocytes # 0.510^3/ul  


 


Monocytes # 0.710^3/ul  


 


Eosinophils # 0.110^3/ul  


 


Basophils # 0.010^3/ul  


 


Nucleated Red Blood Cells # 0.010^3/ul  


 


Prothrombin Time Pending  


 


Prothrombin Time Ratio 1.2  


 


INR International Normalized


Ratio Pending 


  


 


 


Activated Partial


Thromboplast Time 34.2Sec 


  


 


 


Sodium Level 133mmol/L  


 


Potassium Level 4.2mmol/L  


 


Chloride Level 95mmol/L  


 


Carbon Dioxide Level 25mmol/L  


 


Anion Gap 17  


 


Blood Urea Nitrogen 18mg/dl  


 


Creatinine 0.94mg/dl  


 


Glucose Level 198mg/dl  


 


Calcium Level 10.2mg/dl  


 


Total Bilirubin 1.8mg/dl  


 


Direct Bilirubin 0.00mg/dl  


 


Indirect Bilirubin 1.8mg/dl  


 


Aspartate Amino Transf


(AST/SGOT) 83IU/L 


  


 


 


Alanine Aminotransferase


(ALT/SGPT) 111IU/L 


  


 


 


Alkaline Phosphatase 276IU/L  


 


Ammonia 60umol/l  


 


Troponin I 0.035ng/ml  


 


Total Protein 9.0g/dl  


 


Albumin 4.3g/dl  


 


Globulin 4.70g/dl  


 


Albumin/Globulin Ratio 0.91  


 


Lipase 26U/L  


 


Urine Color  STRAW 


 


Urine Clarity  CLEAR 


 


Urine pH  8.0 


 


Urine Specific Gravity  1.006 


 


Urine Ketones  NEGATIVEmg/dL 


 


Urine Nitrite  NEGATIVEmg/dL 


 


Urine Bilirubin  NEGATIVEmg/dL 


 


Urine Urobilinogen  NEGATIVEmg/dL 


 


Urine Leukocyte Esterase  NEGATIVELeu/ul 


 


Urine Microscopic RBC  111/HPF 


 


Urine Microscopic WBC  3/HPF 


 


Urine Calcium Oxalate Crystals  MODERATE/HPF 


 


Urine Hemoglobin  3+mg/dL 


 


Urine Glucose  NEGATIVEmg/dL 


 


Urine Total Protein  NEGATIVEmg/dl 








 Current Medications








 Medications


  (Trade)  Dose


 Ordered  Sig/Johnathan


 Route


 PRN Reason  Start Time


 Stop Time Status Last Admin


Dose Admin


 


 Sodium Chloride


  (NS)  500 ml @ 


 500 mls/hr  Q1H STAT


 IV


   8/12/17 03:19


 8/12/17 04:18 DC 8/12/17 04:16


 


 


 Ondansetron HCl


  (Zofran Inj)  4 mg  ONCE  STAT


 IV


   8/12/17 03:19


 8/12/17 03:22 DC 8/12/17 04:16


 


 


 Lorazepam


  (Ativan)  1 mg  ONCE  ONCE


 IV


   8/12/17 04:30


 8/12/17 04:31 DC 8/12/17 04:10


 


 


 Lorazepam


  (Ativan)  2 mg  STK-MED ONCE


 .ROUTE


   8/12/17 04:06


 8/12/17 04:07 DC  


 











Procedures/MDM


EKG interpretation: Normal sinus rhythm rate of 68, normal axis, no ST or T-

wave changes concerning for acute ischemia, normal intervals.  Normal EKG.  

Ammonia is high and double of the upper limit of normal however not severely 

high.  Patient was very altered compared to when I have seen him in the past, 

he was thrashing about in calling out for his wife.  He did need to be 

restrained and was given 1 mg of Ativan which did help somewhat.  Is given 

lactulose which was able to tolerate.  I have very low suspicion for SBP as the 

patient is afebrile and has had hepatic encephalopathy in the past.  See no 

reason for any emergent paracentesis.  Patient is going to be admitted to 

telemetry for further monitoring during his hepatic encephalopathy causing his 

agitation.





EKG interpretation: 68 normal sinus rhythm, normal axis, no ST or T-wave 

changes concerning for acute ischemia.  Normal intervals


Cardiac monitor interpretation: Normal sinus rhythm without arrhythmia


CT head interpretation: Pending radiology read however I see no acute process.  

I see no hemorrhage, no mass-effect no midline shift, no skull fracture.





Departure


Diagnosis:  


 Primary Impression:  


 Hepatic encephalopathy


 Additional Impressions:  


 Altered mental status


 Chronic liver disease











WILMA MCLEOD DO Aug 12, 2017 04:10

## 2017-08-12 NOTE — CONS
DATE OF ADMISSION:  08/12/2017

 

DATE OF CONSULTATION:  08/12/2017

 

 

 

REASON FOR CONSULTATION:  Antibiotic management.

 

 

 

HISTORY OF PRESENT ILLNESS:  The patient is a 57-year-old male,

who was brought in by ambulance with confusion and altered mental

status.  The patient has a history of liver failure and has had

hepatic encephalopathy in the past.

 

 

 

PAST MEDICAL HISTORY:

 

1.   Coronary artery disease with stent placement.

2.   Cirrhosis of liver.

3.   Diabetes mellitus.

4.   Regular paracenteses for ascites.

5.   Operations as outlined.

 

 

On admission, patient's white count was 11,000, H and H of 10.3

and 31.1, platelet count 247,000.  BUN and creatinine 18/0.94,

glucose 198.

 

 

 

PAST SURGICAL HISTORY:  Status post portal shunt in 2017 and

cardiac stenting with 3 stents.  He has a history of esophageal

varices.  He has history of GI bleed.  Had binding of esophageal

varices in 2015.

 

 

 

FAMILY HISTORY:  Noncontributory.

 

 

 

SOCIAL HISTORY:  He was a heavy drinker.  He does not drink

anymore.  He was a former smoker.  He does not abuse drugs.

 

 

 

PHYSICAL EXAMINATION:

 

GENERAL:  Patient is confused.

 

SKIN:  Skin with ecchymoses.

 

HEENT:  Within normal limits.

 

NECK:  Supple.  Lymph nodes nonpalpable.

 

CHEST:  Decreased breath sounds at the bases.

 

HEART:  Without murmur or gallop.

 

ABDOMEN:  Soft.  There are surgical scars present.  There is also

ascites.  There is no organosplenomegaly or masses.

 

EXTREMITIES:  Without cyanosis, clubbing, or edema.

 

RECTAL:  Deferred.

 

NEUROLOGICAL:  No focal neurological abnormalities.  The patient

is markedly confused.

 

 

 

LABORATORY:  White count 11,000, H and H 10.3 and 31.1, platelet

count 147,000.  BUN and creatinine 18/0.94, that was not

mentioned previously.  Abdominal was done, which shows an NG tube

with a tip in the stomach.  HIPS device is noted in the right

upper outer quadrant.  CT of the brain, no definite acute

intracranial abnormality.  Wound care has been ordered.  Urine is

negative for nitrites and leukocyte esterase.

 

 

 

PLAN:  We will get 2 sets of blood cultures on him and observe.

I may want to treat him for possibility of spontaneous bacterial

peritonitis.  I will dictate my findings to Dr. Zavala.

 

 

 

 

 

 

 

Dictated By:  Jerrold Dreyer, MD JD/anna/dk

 

Job#:  [id number]/Document#:  [id number]

 

D:  08/12/2017 14:49

 

T:  08/12/2017 18:47

## 2017-08-12 NOTE — HP
Date/Time of Note


Date/Time of Note


DATE: 8/12/17 


TIME: 12:45





Assessment/Plan


VTE Prophylaxis


VTE Prophylaxis Intervention:  anti-embolic stocking





Lines/Catheters


IV Catheter Type (from Nrs):  Peripheral IV





Assessment/Plan


Chief Complaint/Hosp Course


1. Liver cirrhosis


2. toxic Encephalopathy


3. Anemia


4. hs pancreatitis


5. Hx alcohol abuse


6. Hx esophageal varices


7. SIRS


8. S/p portal shunt 2017


9. S/p cardiac stenting 3 stents


Problems:  


Assessment/Plan


1. ID consult


2. continue lactulose per rectum


3. H and H q 12 hours





HPI/ROS


Admit Date/Time


Admit Date/Time


Aug 12, 2017 at 05:42





Hx of Present Illness


Pt is incomprehensible. Per wife Pt reported SOB on evening 8/11/2017 and she 

called 911. There were similar confusion in past. Pt has liver cirrhosis due to 

alcohol abuse





ROS


Subjective hx not possible:  other (incompreh)





PMH/Family/Social


Past Medical History


Medical History:  coronary artery disease, diabetes, GI bleed, other (liver 

cirrhosis and pancreatitis)





Past Surgical History


Past Surgical Hx:  coronary bypass surgery (2 stent 2017, esophageal varices 

binding 2015, Portal shunt 2017)





Family History


Significant Family History:  no pertinent family hx





Social History


Alcohol Use:  heavy (before)


Smoking Status:  Former smoker


Drug Use:  none





Exam/Review of Systems


Vital Signs


Vitals





 Vital Signs








  Date Time  Temp Pulse Resp B/P Pulse Ox O2 Delivery O2 Flow Rate FiO2


 


8/12/17 11:09 98.0 95 18 138/103 100   


 


8/12/17 05:57      Room Air  











Exam


Constitutional:  other (confused)


Head:  atraumatic, normocephalic


Eyes:  icteric


ENMT:  nl external ears & nose


Neck:  supple


Respiratory:  diminished breath sounds


Cardiovascular:  regular rate and rhythm


Gastrointestinal:  other (ascitis), soft, surgical scars


Musculoskeletal:  muscle weakness


Neurological:  confused


Skin:  ecchymosis, laceration


Lymph:  enlarged





Labs


Result Diagram:  


8/12/17 0351                                                                   

             8/12/17 0351








Medications


Medications





 Current Medications


Dextrose/Sodium Chloride (D5-1/2ns) 1,000 ml @  50 mls/hr Q20H IV  Last 

administered on 8/12/17at 10:26; Admin Dose 50 MLS/HR;  Start 8/12/17 at 09:30


Pantoprazole 40 mg 40 mg BID@06,18 IV  Last administered on 8/12/17at 10:45; 

Admin Dose 40 MG;  Start 8/12/17 at 10:30


Multivitamins/ Thiamine HCl/ Folic Acid/Sodium Chloride (Mvi Adult/ Vitamin B1/

Folic Acid/NS) 1,011.2 ml  @ 125 mls/ hr DAILY@09 IVPB  Last administered on 8/ 12/17at 10:45; Admin Dose 125 MLS/HR;  Start 8/12/17 at 11:00


Lactulose (Lactulose Enema) 100 ml BID AK  Last administered on 8/12/17at 10:45

; Admin Dose 100 ML;  Start 8/12/17 at 11:00


Lorazepam (Ativan) 1 mg Q6H  PRN IV anxiety/agitation;  Start 8/12/17 at 11:00











WAI BELTRAN Aug 12, 2017 12:55

## 2017-08-12 NOTE — RADRPT
PROCEDURE:   CT Brain without contrast. 

 

CLINICAL INDICATION: Altered mental status

 

TECHNIQUE:   Axial images from the skull base through the vertex without IV contrast.  Multiplanar r
eformatted images were made.  Images were reviewed on a PACS workstation.  The CTDIvol is 43.05 mGy 
and the DLP is 720.23 mGycm.  One or more of the following dose reduction techniques were used: auto
mated exposure control, adjustment of the mA and/or kV according to patient size, or use of iterativ
e reconstruction technique.

 

COMPARISON:   11/11/2016 

 

FINDINGS:

Mild cortical atrophy without significant chronic microvascular ischemic change.  Intracranial vascu
lar calcification.  There is no evidence for territorial infarction or intracranial hemorrhage.  No 
mass or midline shift is seen.  No extra-axial fluid collection is seen.   The visualized paranasal 
sinuses and mastoids are clear. 

 

IMPRESSION:

No definite acute intracranial abnormality.

 

 

 

RPTAT: HLBE

_____________________________________________ 

Physician Dany           Date    Time 

Electronically viewed and signed by Bharti Jacobson Physician on 08/12/2017 05:42 

 

D:  08/12/2017 05:42  T:  08/12/2017 05:42

[email or web address]/

## 2017-08-12 NOTE — EN
Date/Time of Note


Date/Time of Note


DATE: 8/12/17 


TIME: 17:07





Event Note Medicine


Medicine Event Note


Rapid response note


(entry delayed 2/2 patient care)





Rapid response called overhead ~4pm.  I presented to patient's bedside. RR 

called because RN noted pt with bright red blood coming from mouth, source 

unclear. Pt not vomiting.





Vitals


HR 110s


SBP 160s


SO2 90s





exam


agitated gentleman who appears older than stated age sitting up in bed, pushing 

away suction tubing from mouth, +,small amount of bright red blood in mouth and 

on roof of mouth. no visible tongue bite marks/lacerations


ng tube taped to side of face, not attached to suction


resp non labored


+scattered ecchymoses on upper exts





labs: stat hgb 11s





56 yo M with cirrhosis admitted for AMS, RR called for bright red blood coming 

from patient's mouth. Given no hematemesis and HD stability, unlikely to be 

variceal in origin. Consider epistaxis, possibly related to irritation from NG 

tube placement


I advised that RN call primary for further guidance. As pt without current 

evidence of hemodynamic compromise, no compelling indication to transfer to 

higher level of care (ie ICU) at this time. 


Pt's family also at the bedside at time of my evaluation





Copies To:


CC:   HANNAH CASTELLANO MD, ELLEN MD Aug 12, 2017 17:12

## 2017-08-12 NOTE — CONS
Date/Time of Note


Date/Time of Note


DATE: 8/12/17 


TIME: 14:55





Consultation Date/Type/Reason


Admit Date/Time


Aug 12, 2017 at 05:42


Date of Consultation:  Aug 12, 2017


Type of Consultation:  ID


Reason for Consultation


Antibiotic management





Past Medical History


Medical History:  coronary artery disease, diabetes, GI bleed, other (liver 

cirrhosis and pancreatitis)





Past Surgical History


Past Surgical Hx:  coronary bypass surgery (2 stent 2017, esophageal varices 

binding 2015, Portal shunt 2017)





Social History


Alcohol Use:  heavy (before)


Smoking Status:  Former smoker


Drug Use:  none





Exam/Review of Systems


Vital Signs


Vitals





 Vital Signs








  Date Time  Temp Pulse Resp B/P Pulse Ox O2 Delivery O2 Flow Rate FiO2


 


8/12/17 12:20  78      


 


8/12/17 11:09 98.0  18 138/103 100   


 


8/12/17 09:00      Nasal Cannula 2.0 











Results


Result Diagram:  


8/12/17 0351                                                                   

             8/12/17 0351





Results 24 hrs





Laboratory Tests








Test


  8/12/17


03:51 8/12/17


04:30 8/12/17


09:05


 


White Blood Count 11.0  #H  


 


Red Blood Count 3.67  L  


 


Hemoglobin 10.3  L  


 


Hematocrit 31.1  L  


 


Mean Corpuscular Volume 84.7    


 


Mean Corpuscular Hemoglobin 28.1  L  


 


Mean Corpuscular Hemoglobin


Concent 33.1  


  


  


 


 


Red Cell Distribution Width 17.3  H  


 


Platelet Count 147  #  


 


Mean Platelet Volume 11.5  H  


 


Neutrophils % 87.7  H  


 


Lymphocytes % 4.2  L  


 


Monocytes % 6.7    


 


Eosinophils % 0.6    


 


Basophils % 0.4    


 


Nucleated Red Blood Cells % 0.0    


 


Neutrophils # 9.7  H  


 


Lymphocytes # 0.5  L  


 


Monocytes # 0.7    


 


Eosinophils # 0.1    


 


Basophils # 0.0    


 


Nucleated Red Blood Cells # 0.0    


 


Prothrombin Time 18.4  H  


 


Prothrombin Time Ratio 1.4    


 


INR International Normalized


Ratio 1.51  


  


  


 


 


Activated Partial


Thromboplast Time 34.2  


  


  


 


 


Sodium Level 133  L  


 


Potassium Level 4.2    


 


Chloride Level 95  L  


 


Carbon Dioxide Level 25    


 


Anion Gap 17  H  


 


Blood Urea Nitrogen 18    


 


Creatinine 0.94    


 


Glucose Level 198    


 


Calcium Level 10.2    


 


Total Bilirubin 1.8  H  


 


Direct Bilirubin 0.00    


 


Indirect Bilirubin 1.8  H  


 


Aspartate Amino Transf


(AST/SGOT) 83  H


  


  


 


 


Alanine Aminotransferase


(ALT/SGPT) 111  H


  


  


 


 


Alkaline Phosphatase 276  H  


 


Ammonia 60  H  


 


Troponin I 0.035    


 


Total Protein 9.0  H  


 


Albumin 4.3    


 


Globulin 4.70  H  


 


Albumin/Globulin Ratio 0.91    


 


Lipase 26    


 


Urine Color  STRAW   


 


Urine Clarity  CLEAR   


 


Urine pH  8.0   


 


Urine Specific Gravity  1.006   


 


Urine Ketones  NEGATIVE   


 


Urine Nitrite  NEGATIVE   


 


Urine Bilirubin  NEGATIVE   


 


Urine Urobilinogen  NEGATIVE   


 


Urine Leukocyte Esterase  NEGATIVE   


 


Urine Microscopic RBC  111  H 


 


Urine Microscopic WBC  3   


 


Urine Calcium Oxalate Crystals  MODERATE   


 


Urine Hemoglobin  3+  H 


 


Urine Glucose  NEGATIVE   


 


Urine Total Protein  NEGATIVE   


 


Bedside Glucose   221  H











Medications


Medications





 Current Medications


Dextrose/Sodium Chloride (D5-1/2ns) 1,000 ml @  50 mls/hr Q20H IV  Last 

administered on 8/12/17at 10:26; Admin Dose 50 MLS/HR;  Start 8/12/17 at 09:30


Pantoprazole 40 mg 40 mg BID@06,18 IV  Last administered on 8/12/17at 10:45; 

Admin Dose 40 MG;  Start 8/12/17 at 10:30


Multivitamins/ Thiamine HCl/ Folic Acid/Sodium Chloride (Mvi Adult/ Vitamin B1/

Folic Acid/NS) 1,011.2 ml  @ 125 mls/ hr DAILY@09 IVPB  Last administered on 8/ 12/17at 10:45; Admin Dose 125 MLS/HR;  Start 8/12/17 at 11:00


Lactulose (Lactulose Enema) 100 ml BID WV  Last administered on 8/12/17at 10:45

; Admin Dose 100 ML;  Start 8/12/17 at 11:00


Lorazepam (Ativan) 1 mg Q6H  PRN IV anxiety/agitation;  Start 8/12/17 at 11:00











DREYER,JERROLD S MD Aug 12, 2017 14:56

## 2017-08-12 NOTE — RADRPT
PROCEDURE: ABDOMINAL RADIOGRAPH

 

CLINICAL INDICATION: NG tube placement

 

TECHNIQUE:   AP abdomen x-ray. 

 

COMPARISON: None.

 

FINDINGS:

The NG tube tip is in the stomach. The bowel pattern is nonobstructive.  TIPS device is noted in the
 right upper outer quadrant.

 

IMPRESSION:

NG tube tip in the stomach.

 

RPTAT: [facility]Z

_____________________________________________ 

.Han Sanchez MD, MD           Date    Time 

Electronically viewed and signed by .Han Sanchez MD, MD on 08/12/2017 08:45 

 

D:  08/12/2017 08:45  T:  08/12/2017 08:45

.Z/

## 2017-08-12 NOTE — CONS
Date/Time of Note


Date/Time of Note


DATE: 8/12/17 


TIME: 17:42





Assessment/Plan


Assessment/Plan


Chief Complaint/Hosp Course


Impression:


1. end stage liver disease


2. mouth bleeding not GI bleeding: NG flushed clear, there is still red blood 

from mouth. This indicate that bleeding not from Stomach but from mouth


3. hepatic encephalopathy





Recommendation:


1. ENT consult for mouth bleeding


2. lactulose enema


3. transfuse to keep hgb > 8, PLT > 50, INR less than 1.5


4. NG tube is not necessary from GI perspective so can dc if primary or other 

consultant don't need it.


5. protonix 40 mg iv bid


Problems:  





Consultation Date/Type/Reason


Admit Date/Time


Aug 12, 2017 at 05:42


Date of Consultation:  Aug 12, 2017


Type of Consultation:  GI


Reason for Consultation


bleeding from mouth, management of liver failure





Hx of Present Illness


Patient non-verbal, history obtained from talking to son, nurse, and chart 

review. 





This 57-year-old male is brought in by ambulance after family called 911 

because the patient is more altered than usual.  He does have a history of 

liver failure and has had hepatic encephalopathy in the past.  Patient himself 

is unable to answer questions appropriately.  Family reports no fevers or 

trauma. Patient was not having oral bleeding but when NG tube was placed in ER. 

Apparently since the NG tube placement, patient bleeding from the mouth. Then 

family told me ER removed the denture.


Subjective hx not possible:  pt non-verbal





Past Medical History


Medical History:  coronary artery disease, diabetes, GI bleed, other (liver 

cirrhosis and pancreatitis)





Past Surgical History


Past Surgical Hx:  coronary bypass surgery (2 stent 2017, esophageal varices 

binding 2015, Portal shunt 2017)





Family History


Significant Family History:  no pertinent family hx





Social History


Alcohol Use:  none (before)


Smoking Status:  Former smoker


Drug Use:  none





Exam/Review of Systems


Vital Signs


Vitals





 Vital Signs








  Date Time  Temp Pulse Resp B/P Pulse Ox O2 Delivery O2 Flow Rate FiO2


 


8/12/17 16:20  95      


 


8/12/17 15:19 97.4  20 146/83 98   


 


8/12/17 09:00      Nasal Cannula 2.0 











Exam


Constitutional:  alert, frail, non-verbal


Head:  atraumatic, normocephalic


Eyes:  EOMI, nl lids


ENMT:  mucosa pink and moist, nl external ears & nose, nl lips & teeth, nl 

nasal mucosa & septum, other (I examined the mouth and look in with light. He 

has lesion on his tongue on the left and there is also soft clot on the left 

cheek. I flushed the NG and its initially has coffee ground but cleared yet 

mouth still bleeding.)


Neck:  non-tender, supple


Respiratory:  clear to auscultation, normal air movement


Cardiovascular:  nl pulses, regular rate and rhythm


Gastrointestinal:  bowel sounds, non-tender, other (I flushed the NG tube, it 

was coffee ground but cleared after 200 cc of water. However there is still 

fresh blood in the mouth. ), soft


Musculoskeletal:  nl extremities to inspection, nl gait and stance


Neurological:  nl mental status, nl speech, nl strength


Skin:  nl turgor, rash or lesions





Results


Result Diagram:  


8/12/17 1609                                                                   

             8/12/17 0351





Results 24 hrs





Laboratory Tests








Test


  8/12/17


03:51 8/12/17


04:30 8/12/17


09:05 8/12/17


16:09


 


White Blood Count 11.0  #H   18.2  #H


 


Red Blood Count 3.67  L   4.17  L


 


Hemoglobin 10.3  L   11.6  L


 


Hematocrit 31.1  L   35.2  L


 


Mean Corpuscular Volume 84.7     84.4  


 


Mean Corpuscular Hemoglobin 28.1  L   27.8  L


 


Mean Corpuscular Hemoglobin


Concent 33.1  


  


  


  33.0  


 


 


Red Cell Distribution Width 17.3  H   17.4  H


 


Platelet Count 147  #   154  


 


Mean Platelet Volume 11.5  H   11.0  H


 


Neutrophils % 87.7  H   91.1  H


 


Lymphocytes % 4.2  L   2.7  L


 


Monocytes % 6.7     5.5  


 


Eosinophils % 0.6     0.0  


 


Basophils % 0.4     0.2  


 


Nucleated Red Blood Cells % 0.0     0.0  


 


Neutrophils # 9.7  H   16.6  H


 


Lymphocytes # 0.5  L   0.5  L


 


Monocytes # 0.7     1.0  H


 


Eosinophils # 0.1     0.0  


 


Basophils # 0.0     0.0  


 


Nucleated Red Blood Cells # 0.0     0.0  


 


Prothrombin Time 18.4  H   


 


Prothrombin Time Ratio 1.4     


 


INR International Normalized


Ratio 1.51  


  


  


  


 


 


Activated Partial


Thromboplast Time 34.2  


  


  


  


 


 


Sodium Level 133  L   


 


Potassium Level 4.2     


 


Chloride Level 95  L   


 


Carbon Dioxide Level 25     


 


Anion Gap 17  H   


 


Blood Urea Nitrogen 18     


 


Creatinine 0.94     


 


Glucose Level 198     


 


Calcium Level 10.2     


 


Total Bilirubin 1.8  H   


 


Direct Bilirubin 0.00     


 


Indirect Bilirubin 1.8  H   


 


Aspartate Amino Transf


(AST/SGOT) 83  H


  


  


  


 


 


Alanine Aminotransferase


(ALT/SGPT) 111  H


  


  


  


 


 


Alkaline Phosphatase 276  H   


 


Ammonia 60  H   


 


Troponin I 0.035     


 


Total Protein 9.0  H   


 


Albumin 4.3     


 


Globulin 4.70  H   


 


Albumin/Globulin Ratio 0.91     


 


Lipase 26     


 


Urine Color  STRAW    


 


Urine Clarity  CLEAR    


 


Urine pH  8.0    


 


Urine Specific Gravity  1.006    


 


Urine Ketones  NEGATIVE    


 


Urine Nitrite  NEGATIVE    


 


Urine Bilirubin  NEGATIVE    


 


Urine Urobilinogen  NEGATIVE    


 


Urine Leukocyte Esterase  NEGATIVE    


 


Urine Microscopic RBC  111  H  


 


Urine Microscopic WBC  3    


 


Urine Calcium Oxalate Crystals  MODERATE    


 


Urine Hemoglobin  3+  H  


 


Urine Glucose  NEGATIVE    


 


Urine Total Protein  NEGATIVE    


 


Bedside Glucose   221  H 











Medications


Medications





 Current Medications


Dextrose/Sodium Chloride (D5-1/2ns) 1,000 ml @  50 mls/hr Q20H IV  Last 

administered on 8/12/17at 10:26; Admin Dose 50 MLS/HR;  Start 8/12/17 at 09:30


Pantoprazole 40 mg 40 mg BID@06,18 IV  Last administered on 8/12/17at 10:45; 

Admin Dose 40 MG;  Start 8/12/17 at 10:30


Multivitamins/ Thiamine HCl/ Folic Acid/Sodium Chloride (Mvi Adult/ Vitamin B1/

Folic Acid/NS) 1,011.2 ml  @ 125 mls/ hr DAILY@09 IVPB  Last administered on 8/ 12/17at 10:45; Admin Dose 125 MLS/HR;  Start 8/12/17 at 11:00


Lactulose (Lactulose Enema) 100 ml BID AK  Last administered on 8/12/17at 10:45

; Admin Dose 100 ML;  Start 8/12/17 at 11:00


Lorazepam 1 mg 1 mg Q6H  PRN IV anxiety/agitation Last administered on 8/12/ 17at 15:51; Admin Dose 1 MG;  Start 8/12/17 at 11:00


Cefepime HCl 50 ml @  100 mls/hr Q12 IVPB ;  Start 8/12/17 at 21:00


Vancomycin HCl 1.25 gm/Sodium Chloride 250 ml @  83.333 mls/ hr ONCE IVPB ;  

Start 8/12/17 at 17:00;  Stop 8/12/17 at 23:33


Vancomycin HCl (Vancocin) 100 ml @  100 mls/hr Q12H IVPB ;  Start 8/13/17 at 06:

00











HOLDEN ARNOLD MD Aug 12, 2017 17:54

## 2017-08-13 VITALS — RESPIRATION RATE: 20 BRPM | SYSTOLIC BLOOD PRESSURE: 112 MMHG | DIASTOLIC BLOOD PRESSURE: 69 MMHG

## 2017-08-13 VITALS — DIASTOLIC BLOOD PRESSURE: 46 MMHG | SYSTOLIC BLOOD PRESSURE: 92 MMHG | HEART RATE: 80 BPM

## 2017-08-13 VITALS — SYSTOLIC BLOOD PRESSURE: 109 MMHG | HEART RATE: 97 BPM | RESPIRATION RATE: 18 BRPM | DIASTOLIC BLOOD PRESSURE: 71 MMHG

## 2017-08-13 VITALS — RESPIRATION RATE: 20 BRPM | SYSTOLIC BLOOD PRESSURE: 85 MMHG | DIASTOLIC BLOOD PRESSURE: 52 MMHG

## 2017-08-13 VITALS — SYSTOLIC BLOOD PRESSURE: 85 MMHG | RESPIRATION RATE: 18 BRPM | DIASTOLIC BLOOD PRESSURE: 48 MMHG

## 2017-08-13 VITALS — HEART RATE: 84 BPM

## 2017-08-13 VITALS — SYSTOLIC BLOOD PRESSURE: 119 MMHG | DIASTOLIC BLOOD PRESSURE: 69 MMHG | RESPIRATION RATE: 17 BRPM

## 2017-08-13 VITALS — HEART RATE: 78 BPM

## 2017-08-13 VITALS — SYSTOLIC BLOOD PRESSURE: 116 MMHG | DIASTOLIC BLOOD PRESSURE: 72 MMHG | RESPIRATION RATE: 22 BRPM

## 2017-08-13 VITALS — DIASTOLIC BLOOD PRESSURE: 68 MMHG | SYSTOLIC BLOOD PRESSURE: 120 MMHG | RESPIRATION RATE: 17 BRPM

## 2017-08-13 VITALS — SYSTOLIC BLOOD PRESSURE: 110 MMHG | DIASTOLIC BLOOD PRESSURE: 69 MMHG | RESPIRATION RATE: 18 BRPM | HEART RATE: 72 BPM

## 2017-08-13 VITALS — HEART RATE: 80 BPM

## 2017-08-13 VITALS — HEART RATE: 86 BPM

## 2017-08-13 VITALS — SYSTOLIC BLOOD PRESSURE: 106 MMHG | RESPIRATION RATE: 20 BRPM | DIASTOLIC BLOOD PRESSURE: 70 MMHG

## 2017-08-13 VITALS — HEART RATE: 77 BPM | DIASTOLIC BLOOD PRESSURE: 69 MMHG | RESPIRATION RATE: 18 BRPM | SYSTOLIC BLOOD PRESSURE: 98 MMHG

## 2017-08-13 VITALS — HEART RATE: 100 BPM

## 2017-08-13 LAB
ABNORMAL IP MESSAGE: 1
ANION GAP SERPL CALC-SCNC: 19 MMOL/L (ref 8–16)
BASOPHILS # BLD AUTO: 0 10^3/UL (ref 0–0.1)
BASOPHILS NFR BLD: 0.2 % (ref 0–2)
BUN SERPL-MCNC: 16 MG/DL (ref 7–20)
CALCIUM SERPL-MCNC: 9.3 MG/DL (ref 8.4–10.2)
CHLORIDE SERPL-SCNC: 97 MMOL/L (ref 97–110)
CO2 SERPL-SCNC: 21 MMOL/L (ref 21–31)
CREAT SERPL-MCNC: 0.61 MG/DL (ref 0.61–1.24)
EOSINOPHIL # BLD: 0 10^3/UL (ref 0–0.5)
EOSINOPHIL NFR BLD: 0.1 % (ref 0–7)
ERYTHROCYTE [DISTWIDTH] IN BLOOD BY AUTOMATED COUNT: 17.1 % (ref 11.5–14.5)
GLUCOSE SERPL-MCNC: 241 MG/DL (ref 70–220)
HCT VFR BLD CALC: 27.7 % (ref 42–52)
HGB BLD-MCNC: 9.1 G/DL (ref 14–18)
LYMPHOCYTES # BLD AUTO: 0.3 10^3/UL (ref 0.8–2.9)
LYMPHOCYTES NFR BLD AUTO: 2.9 % (ref 15–51)
MAGNESIUM SERPL-MCNC: 1.8 MG/DL (ref 1.7–2.5)
MCH RBC QN AUTO: 26.9 PG (ref 29–33)
MCHC RBC AUTO-ENTMCNC: 32.9 G/DL (ref 32–37)
MCV RBC AUTO: 82 FL (ref 82–101)
MONOCYTES # BLD: 0.9 10^3/UL (ref 0.3–0.9)
MONOCYTES NFR BLD: 8 % (ref 0–11)
NEUTROPHILS # BLD: 10.1 10^3/UL (ref 1.6–7.5)
NEUTROPHILS NFR BLD AUTO: 88.3 % (ref 39–77)
NRBC # BLD MANUAL: 0 10^3/UL (ref 0–0)
NRBC BLD AUTO-RTO: 0 /100WBC (ref 0–0)
PHOSPHATE SERPL-MCNC: 2 MG/DL (ref 2.5–4.9)
PLATELET # BLD: 99 10^3/UL (ref 140–415)
PMV BLD AUTO: 11.6 FL (ref 7.4–10.4)
POSITIVE DIFF: (no result)
POTASSIUM SERPL-SCNC: 3.2 MMOL/L (ref 3.5–5.1)
RBC # BLD AUTO: 3.38 10^6/UL (ref 4.7–6.1)
SODIUM SERPL-SCNC: 134 MMOL/L (ref 135–144)
WBC # BLD AUTO: 11.4 10^3/UL (ref 4.8–10.8)

## 2017-08-13 RX ADMIN — CEFEPIME SCH MLS/HR: 1 INJECTION, POWDER, FOR SOLUTION INTRAMUSCULAR; INTRAVENOUS at 20:50

## 2017-08-13 RX ADMIN — PANTOPRAZOLE SODIUM SCH MG: 40 INJECTION, POWDER, FOR SOLUTION INTRAVENOUS at 05:07

## 2017-08-13 RX ADMIN — LORAZEPAM PRN MG: 2 INJECTION, SOLUTION INTRAMUSCULAR; INTRAVENOUS at 12:04

## 2017-08-13 RX ADMIN — FOLIC ACID SCH MLS/HR: 5 INJECTION, SOLUTION INTRAMUSCULAR; INTRAVENOUS; SUBCUTANEOUS at 05:06

## 2017-08-13 RX ADMIN — LACTULOSE SCH ML: 10 SOLUTION ORAL; RECTAL at 20:50

## 2017-08-13 RX ADMIN — CEFEPIME SCH MLS/HR: 1 INJECTION, POWDER, FOR SOLUTION INTRAMUSCULAR; INTRAVENOUS at 08:30

## 2017-08-13 RX ADMIN — LORAZEPAM PRN MG: 2 INJECTION, SOLUTION INTRAMUSCULAR; INTRAVENOUS at 18:31

## 2017-08-13 RX ADMIN — LACTULOSE SCH ML: 10 SOLUTION ORAL; RECTAL at 09:58

## 2017-08-13 RX ADMIN — PANTOPRAZOLE SODIUM SCH MG: 40 INJECTION, POWDER, FOR SOLUTION INTRAVENOUS at 18:13

## 2017-08-13 NOTE — PN
DATE:  08/13/2017

 

 

 

SUBJECTIVE:  No events overnight.  The patient was given Ativan

for restlessness.  Currently sleeping.  Family at bedside.

 

 

T max 99.6, pulse 86, respirations 20, blood pressure 106/70,

saturation 100 on nasal cannula.

 

 

 

LABORATORY:  WBC 11.4, H and H 9.1 and 27.7, platelets 99,

neutrophils 88.3, BUN 16, creatinine 0.61.

 

 

Blood culture on admission grew gram-positive cocci and p.r.n.

clusters.

 

 

 

ANTIMICROBIALS:  The patient was started on vancomycin and

cefepime yesterday.

 

 

 

PHYSICAL EXAMINATION:

 

GENERAL:  A well-developed, middle aged man who is sleeping in no

distress.

 

HEENT:  Head is atraumatic and normocephalic.  Sclerae are

anicteric.  Buccal mucosa is dry.

 

NECK:  Supple.

 

LUNGS:  Chest rise is symmetrical.  Breath sounds are diminished

at the bases.

 

HEART:  S1, S2.

 

ABDOMEN:  Soft.  Bowel sounds are present.

 

EXTREMITIES:  No cyanosis.

 

ASSESSMENT:

 

      Sepsis with acute encephalopathy.

     

    Gram-positive cocci bacteremia.  Etiology unclear.  Rule out

spontaneous bacterial peritonitis.

    Possible pneumonia.

    Cirrhosis, status post transjugular intrahepatic

portosystemic shunt (TIPS) procedure.

    Diabetes.

    Coronary artery disease.  History of cardiac stents.

    Thrombocytopenia.

 

 

PLAN:  Continue present care and antibiotics.  Order chest x-ray

and abdominal ultrasound to evaluate for presence of ascites.

Check 2D echo.  Repeat blood cultures.  Continue anti aspiration

measures.  I have also discussed with staff and family at

bedside.

 

 

 

 

 

 

 

Dictated By:  Nate Escobedo NP

 

 

 

/anna/india

 

Job#:  [id number]/Document#:  [id number]

 

D:  08/13/2017 17:22

 

T:  08/13/2017 19:21

## 2017-08-13 NOTE — PN
Date/Time of Note


Date/Time of Note


DATE: 8/13/17 


TIME: 16:51





Assessment/Plan


VTE Prophylaxis


VTE Prophylaxis Intervention:  other





Lines/Catheters


IV Catheter Type (from Nrs):  Peripheral IV


Urinary Cath still in place:  No (none)





Assessment/Plan


Chief Complaint/Hosp Course


AMS


 CIRRHOSIS


ANEMIA


SIRS PLAN HD


Problems:  





Subjective


24 Hr Interval Summary


Subjective hx not possible:  other (NO NOSE BLEED)





Exam/Review of Systems


Vital Signs


Vitals





 Vital Signs








  Date Time  Temp Pulse Resp B/P Pulse Ox O2 Delivery O2 Flow Rate FiO2


 


8/13/17 16:12  86      


 


8/13/17 15:53 99.6  20 106/70 100   


 


8/13/17 08:20      Nasal Cannula 2.0 














 Intake and Output   


 


 8/12/17 8/12/17 8/13/17





 15:00 23:00 07:00


 


Intake Total  1311.2 ml 1065 ml


 


Output Total  300 ml 


 


Balance  1011.2 ml 1065 ml











Exam


Neck:  supple


Respiratory:  clear to auscultation


Cardiovascular:  regular rate and rhythm


Gastrointestinal:  soft


Musculoskeletal:  nl extremities to inspection





Results


Result Diagram:  


8/13/17 0618                                                                   

             8/13/17 0618





Results 24 hrs





Laboratory Tests








Test


  8/12/17


22:03 8/13/17


00:25 8/13/17


05:21 8/13/17


06:18


 


Bedside Glucose 262  H 271  H 264  H 


 


White Blood Count    11.4  #H


 


Red Blood Count    3.38  L


 


Hemoglobin    9.1  #L


 


Hematocrit    27.7  #L


 


Mean Corpuscular Volume    82.0  


 


Mean Corpuscular Hemoglobin    26.9  L


 


Mean Corpuscular Hemoglobin


Concent 


  


  


  32.9  


 


 


Red Cell Distribution Width    17.1  H


 


Platelet Count    99  #L


 


Mean Platelet Volume    11.6  H


 


Neutrophils %    88.3  H


 


Lymphocytes %    2.9  L


 


Monocytes %    8.0  


 


Eosinophils %    0.1  


 


Basophils %    0.2  


 


Nucleated Red Blood Cells %    0.0  


 


Neutrophils #    10.1  H


 


Lymphocytes #    0.3  L


 


Monocytes #    0.9  


 


Eosinophils #    0.0  


 


Basophils #    0.0  


 


Nucleated Red Blood Cells #    0.0  


 


Sodium Level    134  L


 


Potassium Level    3.2  L


 


Chloride Level    97  


 


Carbon Dioxide Level    21  


 


Anion Gap    19  H


 


Blood Urea Nitrogen    16  


 


Creatinine    0.61  


 


Glucose Level    241  H


 


Calcium Level    9.3  














Test


  8/13/17


06:22 8/13/17


12:07 


  


 


 


Lab Scanned Report


  BLOOD


TRANSFUSION 


  


  


 


 


Bedside Glucose  216    











Medications


Medications





 Current Medications


Dextrose/Sodium Chloride (D5-1/2ns) 1,000 ml @  50 mls/hr Q20H IV  Last 

administered on 8/12/17at 10:26; Admin Dose 50 MLS/HR;  Start 8/12/17 at 09:30


Pantoprazole 40 mg 40 mg BID@06,18 IV  Last administered on 8/13/17at 05:07; 

Admin Dose 40 MG;  Start 8/12/17 at 10:30


Multivitamins/ Thiamine HCl/ Folic Acid/Sodium Chloride (Mvi Adult/ Vitamin B1/

Folic Acid/NS) 1,011.2 ml  @ 125 mls/ hr DAILY@09 IVPB  Last administered on 8/ 13/17at 08:31; Admin Dose 125 MLS/HR;  Start 8/12/17 at 11:00


Lactulose (Lactulose Enema) 100 ml BID TX  Last administered on 8/13/17at 09:58

; Admin Dose 100 ML;  Start 8/12/17 at 11:00


Lorazepam 1 mg 1 mg Q6H  PRN IV anxiety/agitation Last administered on 8/13/ 17at 12:04; Admin Dose 1 MG;  Start 8/12/17 at 11:00


Cefepime HCl (Maxipime 1gm/50 ml (Pmx)) 50 ml @  100 mls/hr Q12 IVPB  Last 

administered on 8/13/17at 08:30; Admin Dose 100 MLS/HR;  Start 8/12/17 at 21:00


Insulin Aspart NOVOLOG *MILD* ALGORI... Q6 SC  Last administered on 8/13/17at 12

:10; Admin Dose 2 UNIT;  Start 8/13/17 at 00:00


Vancomycin HCl 750 mg/Sodium Chloride 150 ml @  75 mls/hr Q12H IVPB ;  Start 8/ 13/17 at 18:00


Potassium Chloride/Dextrose (KCl/D5W) 265 ml @  88.333 mls/ hr ONCE  ONCE IVPB  

Last administered on 8/13/17at 14:43; Admin Dose 88.333 MLS/HR;  Start 8/13/17 

at 14:00;  Stop 8/13/17 at 16:59











HANNAH CASTELLANO MD Aug 13, 2017 16:52

## 2017-08-13 NOTE — CONS
Date/Time of Note


Date/Time of Note


DATE: 8/13/17 


TIME: 15:29





Assessment/Plan


Assessment/Plan


Chief Complaint/Hosp Course


Impression:


1. end stage liver disease


2. mouth bleeding not GI bleeding: NG flushed clear, there is still red blood 

from mouth. This indicate that bleeding not from Stomach but from mouth


3. hepatic encephalopathy





Recommendation:


1. consider dentist or ENT consult for mouth bleeding


2. lactulose enema


3. transfuse to keep hgb > 8, PLT > 50, INR less than 1.5


4. NG tube is not necessary from GI perspective so can dc if primary or other 

consultant don't need it.


5. protonix 40 mg iv bid


6. Dr. Seth to resume care tomorrow


Problems:  





Consultation Date/Type/Reason


Admit Date/Time


Aug 12, 2017 at 05:42


Initial Consult Date


8/12/17


Type of Consultation:  GI





24 HR Interval Summary


Free Text/Dictation


mouth bleeding improved, no n/v, no hematemesis


Constitutional:  improved





Exam/Review of Systems


Vital Signs


Vitals





 Vital Signs








  Date Time  Temp Pulse Resp B/P Pulse Ox O2 Delivery O2 Flow Rate FiO2


 


8/13/17 14:42  97 18 109/71    


 


8/13/17 12:02 98.0    94   


 


8/13/17 08:20      Nasal Cannula 2.0 














 Intake and Output   


 


 8/12/17 8/12/17 8/13/17





 15:00 23:00 07:00


 


Intake Total  1311.2 ml 1065 ml


 


Output Total  300 ml 


 


Balance  1011.2 ml 1065 ml











Exam


Constitutional:  frail


Psych:  nl mood/affect, no complaints


Head:  atraumatic, normocephalic


Eyes:  EOMI, nl conjunctiva, nl lids


ENMT:  nl external ears & nose, nl lips & teeth, nl nasal mucosa & septum, 

other (dried blood in lips)


Neck:  non-tender, supple


Respiratory:  clear to auscultation, normal air movement


Cardiovascular:  nl pulses, regular rate and rhythm


Gastrointestinal:  bowel sounds, non-tender, soft





Results


Result Diagram:  


8/13/17 0618                                                                   

             8/13/17 0618





Results 24 hrs





Laboratory Tests








Test


  8/12/17


16:09 8/12/17


22:03 8/13/17


00:25 8/13/17


05:21


 


White Blood Count 18.2  #H   


 


Red Blood Count 4.17  L   


 


Hemoglobin 11.6  L   


 


Hematocrit 35.2  L   


 


Mean Corpuscular Volume 84.4     


 


Mean Corpuscular Hemoglobin 27.8  L   


 


Mean Corpuscular Hemoglobin


Concent 33.0  


  


  


  


 


 


Red Cell Distribution Width 17.4  H   


 


Platelet Count 154     


 


Mean Platelet Volume 11.0  H   


 


Neutrophils % 91.1  H   


 


Lymphocytes % 2.7  L   


 


Monocytes % 5.5     


 


Eosinophils % 0.0     


 


Basophils % 0.2     


 


Nucleated Red Blood Cells % 0.0     


 


Neutrophils # 16.6  H   


 


Lymphocytes # 0.5  L   


 


Monocytes # 1.0  H   


 


Eosinophils # 0.0     


 


Basophils # 0.0     


 


Nucleated Red Blood Cells # 0.0     


 


Ammonia 49  H   


 


Bedside Glucose  262  H 271  H 264  H














Test


  8/13/17


06:18 8/13/17


06:22 8/13/17


12:07 


 


 


White Blood Count 11.4  #H   


 


Red Blood Count 3.38  L   


 


Hemoglobin 9.1  #L   


 


Hematocrit 27.7  #L   


 


Mean Corpuscular Volume 82.0     


 


Mean Corpuscular Hemoglobin 26.9  L   


 


Mean Corpuscular Hemoglobin


Concent 32.9  


  


  


  


 


 


Red Cell Distribution Width 17.1  H   


 


Platelet Count 99  #L   


 


Mean Platelet Volume 11.6  H   


 


Neutrophils % 88.3  H   


 


Lymphocytes % 2.9  L   


 


Monocytes % 8.0     


 


Eosinophils % 0.1     


 


Basophils % 0.2     


 


Nucleated Red Blood Cells % 0.0     


 


Neutrophils # 10.1  H   


 


Lymphocytes # 0.3  L   


 


Monocytes # 0.9     


 


Eosinophils # 0.0     


 


Basophils # 0.0     


 


Nucleated Red Blood Cells # 0.0     


 


Sodium Level 134  L   


 


Potassium Level 3.2  L   


 


Chloride Level 97     


 


Carbon Dioxide Level 21     


 


Anion Gap 19  H   


 


Blood Urea Nitrogen 16     


 


Creatinine 0.61     


 


Glucose Level 241  H   


 


Calcium Level 9.3     


 


Lab Scanned Report


  


  BLOOD


TRANSFUSION 


  


 


 


Bedside Glucose   216   











Medications


Medications





 Current Medications


Dextrose/Sodium Chloride (D5-1/2ns) 1,000 ml @  50 mls/hr Q20H IV  Last 

administered on 8/12/17at 10:26; Admin Dose 50 MLS/HR;  Start 8/12/17 at 09:30


Pantoprazole 40 mg 40 mg BID@06,18 IV  Last administered on 8/13/17at 05:07; 

Admin Dose 40 MG;  Start 8/12/17 at 10:30


Multivitamins/ Thiamine HCl/ Folic Acid/Sodium Chloride (Mvi Adult/ Vitamin B1/

Folic Acid/NS) 1,011.2 ml  @ 125 mls/ hr DAILY@09 IVPB  Last administered on 8/ 13/17at 08:31; Admin Dose 125 MLS/HR;  Start 8/12/17 at 11:00


Lactulose (Lactulose Enema) 100 ml BID SC  Last administered on 8/13/17at 09:58

; Admin Dose 100 ML;  Start 8/12/17 at 11:00


Lorazepam 1 mg 1 mg Q6H  PRN IV anxiety/agitation Last administered on 8/13/ 17at 12:04; Admin Dose 1 MG;  Start 8/12/17 at 11:00


Cefepime HCl (Maxipime 1gm/50 ml (Pmx)) 50 ml @  100 mls/hr Q12 IVPB  Last 

administered on 8/13/17at 08:30; Admin Dose 100 MLS/HR;  Start 8/12/17 at 21:00


Insulin Aspart NOVOLOG *MILD* ALGORI... Q6 SC  Last administered on 8/13/17at 12

:10; Admin Dose 2 UNIT;  Start 8/13/17 at 00:00


Vancomycin HCl 750 mg/Sodium Chloride 150 ml @  75 mls/hr Q12H IVPB ;  Start 8/ 13/17 at 18:00


Potassium Chloride/Dextrose (KCl/D5W) 265 ml @  88.333 mls/ hr ONCE  ONCE IVPB  

Last administered on 8/13/17at 14:43; Admin Dose 88.333 MLS/HR;  Start 8/13/17 

at 14:00;  Stop 8/13/17 at 16:59











HOLDEN ARNOLD MD Aug 13, 2017 15:30

## 2017-08-14 VITALS — SYSTOLIC BLOOD PRESSURE: 107 MMHG | DIASTOLIC BLOOD PRESSURE: 58 MMHG | RESPIRATION RATE: 18 BRPM

## 2017-08-14 VITALS — HEART RATE: 98 BPM

## 2017-08-14 VITALS — RESPIRATION RATE: 20 BRPM | SYSTOLIC BLOOD PRESSURE: 98 MMHG | DIASTOLIC BLOOD PRESSURE: 59 MMHG

## 2017-08-14 VITALS — HEART RATE: 100 BPM

## 2017-08-14 VITALS — HEART RATE: 83 BPM

## 2017-08-14 VITALS — HEART RATE: 90 BPM

## 2017-08-14 VITALS — DIASTOLIC BLOOD PRESSURE: 71 MMHG | SYSTOLIC BLOOD PRESSURE: 96 MMHG | RESPIRATION RATE: 20 BRPM

## 2017-08-14 VITALS — RESPIRATION RATE: 22 BRPM | SYSTOLIC BLOOD PRESSURE: 109 MMHG | DIASTOLIC BLOOD PRESSURE: 79 MMHG

## 2017-08-14 VITALS — DIASTOLIC BLOOD PRESSURE: 66 MMHG | RESPIRATION RATE: 20 BRPM | SYSTOLIC BLOOD PRESSURE: 80 MMHG

## 2017-08-14 VITALS — RESPIRATION RATE: 17 BRPM | DIASTOLIC BLOOD PRESSURE: 62 MMHG | HEART RATE: 89 BPM | SYSTOLIC BLOOD PRESSURE: 110 MMHG

## 2017-08-14 VITALS — RESPIRATION RATE: 20 BRPM | SYSTOLIC BLOOD PRESSURE: 110 MMHG | DIASTOLIC BLOOD PRESSURE: 58 MMHG

## 2017-08-14 LAB
ABNORMAL IP MESSAGE: 1
ALBUMIN SERPL-MCNC: 3.4 G/DL (ref 3.3–4.9)
ALBUMIN/GLOB SERPL: 0.85 {RATIO}
ALP SERPL-CCNC: 198 IU/L (ref 42–121)
ALT SERPL-CCNC: 72 IU/L (ref 13–69)
ANION GAP SERPL CALC-SCNC: 18 MMOL/L (ref 8–16)
AST SERPL-CCNC: 56 IU/L (ref 15–46)
BASOPHILS # BLD AUTO: 0 10^3/UL (ref 0–0.1)
BASOPHILS NFR BLD: 0.3 % (ref 0–2)
BILIRUB DIRECT SERPL-MCNC: 0 MG/DL (ref 0–0.2)
BILIRUB SERPL-MCNC: 1.7 MG/DL (ref 0.2–1.3)
BUN SERPL-MCNC: 12 MG/DL (ref 7–20)
CALCIUM SERPL-MCNC: 9.2 MG/DL (ref 8.4–10.2)
CHLORIDE SERPL-SCNC: 99 MMOL/L (ref 97–110)
CO2 SERPL-SCNC: 22 MMOL/L (ref 21–31)
CREAT SERPL-MCNC: 0.55 MG/DL (ref 0.61–1.24)
EOSINOPHIL # BLD: 0.1 10^3/UL (ref 0–0.5)
EOSINOPHIL NFR BLD: 1.1 % (ref 0–7)
ERYTHROCYTE [DISTWIDTH] IN BLOOD BY AUTOMATED COUNT: 17.3 % (ref 11.5–14.5)
GLOBULIN SER-MCNC: 4 G/DL (ref 1.3–3.2)
GLUCOSE SERPL-MCNC: 202 MG/DL (ref 70–220)
HCT VFR BLD CALC: 27.8 % (ref 42–52)
HGB BLD-MCNC: 9.1 G/DL (ref 14–18)
LYMPHOCYTES # BLD AUTO: 0.5 10^3/UL (ref 0.8–2.9)
LYMPHOCYTES NFR BLD AUTO: 6.2 % (ref 15–51)
MCH RBC QN AUTO: 27.7 PG (ref 29–33)
MCHC RBC AUTO-ENTMCNC: 32.7 G/DL (ref 32–37)
MCV RBC AUTO: 84.5 FL (ref 82–101)
MONOCYTES # BLD: 1.1 10^3/UL (ref 0.3–0.9)
MONOCYTES NFR BLD: 15.2 % (ref 0–11)
NEUTROPHILS # BLD: 5.7 10^3/UL (ref 1.6–7.5)
NEUTROPHILS NFR BLD AUTO: 76.8 % (ref 39–77)
NRBC # BLD MANUAL: 0 10^3/UL (ref 0–0)
NRBC BLD AUTO-RTO: 0 /100WBC (ref 0–0)
PLATELET # BLD: 84 10^3/UL (ref 140–415)
PMV BLD AUTO: 12.2 FL (ref 7.4–10.4)
POSITIVE DIFF: (no result)
POTASSIUM SERPL-SCNC: 3.4 MMOL/L (ref 3.5–5.1)
PROT SERPL-MCNC: 7.4 G/DL (ref 6.1–8.1)
RBC # BLD AUTO: 3.29 10^6/UL (ref 4.7–6.1)
SODIUM SERPL-SCNC: 136 MMOL/L (ref 135–144)
WBC # BLD AUTO: 7.4 10^3/UL (ref 4.8–10.8)

## 2017-08-14 RX ADMIN — MORPHINE SULFATE PRN MG: 2 INJECTION, SOLUTION INTRAMUSCULAR; INTRAVENOUS at 18:27

## 2017-08-14 RX ADMIN — PANTOPRAZOLE SODIUM SCH MG: 40 INJECTION, POWDER, FOR SOLUTION INTRAVENOUS at 06:37

## 2017-08-14 RX ADMIN — MORPHINE SULFATE PRN MG: 2 INJECTION, SOLUTION INTRAMUSCULAR; INTRAVENOUS at 13:54

## 2017-08-14 RX ADMIN — LACTULOSE SCH ML: 10 SOLUTION ORAL; RECTAL at 13:54

## 2017-08-14 RX ADMIN — LACTULOSE SCH ML: 10 SOLUTION ORAL; RECTAL at 20:54

## 2017-08-14 RX ADMIN — PANTOPRAZOLE SODIUM SCH MG: 40 INJECTION, POWDER, FOR SOLUTION INTRAVENOUS at 17:21

## 2017-08-14 RX ADMIN — FOLIC ACID SCH MLS/HR: 5 INJECTION, SOLUTION INTRAMUSCULAR; INTRAVENOUS; SUBCUTANEOUS at 06:38

## 2017-08-14 RX ADMIN — CEFEPIME SCH MLS/HR: 1 INJECTION, POWDER, FOR SOLUTION INTRAMUSCULAR; INTRAVENOUS at 09:06

## 2017-08-14 RX ADMIN — FOLIC ACID SCH MLS/HR: 5 INJECTION, SOLUTION INTRAMUSCULAR; INTRAVENOUS; SUBCUTANEOUS at 20:54

## 2017-08-14 RX ADMIN — CEFEPIME SCH MLS/HR: 1 INJECTION, POWDER, FOR SOLUTION INTRAMUSCULAR; INTRAVENOUS at 20:54

## 2017-08-14 RX ADMIN — MORPHINE SULFATE PRN MG: 2 INJECTION, SOLUTION INTRAMUSCULAR; INTRAVENOUS at 23:13

## 2017-08-14 RX ADMIN — LORAZEPAM PRN MG: 2 INJECTION, SOLUTION INTRAMUSCULAR; INTRAVENOUS at 21:07

## 2017-08-14 RX ADMIN — POTASSIUM CHLORIDE SCH MEQ: 20 TABLET, EXTENDED RELEASE ORAL at 20:53

## 2017-08-14 NOTE — RADRPT
PROCEDURE:   US Abdomen - ascites survey. 

 

CLINICAL INDICATION:   Distension.  Rule out ascites. 

 

TECHNIQUE:   Real-time ultrasound survey of the abdomen was performed. 

 

COMPARISON:   07/01/2017 

 

FINDINGS:

 

No free fluid is identified.

 

IMPRESSION:

Limited ultrasound survey without evidence for free fluid.

 

 

RPTAT:  HMVK

_____________________________________________ 

.Han Terry MD, MD           Date    Time 

Electronically viewed and signed by .Han Terry MD, MD on 08/14/2017 01:25 

 

D:  08/14/2017 01:25  T:  08/14/2017 01:25

.K/

## 2017-08-14 NOTE — RADRPT
PROCEDURE:   XR Chest. 

 

CLINICAL INDICATION:   Dyspnea 

 

TECHNIQUE:   Single frontal chest x-ray. 

 

COMPARISON:   Chest x-ray 04/27/2017 

 

FINDINGS:

 

The lungs are clear.  No focal opacification is seen.  The cardiomediastinal silhouette is unremarka
ble. Aortic atherosclerotic vascular calcifications are identified. The osseous structures are unrem
arkable.   

 

IMPRESSION:

 

1.  There is no acute cardiopulmonary process.

2.  Vascular calcifications consistent with atherosclerosis. 

3.  Stable appearances over time.

 

 

RPTAT: PP

_____________________________________________ 

.Tyrell Johns MD, MD           Date    Time 

Electronically viewed and signed by .Tyrell Johns MD, MD on 08/14/2017 14:24 

 

D:  08/14/2017 14:24  T:  08/14/2017 14:24

.B/

## 2017-08-14 NOTE — PN
Date/Time of Note


Date/Time of Note


DATE: 8/14/17 


TIME: 18:36





Assessment/Plan


VTE Prophylaxis


VTE Prophylaxis Intervention:  other





Lines/Catheters


IV Catheter Type (from Nrs):  Peripheral IV


Urinary Cath still in place:  No (none)





Assessment/Plan


Chief Complaint/Hosp Course


AMS


 CIRRHOSIS


sepsis


bacteremia


ANEMIA


SIRS PLAN antibiotic


per giand id


ambulate


Problems:  





Subjective


24 Hr Interval Summary


Subjective hx not possible:  other (no gi or nose bleed,no ent needed for now 

dw dr lugo,awake)





Exam/Review of Systems


Vital Signs


Vitals





 Vital Signs








  Date Time  Temp Pulse Resp B/P Pulse Ox O2 Delivery O2 Flow Rate FiO2


 


8/14/17 16:12  83      


 


8/14/17 15:24 98.2  20 98/59 100   


 


8/14/17 08:00      Nasal Cannula 2.0 














 Intake and Output   


 


 8/13/17 8/13/17 8/14/17





 15:00 23:00 07:00


 


Intake Total 950 ml 365 ml 


 


Balance 950 ml 365 ml 











Exam


Neck:  supple


Respiratory:  clear to auscultation


Cardiovascular:  regular rate and rhythm


Gastrointestinal:  soft


Musculoskeletal:  nl extremities to inspection


Extremities:  normal pulses





Results


Result Diagram:  


8/14/17 0643                                                                   

             8/14/17 0643





Results 24 hrs





Laboratory Tests








Test


  8/14/17


00:40 8/14/17


06:35 8/14/17


06:43 8/14/17


11:55


 


Bedside Glucose 213   219    254  H


 


White Blood Count   7.4  # 


 


Red Blood Count   3.29  L 


 


Hemoglobin   9.1  L 


 


Hematocrit   27.8  L 


 


Mean Corpuscular Volume   84.5   


 


Mean Corpuscular Hemoglobin   27.7  L 


 


Mean Corpuscular Hemoglobin


Concent 


  


  32.7  


  


 


 


Red Cell Distribution Width   17.3  H 


 


Platelet Count   84  L 


 


Mean Platelet Volume   12.2  H 


 


Neutrophils %   76.8   


 


Lymphocytes %   6.2  L 


 


Monocytes %   15.2  H 


 


Eosinophils %   1.1   


 


Basophils %   0.3   


 


Nucleated Red Blood Cells %   0.0   


 


Neutrophils #   5.7   


 


Lymphocytes #   0.5  L 


 


Monocytes #   1.1  H 


 


Eosinophils #   0.1   


 


Basophils #   0.0   


 


Nucleated Red Blood Cells #   0.0   


 


Sodium Level   136   


 


Potassium Level   3.4  L 


 


Chloride Level   99   


 


Carbon Dioxide Level   22   


 


Anion Gap   18  H 


 


Blood Urea Nitrogen   12   


 


Creatinine   0.55  L 


 


Glucose Level   202   


 


Calcium Level   9.2   


 


Total Bilirubin   1.7  H 


 


Direct Bilirubin   0.00   


 


Indirect Bilirubin   1.7  H 


 


Aspartate Amino Transf


(AST/SGOT) 


  


  56  H


  


 


 


Alanine Aminotransferase


(ALT/SGPT) 


  


  72  H


  


 


 


Alkaline Phosphatase   198  H 


 


Ammonia   28   


 


Total Protein   7.4   


 


Albumin   3.4   


 


Globulin   4.00  H 


 


Albumin/Globulin Ratio   0.85   














Test


  8/14/17


17:07 8/14/17


17:26 


  


 


 


Vancomycin Level Trough 9.5  L   


 


Bedside Glucose  264  H  











Medications


Medications





 Current Medications


Dextrose/Sodium Chloride (D5-1/2ns) 1,000 ml @  50 mls/hr Q20H IV  Last 

administered on 8/14/17at 06:38; Admin Dose 50 MLS/HR;  Start 8/12/17 at 09:30


Pantoprazole 40 mg 40 mg BID@06,18 IV  Last administered on 8/14/17at 17:21; 

Admin Dose 40 MG;  Start 8/12/17 at 10:30


Multivitamins/ Thiamine HCl/ Folic Acid/Sodium Chloride (Mvi Adult/ Vitamin B1/

Folic Acid/NS) 1,011.2 ml  @ 125 mls/ hr DAILY@09 IVPB  Last administered on 8/ 14/17at 08:57; Admin Dose 125 MLS/HR;  Start 8/12/17 at 11:00


Lactulose (Lactulose Enema) 100 ml BID AR  Last administered on 8/14/17at 13:54

; Admin Dose 100 ML;  Start 8/12/17 at 11:00


Lorazepam 1 mg 1 mg Q6H  PRN IV anxiety/agitation Last administered on 8/13/ 17at 18:31; Admin Dose 1 MG;  Start 8/12/17 at 11:00


Cefepime HCl (Maxipime 1gm/50 ml (Pmx)) 50 ml @  100 mls/hr Q12 IVPB  Last 

administered on 8/14/17at 09:06; Admin Dose 100 MLS/HR;  Start 8/12/17 at 21:00


Insulin Aspart (Novolog Insulin Pen) NOVOLOG *MILD* ALGORI... Q6 SC  Last 

administered on 8/14/17at 17:30; Admin Dose 4 UNIT;  Start 8/13/17 at 00:00


Miscellaneous Information 1 ea NOTE XX ;  Start 8/14/17 at 01:00


Glucose (Glutose) 15 gm Q15M  PRN PO DECREASED GLUCOSE;  Start 8/14/17 at 01:00


Glucose (Glutose) 22.5 gm Q15M  PRN PO DECREASED GLUCOSE;  Start 8/14/17 at 01:

00


Dextrose (D50w Syringe) 25 ml Q15M  PRN IV DECREASED GLUCOSE;  Start 8/14/17 at 

01:00


Dextrose (D50w Syringe) 50 ml Q15M  PRN IV DECREASED GLUCOSE;  Start 8/14/17 at 

01:00


Glucagon (Glucagen) 1 mg Q15M  PRN IM DECREASED GLUCOSE;  Start 8/14/17 at 01:00


Glucose (Glutose) 15 gm Q15M  PRN BUCCAL DECREASED GLUCOSE;  Start 8/14/17 at 01

:00


Morphine Sulfate 1 mg 1 mg Q4H  PRN IV PAIN LEVEL 7-10 Last administered on 8/14 /17at 18:27; Admin Dose 1 MG;  Start 8/14/17 at 14:00


Vancomycin HCl (Vancocin) 250 ml @  125 mls/hr Q12H IVPB ;  Start 8/15/17 at 06:

00


Potassium Chloride (Klor-Con 20) 20 meq BID PO ;  Start 8/14/17 at 21:00











HANNAH CASTELLANO MD Aug 14, 2017 18:40

## 2017-08-14 NOTE — RADRPT
Echocardiogram Report

 

Patient Name:  DONALD MIGUEL   Gender:       Male

MRN:           [id number]         Accession #:  [id number]

Birth Date:    1960     Study Date:   14-Aug-2017

Sonographer:   EUGENIO Pierson [facility]    Location:     [id number]

 

Ref. Physician: ANTONI HAWTHORNE

Quality: Technically Difficult Study

 

Procedures: Transthoracic echocardiogram with complete 2D, M-Mode, and 

doppler examination.

Indications: r/o vegetation.

 

2D/M Mode                          Doppler

Measurement  Value  Normal Ranges  Measurement     Value  Normal Ranges

LVIDd 2D     4.4    3.5 - 5.6 cm   AV Peak Luis     1.1    m/sec

LVIDs 2D     3.0    2.1 - 4.1 cm   AV Peak PG      5.0    mmHg

LVPWd 2D     1.0    0.6 - 1.1 cm   LVOT Peak Luis   1.1    m/sec

IVSd 2D      1.0    0.6 - 1.1 cm   LVOT Peak PG    5.1    mmHg

AoR Diam 2D  2.7    2.0 - 3.7 cm   MV E Peak Luis   0.7    m/sec

EDV 2D       86.0   cm3            MV A Peak Luis   0.8    m/sec

ESV 2D       28.0   cm3            MV E/A          0.8

LA Dimen 2D  3.2    2.3 - 4.0 cm   MV Decel Time   111    msec

MV Decel [place]  6

MV E/A          0.8

 

Findings

Left Ventricle: Normal left ventricular systolic function.  Normal left 

ventricular cavity size.  Normal left ventricular wall thickness.  

Ejection fraction is visually estimated at 55 %.  Tissue Doppler/Mitral 

Doppler indices are consistent with impaired relaxation (Stage I 

diastolic dysfunction).

Right Ventricle: Normal right ventricular size.  Normal right 

ventricular systolic function.

Left Atrium: The left atrium is normal in size.

Right Atrium: The right atrium is normal in size.

Mitral Valve: Normal appearance and function of the mitral valve with 

trace physiologic regurgitation.

Aortic Valve: Normal appearance of the aortic valve.  No significant 

aortic stenosis or insufficiency.

Tricuspid Valve: Normal appearance and function of the tricuspid valve 

with trace physiologic regurgitation.  Normal right ventricular systolic 

pressure.

Pulmonic Valve: Normal pulmonic valve appearance.

Pericardium: Normal pericardium with no significant pericardial effusion.

Aorta: Normal aortic root.

IVC: Normal size and normal respiratory collapse consistent with normal 

right atrial pressure.

 

Conclusions

1.Normal left ventricular systolic function.  Normal left ventricular 

cavity size.  Normal left ventricular wall thickness.  Ejection fraction 

is visually estimated at 55 %.  Tissue Doppler/Mitral Doppler indices 

are consistent with impaired relaxation (Stage I diastolic dysfunction).

2.Normal appearance and function of the mitral valve with trace 

physiologic regurgitation.

3.Normal appearance and function of the tricuspid valve with trace 

physiologic regurgitation.  Normal right ventricular systolic pressure.

 

Electronically Signed By:

Tyrell Cueto

14-Aug-2017 15:44:20  -0700

 

Patient Name: DONALD MIGUEL

MRN: [id number]

Study Date: 14-Aug-2017

 

[id number]

## 2017-08-14 NOTE — CONS
Date/Time of Note


Date/Time of Note


DATE: 8/14/17 


TIME: 15:54





Assessment/Plan


Assessment/Plan


Additional Assessment/Plan


Impression:


1. end stage liver disease


2. mouth bleeding not GI bleeding: NG flushed clear, there is still red blood 

from mouth. This indicate that bleeding not from Stomach but from mouth


3. hepatic encephalopathy





Recommendation:


1. consider dentist or ENT consult for mouth bleeding


2. lactulose enema


3. transfuse to keep hgb > 8, PLT > 50, INR less than 1.5


4. NG tube is not necessary from GI perspective so can dc if primary or other 

consultant don't need it.


5. protonix 40 mg iv bid





Consultation Date/Type/Reason


Admit Date/Time


Aug 12, 2017 at 05:42


Initial Consult Date


8/12/17


Type of Consultation:  GI





24 HR Interval Summary


Constitutional:  improved, no complaints





Exam/Review of Systems


Vital Signs


Vitals





 Vital Signs








  Date Time  Temp Pulse Resp B/P Pulse Ox O2 Delivery O2 Flow Rate FiO2


 


8/14/17 15:24 98.2 86 20 98/59 100   


 


8/14/17 08:00      Nasal Cannula 2.0 














 Intake and Output   


 


 8/13/17 8/13/17 8/14/17





 15:00 23:00 07:00


 


Intake Total 950 ml 365 ml 


 


Balance 950 ml 365 ml 











Exam


Constitutional:  alert, oriented, well developed


Psych:  nl mood/affect, no complaints


Head:  atraumatic, normocephalic


Eyes:  EOMI, PERRL, nl conjunctiva, nl lids, nl sclera


ENMT:  nl external ears & nose, nl lips & teeth, nl nasal mucosa & septum


Neck:  non-tender, supple


Respiratory:  clear to auscultation, normal air movement


Cardiovascular:  nl pulses, regular rate and rhythm


Gastrointestinal:  nl liver, spleen, non-tender, soft


Musculoskeletal:  nl extremities to inspection, nl gait and stance


Extremities:  normal pulses


Neurological:  CNS II-XII intact, nl mental status, nl speech, nl strength


Skin:  nl turgor, 


   No rash or lesions


Lymph:  nl lymph nodes





Results


Result Diagram:  


8/14/17 0643                                                                   

             8/14/17 0643





Results 24 hrs





Laboratory Tests








Test


  8/13/17


18:00 8/13/17


18:15 8/14/17


00:40 8/14/17


06:35


 


Phosphorus Level 2.0  L   


 


Magnesium Level 1.8     


 


Bedside Glucose  256  H 213   219  














Test


  8/14/17


06:43 8/14/17


11:55 


  


 


 


White Blood Count 7.4  #   


 


Red Blood Count 3.29  L   


 


Hemoglobin 9.1  L   


 


Hematocrit 27.8  L   


 


Mean Corpuscular Volume 84.5     


 


Mean Corpuscular Hemoglobin 27.7  L   


 


Mean Corpuscular Hemoglobin


Concent 32.7  


  


  


  


 


 


Red Cell Distribution Width 17.3  H   


 


Platelet Count 84  L   


 


Mean Platelet Volume 12.2  H   


 


Neutrophils % 76.8     


 


Lymphocytes % 6.2  L   


 


Monocytes % 15.2  H   


 


Eosinophils % 1.1     


 


Basophils % 0.3     


 


Nucleated Red Blood Cells % 0.0     


 


Neutrophils # 5.7     


 


Lymphocytes # 0.5  L   


 


Monocytes # 1.1  H   


 


Eosinophils # 0.1     


 


Basophils # 0.0     


 


Nucleated Red Blood Cells # 0.0     


 


Sodium Level 136     


 


Potassium Level 3.4  L   


 


Chloride Level 99     


 


Carbon Dioxide Level 22     


 


Anion Gap 18  H   


 


Blood Urea Nitrogen 12     


 


Creatinine 0.55  L   


 


Glucose Level 202     


 


Calcium Level 9.2     


 


Total Bilirubin 1.7  H   


 


Direct Bilirubin 0.00     


 


Indirect Bilirubin 1.7  H   


 


Aspartate Amino Transf


(AST/SGOT) 56  H


  


  


  


 


 


Alanine Aminotransferase


(ALT/SGPT) 72  H


  


  


  


 


 


Alkaline Phosphatase 198  H   


 


Ammonia 28     


 


Total Protein 7.4     


 


Albumin 3.4     


 


Globulin 4.00  H   


 


Albumin/Globulin Ratio 0.85     


 


Bedside Glucose  254  H  











Medications


Medications





 Current Medications


Dextrose/Sodium Chloride (D5-1/2ns) 1,000 ml @  50 mls/hr Q20H IV  Last 

administered on 8/14/17at 06:38; Admin Dose 50 MLS/HR;  Start 8/12/17 at 09:30


Pantoprazole 40 mg 40 mg BID@06,18 IV  Last administered on 8/14/17at 06:37; 

Admin Dose 40 MG;  Start 8/12/17 at 10:30


Multivitamins/ Thiamine HCl/ Folic Acid/Sodium Chloride (Mvi Adult/ Vitamin B1/

Folic Acid/NS) 1,011.2 ml  @ 125 mls/ hr DAILY@09 IVPB  Last administered on 8/ 14/17at 08:57; Admin Dose 125 MLS/HR;  Start 8/12/17 at 11:00


Lactulose (Lactulose Enema) 100 ml BID VT  Last administered on 8/14/17at 13:54

; Admin Dose 100 ML;  Start 8/12/17 at 11:00


Lorazepam 1 mg 1 mg Q6H  PRN IV anxiety/agitation Last administered on 8/13/ 17at 18:31; Admin Dose 1 MG;  Start 8/12/17 at 11:00


Cefepime HCl (Maxipime 1gm/50 ml (Pmx)) 50 ml @  100 mls/hr Q12 IVPB  Last 

administered on 8/14/17at 09:06; Admin Dose 100 MLS/HR;  Start 8/12/17 at 21:00


Insulin Aspart NOVOLOG *MILD* ALGORI... Q6 SC  Last administered on 8/14/17at 11

:59; Admin Dose 3 UNIT;  Start 8/13/17 at 00:00


Vancomycin HCl/ Sodium Chloride (Vancocin/NS) 150 ml @  75 mls/hr Q12H IVPB  

Last administered on 8/14/17at 06:37; Admin Dose 75 MLS/HR;  Start 8/13/17 at 18

:00


Miscellaneous Information (*Rx Drug Level Order Reminder*) VANCOMYCIN TROUGH 8/ 14 AT 1700 ONCE  ONCE XX ;  Start 8/14/17 at 17:00;  Stop 8/14/17 at 17:01


Miscellaneous Information 1 ea NOTE XX ;  Start 8/14/17 at 01:00


Glucose (Glutose) 15 gm Q15M  PRN PO DECREASED GLUCOSE;  Start 8/14/17 at 01:00


Glucose (Glutose) 22.5 gm Q15M  PRN PO DECREASED GLUCOSE;  Start 8/14/17 at 01:

00


Dextrose (D50w Syringe) 25 ml Q15M  PRN IV DECREASED GLUCOSE;  Start 8/14/17 at 

01:00


Dextrose (D50w Syringe) 50 ml Q15M  PRN IV DECREASED GLUCOSE;  Start 8/14/17 at 

01:00


Glucagon (Glucagen) 1 mg Q15M  PRN IM DECREASED GLUCOSE;  Start 8/14/17 at 01:00


Glucose (Glutose) 15 gm Q15M  PRN BUCCAL DECREASED GLUCOSE;  Start 8/14/17 at 01

:00


Morphine Sulfate (morphine) 1 mg Q4H  PRN IV PAIN LEVEL 7-10 Last administered 

on 8/14/17at 13:54; Admin Dose 1 MG;  Start 8/14/17 at 14:00











SIDDHARTH RECINOS MD Aug 14, 2017 15:55

## 2017-08-15 VITALS — HEART RATE: 114 BPM

## 2017-08-15 VITALS — RESPIRATION RATE: 21 BRPM | SYSTOLIC BLOOD PRESSURE: 108 MMHG | DIASTOLIC BLOOD PRESSURE: 69 MMHG

## 2017-08-15 VITALS — DIASTOLIC BLOOD PRESSURE: 68 MMHG | SYSTOLIC BLOOD PRESSURE: 119 MMHG | RESPIRATION RATE: 15 BRPM

## 2017-08-15 VITALS — SYSTOLIC BLOOD PRESSURE: 112 MMHG | DIASTOLIC BLOOD PRESSURE: 58 MMHG | RESPIRATION RATE: 16 BRPM | HEART RATE: 98 BPM

## 2017-08-15 VITALS — HEART RATE: 110 BPM

## 2017-08-15 VITALS — DIASTOLIC BLOOD PRESSURE: 59 MMHG | RESPIRATION RATE: 18 BRPM | HEART RATE: 97 BPM | SYSTOLIC BLOOD PRESSURE: 104 MMHG

## 2017-08-15 VITALS — DIASTOLIC BLOOD PRESSURE: 60 MMHG | SYSTOLIC BLOOD PRESSURE: 106 MMHG | RESPIRATION RATE: 18 BRPM

## 2017-08-15 VITALS — SYSTOLIC BLOOD PRESSURE: 128 MMHG | RESPIRATION RATE: 18 BRPM | DIASTOLIC BLOOD PRESSURE: 78 MMHG

## 2017-08-15 VITALS — DIASTOLIC BLOOD PRESSURE: 66 MMHG | RESPIRATION RATE: 21 BRPM | SYSTOLIC BLOOD PRESSURE: 105 MMHG

## 2017-08-15 VITALS — HEART RATE: 105 BPM

## 2017-08-15 VITALS — HEART RATE: 100 BPM

## 2017-08-15 VITALS — HEART RATE: 120 BPM

## 2017-08-15 VITALS — SYSTOLIC BLOOD PRESSURE: 102 MMHG | RESPIRATION RATE: 19 BRPM | DIASTOLIC BLOOD PRESSURE: 58 MMHG

## 2017-08-15 LAB
ABNORMAL IP MESSAGE: 1
ALBUMIN SERPL-MCNC: 2.8 G/DL (ref 3.3–4.9)
ALBUMIN/GLOB SERPL: 0.77 {RATIO}
ALP SERPL-CCNC: 198 IU/L (ref 42–121)
ALT SERPL-CCNC: 78 IU/L (ref 13–69)
ANION GAP SERPL CALC-SCNC: 16 MMOL/L (ref 8–16)
AST SERPL-CCNC: 80 IU/L (ref 15–46)
BASOPHILS # BLD AUTO: 0 10^3/UL (ref 0–0.1)
BASOPHILS NFR BLD: 0.5 % (ref 0–2)
BILIRUB DIRECT SERPL-MCNC: 0 MG/DL (ref 0–0.2)
BILIRUB SERPL-MCNC: 1 MG/DL (ref 0.2–1.3)
BUN SERPL-MCNC: 8 MG/DL (ref 7–20)
CALCIUM SERPL-MCNC: 8.5 MG/DL (ref 8.4–10.2)
CHLORIDE SERPL-SCNC: 103 MMOL/L (ref 97–110)
CO2 SERPL-SCNC: 21 MMOL/L (ref 21–31)
CREAT SERPL-MCNC: 0.48 MG/DL (ref 0.61–1.24)
EOSINOPHIL # BLD: 0 10^3/UL (ref 0–0.5)
EOSINOPHIL NFR BLD: 0.7 % (ref 0–7)
ERYTHROCYTE [DISTWIDTH] IN BLOOD BY AUTOMATED COUNT: 17.5 % (ref 11.5–14.5)
GLOBULIN SER-MCNC: 3.6 G/DL (ref 1.3–3.2)
GLUCOSE SERPL-MCNC: 87 MG/DL (ref 70–220)
HCT VFR BLD CALC: 26.8 % (ref 42–52)
HGB BLD-MCNC: 8.7 G/DL (ref 14–18)
LYMPHOCYTES # BLD AUTO: 0.4 10^3/UL (ref 0.8–2.9)
LYMPHOCYTES NFR BLD AUTO: 6.8 % (ref 15–51)
MCH RBC QN AUTO: 27.8 PG (ref 29–33)
MCHC RBC AUTO-ENTMCNC: 32.5 G/DL (ref 32–37)
MCV RBC AUTO: 85.6 FL (ref 82–101)
MONOCYTES # BLD: 0.8 10^3/UL (ref 0.3–0.9)
MONOCYTES NFR BLD: 15.1 % (ref 0–11)
NEUTROPHILS NFR BLD AUTO: 76.4 % (ref 39–77)
NRBC # BLD MANUAL: 0 10^3/UL (ref 0–0)
NRBC BLD AUTO-RTO: 0 /100WBC (ref 0–0)
PLATELET # BLD: 69 10^3/UL (ref 140–415)
PMV BLD AUTO: 11.3 FL (ref 7.4–10.4)
POSITIVE DIFF: (no result)
POTASSIUM SERPL-SCNC: 3.8 MMOL/L (ref 3.5–5.1)
PROT SERPL-MCNC: 6.4 G/DL (ref 6.1–8.1)
RBC # BLD AUTO: 3.13 10^6/UL (ref 4.7–6.1)
SODIUM SERPL-SCNC: 136 MMOL/L (ref 135–144)
WBC # BLD AUTO: 5.6 10^3/UL (ref 4.8–10.8)

## 2017-08-15 RX ADMIN — PANTOPRAZOLE SODIUM SCH MG: 40 INJECTION, POWDER, FOR SOLUTION INTRAVENOUS at 05:09

## 2017-08-15 RX ADMIN — CEFEPIME SCH MLS/HR: 1 INJECTION, POWDER, FOR SOLUTION INTRAMUSCULAR; INTRAVENOUS at 08:52

## 2017-08-15 RX ADMIN — LORAZEPAM PRN MG: 2 INJECTION, SOLUTION INTRAMUSCULAR; INTRAVENOUS at 16:55

## 2017-08-15 RX ADMIN — CALCIUM GLUCONATE SCH MLS/HR: 94 INJECTION, SOLUTION INTRAVENOUS at 00:33

## 2017-08-15 RX ADMIN — VANCOMYCIN HYDROCHLORIDE SCH MLS/HR: 1 INJECTION, POWDER, LYOPHILIZED, FOR SOLUTION INTRAVENOUS at 17:51

## 2017-08-15 RX ADMIN — POTASSIUM CHLORIDE SCH MEQ: 20 TABLET, EXTENDED RELEASE ORAL at 10:47

## 2017-08-15 RX ADMIN — POTASSIUM CHLORIDE SCH MEQ: 20 TABLET, EXTENDED RELEASE ORAL at 20:46

## 2017-08-15 RX ADMIN — LACTULOSE SCH GM: 10 SOLUTION ORAL at 20:45

## 2017-08-15 RX ADMIN — VANCOMYCIN HYDROCHLORIDE SCH MLS/HR: 1 INJECTION, POWDER, LYOPHILIZED, FOR SOLUTION INTRAVENOUS at 05:09

## 2017-08-15 RX ADMIN — MORPHINE SULFATE PRN MG: 2 INJECTION, SOLUTION INTRAMUSCULAR; INTRAVENOUS at 11:30

## 2017-08-15 RX ADMIN — PANTOPRAZOLE SODIUM SCH MG: 40 INJECTION, POWDER, FOR SOLUTION INTRAVENOUS at 17:50

## 2017-08-15 RX ADMIN — MORPHINE SULFATE PRN MG: 2 INJECTION, SOLUTION INTRAMUSCULAR; INTRAVENOUS at 21:57

## 2017-08-15 RX ADMIN — LACTULOSE SCH GM: 10 SOLUTION ORAL at 12:39

## 2017-08-15 NOTE — CONS
Date/Time of Note


Date/Time of Note


DATE: 8/15/17 


TIME: 23:34





Assessment/Plan


Assessment/Plan


Chief Complaint/Hosp Course


AMS


 CIRRHOSIS


sepsis


bacteremia


ANEMIA


SIRS PLAN antibiotic


per giand id


ambulate


Problems:  





Consultation Date/Type/Reason


Admit Date/Time


Aug 12, 2017 at 05:42





Hx of Present Illness


AMS


 CIRRHOSIS


sepsis


bacteremia


ANEMIA


SIRS PLAN antibiotic


per giand id


ambulate


Constitutional:  improved, no complaints


Gastrointestinal:  no complaints


Genitourinary:  no complaints


Psychological:  nl mood/affect, no complaints





Past Medical History


Medical History:  coronary artery disease, diabetes, GI bleed, other (liver 

cirrhosis and pancreatitis)





Past Surgical History


Past Surgical Hx:  coronary bypass surgery (2 stent 2017, esophageal varices 

binding 2015, Portal shunt 2017)





Social History


Alcohol Use:  none (before)


Smoking Status:  Former smoker


Drug Use:  none


Other Social History


neg





Exam/Review of Systems


Vital Signs


Vitals





 Vital Signs








  Date Time  Temp Pulse Resp B/P Pulse Ox O2 Delivery O2 Flow Rate FiO2


 


8/15/17 20:08  114      


 


8/15/17 20:00 103.2  15 119/68 99   


 


8/15/17 09:10      Nasal Cannula 2.0 














 Intake and Output   


 


 8/14/17 8/14/17 8/15/17





 15:00 23:00 07:00


 


Intake Total 50 ml 1820 ml 120 ml


 


Balance 50 ml 1820 ml 120 ml











Exam


Gastrointestinal:  nl liver, spleen, soft


Extremities:  normal pulses





Results


Result Diagram:  


8/15/17 1015                                                                   

             8/15/17 1015





Results 24 hrs





Laboratory Tests








Test


  8/15/17


06:09 8/15/17


10:15 8/15/17


11:27 8/15/17


17:39


 


Bedside Glucose 270  H  130   273  H


 


White Blood Count  5.6  #  


 


Red Blood Count  3.13  L  


 


Hemoglobin  8.7  L  


 


Hematocrit  26.8  L  


 


Mean Corpuscular Volume  85.6    


 


Mean Corpuscular Hemoglobin  27.8  L  


 


Mean Corpuscular Hemoglobin


Concent 


  32.5  


  


  


 


 


Red Cell Distribution Width  17.5  H  


 


Platelet Count  69  L  


 


Mean Platelet Volume  11.3  H  


 


Neutrophils %  76.4    


 


Lymphocytes %  6.8  L  


 


Monocytes %  15.1  H  


 


Eosinophils %  0.7    


 


Basophils %  0.5    


 


Nucleated Red Blood Cells %  0.0    


 


Neutrophils # (Manual)  4.3    


 


Lymphocytes #  0.4  L  


 


Monocytes #  0.8    


 


Eosinophils #  0.0    


 


Basophils #  0.0    


 


Nucleated Red Blood Cells #  0.0    


 


Sodium Level  136    


 


Potassium Level  3.8    


 


Chloride Level  103    


 


Carbon Dioxide Level  21    


 


Anion Gap  16    


 


Blood Urea Nitrogen  8    


 


Creatinine  0.48  L  


 


Glucose Level  87  #  


 


Calcium Level  8.5    


 


Total Bilirubin  1.0    


 


Direct Bilirubin  0.00    


 


Indirect Bilirubin  1.0    


 


Aspartate Amino Transf


(AST/SGOT) 


  80  H


  


  


 


 


Alanine Aminotransferase


(ALT/SGPT) 


  78  H


  


  


 


 


Alkaline Phosphatase  198  H  


 


Total Protein  6.4  #  


 


Albumin  2.8  L  


 


Globulin  3.60  H  


 


Albumin/Globulin Ratio  0.77    














Test


  8/15/17


20:47 


  


  


 


 


Bedside Glucose 258  H   











Medications


Medications





 Current Medications


Pantoprazole 40 mg 40 mg BID@06,18 IV  Last administered on 8/15/17at 17:50; 

Admin Dose 40 MG;  Start 8/12/17 at 10:30


Multivitamins/ Thiamine HCl/ Folic Acid/Sodium Chloride (Mvi Adult/ Vitamin B1/

Folic Acid/NS) 1,011.2 ml  @ 125 mls/ hr DAILY@09 IVPB  Last administered on 8/

15/17at 10:50; Admin Dose 125 MLS/HR;  Start 8/12/17 at 11:00


Lorazepam (Ativan) 1 mg Q6H  PRN IV anxiety/agitation Last administered on 8/15/

17at 16:55; Admin Dose 1 MG;  Start 8/12/17 at 11:00


Miscellaneous Information 1 ea NOTE XX ;  Start 8/14/17 at 01:00


Glucose (Glutose) 15 gm Q15M  PRN PO DECREASED GLUCOSE;  Start 8/14/17 at 01:00


Glucose (Glutose) 22.5 gm Q15M  PRN PO DECREASED GLUCOSE;  Start 8/14/17 at 01:

00


Dextrose (D50w Syringe) 25 ml Q15M  PRN IV DECREASED GLUCOSE;  Start 8/14/17 at 

01:00


Dextrose (D50w Syringe) 50 ml Q15M  PRN IV DECREASED GLUCOSE;  Start 8/14/17 at 

01:00


Glucagon (Glucagen) 1 mg Q15M  PRN IM DECREASED GLUCOSE;  Start 8/14/17 at 01:00


Glucose (Glutose) 15 gm Q15M  PRN BUCCAL DECREASED GLUCOSE;  Start 8/14/17 at 01

:00


Morphine Sulfate 1 mg 1 mg Q4H  PRN IV PAIN LEVEL 7-10 Last administered on 8/15

/17at 21:57; Admin Dose 1 MG;  Start 8/14/17 at 14:00


Vancomycin HCl (Vancocin) 250 ml @  125 mls/hr Q12H IVPB  Last administered on 8

/15/17at 17:51; Admin Dose 125 MLS/HR;  Start 8/15/17 at 06:00


Potassium Chloride (Klor-Con 20) 20 meq BID PO  Last administered on 8/15/17at 

20:46; Admin Dose 20 MEQ;  Start 8/14/17 at 21:00


Diagnostic Test (Pha) 1 ea 1 ea 02 XX ;  Start 8/15/17 at 02:00


Sodium Chloride (1/2 NS) 1,000 ml @  30 mls/hr Q24H IV  Last administered on 8/

15/17at 00:33; Admin Dose 30 MLS/HR;  Start 8/15/17 at 00:30


Lactulose 26.7 gm 26.7 gm TID PO  Last administered on 8/15/17at 20:45; Admin 

Dose 26.7 GM;  Start 8/15/17 at 13:00


Meropenem/Sodium Chloride (Merrem 500mg/50 ml(Pmx)) 50 ml @  200 mls/hr Q8 IVPB

  Last administered on 8/15/17at 21:50; Admin Dose 200 MLS/HR;  Start 8/15/17 

at 22:00











HANNAH CASTELLANO MD Aug 15, 2017 23:35

## 2017-08-15 NOTE — PN
DATE:  08/14/2017

 

 

 

INFECTIOUS DISEASE PROGRESS NOTE

 

 

 

SUBJECTIVE DATA:  No acute changes overnight.  The patient is

more awake today.  Looks comfortable.  Denies chest pain.  No

fevers overnight.  Family at bedside.

 

 

 

LABORATORY AND DIAGNOSTIC DATA:  WBC 7.4, hemoglobin 9.1,

hematocrit 27.8, platelets 84, neutrophils 76.8.  BUN 12,

creatinine 0.55.

 

 

Chest x-ray this morning revealed no acute cardiopulmonary

process.

 

 

Abdominal ultrasound revealed no evidence for free fluid.

 

MICROBIOLOGY:  Blood culture growing Staph aureus, preliminary.

 

 

 

ANTIMICROBIALS:  Vancomycin, cefepime.

 

 

 

OBJECTIVE DATA:

 

GENERAL:  This is a chronically ill-appearing, fragile, middle-

aged [place] male who is awake, in no distress.

 

HEENT:  Head atraumatic, normocephalic.  Sclerae anicteric.

Buccal mucosa dry.

 

NECK:  Supple.  Trachea midline.

 

CHEST:  Rise symmetrical.  Breath sounds clear.

 

HEART:  S1, S2.

 

ABDOMEN:  Soft, bowel sounds present.

 

EXTREMITIES:  No cyanosis, edema.

 

 

 

ASSESSMENT:

 

1.   Resolving sepsis.

2.   Resolving encephalopathy.

3.   Bacteremia with blood culture preliminarily growing

Staphylococcus aureus, source unknown.

4.   Liver cirrhosis.  Status post transjugular intrahepatic

portosystemic shunt (TIPS).

5.   Coronary artery disease.

6.   History of cardiac stents.

7.   Diabetes.

8.   Anemia and thrombocytopenia.

 

 

PLAN:  The patient is doing much better.  Pending repeat blood

cultures. Pending 2D echo.  Continue on current antibiotics.

Continue anti-aspiration measures, pending final workup.

 

 

Above was discussed with family at bedside.

 

 

 

 

 

 

 

Dictated By:  Ruddy Escobedo NP

 

 

 

/anna/ma

 

Job#:  [id number]/Document#:  [id number]

 

D:  08/14/2017 15:45

 

T:  08/15/2017 03:26

 

VARGAS

## 2017-08-15 NOTE — PN
DATE:  08/15/2017

 

 

 

SUBJECTIVE:  No acute changes overnight.  Patient is alert, looks

comfortable.  Complaining of lower back pain.  No fevers.

 

 

 

LABORATORY AND DIAGNOSTIC DATA:  WBC 5.6, H and H 8.7 and 26.8,

platelets 69, neutrophils 76.4.  BUN 8, creatinine 0.48.

 

 

 

MICROBIOLOGY:  Blood culture on admission grew Staph aureus.

Repeat blood culture the next day still growing staph species.

 

 

 

DIAGNOSTICS:  2D echo read by Dr. Tyrell Cueto revealed no

evidence of vegetation, ejection fraction of 55 percent.

 

 

 

ANTIMICROBIALS:

 

1.   Vancomycin.

2.   Cefepime.

 

 

OBJECTIVE DATA:

 

GENERAL:  This is a fragile chronically ill-appearing middle-aged

[place] man who is alert, in no distress.

 

HEENT:  Head atraumatic, normocephalic.  Sclerae anicteric.

Buccal mucosa dry.

 

NECK:  Supple.

 

CHEST:  Rise symmetrical.  Breath sounds clear.  Diminished at

the bases.

 

HEART:  S1, S2.

 

ABDOMEN:  Soft, bowel sounds present.

 

EXTREMITIES:  Without cyanosis.

 

 

 

ASSESSMENT:

 

1.   Bacteremia with blood culture growing Staph aureus.

  Preliminary, etiology unclear.  2D echo revealed no vegetations.

  Chest x-ray revealed no acute cardiopulmonary process.

2.   Cirrhosis.

3.   Resolving encephalopathy.

4.   Resolving sepsis.

5.   Coronary artery disease, history of cardiac stents.

6.   Diabetes.

7.   Status post transjugular intrahepatic portosystemic shunt

(TIPS) procedure.

 

 

PLAN:  Patient is clinically improving.  Again, etiology of his

bacteremia is unclear.  We are going to order MRI of his lumbar

spine and thoracic spine, given significant back pain.  We will

discontinue cefepime, continue vancomycin.  Follow

recommendations of consultants.

 

 

 

 

 

 

 

Dictated By:  Ruddy Escobedo NP

 

 

 

/anna/estefania

 

Job#:  [id number]/Document#:  [id number]

 

D:  08/15/2017 16:17

 

T:  08/15/2017 21:37

## 2017-08-16 VITALS — DIASTOLIC BLOOD PRESSURE: 59 MMHG | SYSTOLIC BLOOD PRESSURE: 101 MMHG | RESPIRATION RATE: 18 BRPM

## 2017-08-16 VITALS — RESPIRATION RATE: 15 BRPM | DIASTOLIC BLOOD PRESSURE: 56 MMHG | SYSTOLIC BLOOD PRESSURE: 94 MMHG | HEART RATE: 98 BPM

## 2017-08-16 VITALS — HEART RATE: 106 BPM

## 2017-08-16 VITALS — SYSTOLIC BLOOD PRESSURE: 90 MMHG | RESPIRATION RATE: 18 BRPM | DIASTOLIC BLOOD PRESSURE: 55 MMHG

## 2017-08-16 VITALS — DIASTOLIC BLOOD PRESSURE: 57 MMHG | RESPIRATION RATE: 15 BRPM | SYSTOLIC BLOOD PRESSURE: 97 MMHG

## 2017-08-16 VITALS — SYSTOLIC BLOOD PRESSURE: 91 MMHG | RESPIRATION RATE: 18 BRPM | DIASTOLIC BLOOD PRESSURE: 50 MMHG

## 2017-08-16 VITALS — HEART RATE: 92 BPM

## 2017-08-16 VITALS — RESPIRATION RATE: 15 BRPM | SYSTOLIC BLOOD PRESSURE: 100 MMHG | HEART RATE: 105 BPM | DIASTOLIC BLOOD PRESSURE: 55 MMHG

## 2017-08-16 VITALS — HEART RATE: 111 BPM

## 2017-08-16 VITALS — HEART RATE: 94 BPM

## 2017-08-16 LAB
ADD UMIC: YES
COLOR UR: YELLOW
GLUCOSE UR STRIP-MCNC: (no result) MG/DL
KETONES UR STRIP.AUTO-MCNC: NEGATIVE MG/DL
NITRITE UR QL STRIP.AUTO: NEGATIVE MG/DL
RBC # UR AUTO: (no result) MG/DL
UR ASCORBIC ACID: NEGATIVE MG/DL
UR BILIRUBIN (DIP): NEGATIVE MG/DL
UR BUDDING YEAST: (no result) /HPF
UR CLARITY: (no result)
UR PH (DIP): 6 (ref 5–9)
UR RBC: 70 /HPF (ref 0–5)
UR SPECIFIC GRAVITY (DIP): 1.01 (ref 1–1.03)
UR TOTAL PROTEIN (DIP): NEGATIVE MG/DL
UROBILINOGEN UR STRIP-ACNC: NEGATIVE MG/DL
WBC # UR STRIP: (no result) LEU/UL

## 2017-08-16 RX ADMIN — PANTOPRAZOLE SODIUM SCH MG: 40 TABLET, DELAYED RELEASE ORAL at 18:00

## 2017-08-16 RX ADMIN — RIFAXIMIN SCH MG: 550 TABLET ORAL at 21:13

## 2017-08-16 RX ADMIN — DEXAMETHASONE SODIUM PHOSPHATE PRN MG: 10 INJECTION, SOLUTION INTRAMUSCULAR; INTRAVENOUS at 22:23

## 2017-08-16 RX ADMIN — MORPHINE SULFATE PRN MG: 2 INJECTION, SOLUTION INTRAMUSCULAR; INTRAVENOUS at 21:08

## 2017-08-16 RX ADMIN — POTASSIUM CHLORIDE SCH MEQ: 20 TABLET, EXTENDED RELEASE ORAL at 21:09

## 2017-08-16 RX ADMIN — CALCIUM GLUCONATE SCH MLS/HR: 94 INJECTION, SOLUTION INTRAVENOUS at 00:43

## 2017-08-16 RX ADMIN — LACTULOSE SCH GM: 10 SOLUTION ORAL at 21:09

## 2017-08-16 RX ADMIN — MORPHINE SULFATE PRN MG: 2 INJECTION, SOLUTION INTRAMUSCULAR; INTRAVENOUS at 02:58

## 2017-08-16 RX ADMIN — LACTULOSE SCH GM: 10 SOLUTION ORAL at 08:19

## 2017-08-16 RX ADMIN — LACTULOSE SCH GM: 10 SOLUTION ORAL at 13:25

## 2017-08-16 RX ADMIN — MORPHINE SULFATE PRN MG: 2 INJECTION, SOLUTION INTRAMUSCULAR; INTRAVENOUS at 11:09

## 2017-08-16 RX ADMIN — PANTOPRAZOLE SODIUM SCH MG: 40 INJECTION, POWDER, FOR SOLUTION INTRAVENOUS at 16:57

## 2017-08-16 RX ADMIN — CEFEPIME SCH MLS/HR: 1 INJECTION, POWDER, FOR SOLUTION INTRAMUSCULAR; INTRAVENOUS at 21:07

## 2017-08-16 RX ADMIN — PANTOPRAZOLE SODIUM SCH MG: 40 INJECTION, POWDER, FOR SOLUTION INTRAVENOUS at 05:51

## 2017-08-16 RX ADMIN — POTASSIUM CHLORIDE SCH MEQ: 20 TABLET, EXTENDED RELEASE ORAL at 08:19

## 2017-08-16 RX ADMIN — MORPHINE SULFATE PRN MG: 2 INJECTION, SOLUTION INTRAMUSCULAR; INTRAVENOUS at 17:00

## 2017-08-16 RX ADMIN — VANCOMYCIN HYDROCHLORIDE SCH MLS/HR: 1 INJECTION, POWDER, LYOPHILIZED, FOR SOLUTION INTRAVENOUS at 05:52

## 2017-08-16 NOTE — CONS
Date/Time of Note


Date/Time of Note


DATE: 8/16/17 


TIME: 20:02





Assessment/Plan


Assessment/Plan


Additional Assessment/Plan


1.  Cirrhosis of liver


2.  Hepatic encephalopathy mild


3.  Status post coronary artery stent 6 weeks ago


4.  Status post TIPS for recurrent ascites.


Plan


Continue rifaximin and lactulose.


Tepees a cause of his worsening encephalopathy


Avoid all kinds of sedative





Consultation Date/Type/Reason


Admit Date/Time


Aug 12, 2017 at 05:42


Initial Consult Date


8/12/17


Type of Consultation:  GI





24 HR Interval Summary


Free Text/Dictation


Diagonal variation in sleep as per the wife.


Patient keeps awake during night and sleeps during the daytime





Exam/Review of Systems


Vital Signs


Vitals





 Vital Signs








  Date Time  Temp Pulse Resp B/P Pulse Ox O2 Delivery O2 Flow Rate FiO2


 


8/16/17 16:23  94      


 


8/16/17 15:49 98.0  18 90/55 100   


 


8/15/17 09:10      Nasal Cannula 2.0 














 Intake and Output   


 


 8/15/17 8/15/17 8/16/17





 15:00 23:00 07:00


 


Intake Total 50 ml 1900 ml 325 ml


 


Output Total   850 ml


 


Balance 50 ml 1900 ml -525 ml











Exam


Constitutional:  alert, oriented, well developed


Psych:  nl mood/affect, no complaints


Head:  atraumatic, normocephalic


Eyes:  EOMI, PERRL, nl conjunctiva, nl lids, nl sclera


ENMT:  nl external ears & nose, nl lips & teeth, nl nasal mucosa & septum


Neck:  non-tender, supple


Respiratory:  clear to auscultation, normal air movement


Cardiovascular:  nl pulses, regular rate and rhythm


Gastrointestinal:  nl liver, spleen, non-tender, soft


Musculoskeletal:  nl extremities to inspection, nl gait and stance


Extremities:  normal pulses


Neurological:  CNS II-XII intact, nl mental status, nl speech, nl strength


Skin:  nl turgor, 


   No rash or lesions


Lymph:  nl lymph nodes





Results


Result Diagram:  


8/15/17 1015                                                                   

             8/15/17 1015





Results 24 hrs





Laboratory Tests








Test


  8/15/17


20:47 8/15/17


21:10 8/16/17


01:34 8/16/17


06:17


 


Bedside Glucose 258  H  190   


 


Urine Color  YELLOW    


 


Urine Clarity  CLOUDY  A  


 


Urine pH  6.0    


 


Urine Specific Gravity  1.013    


 


Urine Ketones  NEGATIVE    


 


Urine Nitrite  NEGATIVE    


 


Urine Bilirubin  NEGATIVE    


 


Urine Urobilinogen  NEGATIVE    


 


Urine Leukocyte Esterase  2+  H  


 


Urine Microscopic RBC  70  H  


 


Urine Microscopic WBC  29  H  


 


Urine Yeast (Budding)  MODERATE  A  


 


Urine Hemoglobin  1+  H  


 


Urine Glucose  2+  H  


 


Urine Total Protein  NEGATIVE    


 


Activated Partial


Thromboplast Time 


  


  


  46.5  H


 














Test


  8/16/17


07:46 8/16/17


11:50 8/16/17


16:36 


 


 


Bedside Glucose 252  H 256  H 378  H 











Medications


Medications





 Current Medications


Multivitamins/ Thiamine HCl/ Folic Acid/Sodium Chloride (Mvi Adult/ Vitamin B1/

Folic Acid/NS) 1,011.2 ml  @ 125 mls/ hr DAILY@09 IVPB  Last administered on 8/ 16/17at 08:18; Admin Dose 125 MLS/HR;  Start 8/12/17 at 11:00


Lorazepam (Ativan) 1 mg Q6H  PRN IV anxiety/agitation Last administered on 8/15/

17at 16:55; Admin Dose 1 MG;  Start 8/12/17 at 11:00


Miscellaneous Information 1 ea NOTE XX ;  Start 8/14/17 at 01:00


Glucose (Glutose) 15 gm Q15M  PRN PO DECREASED GLUCOSE;  Start 8/14/17 at 01:00


Glucose (Glutose) 22.5 gm Q15M  PRN PO DECREASED GLUCOSE;  Start 8/14/17 at 01:

00


Dextrose (D50w Syringe) 25 ml Q15M  PRN IV DECREASED GLUCOSE;  Start 8/14/17 at 

01:00


Dextrose (D50w Syringe) 50 ml Q15M  PRN IV DECREASED GLUCOSE;  Start 8/14/17 at 

01:00


Glucagon (Glucagen) 1 mg Q15M  PRN IM DECREASED GLUCOSE;  Start 8/14/17 at 01:00


Glucose (Glutose) 15 gm Q15M  PRN BUCCAL DECREASED GLUCOSE;  Start 8/14/17 at 01

:00


Morphine Sulfate (morphine) 1 mg Q4H  PRN IV PAIN LEVEL 7-10 Last administered 

on 8/16/17at 17:00; Admin Dose 1 MG;  Start 8/14/17 at 14:00


Potassium Chloride (Klor-Con 20) 20 meq BID PO  Last administered on 8/16/17at 

08:19; Admin Dose 20 MEQ;  Start 8/14/17 at 21:00


Diagnostic Test (Pha) 1 ea 1 ea 02 XX  Last administered on 8/16/17at 02:16; 

Admin Dose 1 EA;  Start 8/15/17 at 02:00


Sodium Chloride (1/2 NS) 1,000 ml @  30 mls/hr Q24H IV  Last administered on 8/ 16/17at 00:43; Admin Dose 30 MLS/HR;  Start 8/15/17 at 00:30


Lactulose (Enulose) 26.7 gm TID PO  Last administered on 8/16/17at 13:25; Admin 

Dose 26.7 GM;  Start 8/15/17 at 13:00


Insulin Glargine 8 unit 8 unit DAILY@20 SC ;  Start 8/16/17 at 20:00


Cefepime HCl (Maxipime 1gm/50 ml (Pmx)) 50 ml @  100 mls/hr Q12 IVPB ;  Start 8/ 16/17 at 21:00


Pantoprazole (Protonix Tab) 40 mg BID@06,18 PO ;  Start 8/16/17 at 18:00











SIDDHARTH RECINOS MD Aug 16, 2017 20:04

## 2017-08-16 NOTE — PN
Date/Time of Note


Date/Time of Note


DATE: 8/16/17 


TIME: 20:54





Assessment/Plan


VTE Prophylaxis


VTE Prophylaxis Intervention:  other





Lines/Catheters


IV Catheter Type (from Nrs):  Peripheral IV





Assessment/Plan


Chief Complaint/Hosp Course


AMS


 CIRRHOSIS


sepsis


bacteremia


ANEMIA


dm


SIRS PLAN antibiotic


per giand id


ambulate


insulin


Problems:  





Subjective


24 Hr Interval Summary


Respiratory:  no complaints


Cardiovascular:  no complaints





Exam/Review of Systems


Vital Signs


Vitals





 Vital Signs








  Date Time  Temp Pulse Resp B/P Pulse Ox O2 Delivery O2 Flow Rate FiO2


 


8/16/17 16:23  94      


 


8/16/17 15:49 98.0  18 90/55 100   


 


8/15/17 09:10      Nasal Cannula 2.0 














 Intake and Output   


 


 8/15/17 8/15/17 8/16/17





 15:00 23:00 07:00


 


Intake Total 50 ml 1900 ml 325 ml


 


Output Total   850 ml


 


Balance 50 ml 1900 ml -525 ml











Exam


Respiratory:  clear to auscultation


Cardiovascular:  regular rate and rhythm


Gastrointestinal:  soft


Musculoskeletal:  nl extremities to inspection


Extremities:  normal pulses





Results


Result Diagram:  


8/15/17 1015                                                                   

             8/15/17 1015





Results 24 hrs





Laboratory Tests








Test


  8/15/17


21:10 8/16/17


01:34 8/16/17


06:17 8/16/17


07:46


 


Urine Color YELLOW     


 


Urine Clarity CLOUDY  A   


 


Urine pH 6.0     


 


Urine Specific Gravity 1.013     


 


Urine Ketones NEGATIVE     


 


Urine Nitrite NEGATIVE     


 


Urine Bilirubin NEGATIVE     


 


Urine Urobilinogen NEGATIVE     


 


Urine Leukocyte Esterase 2+  H   


 


Urine Microscopic RBC 70  H   


 


Urine Microscopic WBC 29  H   


 


Urine Yeast (Budding) MODERATE  A   


 


Urine Hemoglobin 1+  H   


 


Urine Glucose 2+  H   


 


Urine Total Protein NEGATIVE     


 


Bedside Glucose  190    252  H


 


Activated Partial


Thromboplast Time 


  


  46.5  H


  


 














Test


  8/16/17


11:50 8/16/17


16:36 8/16/17


20:34 


 


 


Bedside Glucose 256  H 378  H 366  H 











Medications


Medications





 Current Medications


Multivitamins/ Thiamine HCl/ Folic Acid/Sodium Chloride (Mvi Adult/ Vitamin B1/

Folic Acid/NS) 1,011.2 ml  @ 125 mls/ hr DAILY@09 IVPB  Last administered on 8/ 16/17at 08:18; Admin Dose 125 MLS/HR;  Start 8/12/17 at 11:00


Lorazepam (Ativan) 1 mg Q6H  PRN IV anxiety/agitation Last administered on 8/15/

17at 16:55; Admin Dose 1 MG;  Start 8/12/17 at 11:00


Miscellaneous Information 1 ea NOTE XX ;  Start 8/14/17 at 01:00


Glucose (Glutose) 15 gm Q15M  PRN PO DECREASED GLUCOSE;  Start 8/14/17 at 01:00


Glucose (Glutose) 22.5 gm Q15M  PRN PO DECREASED GLUCOSE;  Start 8/14/17 at 01:

00


Dextrose (D50w Syringe) 25 ml Q15M  PRN IV DECREASED GLUCOSE;  Start 8/14/17 at 

01:00


Dextrose (D50w Syringe) 50 ml Q15M  PRN IV DECREASED GLUCOSE;  Start 8/14/17 at 

01:00


Glucagon (Glucagen) 1 mg Q15M  PRN IM DECREASED GLUCOSE;  Start 8/14/17 at 01:00


Glucose (Glutose) 15 gm Q15M  PRN BUCCAL DECREASED GLUCOSE;  Start 8/14/17 at 01

:00


Morphine Sulfate (morphine) 1 mg Q4H  PRN IV PAIN LEVEL 7-10 Last administered 

on 8/16/17at 17:00; Admin Dose 1 MG;  Start 8/14/17 at 14:00


Potassium Chloride (Klor-Con 20) 20 meq BID PO  Last administered on 8/16/17at 

08:19; Admin Dose 20 MEQ;  Start 8/14/17 at 21:00


Diagnostic Test (Pha) 1 ea 1 ea 02 XX  Last administered on 8/16/17at 02:16; 

Admin Dose 1 EA;  Start 8/15/17 at 02:00


Sodium Chloride (1/2 NS) 1,000 ml @  30 mls/hr Q24H IV  Last administered on 8/ 16/17at 00:43; Admin Dose 30 MLS/HR;  Start 8/15/17 at 00:30


Lactulose (Enulose) 26.7 gm TID PO  Last administered on 8/16/17at 13:25; Admin 

Dose 26.7 GM;  Start 8/15/17 at 13:00


Insulin Glargine 8 unit 8 unit DAILY@20 SC ;  Start 8/16/17 at 20:00


Cefepime HCl (Maxipime 1gm/50 ml (Pmx)) 50 ml @  100 mls/hr Q12 IVPB ;  Start 8/ 16/17 at 21:00


Pantoprazole (Protonix Tab) 40 mg BID@06,18 PO ;  Start 8/16/17 at 18:00


Rifaximin (Xifaxan) 550 mg BID PO ;  Start 8/16/17 at 21:00











HANNAH CASTELLANO MD Aug 16, 2017 20:54

## 2017-08-17 VITALS — HEART RATE: 101 BPM

## 2017-08-17 VITALS — RESPIRATION RATE: 15 BRPM | HEART RATE: 99 BPM | DIASTOLIC BLOOD PRESSURE: 56 MMHG | SYSTOLIC BLOOD PRESSURE: 99 MMHG

## 2017-08-17 VITALS — RESPIRATION RATE: 15 BRPM | DIASTOLIC BLOOD PRESSURE: 60 MMHG | HEART RATE: 95 BPM | SYSTOLIC BLOOD PRESSURE: 103 MMHG

## 2017-08-17 VITALS — DIASTOLIC BLOOD PRESSURE: 80 MMHG | RESPIRATION RATE: 16 BRPM | SYSTOLIC BLOOD PRESSURE: 125 MMHG

## 2017-08-17 VITALS — RESPIRATION RATE: 19 BRPM | DIASTOLIC BLOOD PRESSURE: 64 MMHG | SYSTOLIC BLOOD PRESSURE: 125 MMHG

## 2017-08-17 VITALS — HEART RATE: 100 BPM

## 2017-08-17 VITALS — HEART RATE: 104 BPM

## 2017-08-17 VITALS — HEART RATE: 103 BPM

## 2017-08-17 VITALS — SYSTOLIC BLOOD PRESSURE: 124 MMHG | DIASTOLIC BLOOD PRESSURE: 82 MMHG | RESPIRATION RATE: 17 BRPM

## 2017-08-17 VITALS — RESPIRATION RATE: 17 BRPM | DIASTOLIC BLOOD PRESSURE: 69 MMHG | SYSTOLIC BLOOD PRESSURE: 104 MMHG

## 2017-08-17 RX ADMIN — POTASSIUM CHLORIDE SCH MEQ: 20 TABLET, EXTENDED RELEASE ORAL at 20:17

## 2017-08-17 RX ADMIN — MORPHINE SULFATE PRN MG: 2 INJECTION, SOLUTION INTRAMUSCULAR; INTRAVENOUS at 05:01

## 2017-08-17 RX ADMIN — LACTULOSE SCH GM: 10 SOLUTION ORAL at 20:17

## 2017-08-17 RX ADMIN — MORPHINE SULFATE PRN MG: 2 INJECTION, SOLUTION INTRAMUSCULAR; INTRAVENOUS at 09:10

## 2017-08-17 RX ADMIN — PANTOPRAZOLE SODIUM SCH MG: 40 TABLET, DELAYED RELEASE ORAL at 06:00

## 2017-08-17 RX ADMIN — MORPHINE SULFATE PRN MG: 2 INJECTION, SOLUTION INTRAMUSCULAR; INTRAVENOUS at 21:06

## 2017-08-17 RX ADMIN — CEFEPIME SCH MLS/HR: 1 INJECTION, POWDER, FOR SOLUTION INTRAMUSCULAR; INTRAVENOUS at 20:18

## 2017-08-17 RX ADMIN — POTASSIUM CHLORIDE SCH MEQ: 20 TABLET, EXTENDED RELEASE ORAL at 08:39

## 2017-08-17 RX ADMIN — LACTULOSE SCH GM: 10 SOLUTION ORAL at 08:39

## 2017-08-17 RX ADMIN — MORPHINE SULFATE PRN MG: 2 INJECTION, SOLUTION INTRAMUSCULAR; INTRAVENOUS at 17:00

## 2017-08-17 RX ADMIN — PANTOPRAZOLE SODIUM SCH MG: 40 TABLET, DELAYED RELEASE ORAL at 18:02

## 2017-08-17 RX ADMIN — RIFAXIMIN SCH MG: 550 TABLET ORAL at 20:17

## 2017-08-17 RX ADMIN — DEXAMETHASONE SODIUM PHOSPHATE PRN MG: 10 INJECTION, SOLUTION INTRAMUSCULAR; INTRAVENOUS at 12:40

## 2017-08-17 RX ADMIN — LACTULOSE SCH GM: 10 SOLUTION ORAL at 12:44

## 2017-08-17 RX ADMIN — DEXAMETHASONE SODIUM PHOSPHATE PRN MG: 10 INJECTION, SOLUTION INTRAMUSCULAR; INTRAVENOUS at 20:25

## 2017-08-17 RX ADMIN — MORPHINE SULFATE PRN MG: 2 INJECTION, SOLUTION INTRAMUSCULAR; INTRAVENOUS at 01:06

## 2017-08-17 RX ADMIN — MORPHINE SULFATE PRN MG: 2 INJECTION, SOLUTION INTRAMUSCULAR; INTRAVENOUS at 13:05

## 2017-08-17 RX ADMIN — CALCIUM GLUCONATE SCH MLS/HR: 94 INJECTION, SOLUTION INTRAVENOUS at 00:30

## 2017-08-17 RX ADMIN — CEFEPIME SCH MLS/HR: 1 INJECTION, POWDER, FOR SOLUTION INTRAMUSCULAR; INTRAVENOUS at 08:39

## 2017-08-17 RX ADMIN — RIFAXIMIN SCH MG: 550 TABLET ORAL at 08:39

## 2017-08-17 NOTE — CONS
Date/Time of Note


Date/Time of Note


DATE: 8/17/17 


TIME: 18:32





Assessment/Plan


Assessment/Plan


Additional Assessment/Plan


Additional Assessment/Plan


1.  Cirrhosis of liver


2.  Hepatic encephalopathy mild


3.  Status post coronary artery stent 6 weeks ago


4.  Status post TIPS for recurrent ascites.


5.  Emesis


6.  Epistaxis


Plan


Continue rifaximin and lactulose.


TIPS a cause of his worsening encephalopathy


Avoid all kinds of sedative


Reglan for possible diabetic gastroparesis





Consultation Date/Type/Reason


Admit Date/Time


Aug 12, 2017 at 05:42


Initial Consult Date


8/12/17


Type of Consultation:  GI





24 HR Interval Summary


Free Text/Dictation


Emesis


Epistaxis as per  wife





Exam/Review of Systems


Vital Signs


Vitals





 Vital Signs








  Date Time  Temp Pulse Resp B/P Pulse Ox O2 Delivery O2 Flow Rate FiO2


 


8/17/17 16:00  104      


 


8/17/17 15:37 98.0  19 125/64 99   


 


8/15/17 09:10      Nasal Cannula 2.0 














 Intake and Output   


 


 8/16/17 8/16/17 8/17/17





 15:00 23:00 07:00


 


Intake Total  1940 ml 910 ml


 


Output Total  400 ml 650 ml


 


Balance  1540 ml 260 ml











Exam


Constitutional:  alert, oriented, well developed


Psych:  nl mood/affect, no complaints


Head:  atraumatic, normocephalic


Eyes:  EOMI, PERRL, nl conjunctiva, nl lids, nl sclera


ENMT:  nl external ears & nose, nl lips & teeth, nl nasal mucosa & septum


Neck:  non-tender, supple


Respiratory:  clear to auscultation, normal air movement


Cardiovascular:  nl pulses, regular rate and rhythm


Gastrointestinal:  nl liver, spleen, non-tender, soft


Musculoskeletal:  nl extremities to inspection, nl gait and stance


Extremities:  normal pulses


Neurological:  CNS II-XII intact, nl mental status, nl speech, nl strength


Skin:  nl turgor, 


   No rash or lesions


Lymph:  nl lymph nodes





Results


Result Diagram:  


8/15/17 1015                                                                   

             8/15/17 1015





Results 24 hrs





Laboratory Tests








Test


  8/16/17


20:34 8/17/17


02:21 8/17/17


03:18 8/17/17


08:10


 


Bedside Glucose 366  H 390  H 354  H 374  H














Test


  8/17/17


11:30 8/17/17


17:52 


  


 


 


Bedside Glucose 324  H 306  H  











Medications


Medications





 Current Medications


Multivitamins/ Thiamine HCl/ Folic Acid/Sodium Chloride (Mvi Adult/ Vitamin B1/

Folic Acid/NS) 1,011.2 ml  @ 125 mls/ hr DAILY@09 IVPB  Last administered on 8/ 17/17at 08:40; Admin Dose 125 MLS/HR;  Start 8/12/17 at 11:00


Lorazepam (Ativan) 1 mg Q6H  PRN IV anxiety/agitation Last administered on 8/15/

17at 16:55; Admin Dose 1 MG;  Start 8/12/17 at 11:00


Miscellaneous Information 1 ea NOTE XX ;  Start 8/14/17 at 01:00


Glucose (Glutose) 15 gm Q15M  PRN PO DECREASED GLUCOSE;  Start 8/14/17 at 01:00


Glucose (Glutose) 22.5 gm Q15M  PRN PO DECREASED GLUCOSE;  Start 8/14/17 at 01:

00


Dextrose (D50w Syringe) 25 ml Q15M  PRN IV DECREASED GLUCOSE;  Start 8/14/17 at 

01:00


Dextrose (D50w Syringe) 50 ml Q15M  PRN IV DECREASED GLUCOSE;  Start 8/14/17 at 

01:00


Glucagon (Glucagen) 1 mg Q15M  PRN IM DECREASED GLUCOSE;  Start 8/14/17 at 01:00


Glucose (Glutose) 15 gm Q15M  PRN BUCCAL DECREASED GLUCOSE;  Start 8/14/17 at 01

:00


Morphine Sulfate (morphine) 1 mg Q4H  PRN IV PAIN LEVEL 7-10 Last administered 

on 8/17/17at 17:00; Admin Dose 1 MG;  Start 8/14/17 at 14:00


Potassium Chloride (Klor-Con 20) 20 meq BID PO  Last administered on 8/17/17at 

08:39; Admin Dose 20 MEQ;  Start 8/14/17 at 21:00


Diagnostic Test (Pha) 1 ea 1 ea 02 XX  Last administered on 8/16/17at 02:16; 

Admin Dose 1 EA;  Start 8/15/17 at 02:00


Sodium Chloride (1/2 NS) 1,000 ml @  30 mls/hr Q24H IV  Last administered on 8/ 16/17at 00:43; Admin Dose 30 MLS/HR;  Start 8/15/17 at 00:30


Lactulose 26.7 gm 26.7 gm TID PO  Last administered on 8/17/17at 12:44; Admin 

Dose 26.7 GM;  Start 8/15/17 at 13:00


Cefepime HCl (Maxipime 1gm/50 ml (Pmx)) 50 ml @  100 mls/hr Q12 IVPB  Last 

administered on 8/17/17at 08:39; Admin Dose 100 MLS/HR;  Start 8/16/17 at 21:00


Pantoprazole (Protonix Tab) 40 mg BID@06,18 PO  Last administered on 8/17/17at 

18:02; Admin Dose 40 MG;  Start 8/16/17 at 18:00


Rifaximin (Xifaxan) 550 mg BID PO  Last administered on 8/17/17at 08:39; Admin 

Dose 550 MG;  Start 8/16/17 at 21:00


Insulin Glargine (Lantus) 12 unit DAILY@20 SC ;  Start 8/17/17 at 20:00


Ondansetron HCl 4 mg 4 mg Q6H  PRN IV NAUSEA AND/OR VOMITING Last administered 

on 8/17/17at 12:40; Admin Dose 4 MG;  Start 8/16/17 at 22:30


Caspofungin/ Sodium Chloride (Cancidas/NS) 250 ml @  250 mls/hr Q24H IVPB ;  

Start 8/18/17 at 16:00











SIDDHARTH RECINOS MD Aug 17, 2017 18:33

## 2017-08-17 NOTE — RADRPT
PROCEDURE:   MRI thoracic spine without contrast

 

CLINICAL INDICATION:   Back pain.  Evaluate for abscess/diskitis. 

 

TECHNIQUE:   An MRI of the thoracic spine was performed on a US [employer] 1.5 hipolito scanner utilizing the foll
owing sequences: Pre and postcontrast sagittal  T1 weighted, sagittal and axial T2 weighted, and sag
ittal STIR. 

 

COMPARISON:   None 

 

FINDINGS:

 

There is a normal kyphosis of the thoracic spine.  No vertebral body subluxation is seen.  The verte
bral bodies are normal in height and signal intensity.  There is a focal dilatation of the central c
anal at the level of C7-T1 measuring 4 mm in AP dimension and 9 mm in craniocaudal length. There is 
mild discogenic disease without associated central canal or neural foraminal stenosis. The intervert
ebral discs are all normal in appearance with no significant disk bulge or protrusion.  The central 
canal and foramina are adequately patent at all levels.  The paraspinal soft tissues are normal. 

 

IMPRESSION:

 

1.  No abnormal bone marrow/disk signal to suggest osteomyelitis/diskitis.  No soft tissue thickenin
g is seen though the exam is limited in the absence of IV contrast.

2.  No acute osseous or ligamentous injury.

3.  Small syrinx cavity measuring 4 mm x 9 mm (AP x cc ) at the level of C7-T1. Mild discogenic dise
ase at this level.  The canal and neural foramina are adequately patent.

4.  Moderate to severe discogenic disease at C5-C6 and C6-C7.  Consider MRI of the C-spine for furth
er evaluation.

 

 

RPTAT: AAEE

_____________________________________________ 

.Ramona Ovalles MD, MD           Date    Time 

Electronically viewed and signed by .Ramona Ovalles MD, MD on 08/17/2017 09:33 

 

D:  08/17/2017 09:33  T:  08/17/2017 09:33

.O/

## 2017-08-17 NOTE — CONS
DATE OF ADMISSION:  08/12/2017

 

DATE OF CONSULTATION:  08/17/2017

 

 

 

HISTORY OF PRESENT ILLNESS:  The patient is a 57-year-old male

with a known history of liver cirrhosis, with accompanying

medical conditions, including GI bleeding and esophageal varices

with a history of coronary artery disease, status post coronary

bypass surgery, diabetes, and pancreatitis, who was admitted on

August 12, 2017, when he came to the emergency room complaining

of shortness of breath.

 

 

Because of the presence of degenerative disc herniation at the

level of C5-C6 and C6-C7, which was included in MRI scan of the

thoracic spine, Orthopedic Surgery was consulted.

 

 

On inquiring, patient denies any acute pain involving neck or

base of neck.  Denies any numbness or tingling in the upper

extremities.  Denies any obvious numbness and tingling; however,

he claims that he has weakness involving the right upper

extremity.

 

 

 

PHYSICAL EXAMINATION:  Was somewhat difficult because of the less

than ideal cooperation; however, no obvious motor weakness

involving upper extremities was noted, and there were no obvious

sensory changes.

 

 

 

LABORATORY:  There were no x-rays of the cervical spine, and the

MRI scan of the cervical spine was not available for my review.

 

 

 

DIAGNOSTIC IMPRESSION:  Presence of degenerative disc herniation

at the level of C5-C6 and C6-C7, documented by MRI scan of the

thoracic spine, without significant subjective symptoms or

objective findings.

 

 

 

RECOMMENDATIONS FOR MANAGEMENT:  Obtain MRI scan of the cervical

spine.  Further treatment recommendation depending on the

findings on the MRI scan of the C-spine.  However, in the absence

of any subjective symptoms or objective findings other than MRI

findings, no treatment seems to be indicated at this time.

 

 

 

 

 

 

 

Dictated By:  In Jessie Greene MD

 

 

 

/anna/estefania

 

Job#:  [id number]/Document#:  [id number]

 

D:  08/17/2017 20:43

 

T:  08/17/2017 22:14

## 2017-08-17 NOTE — PN
DATE:  08/16/2017

 

 

 

SUBJECTIVE DATA:  The patient had been spiking fevers overnight

with a T-max of 103.2.  He is currently sleeping, looks

comfortable and is afebrile.

 

 

 

OBJECTIVE DATA:  Temperature 98, pulse 102, respirations 18,

blood pressure 90/55, saturation 100 percent on room air.

 

 

Microbiology:  Blood cultures persistently growing oxacillin-

sensitive Staph aureus,

 

 

Antimicrobials:  The patient is on meropenem and vancomycin.

 

 

 

PHYSICAL EXAMINATION:

 

GENERAL:  This is a chronically ill-appearing, middle-aged

[place] man who is in no distress.

 

HEENT:  Head atraumatic, normocephalic.  Sclerae anicteric.

Buccal mucosa dry.

 

NECK:  Supple.

 

CHEST:  Chest rise symmetrical.  Breath sounds diminished at the

bases.

 

HEART:  S1, S2.

 

ABDOMEN:  Soft, bowel sounds present.

 

EXTREMITIES:  Without cyanosis.

 

 

 

ASSESSMENT:

 

1.   Persistent oxacillin-sensitive Staph aureus bacteremia,

  etiology unclear.  Rule out endocarditis.  Rule out discitis.

  Possible other etiologies.

2.   End-stage liver disease.  Status post transjugular

intrahepatic portosystemic shunt (TIPS) procedure.

3.   Coronary artery disease with a history of coronary stents.

4.   Diabetes.

5.   Resolving encephalopathy.

6.   Sepsis.

 

 

PLAN:  Patient remains clinically stable.  Pending MRI of the

thoracolumbar spine.  He is growing persistent MSSA in his blood.

2D echo so far revealed no vegetation.  MRI of the spine pending.

Chest

 

 

 

x-ray revealed no evidence of acute pulmonary disease.  We are

going to change antibiotics to cefepime and await for final

workup.  The patient may require ODIN to rule out endocarditis.

Above was discussed with Dr. Zavala.

 

 

 

 

 

 

 

Dictated By:  Ruddy Escobedo NP

 

 

 

/anna/napoleon

 

Job#:  [id number]/Document#:  [id number]

 

D:  08/16/2017 16:02

 

T:  08/16/2017 16:43

## 2017-08-18 VITALS — SYSTOLIC BLOOD PRESSURE: 116 MMHG | DIASTOLIC BLOOD PRESSURE: 75 MMHG | RESPIRATION RATE: 16 BRPM

## 2017-08-18 VITALS — RESPIRATION RATE: 16 BRPM | SYSTOLIC BLOOD PRESSURE: 116 MMHG | DIASTOLIC BLOOD PRESSURE: 76 MMHG

## 2017-08-18 VITALS — HEART RATE: 103 BPM

## 2017-08-18 VITALS — RESPIRATION RATE: 18 BRPM | SYSTOLIC BLOOD PRESSURE: 122 MMHG | DIASTOLIC BLOOD PRESSURE: 58 MMHG

## 2017-08-18 VITALS — HEART RATE: 95 BPM

## 2017-08-18 VITALS — HEART RATE: 99 BPM

## 2017-08-18 VITALS — SYSTOLIC BLOOD PRESSURE: 106 MMHG | DIASTOLIC BLOOD PRESSURE: 66 MMHG | RESPIRATION RATE: 16 BRPM

## 2017-08-18 VITALS — HEART RATE: 109 BPM

## 2017-08-18 VITALS — RESPIRATION RATE: 18 BRPM | SYSTOLIC BLOOD PRESSURE: 118 MMHG | DIASTOLIC BLOOD PRESSURE: 71 MMHG

## 2017-08-18 VITALS — RESPIRATION RATE: 18 BRPM | SYSTOLIC BLOOD PRESSURE: 110 MMHG | DIASTOLIC BLOOD PRESSURE: 67 MMHG

## 2017-08-18 VITALS — HEART RATE: 105 BPM

## 2017-08-18 VITALS — HEART RATE: 102 BPM

## 2017-08-18 LAB
ALBUMIN SERPL-MCNC: 2.3 G/DL (ref 3.3–4.9)
ALBUMIN/GLOB SERPL: 0.63 {RATIO}
ALP SERPL-CCNC: 237 IU/L (ref 42–121)
ALT SERPL-CCNC: 90 IU/L (ref 13–69)
ANION GAP SERPL CALC-SCNC: 17 MMOL/L (ref 8–16)
AST SERPL-CCNC: 105 IU/L (ref 15–46)
BILIRUB DIRECT SERPL-MCNC: 0 MG/DL (ref 0–0.2)
BILIRUB SERPL-MCNC: 1.1 MG/DL (ref 0.2–1.3)
BUN SERPL-MCNC: 9 MG/DL (ref 7–20)
CALCIUM SERPL-MCNC: 8.2 MG/DL (ref 8.4–10.2)
CHLORIDE SERPL-SCNC: 103 MMOL/L (ref 97–110)
CO2 SERPL-SCNC: 21 MMOL/L (ref 21–31)
CREAT SERPL-MCNC: 0.4 MG/DL (ref 0.61–1.24)
GLOBULIN SER-MCNC: 3.6 G/DL (ref 1.3–3.2)
GLUCOSE SERPL-MCNC: 250 MG/DL (ref 70–220)
INR PPP: 1.8
POTASSIUM SERPL-SCNC: 4.6 MMOL/L (ref 3.5–5.1)
PROT SERPL-MCNC: 5.9 G/DL (ref 6.1–8.1)
PROTHROMBIN TIME: 21 SEC (ref 12.2–14.2)
PT RATIO: 1.6
SODIUM SERPL-SCNC: 136 MMOL/L (ref 135–144)

## 2017-08-18 RX ADMIN — LACTULOSE SCH GM: 10 SOLUTION ORAL at 10:07

## 2017-08-18 RX ADMIN — RIFAXIMIN SCH MG: 550 TABLET ORAL at 10:07

## 2017-08-18 RX ADMIN — MORPHINE SULFATE PRN MG: 2 INJECTION, SOLUTION INTRAMUSCULAR; INTRAVENOUS at 06:12

## 2017-08-18 RX ADMIN — CEFEPIME SCH MLS/HR: 1 INJECTION, POWDER, FOR SOLUTION INTRAMUSCULAR; INTRAVENOUS at 10:07

## 2017-08-18 RX ADMIN — CALCIUM GLUCONATE SCH MLS/HR: 94 INJECTION, SOLUTION INTRAVENOUS at 23:29

## 2017-08-18 RX ADMIN — PANTOPRAZOLE SODIUM SCH MG: 40 TABLET, DELAYED RELEASE ORAL at 18:40

## 2017-08-18 RX ADMIN — DEXAMETHASONE SODIUM PHOSPHATE PRN MG: 10 INJECTION, SOLUTION INTRAMUSCULAR; INTRAVENOUS at 10:13

## 2017-08-18 RX ADMIN — MORPHINE SULFATE PRN MG: 2 INJECTION, SOLUTION INTRAMUSCULAR; INTRAVENOUS at 10:14

## 2017-08-18 RX ADMIN — MORPHINE SULFATE PRN MG: 2 INJECTION, SOLUTION INTRAMUSCULAR; INTRAVENOUS at 18:40

## 2017-08-18 RX ADMIN — POTASSIUM CHLORIDE SCH MEQ: 20 TABLET, EXTENDED RELEASE ORAL at 20:20

## 2017-08-18 RX ADMIN — CEFEPIME SCH MLS/HR: 1 INJECTION, POWDER, FOR SOLUTION INTRAMUSCULAR; INTRAVENOUS at 20:20

## 2017-08-18 RX ADMIN — RIFAXIMIN SCH MG: 550 TABLET ORAL at 20:21

## 2017-08-18 RX ADMIN — MORPHINE SULFATE PRN MG: 2 INJECTION, SOLUTION INTRAMUSCULAR; INTRAVENOUS at 14:14

## 2017-08-18 RX ADMIN — INSULIN GLARGINE SCH UNIT: 100 INJECTION, SOLUTION SUBCUTANEOUS at 20:45

## 2017-08-18 RX ADMIN — LACTULOSE SCH GM: 10 SOLUTION ORAL at 20:20

## 2017-08-18 RX ADMIN — CALCIUM GLUCONATE SCH MLS/HR: 94 INJECTION, SOLUTION INTRAVENOUS at 00:23

## 2017-08-18 RX ADMIN — MORPHINE SULFATE PRN MG: 2 INJECTION, SOLUTION INTRAMUSCULAR; INTRAVENOUS at 01:06

## 2017-08-18 RX ADMIN — DEXAMETHASONE SODIUM PHOSPHATE PRN MG: 10 INJECTION, SOLUTION INTRAMUSCULAR; INTRAVENOUS at 18:40

## 2017-08-18 RX ADMIN — PANTOPRAZOLE SODIUM SCH MG: 40 TABLET, DELAYED RELEASE ORAL at 06:12

## 2017-08-18 RX ADMIN — LACTULOSE SCH GM: 10 SOLUTION ORAL at 14:03

## 2017-08-18 RX ADMIN — POTASSIUM CHLORIDE SCH MEQ: 20 TABLET, EXTENDED RELEASE ORAL at 10:07

## 2017-08-18 RX ADMIN — MORPHINE SULFATE PRN MG: 2 INJECTION, SOLUTION INTRAMUSCULAR; INTRAVENOUS at 23:29

## 2017-08-18 RX ADMIN — CASPOFUNGIN ACETATE SCH MLS/HR: 5 INJECTION, POWDER, LYOPHILIZED, FOR SOLUTION INTRAVENOUS at 17:23

## 2017-08-18 NOTE — PN
DATE:  08/17/2017

 

 

 

SUBJECTIVE:  No events overnight.  The patient is awake,

complaining of generalized body pain and lower back pain.  He is

in no distress, afebrile.  He had an MRI of the thoracic spine

that revealed no evidence for osteomyelitis or diskitis.

However, exam was limited in the absence of IV contrast.  Lumbar

spine MRI on hold secondary to issues with a machinery.

 

 

 

OBJECTIVE DATA:

 

VITAL SIGNS:  T-max 99.6, T, current 98.2, pulse 100,

respirations 17, blood pressure 124/82, and saturation 99 percent

on room air.

 

 

 

MICROBIOLOGY:  Blood cultures on August 12th and 13th, grew Staph

aureus, oxacillin sensitive.  Blood cultures on August 15th, grew

Staph aureus.  On the 14th, Staph aureus and yeast.  Urine

culture growing yeast.

 

 

 

ANTIMICROBIALS:  Patient is on cefepime, status post vancomycin.

 

 

 

PHYSICAL EXAMINATION:

 

GENERAL:  This is a wasted, well-developed middle-aged [place]

man who is awake, in no distress.

 

HEENT:  Head atraumatic, normocephalic.  Sclerae anicteric.

Buccal mucosa dry.

 

NECK:  Supple.

 

CHEST:  Rise symmetrical.  Breath sounds diminished at the bases.

 

HEART:  S1, S2.

 

ABDOMEN:  Soft, bowel sounds present.

 

 

 

ASSESSMENT:

 

1.   Severe sepsis with bacteremia and fungemia, rule out

  endocarditis, rule out discitis.

2.   End-stage liver disease, status post transjugular

intrahepatic portosystemic shunt (TIPS) procedure.

3.   History of cardiac stents.

4.   Anemia and thrombocytopenia.

5.   Resolving encephalopathy.

6.   Diabetes.

 

 

PLAN:  We are going to add Cancidas to the regimen.  Continue

on cefepime.  We will await for MRI of the lumbar spine

with contrast.  We will order WBC labeled nuclear scan as well.

The patient is being followed by Gastroenterology, 2D echo on

admission revealed no vegetations, he may need ODIN.

 

 

 

 

 

 

 

Dictated By:  Ruddy Escobedo NP

 

 

 

/anna/camlia

 

Job#:  [id number]/Document#:  [id number]

 

D:  08/17/2017 15:12

 

T:  08/17/2017 17:15

 

VARGAS

## 2017-08-18 NOTE — CONS
Date/Time of Note


Date/Time of Note


DATE: 8/18/17 


TIME: 14:48





Assessment/Plan


Assessment/Plan


Chief Complaint/Hosp Course


Alert, looks comfortable, family at bedside





Temperature 97.9 pulse 99 respirations 18 blood pressure 122/58 saturation 99 

on room air





Microbiology: Urine culture growing Candida glabrata and Candida albicans blood 

culture growing oxacillin sensitive staph aureus and yeast


  


Antimicrobials:  Cancidas, Cefepime


 


PHYSICAL EXAMINATION:


 


GENERAL:  This is a wasted, well-developed middle-aged [place]


man who is awake, in no distress.


 


HEENT:  Head atraumatic, normocephalic.  Sclerae anicteric.


Buccal mucosa dry.


 


NECK:  Supple.


 


CHEST:  Rise symmetrical.  Breath sounds diminished at the bases.


 


HEART:  S1, S2.


 


ABDOMEN:  Soft, bowel sounds present.


 


 ASSESSMENT:


 


1.   Severe sepsis with bacteremia and fungemia, rule out


  endocarditis, rule out discitis, poss other etiologies.


2.   End-stage liver disease, status post transjugular


intrahepatic portosystemic shunt (TIPS) procedure.


3.   History of cardiac stents.


4.   Anemia and thrombocytopenia.


5.   Resolving encephalopathy.


6.   Diabetes.


7.   UTI==>yeast


 


 


PLAN: Clinically stable, on appropriate antimicrobials, pending lumbar and 

cervical spine MRI, pending WBC labeled nuclear scan.  





Discussed with patient and family at bedside


Discussed with Dr. Zavala


Problems:  





Consultation Date/Type/Reason


Admit Date/Time


Aug 12, 2017 at 05:42


Initial Consult Date


8/12/17


Type of Consultation:  ID





Exam/Review of Systems


Vital Signs


Vitals





 Vital Signs








  Date Time  Temp Pulse Resp B/P Pulse Ox O2 Delivery O2 Flow Rate FiO2


 


8/18/17 12:15  99      


 


8/18/17 11:08 97.9  18 122/58 99   


 


8/15/17 09:10      Nasal Cannula 2.0 














 Intake and Output   


 


 8/17/17 8/17/17 8/18/17





 15:00 23:00 07:00


 


Intake Total  100 ml 440 ml


 


Output Total  1200 ml 800 ml


 


Balance  -1100 ml -360 ml











Results


Result Diagram:  


8/15/17 1015                                                                   

             8/18/17 0555





Results 24 hrs





Laboratory Tests








Test


  8/17/17


17:52 8/17/17


20:20 8/18/17


02:33 8/18/17


05:55


 


Bedside Glucose 306  H 228  H 270  H 


 


Sodium Level    136  


 


Potassium Level    4.6  


 


Chloride Level    103  


 


Carbon Dioxide Level    21  


 


Anion Gap    17  H


 


Blood Urea Nitrogen    9  


 


Creatinine    0.40  L


 


Glucose Level    250  H


 


Hemoglobin A1c    7.2  H


 


Calcium Level    8.2  L


 


Total Bilirubin    1.1  


 


Direct Bilirubin    0.00  


 


Indirect Bilirubin    1.1  


 


Aspartate Amino Transf


(AST/SGOT) 


  


  


  105  H


 


 


Alanine Aminotransferase


(ALT/SGPT) 


  


  


  90  H


 


 


Alkaline Phosphatase    237  H


 


Total Protein    5.9  L


 


Albumin    2.3  L


 


Globulin    3.60  H


 


Albumin/Globulin Ratio    0.63  














Test


  8/18/17


08:19 8/18/17


12:29 


  


 


 


Bedside Glucose 283  H 233  H  











Medications


Medications





 Current Medications


Multivitamins/ Thiamine HCl/ Folic Acid/Sodium Chloride (Mvi Adult/ Vitamin B1/

Folic Acid/NS) 1,011.2 ml  @ 125 mls/ hr DAILY@09 IVPB  Last administered on 8/ 18/17at 11:27; Admin Dose 125 MLS/HR;  Start 8/12/17 at 11:00


Lorazepam (Ativan) 1 mg Q6H  PRN IV anxiety/agitation Last administered on 8/15/

17at 16:55; Admin Dose 1 MG;  Start 8/12/17 at 11:00


Miscellaneous Information 1 ea NOTE XX ;  Start 8/14/17 at 01:00


Glucose (Glutose) 15 gm Q15M  PRN PO DECREASED GLUCOSE;  Start 8/14/17 at 01:00


Glucose (Glutose) 22.5 gm Q15M  PRN PO DECREASED GLUCOSE;  Start 8/14/17 at 01:

00


Dextrose (D50w Syringe) 25 ml Q15M  PRN IV DECREASED GLUCOSE;  Start 8/14/17 at 

01:00


Dextrose (D50w Syringe) 50 ml Q15M  PRN IV DECREASED GLUCOSE;  Start 8/14/17 at 

01:00


Glucagon (Glucagen) 1 mg Q15M  PRN IM DECREASED GLUCOSE;  Start 8/14/17 at 01:00


Glucose (Glutose) 15 gm Q15M  PRN BUCCAL DECREASED GLUCOSE;  Start 8/14/17 at 01

:00


Morphine Sulfate (morphine) 1 mg Q4H  PRN IV PAIN LEVEL 7-10 Last administered 

on 8/18/17at 14:14; Admin Dose 1 MG;  Start 8/14/17 at 14:00


Potassium Chloride (Klor-Con 20) 20 meq BID PO  Last administered on 8/18/17at 

10:07; Admin Dose 20 MEQ;  Start 8/14/17 at 21:00


Diagnostic Test (Pha) 1 ea 1 ea 02 XX  Last administered on 8/18/17at 02:35; 

Admin Dose 1 EA;  Start 8/15/17 at 02:00


Sodium Chloride (1/2 NS) 1,000 ml @  30 mls/hr Q24H IV  Last administered on 8/ 18/17at 00:23; Admin Dose 30 MLS/HR;  Start 8/15/17 at 00:30


Lactulose 26.7 gm 26.7 gm TID PO  Last administered on 8/18/17at 14:03; Admin 

Dose 26.7 GM;  Start 8/15/17 at 13:00


Cefepime HCl (Maxipime 1gm/50 ml (Pmx)) 50 ml @  100 mls/hr Q12 IVPB  Last 

administered on 8/18/17at 10:07; Admin Dose 100 MLS/HR;  Start 8/16/17 at 21:00


Pantoprazole (Protonix Tab) 40 mg BID@06,18 PO  Last administered on 8/18/17at 

06:12; Admin Dose 40 MG;  Start 8/16/17 at 18:00


Rifaximin (Xifaxan) 550 mg BID PO  Last administered on 8/18/17at 10:07; Admin 

Dose 550 MG;  Start 8/16/17 at 21:00


Insulin Glargine (Lantus) 12 unit DAILY@20 SC  Last administered on 8/17/17at 20

:23; Admin Dose 12 UNIT;  Start 8/17/17 at 20:00


Ondansetron HCl 4 mg 4 mg Q6H  PRN IV NAUSEA AND/OR VOMITING Last administered 

on 8/18/17at 10:13; Admin Dose 4 MG;  Start 8/16/17 at 22:30


Caspofungin/ Sodium Chloride (Cancidas/NS) 250 ml @  250 mls/hr Q24H IVPB ;  

Start 8/18/17 at 16:00











ANTONI HAWTHORNE NP Aug 18, 2017 14:53

## 2017-08-18 NOTE — CONS
Date/Time of Note


Date/Time of Note


DATE: 8/18/17 


TIME: 20:40





Assessment/Plan


Assessment/Plan


Additional Assessment/Plan


1.  Cirrhosis of liver


2.  Hepatic encephalopathy mild


3.  Status post coronary artery stent 6 weeks ago


4.  Status post TIPS for recurrent ascites.


5.  Emesis


6.  Epistaxis


Plan


Continue rifaximin and lactulose.


TIPS a cause of his worsening encephalopathy


Avoid all kinds of sedative


Reglan for possible diabetic gastroparesis





Consultation Date/Type/Reason


Admit Date/Time


Aug 12, 2017 at 05:42


Initial Consult Date


8/12/17


Type of Consultation:  ID





24 HR Interval Summary


Constitutional:  improved, no complaints





Exam/Review of Systems


Vital Signs


Vitals





 Vital Signs








  Date Time  Temp Pulse Resp B/P Pulse Ox O2 Delivery O2 Flow Rate FiO2


 


8/18/17 19:48 98.3 98 16 106/66 100   


 


8/15/17 09:10      Nasal Cannula 2.0 














 Intake and Output   


 


 8/17/17 8/17/17 8/18/17





 15:00 23:00 07:00


 


Intake Total  100 ml 440 ml


 


Output Total  1200 ml 800 ml


 


Balance  -1100 ml -360 ml











Exam


Constitutional:  alert, oriented, well developed


Psych:  nl mood/affect, no complaints


Head:  atraumatic, normocephalic


Eyes:  EOMI, PERRL, nl conjunctiva, nl lids, nl sclera


ENMT:  nl external ears & nose, nl lips & teeth, nl nasal mucosa & septum


Neck:  non-tender, supple


Respiratory:  clear to auscultation, normal air movement


Cardiovascular:  nl pulses, regular rate and rhythm


Gastrointestinal:  nl liver, spleen, non-tender, soft


Musculoskeletal:  nl extremities to inspection, nl gait and stance


Extremities:  normal pulses


Neurological:  CNS II-XII intact, nl mental status, nl speech, nl strength


Skin:  nl turgor, 


   No rash or lesions


Lymph:  nl lymph nodes





Results


Result Diagram:  


8/15/17 1015                                                                   

             8/18/17 0555





Results 24 hrs





Laboratory Tests








Test


  8/18/17


02:33 8/18/17


05:55 8/18/17


08:19 8/18/17


12:29


 


Bedside Glucose 270  H  283  H 233  H


 


Sodium Level  136    


 


Potassium Level  4.6    


 


Chloride Level  103    


 


Carbon Dioxide Level  21    


 


Anion Gap  17  H  


 


Blood Urea Nitrogen  9    


 


Creatinine  0.40  L  


 


Glucose Level  250  H  


 


Hemoglobin A1c  7.2  H  


 


Calcium Level  8.2  L  


 


Total Bilirubin  1.1    


 


Direct Bilirubin  0.00    


 


Indirect Bilirubin  1.1    


 


Aspartate Amino Transf


(AST/SGOT) 


  105  H


  


  


 


 


Alanine Aminotransferase


(ALT/SGPT) 


  90  H


  


  


 


 


Alkaline Phosphatase  237  H  


 


Total Protein  5.9  L  


 


Albumin  2.3  L  


 


Globulin  3.60  H  


 


Albumin/Globulin Ratio  0.63    














Test


  8/18/17


14:06 8/18/17


18:18 8/18/17


20:19 


 


 


Prothrombin Time 21.0  H   


 


Prothrombin Time Ratio 1.6     


 


INR International Normalized


Ratio 1.80  


  


  


  


 


 


Bedside Glucose  228  H 225  H 











Medications


Medications





 Current Medications


Multivitamins/ Thiamine HCl/ Folic Acid/Sodium Chloride (Mvi Adult/ Vitamin B1/

Folic Acid/NS) 1,011.2 ml  @ 125 mls/ hr DAILY@09 IVPB  Last administered on 8/ 18/17at 11:27; Admin Dose 125 MLS/HR;  Start 8/12/17 at 11:00


Lorazepam (Ativan) 1 mg Q6H  PRN IV anxiety/agitation Last administered on 8/15/

17at 16:55; Admin Dose 1 MG;  Start 8/12/17 at 11:00


Miscellaneous Information 1 ea NOTE XX ;  Start 8/14/17 at 01:00


Glucose (Glutose) 15 gm Q15M  PRN PO DECREASED GLUCOSE;  Start 8/14/17 at 01:00


Glucose (Glutose) 22.5 gm Q15M  PRN PO DECREASED GLUCOSE;  Start 8/14/17 at 01:

00


Dextrose (D50w Syringe) 25 ml Q15M  PRN IV DECREASED GLUCOSE;  Start 8/14/17 at 

01:00


Dextrose (D50w Syringe) 50 ml Q15M  PRN IV DECREASED GLUCOSE;  Start 8/14/17 at 

01:00


Glucagon (Glucagen) 1 mg Q15M  PRN IM DECREASED GLUCOSE;  Start 8/14/17 at 01:00


Glucose (Glutose) 15 gm Q15M  PRN BUCCAL DECREASED GLUCOSE;  Start 8/14/17 at 01

:00


Morphine Sulfate (morphine) 1 mg Q4H  PRN IV PAIN LEVEL 7-10 Last administered 

on 8/18/17at 18:40; Admin Dose 1 MG;  Start 8/14/17 at 14:00


Potassium Chloride (Klor-Con 20) 20 meq BID PO  Last administered on 8/18/17at 

10:07; Admin Dose 20 MEQ;  Start 8/14/17 at 21:00


Diagnostic Test (Pha) 1 ea 1 ea 02 XX  Last administered on 8/18/17at 02:35; 

Admin Dose 1 EA;  Start 8/15/17 at 02:00


Sodium Chloride (1/2 NS) 1,000 ml @  30 mls/hr Q24H IV  Last administered on 8/ 18/17at 00:23; Admin Dose 30 MLS/HR;  Start 8/15/17 at 00:30


Lactulose 26.7 gm 26.7 gm TID PO  Last administered on 8/18/17at 14:03; Admin 

Dose 26.7 GM;  Start 8/15/17 at 13:00


Cefepime HCl (Maxipime 1gm/50 ml (Pmx)) 50 ml @  100 mls/hr Q12 IVPB  Last 

administered on 8/18/17at 10:07; Admin Dose 100 MLS/HR;  Start 8/16/17 at 21:00


Pantoprazole (Protonix Tab) 40 mg BID@06,18 PO  Last administered on 8/18/17at 

18:40; Admin Dose 40 MG;  Start 8/16/17 at 18:00


Rifaximin (Xifaxan) 550 mg BID PO  Last administered on 8/18/17at 10:07; Admin 

Dose 550 MG;  Start 8/16/17 at 21:00


Ondansetron HCl 4 mg 4 mg Q6H  PRN IV NAUSEA AND/OR VOMITING Last administered 

on 8/18/17at 18:40; Admin Dose 4 MG;  Start 8/16/17 at 22:30


Caspofungin/ Sodium Chloride (Cancidas/NS) 250 ml @  250 mls/hr Q24H IVPB  Last 

administered on 8/18/17at 17:23; Admin Dose 250 MLS/HR;  Start 8/18/17 at 16:00


Insulin Glargine (Lantus) 15 unit DAILY@20 SC ;  Start 8/18/17 at 20:00











SIDDHARTH RECINOS MD Aug 18, 2017 20:41

## 2017-08-18 NOTE — PN
Date/Time of Note


Date/Time of Note


DATE: 8/18/17 


TIME: 15:50





Assessment/Plan


VTE Prophylaxis


VTE Prophylaxis Intervention:  SCD's





Lines/Catheters


IV Catheter Type (from [facility]):  Saline Lock


Urinary Cath still in place:  No





Assessment/Plan


Chief Complaint/Hosp Course


1. Liver cirrhosis


2. toxic Encephalopathy


3. Anemia


4. hs pancreatitis


5. Hx alcohol abuse


6. Hx esophageal varices


7. SIRS


8. S/p portal shunt 2017


9. S/p cardiac stenting 3 stents


Problems:  


Assessment/Plan


1. Pending MRI lumbar area


2. Poor prognosis





Subjective


24 Hr Interval Summary


Subjective hx not possible:  other (not talking)





Exam/Review of Systems


Vital Signs


Vitals





 Vital Signs








  Date Time  Temp Pulse Resp B/P Pulse Ox O2 Delivery O2 Flow Rate FiO2


 


8/18/17 12:15  99      


 


8/18/17 11:08 97.9  18 122/58 99   


 


8/15/17 09:10      Nasal Cannula 2.0 














 Intake and Output   


 


 8/17/17 8/17/17 8/18/17





 15:00 23:00 07:00


 


Intake Total  100 ml 440 ml


 


Output Total  1200 ml 800 ml


 


Balance  -1100 ml -360 ml











Exam


Constitutional:  other (sleeping, hardly arousable)


Head:  normocephalic


Eyes:  icteric


Neck:  supple


Respiratory:  clear to auscultation


Cardiovascular:  regular rate and rhythm





Results


Result Diagram:  


8/15/17 1015                                                                   

             8/18/17 0555





Results 24 hrs





Laboratory Tests








Test


  8/17/17


17:52 8/17/17


20:20 8/18/17


02:33 8/18/17


05:55


 


Bedside Glucose 306  H 228  H 270  H 


 


Sodium Level    136  


 


Potassium Level    4.6  


 


Chloride Level    103  


 


Carbon Dioxide Level    21  


 


Anion Gap    17  H


 


Blood Urea Nitrogen    9  


 


Creatinine    0.40  L


 


Glucose Level    250  H


 


Hemoglobin A1c    7.2  H


 


Calcium Level    8.2  L


 


Total Bilirubin    1.1  


 


Direct Bilirubin    0.00  


 


Indirect Bilirubin    1.1  


 


Aspartate Amino Transf


(AST/SGOT) 


  


  


  105  H


 


 


Alanine Aminotransferase


(ALT/SGPT) 


  


  


  90  H


 


 


Alkaline Phosphatase    237  H


 


Total Protein    5.9  L


 


Albumin    2.3  L


 


Globulin    3.60  H


 


Albumin/Globulin Ratio    0.63  














Test


  8/18/17


08:19 8/18/17


12:29 8/18/17


14:06 


 


 


Bedside Glucose 283  H 233  H  


 


Prothrombin Time   21.0  H 


 


Prothrombin Time Ratio   1.6   


 


INR International Normalized


Ratio 


  


  1.80  


  


 











Medications


Medications





 Current Medications


Multivitamins/ Thiamine HCl/ Folic Acid/Sodium Chloride (Mvi Adult/ Vitamin B1/

Folic Acid/NS) 1,011.2 ml  @ 125 mls/ hr DAILY@09 IVPB  Last administered on 8/ 18/17at 11:27; Admin Dose 125 MLS/HR;  Start 8/12/17 at 11:00


Lorazepam (Ativan) 1 mg Q6H  PRN IV anxiety/agitation Last administered on 8/15/

17at 16:55; Admin Dose 1 MG;  Start 8/12/17 at 11:00


Miscellaneous Information 1 ea NOTE XX ;  Start 8/14/17 at 01:00


Glucose (Glutose) 15 gm Q15M  PRN PO DECREASED GLUCOSE;  Start 8/14/17 at 01:00


Glucose (Glutose) 22.5 gm Q15M  PRN PO DECREASED GLUCOSE;  Start 8/14/17 at 01:

00


Dextrose (D50w Syringe) 25 ml Q15M  PRN IV DECREASED GLUCOSE;  Start 8/14/17 at 

01:00


Dextrose (D50w Syringe) 50 ml Q15M  PRN IV DECREASED GLUCOSE;  Start 8/14/17 at 

01:00


Glucagon (Glucagen) 1 mg Q15M  PRN IM DECREASED GLUCOSE;  Start 8/14/17 at 01:00


Glucose (Glutose) 15 gm Q15M  PRN BUCCAL DECREASED GLUCOSE;  Start 8/14/17 at 01

:00


Morphine Sulfate (morphine) 1 mg Q4H  PRN IV PAIN LEVEL 7-10 Last administered 

on 8/18/17at 14:14; Admin Dose 1 MG;  Start 8/14/17 at 14:00


Potassium Chloride (Klor-Con 20) 20 meq BID PO  Last administered on 8/18/17at 

10:07; Admin Dose 20 MEQ;  Start 8/14/17 at 21:00


Diagnostic Test (Pha) 1 ea 1 ea 02 XX  Last administered on 8/18/17at 02:35; 

Admin Dose 1 EA;  Start 8/15/17 at 02:00


Sodium Chloride (1/2 NS) 1,000 ml @  30 mls/hr Q24H IV  Last administered on 8/ 18/17at 00:23; Admin Dose 30 MLS/HR;  Start 8/15/17 at 00:30


Lactulose 26.7 gm 26.7 gm TID PO  Last administered on 8/18/17at 14:03; Admin 

Dose 26.7 GM;  Start 8/15/17 at 13:00


Cefepime HCl (Maxipime 1gm/50 ml (Pmx)) 50 ml @  100 mls/hr Q12 IVPB  Last 

administered on 8/18/17at 10:07; Admin Dose 100 MLS/HR;  Start 8/16/17 at 21:00


Pantoprazole (Protonix Tab) 40 mg BID@06,18 PO  Last administered on 8/18/17at 

06:12; Admin Dose 40 MG;  Start 8/16/17 at 18:00


Rifaximin (Xifaxan) 550 mg BID PO  Last administered on 8/18/17at 10:07; Admin 

Dose 550 MG;  Start 8/16/17 at 21:00


Insulin Glargine (Lantus) 12 unit DAILY@20 SC  Last administered on 8/17/17at 20

:23; Admin Dose 12 UNIT;  Start 8/17/17 at 20:00


Ondansetron HCl 4 mg 4 mg Q6H  PRN IV NAUSEA AND/OR VOMITING Last administered 

on 8/18/17at 10:13; Admin Dose 4 MG;  Start 8/16/17 at 22:30


Caspofungin/ Sodium Chloride (Cancidas/NS) 250 ml @  250 mls/hr Q24H IVPB ;  

Start 8/18/17 at 16:00











WAI BELTRAN Aug 18, 2017 15:51

## 2017-08-19 VITALS — RESPIRATION RATE: 18 BRPM | SYSTOLIC BLOOD PRESSURE: 106 MMHG | DIASTOLIC BLOOD PRESSURE: 65 MMHG

## 2017-08-19 VITALS — RESPIRATION RATE: 16 BRPM | SYSTOLIC BLOOD PRESSURE: 100 MMHG | DIASTOLIC BLOOD PRESSURE: 57 MMHG

## 2017-08-19 VITALS — SYSTOLIC BLOOD PRESSURE: 96 MMHG | RESPIRATION RATE: 16 BRPM | DIASTOLIC BLOOD PRESSURE: 54 MMHG

## 2017-08-19 VITALS — HEART RATE: 95 BPM

## 2017-08-19 VITALS — RESPIRATION RATE: 16 BRPM | SYSTOLIC BLOOD PRESSURE: 117 MMHG | DIASTOLIC BLOOD PRESSURE: 70 MMHG

## 2017-08-19 VITALS — SYSTOLIC BLOOD PRESSURE: 101 MMHG | RESPIRATION RATE: 17 BRPM | DIASTOLIC BLOOD PRESSURE: 62 MMHG

## 2017-08-19 VITALS — RESPIRATION RATE: 18 BRPM | DIASTOLIC BLOOD PRESSURE: 68 MMHG | SYSTOLIC BLOOD PRESSURE: 117 MMHG

## 2017-08-19 VITALS — DIASTOLIC BLOOD PRESSURE: 64 MMHG | SYSTOLIC BLOOD PRESSURE: 91 MMHG | RESPIRATION RATE: 17 BRPM

## 2017-08-19 VITALS — HEART RATE: 90 BPM

## 2017-08-19 VITALS — HEART RATE: 98 BPM

## 2017-08-19 VITALS — HEART RATE: 84 BPM

## 2017-08-19 VITALS — HEART RATE: 105 BPM

## 2017-08-19 VITALS — HEART RATE: 99 BPM

## 2017-08-19 RX ADMIN — POTASSIUM CHLORIDE SCH MEQ: 20 TABLET, EXTENDED RELEASE ORAL at 20:14

## 2017-08-19 RX ADMIN — CEFEPIME SCH MLS/HR: 1 INJECTION, POWDER, FOR SOLUTION INTRAMUSCULAR; INTRAVENOUS at 08:43

## 2017-08-19 RX ADMIN — RIFAXIMIN SCH MG: 550 TABLET ORAL at 08:47

## 2017-08-19 RX ADMIN — LACTULOSE SCH GM: 10 SOLUTION ORAL at 20:11

## 2017-08-19 RX ADMIN — MORPHINE SULFATE PRN MG: 2 INJECTION, SOLUTION INTRAMUSCULAR; INTRAVENOUS at 11:13

## 2017-08-19 RX ADMIN — MORPHINE SULFATE PRN MG: 2 INJECTION, SOLUTION INTRAMUSCULAR; INTRAVENOUS at 03:21

## 2017-08-19 RX ADMIN — RIFAXIMIN SCH MG: 550 TABLET ORAL at 20:14

## 2017-08-19 RX ADMIN — CEFEPIME SCH MLS/HR: 1 INJECTION, POWDER, FOR SOLUTION INTRAMUSCULAR; INTRAVENOUS at 20:14

## 2017-08-19 RX ADMIN — CASPOFUNGIN ACETATE SCH MLS/HR: 5 INJECTION, POWDER, LYOPHILIZED, FOR SOLUTION INTRAVENOUS at 16:16

## 2017-08-19 RX ADMIN — LACTULOSE SCH GM: 10 SOLUTION ORAL at 12:48

## 2017-08-19 RX ADMIN — POTASSIUM CHLORIDE SCH MEQ: 20 TABLET, EXTENDED RELEASE ORAL at 08:46

## 2017-08-19 RX ADMIN — LACTULOSE SCH GM: 10 SOLUTION ORAL at 08:50

## 2017-08-19 RX ADMIN — MORPHINE SULFATE PRN MG: 2 INJECTION, SOLUTION INTRAMUSCULAR; INTRAVENOUS at 15:06

## 2017-08-19 RX ADMIN — PANTOPRAZOLE SODIUM SCH MG: 40 TABLET, DELAYED RELEASE ORAL at 05:48

## 2017-08-19 RX ADMIN — MORPHINE SULFATE PRN MG: 2 INJECTION, SOLUTION INTRAMUSCULAR; INTRAVENOUS at 19:46

## 2017-08-19 RX ADMIN — PANTOPRAZOLE SODIUM SCH MG: 40 TABLET, DELAYED RELEASE ORAL at 17:24

## 2017-08-19 RX ADMIN — INSULIN GLARGINE SCH UNIT: 100 INJECTION, SOLUTION SUBCUTANEOUS at 20:31

## 2017-08-19 NOTE — CONS
Date/Time of Note


Date/Time of Note


DATE: 8/19/17 


TIME: 16:44





Assessment/Plan


Assessment/Plan


Chief Complaint/Hosp Course


Alert, shivering, afebrile, looks comfortable 





Microbiology: Urine culture growing Candida glabrata and Candida albicans blood 

culture growing oxacillin sensitive staph aureus and yeast


  


Antimicrobials:  Cancidas, Cefepime


 


PHYSICAL EXAMINATION:


 


GENERAL:  This is a wasted, well-developed middle-aged [place]


man who is awake, in no distress.


 


HEENT:  Head atraumatic, normocephalic.  Sclerae anicteric.


Buccal mucosa dry.


 


NECK:  Supple.


 


CHEST:  Rise symmetrical.  Breath sounds diminished at the bases.


 


HEART:  S1, S2.


 


ABDOMEN:  Soft, bowel sounds present.


 


 ASSESSMENT:


 


1.   Severe sepsis with bacteremia and fungemia, rule out


  endocarditis, rule out discitis, poss other etiologies.


2.   End-stage liver disease, status post transjugular


intrahepatic portosystemic shunt (TIPS) procedure.


3.   History of cardiac stents.


4.   Anemia and thrombocytopenia.


5.   Resolving encephalopathy.


6.   Diabetes.


7.   UTI==>yeast


 


PLAN: Clinically stable, on appropriate antimicrobials, pending lumbar and 

cervical spine MRI, pending WBC labeled nuclear scan, will repeat bld cx today, 

may need ODIN.  





dw staff


Problems:  





Consultation Date/Type/Reason


Admit Date/Time


Aug 12, 2017 at 05:42


Initial Consult Date


8/12/17


Type of Consultation:  ID





Exam/Review of Systems


Vital Signs


Vitals





 Vital Signs








  Date Time  Temp Pulse Resp B/P Pulse Ox O2 Delivery O2 Flow Rate FiO2


 


8/19/17 15:57 98.8 99 17 91/64 98   


 


8/15/17 09:10      Nasal Cannula 2.0 














 Intake and Output   


 


 8/18/17 8/18/17 8/19/17





 15:00 23:00 07:00


 


Intake Total  1805 ml 695 ml


 


Output Total  650 ml 900 ml


 


Balance  1155 ml -205 ml











Results


Result Diagram:  


8/15/17 1015                                                                   

             8/18/17 0555





Results 24 hrs





Laboratory Tests








Test


  8/18/17


18:18 8/18/17


20:19 8/19/17


02:40 8/19/17


08:11


 


Bedside Glucose 228  H 225  H 335  H 227  H














Test


  8/19/17


12:06 


  


  


 


 


Bedside Glucose 319  H   











Medications


Medications





 Current Medications


Lorazepam (Ativan) 1 mg Q6H  PRN IV anxiety/agitation Last administered on 8/15/

17at 16:55; Admin Dose 1 MG;  Start 8/12/17 at 11:00


Miscellaneous Information 1 ea NOTE XX ;  Start 8/14/17 at 01:00


Glucose (Glutose) 15 gm Q15M  PRN PO DECREASED GLUCOSE;  Start 8/14/17 at 01:00


Glucose (Glutose) 22.5 gm Q15M  PRN PO DECREASED GLUCOSE;  Start 8/14/17 at 01:

00


Dextrose (D50w Syringe) 25 ml Q15M  PRN IV DECREASED GLUCOSE;  Start 8/14/17 at 

01:00


Dextrose (D50w Syringe) 50 ml Q15M  PRN IV DECREASED GLUCOSE;  Start 8/14/17 at 

01:00


Glucagon (Glucagen) 1 mg Q15M  PRN IM DECREASED GLUCOSE;  Start 8/14/17 at 01:00


Glucose (Glutose) 15 gm Q15M  PRN BUCCAL DECREASED GLUCOSE;  Start 8/14/17 at 01

:00


Morphine Sulfate (morphine) 1 mg Q4H  PRN IV PAIN LEVEL 7-10 Last administered 

on 8/19/17at 15:06; Admin Dose 1 MG;  Start 8/14/17 at 14:00


Potassium Chloride (Klor-Con 20) 20 meq BID PO  Last administered on 8/19/17at 

08:46; Admin Dose 20 MEQ;  Start 8/14/17 at 21:00


Diagnostic Test (Pha) 1 ea 1 ea 02 XX  Last administered on 8/18/17at 02:35; 

Admin Dose 1 EA;  Start 8/15/17 at 02:00


Cefepime HCl (Maxipime 1gm/50 ml (Pmx)) 50 ml @  100 mls/hr Q12 IVPB  Last 

administered on 8/19/17at 08:43; Admin Dose 100 MLS/HR;  Start 8/16/17 at 21:00


Pantoprazole (Protonix Tab) 40 mg BID@06,18 PO  Last administered on 8/19/17at 

05:48; Admin Dose 40 MG;  Start 8/16/17 at 18:00


Rifaximin (Xifaxan) 550 mg BID PO  Last administered on 8/19/17at 08:47; Admin 

Dose 550 MG;  Start 8/16/17 at 21:00


Ondansetron HCl 4 mg 4 mg Q6H  PRN IV NAUSEA AND/OR VOMITING Last administered 

on 8/18/17at 18:40; Admin Dose 4 MG;  Start 8/16/17 at 22:30


Caspofungin/ Sodium Chloride (Cancidas/NS) 250 ml @  250 mls/hr Q24H IVPB  Last 

administered on 8/19/17at 16:16; Admin Dose 250 MLS/HR;  Start 8/18/17 at 16:00


Insulin Glargine (Lantus) 15 unit DAILY@20 SC  Last administered on 8/18/17at 20

:45; Admin Dose 15 UNIT;  Start 8/18/17 at 20:00


Lactulose (Enulose) 26.7 gm BID PO ;  Start 8/19/17 at 21:00


Thiamine HCl (Vitamin B1) 100 mg DAILY PO ;  Start 8/20/17 at 09:00


Multivitamins/ Minerals (Theragran-M) 1 tab DAILY PO ;  Start 8/20/17 at 09:00











ANTONI HAWTHORNE NP Aug 19, 2017 16:46

## 2017-08-19 NOTE — PN
Date/Time of Note


Date/Time of Note


DATE: 8/19/17 


TIME: 16:15





Assessment/Plan


VTE Prophylaxis


VTE Prophylaxis Intervention:  heparin





Lines/Catheters


IV Catheter Type (from [facility]):  Saline Lock


Urinary Cath still in place:  No





Assessment/Plan


Chief Complaint/Hosp Course


1. Liver cirrhosis


2. toxic Encephalopathy


3. Anemia


4. hs pancreatitis


5. Hx alcohol abuse


6. Hx esophageal varices


7. SIRS


8. S/p portal shunt 2017


9. S/p cardiac stenting 3 stents


Problems:  


Assessment/Plan


1. PT for mobility


2. decrease lactulose to BID





Subjective


24 Hr Interval Summary


Constitutional:  improved, no complaints


Musculoskeletal:  back pain, bone/joint pain





Exam/Review of Systems


Vital Signs


Vitals





 Vital Signs








  Date Time  Temp Pulse Resp B/P Pulse Ox O2 Delivery O2 Flow Rate FiO2


 


8/19/17 15:57 98.8 99 17 91/64 98   


 


8/15/17 09:10      Nasal Cannula 2.0 














 Intake and Output   


 


 8/18/17 8/18/17 8/19/17





 15:00 23:00 07:00


 


Intake Total  1805 ml 695 ml


 


Output Total  650 ml 900 ml


 


Balance  1155 ml -205 ml











Exam


Constitutional:  alert, oriented


Respiratory:  diminished breath sounds


Cardiovascular:  regular rate and rhythm





Results


Result Diagram:  


8/15/17 1015                                                                   

             8/18/17 0555





Results 24 hrs





Laboratory Tests








Test


  8/18/17


18:18 8/18/17


20:19 8/19/17


02:40 8/19/17


08:11


 


Bedside Glucose 228  H 225  H 335  H 227  H














Test


  8/19/17


12:06 


  


  


 


 


Bedside Glucose 319  H   











Medications


Medications





 Current Medications


Multivitamins/ Thiamine HCl/ Folic Acid/Sodium Chloride (Mvi Adult/ Vitamin B1/

Folic Acid/NS) 1,011.2 ml  @ 125 mls/ hr DAILY@09 IVPB  Last administered on 8/ 19/17at 08:43; Admin Dose 125 MLS/HR;  Start 8/12/17 at 11:00


Lorazepam (Ativan) 1 mg Q6H  PRN IV anxiety/agitation Last administered on 8/15/

17at 16:55; Admin Dose 1 MG;  Start 8/12/17 at 11:00


Miscellaneous Information 1 ea NOTE XX ;  Start 8/14/17 at 01:00


Glucose (Glutose) 15 gm Q15M  PRN PO DECREASED GLUCOSE;  Start 8/14/17 at 01:00


Glucose (Glutose) 22.5 gm Q15M  PRN PO DECREASED GLUCOSE;  Start 8/14/17 at 01:

00


Dextrose (D50w Syringe) 25 ml Q15M  PRN IV DECREASED GLUCOSE;  Start 8/14/17 at 

01:00


Dextrose (D50w Syringe) 50 ml Q15M  PRN IV DECREASED GLUCOSE;  Start 8/14/17 at 

01:00


Glucagon (Glucagen) 1 mg Q15M  PRN IM DECREASED GLUCOSE;  Start 8/14/17 at 01:00


Glucose (Glutose) 15 gm Q15M  PRN BUCCAL DECREASED GLUCOSE;  Start 8/14/17 at 01

:00


Morphine Sulfate (morphine) 1 mg Q4H  PRN IV PAIN LEVEL 7-10 Last administered 

on 8/19/17at 15:06; Admin Dose 1 MG;  Start 8/14/17 at 14:00


Potassium Chloride (Klor-Con 20) 20 meq BID PO  Last administered on 8/19/17at 

08:46; Admin Dose 20 MEQ;  Start 8/14/17 at 21:00


Diagnostic Test (Pha) 1 ea 1 ea 02 XX  Last administered on 8/18/17at 02:35; 

Admin Dose 1 EA;  Start 8/15/17 at 02:00


Sodium Chloride 1,000 ml @  30 mls/hr Q24H IV  Last administered on 8/18/17at 23

:29; Admin Dose 30 MLS/HR;  Start 8/15/17 at 00:30


Cefepime HCl (Maxipime 1gm/50 ml (Pmx)) 50 ml @  100 mls/hr Q12 IVPB  Last 

administered on 8/19/17at 08:43; Admin Dose 100 MLS/HR;  Start 8/16/17 at 21:00


Pantoprazole (Protonix Tab) 40 mg BID@06,18 PO  Last administered on 8/19/17at 

05:48; Admin Dose 40 MG;  Start 8/16/17 at 18:00


Rifaximin (Xifaxan) 550 mg BID PO  Last administered on 8/19/17at 08:47; Admin 

Dose 550 MG;  Start 8/16/17 at 21:00


Ondansetron HCl 4 mg 4 mg Q6H  PRN IV NAUSEA AND/OR VOMITING Last administered 

on 8/18/17at 18:40; Admin Dose 4 MG;  Start 8/16/17 at 22:30


Caspofungin/ Sodium Chloride (Cancidas/NS) 250 ml @  250 mls/hr Q24H IVPB  Last 

administered on 8/18/17at 17:23; Admin Dose 250 MLS/HR;  Start 8/18/17 at 16:00


Insulin Glargine (Lantus) 15 unit DAILY@20 SC  Last administered on 8/18/17at 20

:45; Admin Dose 15 UNIT;  Start 8/18/17 at 20:00


Lactulose (Enulose) 26.7 gm BID PO ;  Start 8/19/17 at 21:00;  Status UNV


Thiamine HCl (Vitamin B1) 100 mg DAILY PO ;  Start 8/20/17 at 09:00;  Status UNV


Multivitamins/ Minerals (Theragran-M) 1 tab DAILY PO ;  Start 8/20/17 at 09:00;

  Status UNV











WAI BELTRAN Aug 19, 2017 16:16

## 2017-08-19 NOTE — CONS
Date/Time of Note


Date/Time of Note


DATE: 8/19/17 


TIME: 11:43





Assessment/Plan


Assessment/Plan


Additional Assessment/Plan


1.  Cirrhosis of liver


2.  Hepatic encephalopathy mild


3.  Status post coronary artery stent 6 weeks ago


4.  Status post TIPS for recurrent ascites.


5.  Emesis


6.  Epistaxis


Plan


Continue rifaximin and lactulose.


TIPS a cause of his worsening encephalopathy


Avoid all kinds of sedative


Reglan for possible diabetic gastroparesis


Ambulate with the help of physical therapy





Consultation Date/Type/Reason


Admit Date/Time


Aug 12, 2017 at 05:42


Initial Consult Date


8/12/17


Type of Consultation:  ID





24 HR Interval Summary


Free Text/Dictation


Patient has no neck pain no back


No nausea no vomiting


Constitutional:  improved, no complaints





Exam/Review of Systems


Vital Signs


Vitals





 Vital Signs








  Date Time  Temp Pulse Resp B/P Pulse Ox O2 Delivery O2 Flow Rate FiO2


 


8/19/17 11:20 98.9 98 17 101/62 99   


 


8/15/17 09:10      Nasal Cannula 2.0 














 Intake and Output   


 


 8/18/17 8/18/17 8/19/17





 14:59 22:59 06:59


 


Intake Total  1680 ml 820 ml


 


Output Total  650 ml 900 ml


 


Balance  1030 ml -80 ml











Exam


Constitutional:  alert, oriented, well developed


Psych:  nl mood/affect, no complaints


Head:  atraumatic, normocephalic


Eyes:  EOMI, PERRL, nl conjunctiva, nl lids, nl sclera


ENMT:  nl external ears & nose, nl lips & teeth, nl nasal mucosa & septum


Neck:  non-tender, supple


Respiratory:  clear to auscultation, normal air movement


Cardiovascular:  nl pulses, regular rate and rhythm


Gastrointestinal:  nl liver, spleen, non-tender, soft


Musculoskeletal:  nl extremities to inspection, nl gait and stance


Extremities:  normal pulses


Neurological:  CNS II-XII intact, nl mental status, nl speech, nl strength


Skin:  nl turgor, 


   No rash or lesions


Lymph:  nl lymph nodes





Results


Result Diagram:  


8/15/17 1015                                                                   

             8/18/17 0555





Results 24 hrs





Laboratory Tests








Test


  8/18/17


12:29 8/18/17


14:06 8/18/17


18:18 8/18/17


20:19


 


Bedside Glucose 233  H  228  H 225  H


 


Prothrombin Time  21.0  H  


 


Prothrombin Time Ratio  1.6    


 


INR International Normalized


Ratio 


  1.80  


  


  


 














Test


  8/19/17


02:40 8/19/17


08:11 


  


 


 


Bedside Glucose 335  H 227  H  











Medications


Medications





 Current Medications


Multivitamins/ Thiamine HCl/ Folic Acid/Sodium Chloride (Mvi Adult/ Vitamin B1/

Folic Acid/NS) 1,011.2 ml  @ 125 mls/ hr DAILY@09 IVPB  Last administered on 8/ 19/17at 08:43; Admin Dose 125 MLS/HR;  Start 8/12/17 at 11:00


Lorazepam (Ativan) 1 mg Q6H  PRN IV anxiety/agitation Last administered on 8/15/

17at 16:55; Admin Dose 1 MG;  Start 8/12/17 at 11:00


Miscellaneous Information 1 ea NOTE XX ;  Start 8/14/17 at 01:00


Glucose (Glutose) 15 gm Q15M  PRN PO DECREASED GLUCOSE;  Start 8/14/17 at 01:00


Glucose (Glutose) 22.5 gm Q15M  PRN PO DECREASED GLUCOSE;  Start 8/14/17 at 01:

00


Dextrose (D50w Syringe) 25 ml Q15M  PRN IV DECREASED GLUCOSE;  Start 8/14/17 at 

01:00


Dextrose (D50w Syringe) 50 ml Q15M  PRN IV DECREASED GLUCOSE;  Start 8/14/17 at 

01:00


Glucagon (Glucagen) 1 mg Q15M  PRN IM DECREASED GLUCOSE;  Start 8/14/17 at 01:00


Glucose (Glutose) 15 gm Q15M  PRN BUCCAL DECREASED GLUCOSE;  Start 8/14/17 at 01

:00


Morphine Sulfate (morphine) 1 mg Q4H  PRN IV PAIN LEVEL 7-10 Last administered 

on 8/19/17at 11:13; Admin Dose 1 MG;  Start 8/14/17 at 14:00


Potassium Chloride (Klor-Con 20) 20 meq BID PO  Last administered on 8/19/17at 

08:46; Admin Dose 20 MEQ;  Start 8/14/17 at 21:00


Diagnostic Test (Pha) 1 ea 1 ea 02 XX  Last administered on 8/18/17at 02:35; 

Admin Dose 1 EA;  Start 8/15/17 at 02:00


Sodium Chloride (1/2 NS) 1,000 ml @  30 mls/hr Q24H IV  Last administered on 8/ 18/17at 23:29; Admin Dose 30 MLS/HR;  Start 8/15/17 at 00:30


Lactulose 26.7 gm 26.7 gm TID PO  Last administered on 8/19/17at 08:50; Admin 

Dose 26.7 GM;  Start 8/15/17 at 13:00


Cefepime HCl (Maxipime 1gm/50 ml (Pmx)) 50 ml @  100 mls/hr Q12 IVPB  Last 

administered on 8/19/17at 08:43; Admin Dose 100 MLS/HR;  Start 8/16/17 at 21:00


Pantoprazole (Protonix Tab) 40 mg BID@06,18 PO  Last administered on 8/19/17at 

05:48; Admin Dose 40 MG;  Start 8/16/17 at 18:00


Rifaximin (Xifaxan) 550 mg BID PO  Last administered on 8/19/17at 08:47; Admin 

Dose 550 MG;  Start 8/16/17 at 21:00


Ondansetron HCl 4 mg 4 mg Q6H  PRN IV NAUSEA AND/OR VOMITING Last administered 

on 8/18/17at 18:40; Admin Dose 4 MG;  Start 8/16/17 at 22:30


Caspofungin/ Sodium Chloride (Cancidas/NS) 250 ml @  250 mls/hr Q24H IVPB  Last 

administered on 8/18/17at 17:23; Admin Dose 250 MLS/HR;  Start 8/18/17 at 16:00


Insulin Glargine (Lantus) 15 unit DAILY@20 SC  Last administered on 8/18/17at 20

:45; Admin Dose 15 UNIT;  Start 8/18/17 at 20:00











SIDDHARTH RECINOS MD Aug 19, 2017 11:43

## 2017-08-20 VITALS — SYSTOLIC BLOOD PRESSURE: 92 MMHG | RESPIRATION RATE: 16 BRPM | DIASTOLIC BLOOD PRESSURE: 57 MMHG

## 2017-08-20 VITALS — DIASTOLIC BLOOD PRESSURE: 58 MMHG | SYSTOLIC BLOOD PRESSURE: 98 MMHG | RESPIRATION RATE: 17 BRPM

## 2017-08-20 VITALS — DIASTOLIC BLOOD PRESSURE: 57 MMHG | SYSTOLIC BLOOD PRESSURE: 112 MMHG | RESPIRATION RATE: 16 BRPM

## 2017-08-20 VITALS — DIASTOLIC BLOOD PRESSURE: 58 MMHG | SYSTOLIC BLOOD PRESSURE: 98 MMHG | RESPIRATION RATE: 16 BRPM

## 2017-08-20 VITALS — SYSTOLIC BLOOD PRESSURE: 113 MMHG | DIASTOLIC BLOOD PRESSURE: 59 MMHG

## 2017-08-20 VITALS — HEART RATE: 102 BPM

## 2017-08-20 VITALS — HEART RATE: 103 BPM

## 2017-08-20 VITALS — HEART RATE: 90 BPM

## 2017-08-20 VITALS — HEART RATE: 108 BPM

## 2017-08-20 VITALS — RESPIRATION RATE: 16 BRPM | DIASTOLIC BLOOD PRESSURE: 55 MMHG | SYSTOLIC BLOOD PRESSURE: 94 MMHG

## 2017-08-20 VITALS — HEART RATE: 87 BPM

## 2017-08-20 VITALS — HEART RATE: 94 BPM

## 2017-08-20 LAB
ABNORMAL IP MESSAGE: 1
ABNORMAL IP MESSAGE: 1
ANION GAP SERPL CALC-SCNC: 17 MMOL/L (ref 8–16)
BASOPHILS # BLD AUTO: 0 10^3/UL (ref 0–0.1)
BASOPHILS # BLD AUTO: 0 10^3/UL (ref 0–0.1)
BASOPHILS NFR BLD: 0.5 % (ref 0–2)
BASOPHILS NFR BLD: 0.7 % (ref 0–2)
BUN SERPL-MCNC: 8 MG/DL (ref 7–20)
CALCIUM SERPL-MCNC: 8.1 MG/DL (ref 8.4–10.2)
CHLORIDE SERPL-SCNC: 100 MMOL/L (ref 97–110)
CO2 SERPL-SCNC: 20 MMOL/L (ref 21–31)
CREAT SERPL-MCNC: 0.55 MG/DL (ref 0.61–1.24)
EOSINOPHIL # BLD: 0.2 10^3/UL (ref 0–0.5)
EOSINOPHIL # BLD: 0.2 10^3/UL (ref 0–0.5)
EOSINOPHIL NFR BLD: 2.6 % (ref 0–7)
EOSINOPHIL NFR BLD: 3.1 % (ref 0–7)
ERYTHROCYTE [DISTWIDTH] IN BLOOD BY AUTOMATED COUNT: 18.7 % (ref 11.5–14.5)
ERYTHROCYTE [DISTWIDTH] IN BLOOD BY AUTOMATED COUNT: 19 % (ref 11.5–14.5)
GLUCOSE SERPL-MCNC: 200 MG/DL (ref 70–220)
HCT VFR BLD CALC: 22.4 % (ref 42–52)
HCT VFR BLD CALC: 22.6 % (ref 42–52)
HGB BLD-MCNC: 7.2 G/DL (ref 14–18)
HGB BLD-MCNC: 7.2 G/DL (ref 14–18)
LYMPHOCYTES # BLD AUTO: 0.7 10^3/UL (ref 0.8–2.9)
LYMPHOCYTES # BLD AUTO: 0.7 10^3/UL (ref 0.8–2.9)
LYMPHOCYTES NFR BLD AUTO: 11.7 % (ref 15–51)
LYMPHOCYTES NFR BLD AUTO: 11.9 % (ref 15–51)
MCH RBC QN AUTO: 27.7 PG (ref 29–33)
MCH RBC QN AUTO: 28 PG (ref 29–33)
MCHC RBC AUTO-ENTMCNC: 31.9 G/DL (ref 32–37)
MCHC RBC AUTO-ENTMCNC: 32.1 G/DL (ref 32–37)
MCV RBC AUTO: 86.9 FL (ref 82–101)
MCV RBC AUTO: 87.2 FL (ref 82–101)
MONOCYTES # BLD: 0.6 10^3/UL (ref 0.3–0.9)
MONOCYTES # BLD: 0.7 10^3/UL (ref 0.3–0.9)
MONOCYTES NFR BLD: 11.9 % (ref 0–11)
MONOCYTES NFR BLD: 9.2 % (ref 0–11)
NEUTROPHILS NFR BLD AUTO: 71.2 % (ref 39–77)
NEUTROPHILS NFR BLD AUTO: 75.2 % (ref 39–77)
NRBC # BLD MANUAL: 0 10^3/UL (ref 0–0)
NRBC # BLD MANUAL: 0 10^3/UL (ref 0–0)
NRBC BLD AUTO-RTO: 0 /100WBC (ref 0–0)
NRBC BLD AUTO-RTO: 0 /100WBC (ref 0–0)
PLATELET # BLD: 83 10^3/UL (ref 140–415)
PLATELET # BLD: 92 10^3/UL (ref 140–415)
PMV BLD AUTO: 11.4 FL (ref 7.4–10.4)
PMV BLD AUTO: 11.5 FL (ref 7.4–10.4)
POSITIVE DIFF: (no result)
POSITIVE DIFF: (no result)
POTASSIUM SERPL-SCNC: 4.5 MMOL/L (ref 3.5–5.1)
RBC # BLD AUTO: 2.57 10^6/UL (ref 4.7–6.1)
RBC # BLD AUTO: 2.6 10^6/UL (ref 4.7–6.1)
SODIUM SERPL-SCNC: 132 MMOL/L (ref 135–144)
WBC # BLD AUTO: 6.1 10^3/UL (ref 4.8–10.8)
WBC # BLD AUTO: 6.1 10^3/UL (ref 4.8–10.8)

## 2017-08-20 RX ADMIN — LACTULOSE SCH GM: 10 SOLUTION ORAL at 20:40

## 2017-08-20 RX ADMIN — MORPHINE SULFATE PRN MG: 2 INJECTION, SOLUTION INTRAMUSCULAR; INTRAVENOUS at 20:39

## 2017-08-20 RX ADMIN — MORPHINE SULFATE PRN MG: 2 INJECTION, SOLUTION INTRAMUSCULAR; INTRAVENOUS at 05:10

## 2017-08-20 RX ADMIN — MORPHINE SULFATE PRN MG: 2 INJECTION, SOLUTION INTRAMUSCULAR; INTRAVENOUS at 16:50

## 2017-08-20 RX ADMIN — CASPOFUNGIN ACETATE SCH MLS/HR: 5 INJECTION, POWDER, LYOPHILIZED, FOR SOLUTION INTRAVENOUS at 16:48

## 2017-08-20 RX ADMIN — MORPHINE SULFATE PRN MG: 2 INJECTION, SOLUTION INTRAMUSCULAR; INTRAVENOUS at 10:51

## 2017-08-20 RX ADMIN — CEFEPIME SCH MLS/HR: 1 INJECTION, POWDER, FOR SOLUTION INTRAMUSCULAR; INTRAVENOUS at 08:36

## 2017-08-20 RX ADMIN — PANTOPRAZOLE SODIUM SCH MG: 40 TABLET, DELAYED RELEASE ORAL at 17:52

## 2017-08-20 RX ADMIN — LACTULOSE SCH GM: 10 SOLUTION ORAL at 08:36

## 2017-08-20 RX ADMIN — POTASSIUM CHLORIDE SCH MEQ: 20 TABLET, EXTENDED RELEASE ORAL at 08:35

## 2017-08-20 RX ADMIN — RIFAXIMIN SCH MG: 550 TABLET ORAL at 20:39

## 2017-08-20 RX ADMIN — CEFEPIME SCH MLS/HR: 1 INJECTION, POWDER, FOR SOLUTION INTRAMUSCULAR; INTRAVENOUS at 21:59

## 2017-08-20 RX ADMIN — INSULIN GLARGINE SCH UNIT: 100 INJECTION, SOLUTION SUBCUTANEOUS at 20:47

## 2017-08-20 RX ADMIN — PANTOPRAZOLE SODIUM SCH MG: 40 TABLET, DELAYED RELEASE ORAL at 05:09

## 2017-08-20 RX ADMIN — POTASSIUM CHLORIDE SCH MEQ: 20 TABLET, EXTENDED RELEASE ORAL at 20:39

## 2017-08-20 RX ADMIN — THIAMINE HCL TAB 100 MG SCH MG: 100 TAB at 08:35

## 2017-08-20 RX ADMIN — RIFAXIMIN SCH MG: 550 TABLET ORAL at 08:35

## 2017-08-20 RX ADMIN — MULTIPLE VITAMINS W/ MINERALS TAB SCH TAB: TAB at 08:35

## 2017-08-20 RX ADMIN — MORPHINE SULFATE PRN MG: 2 INJECTION, SOLUTION INTRAMUSCULAR; INTRAVENOUS at 00:06

## 2017-08-20 NOTE — CONS
Date/Time of Note


Date/Time of Note


DATE: 8/20/17 


TIME: 14:23





Assessment/Plan


Assessment/Plan


Chief Complaint/Hosp Course


Alert, feels better, looks comfortable 





Microbiology: Urine culture growing Candida glabrata and Candida albicans blood 

culture growing oxacillin sensitive staph aureus and yeast


  


Antimicrobials:  Cancidas, Cefepime


 


PHYSICAL EXAMINATION:


 


GENERAL:  This is a wasted, well-developed middle-aged Persian


man who is awake, in no distress.


 


HEENT:  Head atraumatic, normocephalic.  Sclerae anicteric.


Buccal mucosa dry.


 


NECK:  Supple.


 


CHEST:  Rise symmetrical.  Breath sounds diminished at the bases.


 


HEART:  S1, S2.


 


ABDOMEN:  Soft, bowel sounds present.


 


 ASSESSMENT:


 


1.   Severe sepsis with bacteremia and fungemia, rule out


  endocarditis, rule out discitis, poss other etiologies.


2.   End-stage liver disease, status post transjugular


intrahepatic portosystemic shunt (TIPS) procedure.


3.   History of cardiac stents.


4.   Anemia and thrombocytopenia.


5.   Resolving encephalopathy.


6.   Diabetes.


7.   UTI==>yeast


 


PLAN: Clinically stable, on appropriate antimicrobials, pending lumbar and 

cervical spine MRI, pending WBC labeled nuclear scan, pending repeat bld cx, 

start PT, may need ODIN.  





dw staff/pt


Problems:  





Consultation Date/Type/Reason


Admit Date/Time


Aug 12, 2017 at 05:42


Initial Consult Date


8/12/17


Type of Consultation:  ID





Exam/Review of Systems


Vital Signs


Vitals





 Vital Signs








  Date Time  Temp Pulse Resp B/P Pulse Ox O2 Delivery O2 Flow Rate FiO2


 


8/20/17 13:35  87      


 


8/20/17 11:20 99.0  16 94/55 98   














 Intake and Output   


 


 8/19/17 8/19/17 8/20/17





 15:00 23:00 07:00


 


Intake Total 50 ml 2910 ml 


 


Output Total  400 ml 


 


Balance 50 ml 2510 ml 











Results


Result Diagram:  


8/20/17 0544                                                                   

             8/20/17 0544





Results 24 hrs





Laboratory Tests








Test


  8/19/17


17:15 8/19/17


19:44 8/20/17


05:44 8/20/17


08:28


 


Bedside Glucose 349  H 149    253  H


 


White Blood Count   6.1   


 


Red Blood Count   2.57  L 


 


Hemoglobin   7.2  L 


 


Hematocrit   22.4  L 


 


Mean Corpuscular Volume   87.2   


 


Mean Corpuscular Hemoglobin   28.0  L 


 


Mean Corpuscular Hemoglobin


Concent 


  


  32.1  


  


 


 


Red Cell Distribution Width   18.7  H 


 


Platelet Count   83  #L 


 


Mean Platelet Volume   11.5  H 


 


Neutrophils %   71.2   


 


Lymphocytes %   11.9  L 


 


Monocytes %   11.9  H 


 


Eosinophils %   3.1   


 


Basophils %   0.7   


 


Nucleated Red Blood Cells %   0.0   


 


Neutrophils # (Manual)   4   


 


Lymphocytes #   0.7  L 


 


Monocytes #   0.7   


 


Eosinophils #   0.2   


 


Basophils #   0.0   


 


Nucleated Red Blood Cells #   0.0   


 


Sodium Level   132  L 


 


Potassium Level   4.5   


 


Chloride Level   100   


 


Carbon Dioxide Level   20  L 


 


Anion Gap   17  H 


 


Blood Urea Nitrogen   8   


 


Creatinine   0.55  L 


 


Glucose Level   200   


 


Calcium Level   8.1  L 














Test


  8/20/17


12:35 


  


  


 


 


Bedside Glucose 205     











Medications


Medications





 Current Medications


Lorazepam (Ativan) 1 mg Q6H  PRN IV anxiety/agitation Last administered on 8/15/

17at 16:55; Admin Dose 1 MG;  Start 8/12/17 at 11:00


Miscellaneous Information 1 ea NOTE XX ;  Start 8/14/17 at 01:00


Glucose (Glutose) 15 gm Q15M  PRN PO DECREASED GLUCOSE;  Start 8/14/17 at 01:00


Glucose (Glutose) 22.5 gm Q15M  PRN PO DECREASED GLUCOSE;  Start 8/14/17 at 01:

00


Dextrose (D50w Syringe) 25 ml Q15M  PRN IV DECREASED GLUCOSE;  Start 8/14/17 at 

01:00


Dextrose (D50w Syringe) 50 ml Q15M  PRN IV DECREASED GLUCOSE;  Start 8/14/17 at 

01:00


Glucagon (Glucagen) 1 mg Q15M  PRN IM DECREASED GLUCOSE;  Start 8/14/17 at 01:00


Glucose (Glutose) 15 gm Q15M  PRN BUCCAL DECREASED GLUCOSE;  Start 8/14/17 at 01

:00


Morphine Sulfate (morphine) 1 mg Q4H  PRN IV PAIN LEVEL 7-10 Last administered 

on 8/20/17at 10:51; Admin Dose 1 MG;  Start 8/14/17 at 14:00


Potassium Chloride (Klor-Con 20) 20 meq BID PO  Last administered on 8/20/17at 

08:35; Admin Dose 20 MEQ;  Start 8/14/17 at 21:00


Diagnostic Test (Pha) 1 ea 1 ea 02 XX  Last administered on 8/18/17at 02:35; 

Admin Dose 1 EA;  Start 8/15/17 at 02:00


Cefepime HCl (Maxipime 1gm/50 ml (Pmx)) 50 ml @  100 mls/hr Q12 IVPB  Last 

administered on 8/20/17at 08:36; Admin Dose 100 MLS/HR;  Start 8/16/17 at 21:00


Pantoprazole (Protonix Tab) 40 mg BID@06,18 PO  Last administered on 8/20/17at 

05:09; Admin Dose 40 MG;  Start 8/16/17 at 18:00


Rifaximin (Xifaxan) 550 mg BID PO  Last administered on 8/20/17at 08:35; Admin 

Dose 550 MG;  Start 8/16/17 at 21:00


Ondansetron HCl 4 mg 4 mg Q6H  PRN IV NAUSEA AND/OR VOMITING Last administered 

on 8/18/17at 18:40; Admin Dose 4 MG;  Start 8/16/17 at 22:30


Caspofungin/ Sodium Chloride (Cancidas/NS) 250 ml @  250 mls/hr Q24H IVPB  Last 

administered on 8/19/17at 16:16; Admin Dose 250 MLS/HR;  Start 8/18/17 at 16:00


Insulin Glargine (Lantus) 15 unit DAILY@20 SC  Last administered on 8/19/17at 20

:31; Admin Dose 15 UNIT;  Start 8/18/17 at 20:00


Lactulose (Enulose) 26.7 gm BID PO  Last administered on 8/20/17at 08:36; Admin 

Dose 26.7 GM;  Start 8/19/17 at 21:00


Thiamine HCl (Vitamin B1) 100 mg DAILY PO  Last administered on 8/20/17at 08:35

; Admin Dose 100 MG;  Start 8/20/17 at 09:00


Multivitamins/ Minerals (Theragran-M) 1 tab DAILY PO  Last administered on 8/20/ 17at 08:35; Admin Dose 1 TAB;  Start 8/20/17 at 09:00











ANTONI HAWTHORNE NP Aug 20, 2017 14:24

## 2017-08-20 NOTE — PN
Date/Time of Note


Date/Time of Note


DATE: 8/20/17 


TIME: 19:39





Assessment/Plan


VTE Prophylaxis


VTE Prophylaxis Intervention:  contraindicated





Lines/Catheters


IV Catheter Type (from [facility]):  Saline Lock


Urinary Cath still in place:  No





Assessment/Plan


Chief Complaint/Hosp Course


 1  Severe sepsis with bacteremia and fungemia, rule out


  endocarditis, rule out discitis, poss other etiologies.


2.   End-stage liver disease, status post transjugular intrahepatic 

portosystemic shunt (TIPS) procedure.


3 Toxic Encephalopathy


3. Anemia


4. hs pancreatitis


5. Hx alcohol abuse


6. Hx esophageal varices


7. SIRS


8. S/p portal shunt 2017


9. S/p cardiac stenting 3 stents


 


PLAN:


 Clinically stable, on appropriate antimicrobials, pending lumbar and cervical 

spine MRII ( breann done tmw)


 C/W Lactulose


 Recheck CBC (as Hb dropping) no evidence of bleed


Problems:  





Subjective


24 Hr Interval Summary


Free Text/Dictation


Diarrhoea improved





Exam/Review of Systems


Vital Signs


Vitals





 Vital Signs








  Date Time  Temp Pulse Resp B/P Pulse Ox O2 Delivery O2 Flow Rate FiO2


 


8/20/17 16:54  94      


 


8/20/17 15:38 98.9  16 98/58 97   














 Intake and Output   


 


 8/19/17 8/19/17 8/20/17





 15:00 23:00 07:00


 


Intake Total 50 ml 2910 ml 


 


Output Total  400 ml 


 


Balance 50 ml 2510 ml 











Exam


constitutional:  alert, oriented


Respiratory:  diminished breath sounds


Cardiovascular:  regular rate and rhythm





Results


Result Diagram:  


8/20/17 0544                                                                   

             8/20/17 0544





Results 24 hrs





Laboratory Tests








Test


  8/19/17


19:44 8/20/17


05:44 8/20/17


08:28 8/20/17


12:35


 


Bedside Glucose 149    253  H 205  


 


White Blood Count  6.1    


 


Red Blood Count  2.57  L  


 


Hemoglobin  7.2  L  


 


Hematocrit  22.4  L  


 


Mean Corpuscular Volume  87.2    


 


Mean Corpuscular Hemoglobin  28.0  L  


 


Mean Corpuscular Hemoglobin


Concent 


  32.1  


  


  


 


 


Red Cell Distribution Width  18.7  H  


 


Platelet Count  83  #L  


 


Mean Platelet Volume  11.5  H  


 


Neutrophils %  71.2    


 


Lymphocytes %  11.9  L  


 


Monocytes %  11.9  H  


 


Eosinophils %  3.1    


 


Basophils %  0.7    


 


Nucleated Red Blood Cells %  0.0    


 


Neutrophils # (Manual)  4    


 


Lymphocytes #  0.7  L  


 


Monocytes #  0.7    


 


Eosinophils #  0.2    


 


Basophils #  0.0    


 


Nucleated Red Blood Cells #  0.0    


 


Sodium Level  132  L  


 


Potassium Level  4.5    


 


Chloride Level  100    


 


Carbon Dioxide Level  20  L  


 


Anion Gap  17  H  


 


Blood Urea Nitrogen  8    


 


Creatinine  0.55  L  


 


Glucose Level  200    


 


Calcium Level  8.1  L  














Test


  8/20/17


17:39 


  


  


 


 


Bedside Glucose 217     











Medications


Medications





 Current Medications


Lorazepam (Ativan) 1 mg Q6H  PRN IV anxiety/agitation Last administered on 8/15/

17at 16:55; Admin Dose 1 MG;  Start 8/12/17 at 11:00


Miscellaneous Information 1 ea NOTE XX ;  Start 8/14/17 at 01:00


Glucose (Glutose) 15 gm Q15M  PRN PO DECREASED GLUCOSE;  Start 8/14/17 at 01:00


Glucose (Glutose) 22.5 gm Q15M  PRN PO DECREASED GLUCOSE;  Start 8/14/17 at 01:

00


Dextrose (D50w Syringe) 25 ml Q15M  PRN IV DECREASED GLUCOSE;  Start 8/14/17 at 

01:00


Dextrose (D50w Syringe) 50 ml Q15M  PRN IV DECREASED GLUCOSE;  Start 8/14/17 at 

01:00


Glucagon (Glucagen) 1 mg Q15M  PRN IM DECREASED GLUCOSE;  Start 8/14/17 at 01:00


Glucose (Glutose) 15 gm Q15M  PRN BUCCAL DECREASED GLUCOSE;  Start 8/14/17 at 01

:00


Morphine Sulfate (morphine) 1 mg Q4H  PRN IV PAIN LEVEL 7-10 Last administered 

on 8/20/17at 16:50; Admin Dose 1 MG;  Start 8/14/17 at 14:00


Potassium Chloride (Klor-Con 20) 20 meq BID PO  Last administered on 8/20/17at 

08:35; Admin Dose 20 MEQ;  Start 8/14/17 at 21:00


Diagnostic Test (Pha) 1 ea 1 ea 02 XX  Last administered on 8/18/17at 02:35; 

Admin Dose 1 EA;  Start 8/15/17 at 02:00


Cefepime HCl (Maxipime 1gm/50 ml (Pmx)) 50 ml @  100 mls/hr Q12 IVPB  Last 

administered on 8/20/17at 08:36; Admin Dose 100 MLS/HR;  Start 8/16/17 at 21:00


Pantoprazole (Protonix Tab) 40 mg BID@06,18 PO  Last administered on 8/20/17at 

17:52; Admin Dose 40 MG;  Start 8/16/17 at 18:00


Rifaximin (Xifaxan) 550 mg BID PO  Last administered on 8/20/17at 08:35; Admin 

Dose 550 MG;  Start 8/16/17 at 21:00


Ondansetron HCl 4 mg 4 mg Q6H  PRN IV NAUSEA AND/OR VOMITING Last administered 

on 8/18/17at 18:40; Admin Dose 4 MG;  Start 8/16/17 at 22:30


Caspofungin/ Sodium Chloride (Cancidas/NS) 250 ml @  250 mls/hr Q24H IVPB  Last 

administered on 8/20/17at 16:48; Admin Dose 250 MLS/HR;  Start 8/18/17 at 16:00


Insulin Glargine (Lantus) 15 unit DAILY@20 SC  Last administered on 8/19/17at 20

:31; Admin Dose 15 UNIT;  Start 8/18/17 at 20:00


Lactulose (Enulose) 26.7 gm BID PO  Last administered on 8/20/17at 08:36; Admin 

Dose 26.7 GM;  Start 8/19/17 at 21:00


Thiamine HCl (Vitamin B1) 100 mg DAILY PO  Last administered on 8/20/17at 08:35

; Admin Dose 100 MG;  Start 8/20/17 at 09:00


Multivitamins/ Minerals (Theragran-M) 1 tab DAILY PO  Last administered on 8/20/ 17at 08:35; Admin Dose 1 TAB;  Start 8/20/17 at 09:00











NEREYDA SANCHEZ MD Aug 20, 2017 19:43

## 2017-08-20 NOTE — RADRPT
PROCEDURE:  Indium-111 labeled white blood cell scan

 

CLINICAL INDICATION:   57 -year-old patient with fever and leukocytosis. 

 

TECHNIQUE:   Following the intravenous injection of 0.34 mCi of Indium-111 labeled white blood cells
, whole body anterior and posterior planar images were obtained 24 hours post injection. 

 

COMPARISON:   No prior indium scans. 

 

FINDINGS:

 

No abnormal areas of increased tracer activity are seen in the spine.

 

Faint symmetrical uptake is seen in both kidneys.

 

No definite abnormal areas of increased activity are seen in the study, including visualized portion
s of the head and neck, chest, abdomen, pelvis and upper and lower.

 

Physiologic uptake is noted in the small liver and spleen.

 

 

 

IMPRESSION:

 

1.  Mild symmetrical increased uptake in both kidneys likely due to inflammatory/infectious process.

 

2.  No abnormal areas of increased activity in the spine.

 

3.  Small liver.  

 

4.  No other definite abnormal focal areas of increased activity.

 

RPTAT: QQ

_____________________________________________ 

.Ami Crow MD, MD           Date    Time 

Electronically viewed and signed by .Ami Crow MD, MD on 08/20/2017 20:29 

 

D:  08/20/2017 20:29  T:  08/20/2017 20:29

.L/

## 2017-08-21 VITALS — DIASTOLIC BLOOD PRESSURE: 64 MMHG | RESPIRATION RATE: 18 BRPM | SYSTOLIC BLOOD PRESSURE: 96 MMHG

## 2017-08-21 VITALS — RESPIRATION RATE: 17 BRPM | SYSTOLIC BLOOD PRESSURE: 96 MMHG | DIASTOLIC BLOOD PRESSURE: 62 MMHG

## 2017-08-21 VITALS — RESPIRATION RATE: 19 BRPM | SYSTOLIC BLOOD PRESSURE: 106 MMHG | DIASTOLIC BLOOD PRESSURE: 59 MMHG

## 2017-08-21 VITALS — RESPIRATION RATE: 18 BRPM | SYSTOLIC BLOOD PRESSURE: 88 MMHG | DIASTOLIC BLOOD PRESSURE: 57 MMHG

## 2017-08-21 VITALS — DIASTOLIC BLOOD PRESSURE: 52 MMHG | SYSTOLIC BLOOD PRESSURE: 90 MMHG | RESPIRATION RATE: 18 BRPM

## 2017-08-21 VITALS — SYSTOLIC BLOOD PRESSURE: 110 MMHG | RESPIRATION RATE: 19 BRPM | DIASTOLIC BLOOD PRESSURE: 59 MMHG

## 2017-08-21 VITALS — HEART RATE: 93 BPM

## 2017-08-21 VITALS — HEART RATE: 92 BPM

## 2017-08-21 VITALS — HEART RATE: 94 BPM

## 2017-08-21 VITALS — HEART RATE: 90 BPM

## 2017-08-21 VITALS — HEART RATE: 102 BPM

## 2017-08-21 LAB
ABNORMAL IP MESSAGE: 1
ANION GAP SERPL CALC-SCNC: 13 MMOL/L (ref 8–16)
BASOPHILS # BLD AUTO: 0 10^3/UL (ref 0–0.1)
BASOPHILS NFR BLD: 0.6 % (ref 0–2)
BASOPHILS NFR BLD: 0.6 % (ref 0–2)
BASOPHILS NFR BLD: 0.7 % (ref 0–2)
BUN SERPL-MCNC: 9 MG/DL (ref 7–20)
CALCIUM SERPL-MCNC: 7.8 MG/DL (ref 8.4–10.2)
CHLORIDE SERPL-SCNC: 101 MMOL/L (ref 97–110)
CO2 SERPL-SCNC: 21 MMOL/L (ref 21–31)
CREAT SERPL-MCNC: 0.49 MG/DL (ref 0.61–1.24)
EOSINOPHIL # BLD: 0.1 10^3/UL (ref 0–0.5)
EOSINOPHIL # BLD: 0.2 10^3/UL (ref 0–0.5)
EOSINOPHIL # BLD: 0.2 10^3/UL (ref 0–0.5)
EOSINOPHIL NFR BLD: 2.2 % (ref 0–7)
EOSINOPHIL NFR BLD: 2.7 % (ref 0–7)
EOSINOPHIL NFR BLD: 3.2 % (ref 0–7)
ERYTHROCYTE [DISTWIDTH] IN BLOOD BY AUTOMATED COUNT: 18.7 % (ref 11.5–14.5)
ERYTHROCYTE [DISTWIDTH] IN BLOOD BY AUTOMATED COUNT: 19.1 % (ref 11.5–14.5)
ERYTHROCYTE [DISTWIDTH] IN BLOOD BY AUTOMATED COUNT: 19.4 % (ref 11.5–14.5)
GLUCOSE SERPL-MCNC: 228 MG/DL (ref 70–220)
HCT VFR BLD CALC: 20.1 % (ref 42–52)
HCT VFR BLD CALC: 21.7 % (ref 42–52)
HCT VFR BLD CALC: 23.3 % (ref 42–52)
HGB BLD-MCNC: 6.4 G/DL (ref 14–18)
HGB BLD-MCNC: 6.9 G/DL (ref 14–18)
HGB BLD-MCNC: 7.5 G/DL (ref 14–18)
LYMPHOCYTES # BLD AUTO: 0.6 10^3/UL (ref 0.8–2.9)
LYMPHOCYTES # BLD AUTO: 0.6 10^3/UL (ref 0.8–2.9)
LYMPHOCYTES # BLD AUTO: 0.7 10^3/UL (ref 0.8–2.9)
LYMPHOCYTES NFR BLD AUTO: 10.1 % (ref 15–51)
LYMPHOCYTES NFR BLD AUTO: 10.5 % (ref 15–51)
LYMPHOCYTES NFR BLD AUTO: 13.3 % (ref 15–51)
MCH RBC QN AUTO: 27.1 PG (ref 29–33)
MCH RBC QN AUTO: 27.5 PG (ref 29–33)
MCH RBC QN AUTO: 27.8 PG (ref 29–33)
MCHC RBC AUTO-ENTMCNC: 31.8 G/DL (ref 32–37)
MCHC RBC AUTO-ENTMCNC: 31.8 G/DL (ref 32–37)
MCHC RBC AUTO-ENTMCNC: 32.2 G/DL (ref 32–37)
MCV RBC AUTO: 85.2 FL (ref 82–101)
MCV RBC AUTO: 85.3 FL (ref 82–101)
MCV RBC AUTO: 87.5 FL (ref 82–101)
MONOCYTES # BLD: 0.6 10^3/UL (ref 0.3–0.9)
MONOCYTES # BLD: 0.7 10^3/UL (ref 0.3–0.9)
MONOCYTES # BLD: 0.8 10^3/UL (ref 0.3–0.9)
MONOCYTES NFR BLD: 11.8 % (ref 0–11)
MONOCYTES NFR BLD: 12.3 % (ref 0–11)
MONOCYTES NFR BLD: 12.3 % (ref 0–11)
NEUTROPHILS NFR BLD AUTO: 70.2 % (ref 39–77)
NEUTROPHILS NFR BLD AUTO: 73.3 % (ref 39–77)
NEUTROPHILS NFR BLD AUTO: 74.7 % (ref 39–77)
NRBC # BLD MANUAL: 0 10^3/UL (ref 0–0)
NRBC BLD AUTO-RTO: 0 /100WBC (ref 0–0)
PATH REVIEW?: YES
PLATELET # BLD: 80 10^3/UL (ref 140–415)
PLATELET # BLD: 86 10^3/UL (ref 140–415)
PLATELET # BLD: 87 10^3/UL (ref 140–415)
PMV BLD AUTO: 11 FL (ref 7.4–10.4)
PMV BLD AUTO: 11.7 FL (ref 7.4–10.4)
PMV BLD AUTO: 11.8 FL (ref 7.4–10.4)
POSITIVE DIFF: (no result)
POTASSIUM SERPL-SCNC: 4.3 MMOL/L (ref 3.5–5.1)
RBC # BLD AUTO: 2.36 10^6/UL (ref 4.7–6.1)
RBC # BLD AUTO: 2.48 10^6/UL (ref 4.7–6.1)
RBC # BLD AUTO: 2.73 10^6/UL (ref 4.7–6.1)
SODIUM SERPL-SCNC: 131 MMOL/L (ref 135–144)
WBC # BLD AUTO: 4.7 10^3/UL (ref 4.8–10.8)
WBC # BLD AUTO: 5.8 10^3/UL (ref 4.8–10.8)
WBC # BLD AUTO: 6.4 10^3/UL (ref 4.8–10.8)

## 2017-08-21 PROCEDURE — 30233N1 TRANSFUSION OF NONAUTOLOGOUS RED BLOOD CELLS INTO PERIPHERAL VEIN, PERCUTANEOUS APPROACH: ICD-10-PCS | Performed by: INTERNAL MEDICINE

## 2017-08-21 RX ADMIN — CASPOFUNGIN ACETATE SCH MLS/HR: 5 INJECTION, POWDER, LYOPHILIZED, FOR SOLUTION INTRAVENOUS at 18:33

## 2017-08-21 RX ADMIN — LACTULOSE SCH GM: 10 SOLUTION ORAL at 20:53

## 2017-08-21 RX ADMIN — MORPHINE SULFATE PRN MG: 2 INJECTION, SOLUTION INTRAMUSCULAR; INTRAVENOUS at 17:45

## 2017-08-21 RX ADMIN — LACTULOSE SCH GM: 10 SOLUTION ORAL at 08:20

## 2017-08-21 RX ADMIN — MORPHINE SULFATE PRN MG: 2 INJECTION, SOLUTION INTRAMUSCULAR; INTRAVENOUS at 00:20

## 2017-08-21 RX ADMIN — PANTOPRAZOLE SODIUM SCH MG: 40 TABLET, DELAYED RELEASE ORAL at 05:31

## 2017-08-21 RX ADMIN — CEFEPIME SCH MLS/HR: 1 INJECTION, POWDER, FOR SOLUTION INTRAMUSCULAR; INTRAVENOUS at 20:53

## 2017-08-21 RX ADMIN — MORPHINE SULFATE PRN MG: 2 INJECTION, SOLUTION INTRAMUSCULAR; INTRAVENOUS at 22:00

## 2017-08-21 RX ADMIN — THIAMINE HCL TAB 100 MG SCH MG: 100 TAB at 08:17

## 2017-08-21 RX ADMIN — RIFAXIMIN SCH MG: 550 TABLET ORAL at 20:52

## 2017-08-21 RX ADMIN — RIFAXIMIN SCH MG: 550 TABLET ORAL at 08:17

## 2017-08-21 RX ADMIN — MORPHINE SULFATE PRN MG: 2 INJECTION, SOLUTION INTRAMUSCULAR; INTRAVENOUS at 11:39

## 2017-08-21 RX ADMIN — MULTIPLE VITAMINS W/ MINERALS TAB SCH TAB: TAB at 08:17

## 2017-08-21 RX ADMIN — MORPHINE SULFATE PRN MG: 2 INJECTION, SOLUTION INTRAMUSCULAR; INTRAVENOUS at 05:32

## 2017-08-21 RX ADMIN — INSULIN GLARGINE SCH UNIT: 100 INJECTION, SOLUTION SUBCUTANEOUS at 20:57

## 2017-08-21 RX ADMIN — POTASSIUM CHLORIDE SCH MEQ: 20 TABLET, EXTENDED RELEASE ORAL at 20:52

## 2017-08-21 RX ADMIN — CEFEPIME SCH MLS/HR: 1 INJECTION, POWDER, FOR SOLUTION INTRAMUSCULAR; INTRAVENOUS at 08:17

## 2017-08-21 RX ADMIN — POTASSIUM CHLORIDE SCH MEQ: 20 TABLET, EXTENDED RELEASE ORAL at 08:17

## 2017-08-21 RX ADMIN — PANTOPRAZOLE SODIUM SCH MG: 40 TABLET, DELAYED RELEASE ORAL at 17:45

## 2017-08-21 NOTE — CONS
Date/Time of Note


Date/Time of Note


DATE: 8/21/17 


TIME: 21:30





Assessment/Plan


Assessment/Plan


Additional Assessment/Plan


Additional Assessment/Plan


1.  Cirrhosis of liver


2.  Hepatic encephalopathy mild


3.  Status post coronary artery stent 6 weeks ago


4.  Status post TIPS for recurrent ascites.


5.  Emesis


6.  Epistaxis


Plan


Continue rifaximin and lactulose.


TIPS a cause of his worsening encephalopathy


Avoid all kinds of sedative


Reglan for possible diabetic gastroparesis


Ambulate with the help of physical therapy


EGD for anemia,vomiting





Consultation Date/Type/Reason


Admit Date/Time


Aug 12, 2017 at 05:42


Initial Consult Date


8/12/17


Type of Consultation:  ID





24 HR Interval Summary


Free Text/Dictation


epistaxis


nausea vomiting better





Exam/Review of Systems


Vital Signs


Vitals





 Vital Signs








  Date Time  Temp Pulse Resp B/P Pulse Ox O2 Delivery O2 Flow Rate FiO2


 


8/21/17 20:22  102      


 


8/21/17 19:52 98.5  17 96/62 97   














 Intake and Output   


 


 8/20/17 8/20/17 8/21/17





 15:00 23:00 07:00


 


Intake Total 50 ml 1300 ml 600 ml


 


Output Total  600 ml 500 ml


 


Balance 50 ml 700 ml 100 ml











Exam


Constitutional:  alert, oriented, well developed


Psych:  nl mood/affect, no complaints


Head:  atraumatic, normocephalic


Eyes:  EOMI, PERRL, nl conjunctiva, nl lids, nl sclera


ENMT:  nl external ears & nose, nl lips & teeth, nl nasal mucosa & septum


Neck:  non-tender, supple


Respiratory:  clear to auscultation, normal air movement


Cardiovascular:  nl pulses, regular rate and rhythm


Gastrointestinal:  nl liver, spleen, non-tender, soft


Musculoskeletal:  nl extremities to inspection, nl gait and stance


Extremities:  normal pulses


Neurological:  CNS II-XII intact, nl mental status, nl speech, nl strength


Skin:  nl turgor, 


   No rash or lesions


Lymph:  nl lymph nodes





Results


Result Diagram:  


8/21/17 1159                                                                   

             8/21/17 0610





Results 24 hrs





Laboratory Tests








Test


  8/21/17


02:30 8/21/17


06:10 8/21/17


07:57 8/21/17


11:59


 


Bedside Glucose 315  H  217   


 


White Blood Count  4.7  #L  5.8  #


 


Red Blood Count  2.36  L  2.48  L


 


Hemoglobin  6.4  *L  6.9  *L


 


Hematocrit  20.1  L  21.7  L


 


Mean Corpuscular Volume  85.2    87.5  


 


Mean Corpuscular Hemoglobin  27.1  L  27.8  L


 


Mean Corpuscular Hemoglobin


Concent 


  31.8  L


  


  31.8  L


 


 


Red Cell Distribution Width  19.4  H  19.1  H


 


Platelet Count  80  L  86  L


 


Mean Platelet Volume  11.7  H  11.0  H


 


Neutrophils %  70.2    74.7  


 


Lymphocytes %  13.3  L  10.1  L


 


Monocytes %  12.3  H  11.8  H


 


Eosinophils %  3.2    2.2  


 


Basophils %  0.6    0.7  


 


Nucleated Red Blood Cells %  0.0    0.0  


 


Neutrophils # (Manual)  3    4  


 


Lymphocytes #  0.6  L  0.6  L


 


Monocytes #  0.6    0.7  


 


Eosinophils #  0.2    0.1  


 


Basophils #  0.0    0.0  


 


Nucleated Red Blood Cells #  0.0    0.0  


 


Pathologist Review


(Hematology) 


  YES  


  


  


 


 


Sodium Level  131  L  


 


Potassium Level  4.3    


 


Chloride Level  101    


 


Carbon Dioxide Level  21    


 


Anion Gap  13    


 


Blood Urea Nitrogen  9    


 


Creatinine  0.49  L  


 


Glucose Level  228  H  


 


Calcium Level  7.8  L  














Test


  8/21/17


12:24 8/21/17


17:21 8/21/17


20:10 


 


 


Bedside Glucose 233  H 353  H 203   











Medications


Medications





 Current Medications


Lorazepam (Ativan) 1 mg Q6H  PRN IV anxiety/agitation Last administered on 8/15/

17at 16:55; Admin Dose 1 MG;  Start 8/12/17 at 11:00


Miscellaneous Information 1 ea NOTE XX ;  Start 8/14/17 at 01:00


Glucose (Glutose) 15 gm Q15M  PRN PO DECREASED GLUCOSE;  Start 8/14/17 at 01:00


Glucose (Glutose) 22.5 gm Q15M  PRN PO DECREASED GLUCOSE;  Start 8/14/17 at 01:

00


Dextrose (D50w Syringe) 25 ml Q15M  PRN IV DECREASED GLUCOSE;  Start 8/14/17 at 

01:00


Dextrose (D50w Syringe) 50 ml Q15M  PRN IV DECREASED GLUCOSE;  Start 8/14/17 at 

01:00


Glucagon (Glucagen) 1 mg Q15M  PRN IM DECREASED GLUCOSE;  Start 8/14/17 at 01:00


Glucose (Glutose) 15 gm Q15M  PRN BUCCAL DECREASED GLUCOSE;  Start 8/14/17 at 01

:00


Morphine Sulfate (morphine) 1 mg Q4H  PRN IV PAIN LEVEL 7-10 Last administered 

on 8/21/17at 17:45; Admin Dose 1 MG;  Start 8/14/17 at 14:00


Potassium Chloride (Klor-Con 20) 20 meq BID PO  Last administered on 8/21/17at 

20:52; Admin Dose 20 MEQ;  Start 8/14/17 at 21:00


Diagnostic Test (Pha) 1 ea 1 ea 02 XX  Last administered on 8/18/17at 02:35; 

Admin Dose 1 EA;  Start 8/15/17 at 02:00


Cefepime HCl (Maxipime 1gm/50 ml (Pmx)) 50 ml @  100 mls/hr Q12 IVPB  Last 

administered on 8/21/17at 20:53; Admin Dose 100 MLS/HR;  Start 8/16/17 at 21:00


Pantoprazole (Protonix Tab) 40 mg BID@06,18 PO  Last administered on 8/21/17at 

17:45; Admin Dose 40 MG;  Start 8/16/17 at 18:00


Rifaximin (Xifaxan) 550 mg BID PO  Last administered on 8/21/17at 20:52; Admin 

Dose 550 MG;  Start 8/16/17 at 21:00


Ondansetron HCl 4 mg 4 mg Q6H  PRN IV NAUSEA AND/OR VOMITING Last administered 

on 8/18/17at 18:40; Admin Dose 4 MG;  Start 8/16/17 at 22:30


Caspofungin/ Sodium Chloride (Cancidas/NS) 250 ml @  250 mls/hr Q24H IVPB  Last 

administered on 8/21/17at 18:33; Admin Dose 250 MLS/HR;  Start 8/18/17 at 16:00


Lactulose (Enulose) 26.7 gm BID PO  Last administered on 8/21/17at 20:53; Admin 

Dose 26.7 GM;  Start 8/19/17 at 21:00


Thiamine HCl (Vitamin B1) 100 mg DAILY PO  Last administered on 8/21/17at 08:17

; Admin Dose 100 MG;  Start 8/20/17 at 09:00


Multivitamins/ Minerals (Theragran-M) 1 tab DAILY PO  Last administered on 8/21/ 17at 08:17; Admin Dose 1 TAB;  Start 8/20/17 at 09:00


Insulin Glargine (Lantus) 18 unit DAILY@20 SC  Last administered on 8/21/17at 20

:57; Admin Dose 18 UNIT;  Start 8/21/17 at 20:00











SIDDHARTH RECINOS MD Aug 21, 2017 21:32

## 2017-08-21 NOTE — PN
Date/Time of Note


Date/Time of Note


DATE: 8/21/17 


TIME: 10:59





Assessment/Plan


VTE Prophylaxis


VTE Prophylaxis Intervention:  contraindicated





Lines/Catheters


IV Catheter Type (from [facility]):  Saline Lock


Urinary Cath still in place:  No





Assessment/Plan


Assessment/Plan


Physical exam 





Constitutional:  alert, oriented


Respiratory:  diminished breath sounds


Cardiovascular:  regular rate and rhythm


Abdomen: soft, non tender





A/P 


1  Severe sepsis with bacteremia and fungemia, rule out


  endocarditis, rule out discitis, poss other etiologies.WBC scan essentially 

neg


2.   End-stage liver disease, status post transjugular intrahepatic 

portosystemic shunt (TIPS) procedure.


3 Toxic Encephalopathy


3. Anemia now with possible GI bleed


4. hs pancreatitis


5. Hx alcohol abuse


6. Hx esophageal varices


7. SIRS


8. S/p portal shunt 2017


9. S/p cardiac stenting 3 stents


 


PLAN:


- Transfuse 1 unit prbc, Spoke to Dr Seth; possible EGD tmw


- CBC Q 12 and PPI


- On Cefepime and Caspofungin 


- pending lumbar and cervical spine MRI


- C/W Lactulose


- F/U GI and I.D





Subjective


24 Hr Interval Summary


Free Text/Dictation


Hb dropped 6.4, pt says he did not see his stool and no report per nurses





Exam/Review of Systems


Vital Signs


Vitals





 Vital Signs








  Date Time  Temp Pulse Resp B/P Pulse Ox O2 Delivery O2 Flow Rate FiO2


 


8/21/17 08:56  94      


 


8/21/17 07:32 98.5  18 90/52 97   














 Intake and Output   


 


 8/20/17 8/20/17 8/21/17





 15:00 23:00 07:00


 


Intake Total 50 ml 1300 ml 600 ml


 


Output Total  600 ml 500 ml


 


Balance 50 ml 700 ml 100 ml











Results


Result Diagram:  


8/21/17 0610                                                                   

             8/21/17 0610





Results 24 hrs





Laboratory Tests








Test


  8/20/17


12:35 8/20/17


17:39 8/20/17


20:11 8/20/17


20:16


 


Bedside Glucose 205   217   233  H 


 


White Blood Count    6.1  


 


Red Blood Count    2.60  L


 


Hemoglobin    7.2  L


 


Hematocrit    22.6  L


 


Mean Corpuscular Volume    86.9  


 


Mean Corpuscular Hemoglobin    27.7  L


 


Mean Corpuscular Hemoglobin


Concent 


  


  


  31.9  L


 


 


Red Cell Distribution Width    19.0  H


 


Platelet Count    92  L


 


Mean Platelet Volume    11.4  H


 


Neutrophils %    75.2  


 


Lymphocytes %    11.7  L


 


Monocytes %    9.2  


 


Eosinophils %    2.6  


 


Basophils %    0.5  


 


Nucleated Red Blood Cells %    0.0  


 


Neutrophils # (Manual)    5  


 


Lymphocytes #    0.7  L


 


Monocytes #    0.6  


 


Eosinophils #    0.2  


 


Basophils #    0.0  


 


Nucleated Red Blood Cells #    0.0  














Test


  8/21/17


02:30 8/21/17


06:10 8/21/17


07:57 


 


 


Bedside Glucose 315  H  217   


 


White Blood Count  4.7  #L  


 


Red Blood Count  2.36  L  


 


Hemoglobin  6.4  *L  


 


Hematocrit  20.1  L  


 


Mean Corpuscular Volume  85.2    


 


Mean Corpuscular Hemoglobin  27.1  L  


 


Mean Corpuscular Hemoglobin


Concent 


  31.8  L


  


  


 


 


Red Cell Distribution Width  19.4  H  


 


Platelet Count  80  L  


 


Mean Platelet Volume  11.7  H  


 


Neutrophils %  70.2    


 


Lymphocytes %  13.3  L  


 


Monocytes %  12.3  H  


 


Eosinophils %  3.2    


 


Basophils %  0.6    


 


Nucleated Red Blood Cells %  0.0    


 


Neutrophils # (Manual)  3    


 


Lymphocytes #  0.6  L  


 


Monocytes #  0.6    


 


Eosinophils #  0.2    


 


Basophils #  0.0    


 


Nucleated Red Blood Cells #  0.0    


 


Pathologist Review


(Hematology) 


  YES  


  


  


 


 


Sodium Level  131  L  


 


Potassium Level  4.3    


 


Chloride Level  101    


 


Carbon Dioxide Level  21    


 


Anion Gap  13    


 


Blood Urea Nitrogen  9    


 


Creatinine  0.49  L  


 


Glucose Level  228  H  


 


Calcium Level  7.8  L  











Medications


Medications





 Current Medications


Lorazepam (Ativan) 1 mg Q6H  PRN IV anxiety/agitation Last administered on 8/15/

17at 16:55; Admin Dose 1 MG;  Start 8/12/17 at 11:00


Miscellaneous Information 1 ea NOTE XX ;  Start 8/14/17 at 01:00


Glucose (Glutose) 15 gm Q15M  PRN PO DECREASED GLUCOSE;  Start 8/14/17 at 01:00


Glucose (Glutose) 22.5 gm Q15M  PRN PO DECREASED GLUCOSE;  Start 8/14/17 at 01:

00


Dextrose (D50w Syringe) 25 ml Q15M  PRN IV DECREASED GLUCOSE;  Start 8/14/17 at 

01:00


Dextrose (D50w Syringe) 50 ml Q15M  PRN IV DECREASED GLUCOSE;  Start 8/14/17 at 

01:00


Glucagon (Glucagen) 1 mg Q15M  PRN IM DECREASED GLUCOSE;  Start 8/14/17 at 01:00


Glucose (Glutose) 15 gm Q15M  PRN BUCCAL DECREASED GLUCOSE;  Start 8/14/17 at 01

:00


Morphine Sulfate (morphine) 1 mg Q4H  PRN IV PAIN LEVEL 7-10 Last administered 

on 8/21/17at 05:32; Admin Dose 1 MG;  Start 8/14/17 at 14:00


Potassium Chloride (Klor-Con 20) 20 meq BID PO  Last administered on 8/21/17at 

08:17; Admin Dose 20 MEQ;  Start 8/14/17 at 21:00


Diagnostic Test (Pha) 1 ea 1 ea 02 XX  Last administered on 8/18/17at 02:35; 

Admin Dose 1 EA;  Start 8/15/17 at 02:00


Cefepime HCl (Maxipime 1gm/50 ml (Pmx)) 50 ml @  100 mls/hr Q12 IVPB  Last 

administered on 8/21/17at 08:17; Admin Dose 100 MLS/HR;  Start 8/16/17 at 21:00


Pantoprazole (Protonix Tab) 40 mg BID@06,18 PO  Last administered on 8/21/17at 

05:31; Admin Dose 40 MG;  Start 8/16/17 at 18:00


Rifaximin (Xifaxan) 550 mg BID PO  Last administered on 8/21/17at 08:17; Admin 

Dose 550 MG;  Start 8/16/17 at 21:00


Ondansetron HCl 4 mg 4 mg Q6H  PRN IV NAUSEA AND/OR VOMITING Last administered 

on 8/18/17at 18:40; Admin Dose 4 MG;  Start 8/16/17 at 22:30


Caspofungin/ Sodium Chloride (Cancidas/NS) 250 ml @  250 mls/hr Q24H IVPB  Last 

administered on 8/20/17at 16:48; Admin Dose 250 MLS/HR;  Start 8/18/17 at 16:00


Insulin Glargine (Lantus) 15 unit DAILY@20 SC  Last administered on 8/20/17at 20

:47; Admin Dose 15 UNIT;  Start 8/18/17 at 20:00


Lactulose (Enulose) 26.7 gm BID PO  Last administered on 8/21/17at 08:20; Admin 

Dose 26.7 GM;  Start 8/19/17 at 21:00


Thiamine HCl (Vitamin B1) 100 mg DAILY PO  Last administered on 8/21/17at 08:17

; Admin Dose 100 MG;  Start 8/20/17 at 09:00


Multivitamins/ Minerals (Theragran-M) 1 tab DAILY PO  Last administered on 8/21/ 17at 08:17; Admin Dose 1 TAB;  Start 8/20/17 at 09:00


Acetaminophen (Tylenol Tab) 650 mg ONCE PO ;  Start 8/21/17 at 10:30;  Stop 8/21 /17 at 19:00


Diphenhydramine HCl (Benadryl) 25 mg ONCE IV ;  Start 8/21/17 at 10:30;  Stop 8/ 21/17 at 19:00











NEREYDA SANCHEZ MD Aug 21, 2017 11:05

## 2017-08-21 NOTE — CONS
Date/Time of Note


Date/Time of Note


DATE: 8/21/17 


TIME: 15:36





Assessment/Plan


Assessment/Plan


Chief Complaint/Hosp Course


No acute changes overnight.  Patient is alert feels better, looks comfortable.





Temperature 98.6 pulse 100 respirations 18 blood pressure 96/64 saturation 98% 

on room air





WBC 5.8 H&H 6.9 and 21.7 platelets 56 neutrophils 74.7 BUN 9 creatinine 0.49





Diagnostics: WBC labeled nuclear scalp revealed mild symmetrical increased 

uptake in both kidneys likely due to inflammatory/infectious process.  No other 

definite abnormal focal areas of increased activity.  No abnormal areas of 

increased activity in the spine


Microbiology: Urine culture growing Candida glabrata and Candida albicans blood 

culture growing oxacillin sensitive staph aureus and yeast


  


Antimicrobials:  Cancidas, Cefepime


 


PHYSICAL EXAMINATION:


 


GENERAL:  This is a wasted, well-developed middle-aged Turk[place]


man who is awake, in no distress.


 


HEENT:  Head atraumatic, normocephalic.  Sclerae anicteric.


Buccal mucosa dry.


 


NECK:  Supple.


 


CHEST:  Rise symmetrical.  Breath sounds diminished at the bases.


 


HEART:  S1, S2.


 


ABDOMEN:  Soft, bowel sounds present.


 


 ASSESSMENT:


 


1.   Severe sepsis with bacteremia and fungemia ?etiology


2.   End-stage liver disease, status post transjugular


intrahepatic portosystemic shunt (TIPS) procedure.


3.   History of cardiac stents.


4.   Anemia and thrombocytopenia.


5.   Resolving encephalopathy.


6.   Diabetes.


7.   UTI==>yeast


 


PLAN: Clinically stable, on appropriate antimicrobials, will order CT abdomen 

and pelvis followed WBC scan findings, monitor H&H, follow gastroenterology 

recommendations  





dw staff/pt


Problems:  





Consultation Date/Type/Reason


Admit Date/Time


Aug 12, 2017 at 05:42


Initial Consult Date


8/12/17


Type of Consultation:  ID





Exam/Review of Systems


Vital Signs


Vitals





 Vital Signs








  Date Time  Temp Pulse Resp B/P Pulse Ox O2 Delivery O2 Flow Rate FiO2


 


8/21/17 15:31 98.6 100 18 96/64 98   














 Intake and Output   


 


 8/20/17 8/20/17 8/21/17





 15:00 23:00 07:00


 


Intake Total 50 ml 1300 ml 600 ml


 


Output Total  600 ml 500 ml


 


Balance 50 ml 700 ml 100 ml











Results


Result Diagram:  


8/21/17 1159                                                                   

             8/21/17 0610





Results 24 hrs





Laboratory Tests








Test


  8/20/17


17:39 8/20/17


20:11 8/20/17


20:16 8/21/17


02:30


 


Bedside Glucose 217   233  H  315  H


 


White Blood Count   6.1   


 


Red Blood Count   2.60  L 


 


Hemoglobin   7.2  L 


 


Hematocrit   22.6  L 


 


Mean Corpuscular Volume   86.9   


 


Mean Corpuscular Hemoglobin   27.7  L 


 


Mean Corpuscular Hemoglobin


Concent 


  


  31.9  L


  


 


 


Red Cell Distribution Width   19.0  H 


 


Platelet Count   92  L 


 


Mean Platelet Volume   11.4  H 


 


Neutrophils %   75.2   


 


Lymphocytes %   11.7  L 


 


Monocytes %   9.2   


 


Eosinophils %   2.6   


 


Basophils %   0.5   


 


Nucleated Red Blood Cells %   0.0   


 


Neutrophils # (Manual)   5   


 


Lymphocytes #   0.7  L 


 


Monocytes #   0.6   


 


Eosinophils #   0.2   


 


Basophils #   0.0   


 


Nucleated Red Blood Cells #   0.0   














Test


  8/21/17


06:10 8/21/17


07:57 8/21/17


11:59 8/21/17


12:24


 


White Blood Count 4.7  #L  5.8  # 


 


Red Blood Count 2.36  L  2.48  L 


 


Hemoglobin 6.4  *L  6.9  *L 


 


Hematocrit 20.1  L  21.7  L 


 


Mean Corpuscular Volume 85.2    87.5   


 


Mean Corpuscular Hemoglobin 27.1  L  27.8  L 


 


Mean Corpuscular Hemoglobin


Concent 31.8  L


  


  31.8  L


  


 


 


Red Cell Distribution Width 19.4  H  19.1  H 


 


Platelet Count 80  L  86  L 


 


Mean Platelet Volume 11.7  H  11.0  H 


 


Neutrophils % 70.2    74.7   


 


Lymphocytes % 13.3  L  10.1  L 


 


Monocytes % 12.3  H  11.8  H 


 


Eosinophils % 3.2    2.2   


 


Basophils % 0.6    0.7   


 


Nucleated Red Blood Cells % 0.0    0.0   


 


Neutrophils # (Manual) 3    4   


 


Lymphocytes # 0.6  L  0.6  L 


 


Monocytes # 0.6    0.7   


 


Eosinophils # 0.2    0.1   


 


Basophils # 0.0    0.0   


 


Nucleated Red Blood Cells # 0.0    0.0   


 


Pathologist Review


(Hematology) YES  


  


  


  


 


 


Sodium Level 131  L   


 


Potassium Level 4.3     


 


Chloride Level 101     


 


Carbon Dioxide Level 21     


 


Anion Gap 13     


 


Blood Urea Nitrogen 9     


 


Creatinine 0.49  L   


 


Glucose Level 228  H   


 


Calcium Level 7.8  L   


 


Bedside Glucose  217    233  H











Medications


Medications





 Current Medications


Lorazepam (Ativan) 1 mg Q6H  PRN IV anxiety/agitation Last administered on 8/15/

17at 16:55; Admin Dose 1 MG;  Start 8/12/17 at 11:00


Miscellaneous Information 1 ea NOTE XX ;  Start 8/14/17 at 01:00


Glucose (Glutose) 15 gm Q15M  PRN PO DECREASED GLUCOSE;  Start 8/14/17 at 01:00


Glucose (Glutose) 22.5 gm Q15M  PRN PO DECREASED GLUCOSE;  Start 8/14/17 at 01:

00


Dextrose (D50w Syringe) 25 ml Q15M  PRN IV DECREASED GLUCOSE;  Start 8/14/17 at 

01:00


Dextrose (D50w Syringe) 50 ml Q15M  PRN IV DECREASED GLUCOSE;  Start 8/14/17 at 

01:00


Glucagon (Glucagen) 1 mg Q15M  PRN IM DECREASED GLUCOSE;  Start 8/14/17 at 01:00


Glucose (Glutose) 15 gm Q15M  PRN BUCCAL DECREASED GLUCOSE;  Start 8/14/17 at 01

:00


Morphine Sulfate (morphine) 1 mg Q4H  PRN IV PAIN LEVEL 7-10 Last administered 

on 8/21/17at 11:39; Admin Dose 1 MG;  Start 8/14/17 at 14:00


Potassium Chloride (Klor-Con 20) 20 meq BID PO  Last administered on 8/21/17at 

08:17; Admin Dose 20 MEQ;  Start 8/14/17 at 21:00


Diagnostic Test (Pha) 1 ea 1 ea 02 XX  Last administered on 8/18/17at 02:35; 

Admin Dose 1 EA;  Start 8/15/17 at 02:00


Cefepime HCl (Maxipime 1gm/50 ml (Pmx)) 50 ml @  100 mls/hr Q12 IVPB  Last 

administered on 8/21/17at 08:17; Admin Dose 100 MLS/HR;  Start 8/16/17 at 21:00


Pantoprazole (Protonix Tab) 40 mg BID@06,18 PO  Last administered on 8/21/17at 

05:31; Admin Dose 40 MG;  Start 8/16/17 at 18:00


Rifaximin (Xifaxan) 550 mg BID PO  Last administered on 8/21/17at 08:17; Admin 

Dose 550 MG;  Start 8/16/17 at 21:00


Ondansetron HCl 4 mg 4 mg Q6H  PRN IV NAUSEA AND/OR VOMITING Last administered 

on 8/18/17at 18:40; Admin Dose 4 MG;  Start 8/16/17 at 22:30


Caspofungin/ Sodium Chloride (Cancidas/NS) 250 ml @  250 mls/hr Q24H IVPB  Last 

administered on 8/20/17at 16:48; Admin Dose 250 MLS/HR;  Start 8/18/17 at 16:00


Lactulose (Enulose) 26.7 gm BID PO  Last administered on 8/21/17at 08:20; Admin 

Dose 26.7 GM;  Start 8/19/17 at 21:00


Thiamine HCl (Vitamin B1) 100 mg DAILY PO  Last administered on 8/21/17at 08:17

; Admin Dose 100 MG;  Start 8/20/17 at 09:00


Multivitamins/ Minerals (Theragran-M) 1 tab DAILY PO  Last administered on 8/21/ 17at 08:17; Admin Dose 1 TAB;  Start 8/20/17 at 09:00


Acetaminophen (Tylenol Tab) 650 mg ONCE PO  Last administered on 8/21/17at 14:16

; Admin Dose 650 MG;  Start 8/21/17 at 10:30;  Stop 8/21/17 at 19:00


Diphenhydramine HCl (Benadryl) 25 mg ONCE IV  Last administered on 8/21/17at 14:

16; Admin Dose 25 MG;  Start 8/21/17 at 10:30;  Stop 8/21/17 at 19:00


Insulin Glargine (Lantus) 18 unit DAILY@20 SC ;  Start 8/21/17 at 20:00











ANTONI HAWTHORNE NP Aug 21, 2017 15:37

## 2017-08-22 VITALS — SYSTOLIC BLOOD PRESSURE: 89 MMHG | RESPIRATION RATE: 18 BRPM | DIASTOLIC BLOOD PRESSURE: 62 MMHG

## 2017-08-22 VITALS — RESPIRATION RATE: 17 BRPM | DIASTOLIC BLOOD PRESSURE: 60 MMHG | SYSTOLIC BLOOD PRESSURE: 97 MMHG

## 2017-08-22 VITALS — HEART RATE: 97 BPM

## 2017-08-22 VITALS — DIASTOLIC BLOOD PRESSURE: 53 MMHG | RESPIRATION RATE: 16 BRPM | HEART RATE: 90 BPM | SYSTOLIC BLOOD PRESSURE: 77 MMHG

## 2017-08-22 VITALS — HEART RATE: 101 BPM

## 2017-08-22 VITALS — RESPIRATION RATE: 14 BRPM | HEART RATE: 88 BPM | DIASTOLIC BLOOD PRESSURE: 52 MMHG | SYSTOLIC BLOOD PRESSURE: 88 MMHG

## 2017-08-22 VITALS — RESPIRATION RATE: 18 BRPM | DIASTOLIC BLOOD PRESSURE: 54 MMHG | HEART RATE: 90 BPM | SYSTOLIC BLOOD PRESSURE: 86 MMHG

## 2017-08-22 VITALS — RESPIRATION RATE: 19 BRPM | SYSTOLIC BLOOD PRESSURE: 138 MMHG | DIASTOLIC BLOOD PRESSURE: 72 MMHG

## 2017-08-22 VITALS — SYSTOLIC BLOOD PRESSURE: 90 MMHG | DIASTOLIC BLOOD PRESSURE: 58 MMHG | RESPIRATION RATE: 18 BRPM

## 2017-08-22 VITALS — HEART RATE: 94 BPM

## 2017-08-22 VITALS — RESPIRATION RATE: 16 BRPM | DIASTOLIC BLOOD PRESSURE: 68 MMHG | HEART RATE: 94 BPM | SYSTOLIC BLOOD PRESSURE: 121 MMHG

## 2017-08-22 VITALS — SYSTOLIC BLOOD PRESSURE: 98 MMHG | RESPIRATION RATE: 18 BRPM | DIASTOLIC BLOOD PRESSURE: 66 MMHG

## 2017-08-22 VITALS — HEART RATE: 89 BPM

## 2017-08-22 VITALS — SYSTOLIC BLOOD PRESSURE: 104 MMHG | RESPIRATION RATE: 18 BRPM | DIASTOLIC BLOOD PRESSURE: 64 MMHG

## 2017-08-22 VITALS — HEART RATE: 93 BPM

## 2017-08-22 LAB
ABNORMAL IP MESSAGE: 1
ANION GAP SERPL CALC-SCNC: 10 MMOL/L (ref 8–16)
BASOPHILS # BLD AUTO: 0.1 10^3/UL (ref 0–0.1)
BASOPHILS NFR BLD: 0.9 % (ref 0–2)
BUN SERPL-MCNC: 10 MG/DL (ref 7–20)
CALCIUM SERPL-MCNC: 7.7 MG/DL (ref 8.4–10.2)
CHLORIDE SERPL-SCNC: 105 MMOL/L (ref 97–110)
CO2 SERPL-SCNC: 21 MMOL/L (ref 21–31)
CREAT SERPL-MCNC: 0.66 MG/DL (ref 0.61–1.24)
EOSINOPHIL # BLD: 0.1 10^3/UL (ref 0–0.5)
EOSINOPHIL NFR BLD: 2.4 % (ref 0–7)
ERYTHROCYTE [DISTWIDTH] IN BLOOD BY AUTOMATED COUNT: 19 % (ref 11.5–14.5)
GLUCOSE SERPL-MCNC: 206 MG/DL (ref 70–220)
HCT VFR BLD CALC: 24.9 % (ref 42–52)
HGB BLD-MCNC: 8 G/DL (ref 14–18)
LYMPHOCYTES # BLD AUTO: 0.6 10^3/UL (ref 0.8–2.9)
LYMPHOCYTES NFR BLD AUTO: 10.8 % (ref 15–51)
MCH RBC QN AUTO: 28.3 PG (ref 29–33)
MCHC RBC AUTO-ENTMCNC: 32.1 G/DL (ref 32–37)
MCV RBC AUTO: 88 FL (ref 82–101)
MONOCYTES # BLD: 0.6 10^3/UL (ref 0.3–0.9)
MONOCYTES NFR BLD: 10.8 % (ref 0–11)
NEUTROPHILS NFR BLD AUTO: 74.5 % (ref 39–77)
NRBC # BLD MANUAL: 0 10^3/UL (ref 0–0)
NRBC BLD AUTO-RTO: 0 /100WBC (ref 0–0)
PLATELET # BLD: 79 10^3/UL (ref 140–415)
PMV BLD AUTO: 11.6 FL (ref 7.4–10.4)
POSITIVE DIFF: (no result)
POTASSIUM SERPL-SCNC: 4.9 MMOL/L (ref 3.5–5.1)
RBC # BLD AUTO: 2.83 10^6/UL (ref 4.7–6.1)
SODIUM SERPL-SCNC: 131 MMOL/L (ref 135–144)
WBC # BLD AUTO: 5.4 10^3/UL (ref 4.8–10.8)

## 2017-08-22 PROCEDURE — 0DJ08ZZ INSPECTION OF UPPER INTESTINAL TRACT, VIA NATURAL OR ARTIFICIAL OPENING ENDOSCOPIC: ICD-10-PCS | Performed by: INTERNAL MEDICINE

## 2017-08-22 RX ADMIN — MORPHINE SULFATE PRN MG: 2 INJECTION, SOLUTION INTRAMUSCULAR; INTRAVENOUS at 22:09

## 2017-08-22 RX ADMIN — MULTIPLE VITAMINS W/ MINERALS TAB SCH TAB: TAB at 09:00

## 2017-08-22 RX ADMIN — THIAMINE HCL TAB 100 MG SCH MG: 100 TAB at 10:23

## 2017-08-22 RX ADMIN — INSULIN GLARGINE SCH UNIT: 100 INJECTION, SOLUTION SUBCUTANEOUS at 20:43

## 2017-08-22 RX ADMIN — METOCLOPRAMIDE HYDROCHLORIDE SCH MG: 10 INJECTION, SOLUTION INTRAMUSCULAR; INTRAVENOUS at 14:00

## 2017-08-22 RX ADMIN — CEFEPIME SCH MLS/HR: 1 INJECTION, POWDER, FOR SOLUTION INTRAMUSCULAR; INTRAVENOUS at 09:00

## 2017-08-22 RX ADMIN — POTASSIUM CHLORIDE SCH MEQ: 20 TABLET, EXTENDED RELEASE ORAL at 09:00

## 2017-08-22 RX ADMIN — MORPHINE SULFATE PRN MG: 2 INJECTION, SOLUTION INTRAMUSCULAR; INTRAVENOUS at 14:06

## 2017-08-22 RX ADMIN — CASPOFUNGIN ACETATE SCH MLS/HR: 5 INJECTION, POWDER, LYOPHILIZED, FOR SOLUTION INTRAVENOUS at 17:15

## 2017-08-22 RX ADMIN — LACTULOSE SCH GM: 10 SOLUTION ORAL at 10:19

## 2017-08-22 RX ADMIN — THIAMINE HCL TAB 100 MG SCH MG: 100 TAB at 09:00

## 2017-08-22 RX ADMIN — POTASSIUM CHLORIDE SCH MEQ: 20 TABLET, EXTENDED RELEASE ORAL at 20:31

## 2017-08-22 RX ADMIN — MORPHINE SULFATE PRN MG: 2 INJECTION, SOLUTION INTRAMUSCULAR; INTRAVENOUS at 01:14

## 2017-08-22 RX ADMIN — RIFAXIMIN SCH MG: 550 TABLET ORAL at 09:00

## 2017-08-22 RX ADMIN — CEFEPIME SCH MLS/HR: 1 INJECTION, POWDER, FOR SOLUTION INTRAMUSCULAR; INTRAVENOUS at 10:19

## 2017-08-22 RX ADMIN — RIFAXIMIN SCH MG: 550 TABLET ORAL at 20:30

## 2017-08-22 RX ADMIN — LACTULOSE SCH GM: 10 SOLUTION ORAL at 20:30

## 2017-08-22 RX ADMIN — CEFEPIME SCH MLS/HR: 1 INJECTION, POWDER, FOR SOLUTION INTRAMUSCULAR; INTRAVENOUS at 20:31

## 2017-08-22 RX ADMIN — RIFAXIMIN SCH MG: 550 TABLET ORAL at 10:23

## 2017-08-22 RX ADMIN — PANTOPRAZOLE SODIUM SCH MG: 40 TABLET, DELAYED RELEASE ORAL at 18:00

## 2017-08-22 RX ADMIN — PANTOPRAZOLE SODIUM SCH MG: 40 TABLET, DELAYED RELEASE ORAL at 05:42

## 2017-08-22 RX ADMIN — MORPHINE SULFATE PRN MG: 2 INJECTION, SOLUTION INTRAMUSCULAR; INTRAVENOUS at 18:12

## 2017-08-22 RX ADMIN — MULTIPLE VITAMINS W/ MINERALS TAB SCH TAB: TAB at 10:23

## 2017-08-22 RX ADMIN — LACTULOSE SCH GM: 10 SOLUTION ORAL at 09:00

## 2017-08-22 RX ADMIN — METOCLOPRAMIDE HYDROCHLORIDE SCH MG: 10 INJECTION, SOLUTION INTRAMUSCULAR; INTRAVENOUS at 09:40

## 2017-08-22 RX ADMIN — METOCLOPRAMIDE HYDROCHLORIDE SCH MG: 10 INJECTION, SOLUTION INTRAMUSCULAR; INTRAVENOUS at 18:00

## 2017-08-22 RX ADMIN — POTASSIUM CHLORIDE SCH MEQ: 20 TABLET, EXTENDED RELEASE ORAL at 10:23

## 2017-08-22 NOTE — OPPN
Date/Time of Note


Date/Time of Note


DATE: 8/22/17 


TIME: 09:07





Operative Report


Preoperative Diagnosis


GI bleed


Postoperative Diagnosis


GI bleeding


Operation/Procedure Performed


Congestive gastropathy


Severe gastroparesis


No varicose vein identified


No blood seen


Provider:  SIDDHARTH RECINOS MD


Estimated blood loss:  none


Transfusion Required:  no


Specimen:  none


Grafts/Implants:  none


Complications:  no











SIDDHARTH RECINOS MD Aug 22, 2017 09:08

## 2017-08-22 NOTE — PN
DATE:  08/22/2017

 

SUBJECTIVE DATA:  Patient is lying comfortably in bed.  He is

sleeping status post an EGD.  No fevers.  Temperature 98.4, pulse

97, respirations 17, blood pressure 97/60, saturation 100 on room

air.

 

LABORATORY AND DIAGNOSTIC DATA:  WBC 5.4, platelets 79;

neutrophils 74.5.  BUN 10, creatinine 0.66.  Microbiology: Blood

culture on admission grew Staph aureus, and Candida glabrata.

 

Urine culture grew Candida albicans and glabrata.  Repeat blood

cultures on August 19 negative.

 

Diagnostics: WBC scan revealed no increased activity in this

spine, mild symmetric increased uptake in both kidneys likely due

to inflammatory/infectious process.

 

2D echo revealed no evidence of vegetations.

 

EGD findings revealed congestive gastropathy, severe

gastroparesis, no varicose vein identified and no blood seen.

 

ANTIMICROBIALS:  Patient is on Cancidas and cefepime.

 

OBJECTIVE DATA:

GENERAL:  This is a well-developed, wasted middle-aged [place]

man who is alert, in no distress.

HEENT:  Head atraumatic, normocephalic.  Sclerae anicteric.

Buccal mucosa pink.

NECK:  Supple.

CHEST:  Rise symmetrical.  Breath sounds clear.

HEART:  S1, S2.

ABDOMEN:  Soft, bowel sounds present.

EXTREMITIES:  Without cyanosis.

 

ASSESSMENT:

1.   Status post sepsis.

2.   Oxacillin-sensitive Staph aureus bacteremia with fungemia,

possibly secondary to urinary tract infection.

3.   Urinary tract infection.

4.   End-stage liver disease status post TIPS.

5.   Anemia and thrombocytopenia status post EGD.  Report noted.

6.   Diabetes.

7.   Status post encephalopathy.

8.   Coronary artery disease status post cardiac stents.

 

PLAN:  The patient remains stable.  We will order CT of the

abdomen and pelvis to follow on abnormal WBC scan findings.

Continue present care for now.  Follow recommendations of

consultants.  Patient may need ODIN.

 

 

 

Dictated By:  Ruddy Escobedo NP

 

/anna/nisha

Job#:  [id number]/Document#:  [id number]

D:  08/22/2017 15:17

T:  08/22/2017 16:20

## 2017-08-22 NOTE — OPPN
Date/Time of Note


Date/Time of Note


DATE: 8/22/17 


TIME: 10:02





Operative Report


Anesthesia Type:  MAC


Transfusion Required:  SIDDHARTH Zafar MD Aug 22, 2017 10:03

## 2017-08-22 NOTE — PN
Date/Time of Note


Date/Time of Note


DATE: 8/22/17 


TIME: 12:05





Assessment/Plan


VTE Prophylaxis


VTE Prophylaxis Intervention:  SCD's


VTE Contraindication Reason:  bleeding





Lines/Catheters


IV Catheter Type (from Nrsg):  Saline Lock


Urinary Cath still in place:  No





Assessment/Plan


Chief Complaint/Hosp Course


Physical exam 





Constitutional:  alert, oriented


Respiratory:  diminished breath sounds


Cardiovascular:  regular rate and rhythm


Abdomen: soft, non tender





A/P 


1  Severe sepsis with bacteremia and fungemia, rule out


  endocarditis, rule out discitis, poss other etiologies.WBC scan essentially 

neg


2.   End-stage liver disease, status post transjugular intrahepatic 

portosystemic shunt (TIPS) procedure.


3 Toxic Encephalopathy


3. Anemia S/P EGD on 08/22 with gastroparesis


4. hs pancreatitis


5. Hx alcohol abuse


6. Hx esophageal varices


7. SIRS


8. S/p portal shunt 2017


9. S/p cardiac stenting 3 stents


 


PLAN:


- On PPI/Reglan


- On Cefepime and Caspofungin 


- pending lumbar and cervical spine MRI today


- CT CAP to be done in am with contrast


- C/W Lactulose


- F/U GI and I.D


- Labs in am


Problems:  





Subjective


24 Hr Interval Summary


Free Text/Dictation


s/p EGD today with gastroparesis


No varices, no active bleed


Hypotensive ( given propofol) improved





Exam/Review of Systems


Vital Signs


Vitals





 Vital Signs








  Date Time  Temp Pulse Resp B/P Pulse Ox O2 Delivery O2 Flow Rate FiO2


 


8/22/17 11:38 98.4 107 17 97/60 100   


 


8/22/17 09:12      Room Air  














 Intake and Output   


 


 8/21/17 8/21/17 8/22/17





 15:00 23:00 07:00


 


Intake Total 50 ml 1370 ml 500 ml


 


Output Total  350 ml 400 ml


 


Balance 50 ml 1020 ml 100 ml











Results


Result Diagram:  


8/22/17 0620                                                                   

             8/22/17 0620





Results 24 hrs





Laboratory Tests








Test


  8/21/17


12:24 8/21/17


17:21 8/21/17


20:10 8/21/17


20:30


 


Bedside Glucose 233  H 353  H 203   


 


Stool Occult Blood    POSITIVE  














Test


  8/21/17


22:04 8/22/17


01:21 8/22/17


06:06 8/22/17


06:20


 


White Blood Count 6.4     5.4  


 


Red Blood Count 2.73  L   2.83  L


 


Hemoglobin 7.5  L   8.0  L


 


Hematocrit 23.3  L   24.9  L


 


Mean Corpuscular Volume 85.3     88.0  


 


Mean Corpuscular Hemoglobin 27.5  L   28.3  L


 


Mean Corpuscular Hemoglobin


Concent 32.2  


  


  


  32.1  


 


 


Red Cell Distribution Width 18.7  H   19.0  H


 


Platelet Count 87  L   79  L


 


Mean Platelet Volume 11.8  H   11.6  H


 


Neutrophils % 73.3     74.5  


 


Lymphocytes % 10.5  L   10.8  L


 


Monocytes % 12.3  H   10.8  


 


Eosinophils % 2.7     2.4  


 


Basophils % 0.6     0.9  


 


Nucleated Red Blood Cells % 0.0     0.0  


 


Neutrophils # (Manual) 5     4  


 


Lymphocytes # 0.7  L   0.6  L


 


Monocytes # 0.8     0.6  


 


Eosinophils # 0.2     0.1  


 


Basophils # 0.0     0.1  


 


Nucleated Red Blood Cells # 0.0     0.0  


 


Calcium Level 7.7  L   7.7  L


 


Bedside Glucose  267  H  


 


Lab Scanned Report


  


  


  BLOOD


TRANSFUSION 


 


 


Sodium Level    131  L


 


Potassium Level    4.9  


 


Chloride Level    105  


 


Carbon Dioxide Level    21  


 


Anion Gap    10  


 


Blood Urea Nitrogen    10  


 


Creatinine    0.66  


 


Glucose Level    206  














Test


  8/22/17


07:53 8/22/17


10:13 


  


 


 


Bedside Glucose 191   195    











Medications


Medications





 Current Medications


Lorazepam (Ativan) 1 mg Q6H  PRN IV anxiety/agitation Last administered on 8/15/

17at 16:55; Admin Dose 1 MG;  Start 8/12/17 at 11:00


Miscellaneous Information 1 ea NOTE XX ;  Start 8/14/17 at 01:00


Glucose (Glutose) 15 gm Q15M  PRN PO DECREASED GLUCOSE;  Start 8/14/17 at 01:00


Glucose (Glutose) 22.5 gm Q15M  PRN PO DECREASED GLUCOSE;  Start 8/14/17 at 01:

00


Dextrose (D50w Syringe) 25 ml Q15M  PRN IV DECREASED GLUCOSE;  Start 8/14/17 at 

01:00


Dextrose (D50w Syringe) 50 ml Q15M  PRN IV DECREASED GLUCOSE;  Start 8/14/17 at 

01:00


Glucagon (Glucagen) 1 mg Q15M  PRN IM DECREASED GLUCOSE;  Start 8/14/17 at 01:00


Glucose (Glutose) 15 gm Q15M  PRN BUCCAL DECREASED GLUCOSE;  Start 8/14/17 at 01

:00


Morphine Sulfate (morphine) 1 mg Q4H  PRN IV PAIN LEVEL 7-10 Last administered 

on 8/22/17at 01:14; Admin Dose 1 MG;  Start 8/14/17 at 14:00


Potassium Chloride (Klor-Con 20) 20 meq BID PO  Last administered on 8/22/17at 

10:23; Admin Dose 20 MEQ;  Start 8/14/17 at 21:00


Diagnostic Test (Pha) 1 ea 1 ea 02 XX  Last administered on 8/18/17at 02:35; 

Admin Dose 1 EA;  Start 8/15/17 at 02:00


Cefepime HCl (Maxipime 1gm/50 ml (Pmx)) 50 ml @  100 mls/hr Q12 IVPB  Last 

administered on 8/22/17at 10:19; Admin Dose 100 MLS/HR;  Start 8/16/17 at 21:00


Pantoprazole (Protonix Tab) 40 mg BID@06,18 PO  Last administered on 8/22/17at 

05:42; Admin Dose 40 MG;  Start 8/16/17 at 18:00


Rifaximin (Xifaxan) 550 mg BID PO  Last administered on 8/22/17at 10:23; Admin 

Dose 550 MG;  Start 8/16/17 at 21:00


Ondansetron HCl 4 mg 4 mg Q6H  PRN IV NAUSEA AND/OR VOMITING Last administered 

on 8/18/17at 18:40; Admin Dose 4 MG;  Start 8/16/17 at 22:30


Caspofungin/ Sodium Chloride (Cancidas/NS) 250 ml @  250 mls/hr Q24H IVPB  Last 

administered on 8/21/17at 18:33; Admin Dose 250 MLS/HR;  Start 8/18/17 at 16:00


Lactulose (Enulose) 26.7 gm BID PO  Last administered on 8/22/17at 10:19; Admin 

Dose 26.7 GM;  Start 8/19/17 at 21:00


Thiamine HCl (Vitamin B1) 100 mg DAILY PO  Last administered on 8/22/17at 10:23

; Admin Dose 100 MG;  Start 8/20/17 at 09:00


Multivitamins/ Minerals (Theragran-M) 1 tab DAILY PO  Last administered on 8/22/ 17at 10:23; Admin Dose 1 TAB;  Start 8/20/17 at 09:00


Insulin Glargine (Lantus) 18 unit DAILY@20 SC  Last administered on 8/21/17at 20

:57; Admin Dose 18 UNIT;  Start 8/21/17 at 20:00


Metoclopramide HCl (Reglan) 10 mg Q6 IV ;  Start 8/22/17 at 09:40











NEREYDA SANCHEZ MD Aug 22, 2017 12:05

## 2017-08-22 NOTE — GILP
DATE OF PROCEDURE:

 

 

 

PROCEDURE PERFORMED:  EGD.

 

 

 

INDICATIONS FOR PROCEDURE:  A 57-year-old male undergoing this

procedure for severe drop in hematocrit requiring blood

transfusion.  He is known to have cirrhosis of the liver.  At

times the blood is seen in the mouth.  The purpose of this

procedure is to evaluate the upper GI tract and find out the

source of bleeding.  The patient has been complaining of constant

nausea, vomiting.  The risks of the procedure, related

complications, anesthetic risk, alternatives was totally

discussed with the patient.  Also with the wife and informed

consent was obtained.  The patient was brought to the GI lab,

sedated by the anesthesiologist.  After optimum sedation, the

scope was passed with much ease into the esophagus.  The distal

esophageal varicose vein were not seen.  The stomach mucosa

revealed congestive gastropathy and there was undigested food

particle in the distal part of the body of the stomach and antrum

covering the entire pylorus. Retroversion done.  No gastric

varicose vein identified.   No altered blood or fresh blood was

seen.  Scope was straightened out and removed with good patient

tolerance.

 

 

 

IMPRESSION:

 

1.   Esophageal varicose vein had completely subsided after TIP

  procedure.

2.   No gastric varicose veins seen.

3.   Congestive gastropathy present.

4.   No evidence of bleeding.

5.   Severe gastroparesis.

 

 

PLAN:  Is to start the patient on Reglan.  Continue rifaximin and

lactulose for his encephalopathy which is precipitated by the TIP

procedure.  If he continues to bleed, then we will have to look

into his nose, which might be the cause of his drop in

hematocrit.  The epistaxis might be the cause of drop in his

hematocrit.

 

 

 

 

 

 

 

Dictated By:  Albino Seth MD

 

 

 

/anna/ak

 

Job#:  [id number]/Document#:  [id number]

 

D:  08/22/2017 08:48

 

T:  08/22/2017 09:12

 

[facility]

## 2017-08-23 VITALS — SYSTOLIC BLOOD PRESSURE: 97 MMHG | DIASTOLIC BLOOD PRESSURE: 54 MMHG | RESPIRATION RATE: 17 BRPM

## 2017-08-23 VITALS — HEART RATE: 95 BPM

## 2017-08-23 VITALS — HEART RATE: 96 BPM

## 2017-08-23 VITALS — RESPIRATION RATE: 19 BRPM | DIASTOLIC BLOOD PRESSURE: 56 MMHG | SYSTOLIC BLOOD PRESSURE: 97 MMHG

## 2017-08-23 VITALS — DIASTOLIC BLOOD PRESSURE: 67 MMHG | RESPIRATION RATE: 19 BRPM | SYSTOLIC BLOOD PRESSURE: 97 MMHG

## 2017-08-23 VITALS — HEART RATE: 98 BPM

## 2017-08-23 VITALS — DIASTOLIC BLOOD PRESSURE: 63 MMHG | RESPIRATION RATE: 18 BRPM | SYSTOLIC BLOOD PRESSURE: 97 MMHG

## 2017-08-23 VITALS — DIASTOLIC BLOOD PRESSURE: 55 MMHG | RESPIRATION RATE: 18 BRPM | SYSTOLIC BLOOD PRESSURE: 91 MMHG

## 2017-08-23 VITALS — HEART RATE: 101 BPM

## 2017-08-23 VITALS — RESPIRATION RATE: 18 BRPM | SYSTOLIC BLOOD PRESSURE: 104 MMHG | DIASTOLIC BLOOD PRESSURE: 63 MMHG

## 2017-08-23 VITALS — HEART RATE: 92 BPM

## 2017-08-23 RX ADMIN — MULTIPLE VITAMINS W/ MINERALS TAB SCH TAB: TAB at 08:23

## 2017-08-23 RX ADMIN — PANTOPRAZOLE SODIUM SCH MG: 40 TABLET, DELAYED RELEASE ORAL at 05:17

## 2017-08-23 RX ADMIN — POTASSIUM CHLORIDE SCH MEQ: 20 TABLET, EXTENDED RELEASE ORAL at 08:23

## 2017-08-23 RX ADMIN — INSULIN GLARGINE SCH UNIT: 100 INJECTION, SOLUTION SUBCUTANEOUS at 20:44

## 2017-08-23 RX ADMIN — LACTULOSE SCH GM: 10 SOLUTION ORAL at 08:23

## 2017-08-23 RX ADMIN — RIFAXIMIN SCH MG: 550 TABLET ORAL at 08:24

## 2017-08-23 RX ADMIN — METOCLOPRAMIDE HYDROCHLORIDE SCH MG: 10 INJECTION, SOLUTION INTRAMUSCULAR; INTRAVENOUS at 20:53

## 2017-08-23 RX ADMIN — METOCLOPRAMIDE HYDROCHLORIDE SCH MG: 10 INJECTION, SOLUTION INTRAMUSCULAR; INTRAVENOUS at 00:37

## 2017-08-23 RX ADMIN — MORPHINE SULFATE PRN MG: 2 INJECTION, SOLUTION INTRAMUSCULAR; INTRAVENOUS at 02:56

## 2017-08-23 RX ADMIN — MORPHINE SULFATE PRN MG: 2 INJECTION, SOLUTION INTRAMUSCULAR; INTRAVENOUS at 06:44

## 2017-08-23 RX ADMIN — MORPHINE SULFATE PRN MG: 2 INJECTION, SOLUTION INTRAMUSCULAR; INTRAVENOUS at 21:03

## 2017-08-23 RX ADMIN — CEFTRIAXONE SCH MLS/HR: 1 INJECTION, SOLUTION INTRAVENOUS at 20:53

## 2017-08-23 RX ADMIN — METOCLOPRAMIDE HYDROCHLORIDE SCH MG: 10 INJECTION, SOLUTION INTRAMUSCULAR; INTRAVENOUS at 12:00

## 2017-08-23 RX ADMIN — PANTOPRAZOLE SODIUM SCH MG: 40 TABLET, DELAYED RELEASE ORAL at 20:53

## 2017-08-23 RX ADMIN — CASPOFUNGIN ACETATE SCH MLS/HR: 5 INJECTION, POWDER, LYOPHILIZED, FOR SOLUTION INTRAVENOUS at 16:24

## 2017-08-23 RX ADMIN — RIFAXIMIN SCH MG: 550 TABLET ORAL at 20:48

## 2017-08-23 RX ADMIN — METOCLOPRAMIDE HYDROCHLORIDE SCH MG: 10 INJECTION, SOLUTION INTRAMUSCULAR; INTRAVENOUS at 05:17

## 2017-08-23 RX ADMIN — LACTULOSE SCH GM: 10 SOLUTION ORAL at 20:48

## 2017-08-23 RX ADMIN — LACTULOSE SCH GM: 10 SOLUTION ORAL at 09:00

## 2017-08-23 RX ADMIN — MORPHINE SULFATE PRN MG: 2 INJECTION, SOLUTION INTRAMUSCULAR; INTRAVENOUS at 14:51

## 2017-08-23 RX ADMIN — POTASSIUM CHLORIDE SCH MEQ: 20 TABLET, EXTENDED RELEASE ORAL at 20:48

## 2017-08-23 RX ADMIN — METOCLOPRAMIDE HYDROCHLORIDE SCH MG: 10 INJECTION, SOLUTION INTRAMUSCULAR; INTRAVENOUS at 23:48

## 2017-08-23 RX ADMIN — THIAMINE HCL TAB 100 MG SCH MG: 100 TAB at 08:23

## 2017-08-23 RX ADMIN — LACTULOSE SCH GM: 10 SOLUTION ORAL at 12:43

## 2017-08-23 RX ADMIN — CEFEPIME SCH MLS/HR: 1 INJECTION, POWDER, FOR SOLUTION INTRAMUSCULAR; INTRAVENOUS at 09:49

## 2017-08-23 NOTE — CONS
Date/Time of Note


Date/Time of Note


DATE: 8/23/17 


TIME: 16:39





Assessment/Plan


Assessment/Plan


Chief Complaint/Hosp Course


No acute changes overnight.  Patient is alert, looks comfortable, wants to go 

home, no fevers.





Diagnostics: WBC labeled nuclear scalp revealed mild symmetrical increased 

uptake in both kidneys likely due to inflammatory/infectious process.  No other 

definite abnormal focal areas of increased activity.  No abnormal areas of 

increased activity in the spine


Microbiology: Urine culture growing Candida glabrata and Candida albicans blood 

culture growing oxacillin sensitive staph aureus and yeast


  


Antimicrobials:  Cancidas, Cefepime


 


PHYSICAL EXAMINATION:


 


GENERAL:  This is a wasted, well-developed middle-aged Citizen of [place]


man who is awake, in no distress.


 


HEENT:  Head atraumatic, normocephalic.  Sclerae anicteric.


Buccal mucosa dry.


 


NECK:  Supple.


 


CHEST:  Rise symmetrical.  Breath sounds diminished at the bases.


 


HEART:  S1, S2.


 


ABDOMEN:  Soft, bowel sounds present.


 


 ASSESSMENT:


 


1.   S/p sepsis with bacteremia and fungemia  


2.   End-stage liver disease, status post transjugular


intrahepatic portosystemic shunt (TIPS) procedure.


3.   History of cardiac stents.


4.   Anemia and thrombocytopenia.


5.   Resolving encephalopathy.


6.   Diabetes.


7.   UTI==>yeast


 


PLAN: Clinically stable, pending CT abdomen to evaluate WBC scan findings, 

continue abx, consider PICC, may need ODIN. Change Cefepime to Rocephin  





[facility] staff/pt


Problems:  





Consultation Date/Type/Reason


Admit Date/Time


Aug 12, 2017 at 05:42


Initial Consult Date


8/12/17


Type of Consultation:  ID





Exam/Review of Systems


Vital Signs


Vitals





 Vital Signs








  Date Time  Temp Pulse Resp B/P Pulse Ox O2 Delivery O2 Flow Rate FiO2


 


8/23/17 16:09  96      


 


8/23/17 15:43 98.6  19 97/67 100   


 


8/22/17 09:12      Room Air  














 Intake and Output   


 


 8/22/17 8/22/17 8/23/17





 15:00 23:00 07:00


 


Intake Total 50 ml 900 ml 600 ml


 


Output Total  400 ml 450 ml


 


Balance 50 ml 500 ml 150 ml











Results


Result Diagram:  


8/22/17 0620                                                                   

             8/22/17 0620





Results 24 hrs





Laboratory Tests








Test


  8/22/17


17:49 8/22/17


20:20 8/23/17


00:44 8/23/17


08:02


 


Bedside Glucose 286  H 240  H 128   288  H














Test


  8/23/17


11:47 


  


  


 


 


Bedside Glucose 194     











Medications


Medications





 Current Medications


Lorazepam (Ativan) 1 mg Q6H  PRN IV anxiety/agitation Last administered on 8/15/

17at 16:55; Admin Dose 1 MG;  Start 8/12/17 at 11:00


Miscellaneous Information 1 ea NOTE XX ;  Start 8/14/17 at 01:00


Glucose (Glutose) 15 gm Q15M  PRN PO DECREASED GLUCOSE;  Start 8/14/17 at 01:00


Glucose (Glutose) 22.5 gm Q15M  PRN PO DECREASED GLUCOSE;  Start 8/14/17 at 01:

00


Dextrose (D50w Syringe) 25 ml Q15M  PRN IV DECREASED GLUCOSE;  Start 8/14/17 at 

01:00


Dextrose (D50w Syringe) 50 ml Q15M  PRN IV DECREASED GLUCOSE;  Start 8/14/17 at 

01:00


Glucagon (Glucagen) 1 mg Q15M  PRN IM DECREASED GLUCOSE;  Start 8/14/17 at 01:00


Glucose (Glutose) 15 gm Q15M  PRN BUCCAL DECREASED GLUCOSE;  Start 8/14/17 at 01

:00


Morphine Sulfate (morphine) 1 mg Q4H  PRN IV PAIN LEVEL 7-10 Last administered 

on 8/23/17at 14:51; Admin Dose 1 MG;  Start 8/14/17 at 14:00


Potassium Chloride (Klor-Con 20) 20 meq BID PO  Last administered on 8/23/17at 

08:23; Admin Dose 20 MEQ;  Start 8/14/17 at 21:00


Diagnostic Test (Pha) 1 ea 1 ea 02 XX  Last administered on 8/18/17at 02:35; 

Admin Dose 1 EA;  Start 8/15/17 at 02:00


Cefepime HCl (Maxipime 1gm/50 ml (Pmx)) 50 ml @  100 mls/hr Q12 IVPB  Last 

administered on 8/23/17at 09:49; Admin Dose 100 MLS/HR;  Start 8/16/17 at 21:00


Pantoprazole (Protonix Tab) 40 mg BID@06,18 PO  Last administered on 8/23/17at 

05:17; Admin Dose 40 MG;  Start 8/16/17 at 18:00


Rifaximin (Xifaxan) 550 mg BID PO  Last administered on 8/23/17at 08:24; Admin 

Dose 550 MG;  Start 8/16/17 at 21:00


Ondansetron HCl 4 mg 4 mg Q6H  PRN IV NAUSEA AND/OR VOMITING Last administered 

on 8/18/17at 18:40; Admin Dose 4 MG;  Start 8/16/17 at 22:30


Caspofungin/ Sodium Chloride (Cancidas/NS) 250 ml @  250 mls/hr Q24H IVPB  Last 

administered on 8/23/17at 16:24; Admin Dose 250 MLS/HR;  Start 8/18/17 at 16:00


Lactulose (Enulose) 26.7 gm BID PO  Last administered on 8/23/17at 12:43; Admin 

Dose 26.7 GM;  Start 8/19/17 at 21:00


Thiamine HCl (Vitamin B1) 100 mg DAILY PO  Last administered on 8/23/17at 08:23

; Admin Dose 100 MG;  Start 8/20/17 at 09:00


Multivitamins/ Minerals (Theragran-M) 1 tab DAILY PO  Last administered on 8/23/ 17at 08:23; Admin Dose 1 TAB;  Start 8/20/17 at 09:00


Metoclopramide HCl (Reglan) 10 mg Q6 IV  Last administered on 8/23/17at 05:17; 

Admin Dose 10 MG;  Start 8/22/17 at 09:40


Insulin Glargine (Lantus) 25 unit DAILY@20 SC ;  Start 8/23/17 at 20:00











ANTONI HAWTHORNE NP Aug 23, 2017 16:41

## 2017-08-23 NOTE — RADRPT
PROCEDURE:   MRI lumbar spine with and without contrast 

 

CLINICAL INDICATION:    Back pain.  Hepatic encephalopathy. 

 

TECHNIQUE:   An MRI of the lumbar spine was performed on a  1.5 hipolito scanner utilizing the followin
g sequences: pre and post contrast sagittal and axial T1 weighted, sagittal and axial T2 weighted, a
nd sagittal T2 weighted with fat saturation. 10 ml of Magnevist were given intravenously without com
plication. 

 

COMPARISON:   None. 

 

FINDINGS:

There is a normal lordosis of the lumbar spine. No vertebral body subluxation is evident. The verteb
ral bodies are normal in height and signal intensity. The conus medullaris is visible at the L1  lev
el, and is normal in appearance. The postcontrast images show no abnormal enhancement. 

 

T12 - L1:  The disk is normal in appearance. The central canal and foramina are adequately patent. 

 

L1 - L2:  The disk is normal in appearance. Mild bilateral foraminal narrowing. The central canal an
d foramina are otherwise adequately patent. 

 

L2 - L3:  The disk is normal in appearance. Mild to moderate facet joint arthropathy .  The central 
canal and foramina are adequately patent. 

 

L3 - L4:  Disk desiccation and mild loss of disk height with circumferential disk bulging and anteri
or endplate spurring.  Superimposed broad-based central disk extrusion measuring 7 mm in AP dimensio
n with extension into the right subarticular recess with impingement upon the descending right L4 ne
rve root. Mild right foraminal stenosis without definite exiting nerve root impingement. The left ne
ural foramen and central canal are patent.  Moderate bilateral hypertrophic facet joint arthropathy 
and facet joint effusions.

 

L4 - L5:  Disk desiccation and mild loss of disk height with circumferential disk bulging and superi
mposed broad-based central disk protrusion measuring 3 mm in AP dimension..  Bilateral subarticular 
recess narrowing without significant foraminal narrowing. The central canal and foramina are adequat
ely patent. 

 

L5 - S1:  Marked disk desiccation loss of disk height, disk osteophyte complex formation moderate to
 severe left foraminal stenosis. No central canal or right foraminal stenosis.  Mild facet joint art
hropathy.   

 

IMPRESSION:

 

1.  Multilevel degenerative disk bulging and facet joint arthropathy with a degenerative spondylosis
/enthesopathy most pronounced from L3-L4 through L5-S1. No evidence of the abnormal postcontrast enh
ancement or soft tissue abnormality.

2.  Broad-based central disk extrusion at L3-L4 extending into the right subarticular recess with im
pingement upon the descending right L4 nerve root.  Mild right foraminal stenosis without definite e
xiting nerve root impingement.  No left foraminal or central canal stenosis.

3.  Broad-based central disk protrusion at L4-L5 with mild bilateral subarticular recess narrowing w
ithout significant foraminal or central canal stenosis.

4.  Degenerative discogenic changes at L5-S1 mild facet joint arthropathy.  Moderate to severe left 
foraminal stenosis without significant central canal or right foraminal stenosis.

 

RPTAT:[id number]

_____________________________________________ 

Physician Bradley           Date    Time 

Electronically viewed and signed by Physician Bradley on 08/23/2017 19:24 

 

D:  08/23/2017 19:24  T:  08/23/2017 19:24

HECTOR/

## 2017-08-23 NOTE — PN
Date/Time of Note


Date/Time of Note


DATE: 8/23/17 


TIME: 16:07





Assessment/Plan


VTE Prophylaxis


VTE Prophylaxis Intervention:  contraindicated, SCD's


VTE Contraindication Reason:  bleeding





Lines/Catheters


IV Catheter Type (from Nrs):  Saline Lock


Urinary Cath still in place:  No





Assessment/Plan


Chief Complaint/Hosp Course


Physical exam 





Constitutional:  alert, oriented


Respiratory:  diminished breath sounds


Cardiovascular:  regular rate and rhythm


Abdomen: soft, non tender





A/P 


1  Severe sepsis with  Staph bacteremia and fungemia, rule out   endocarditis, 

rule out discitis, poss other etiologies.CT A+P pending 


2.   End-stage liver disease, status post transjugular intrahepatic 

portosystemic shunt (TIPS) procedure.


3 Toxic Encephalopathy


4  DM uncontrolled


3. Anemia S/P EGD on 08/22 with gastroparesis


4. hs pancreatitis


5. Hx alcohol abuse


6. Hx esophageal varices


7. SIRS


8. S/p portal shunt 2017


9. S/p cardiac stenting 3 stents


 


PLAN:


- On PPI/Reglan


- c/w Cefepime and Caspofungin 


- pending lumbar and cervical spine MRI today


- CT A+P to be done today


- Increase Lantus to 26 and increase Novolog to 12 tid with meals


- C/W Lactulose


- F/U GI and I.D


- GI/DVT prophylaxis


Problems:  





Subjective


24 Hr Interval Summary


Free Text/Dictation


Pt is concerned about the imaging done and wants to go home


Spoke to him in details the needs to have it done and explained need the 

necessity of staying in hospital  with iv abx for bacterimia and fungemia and 

need for finding the source of infection





Exam/Review of Systems


Vital Signs


Vitals





 Vital Signs








  Date Time  Temp Pulse Resp B/P Pulse Ox O2 Delivery O2 Flow Rate FiO2


 


8/23/17 15:43 98.6 98 19 97/67 100   


 


8/22/17 09:12      Room Air  














 Intake and Output   


 


 8/22/17 8/22/17 8/23/17





 15:00 23:00 07:00


 


Intake Total 50 ml 900 ml 600 ml


 


Output Total  400 ml 450 ml


 


Balance 50 ml 500 ml 150 ml











Results


Result Diagram:  


8/22/17 0620                                                                   

             8/22/17 0620





Results 24 hrs





Laboratory Tests








Test


  8/22/17


17:49 8/22/17


20:20 8/23/17


00:44 8/23/17


08:02


 


Bedside Glucose 286  H 240  H 128   288  H














Test


  8/23/17


11:47 


  


  


 


 


Bedside Glucose 194     











Medications


Medications





 Current Medications


Lorazepam (Ativan) 1 mg Q6H  PRN IV anxiety/agitation Last administered on 8/15/

17at 16:55; Admin Dose 1 MG;  Start 8/12/17 at 11:00


Miscellaneous Information 1 ea NOTE XX ;  Start 8/14/17 at 01:00


Glucose (Glutose) 15 gm Q15M  PRN PO DECREASED GLUCOSE;  Start 8/14/17 at 01:00


Glucose (Glutose) 22.5 gm Q15M  PRN PO DECREASED GLUCOSE;  Start 8/14/17 at 01:

00


Dextrose (D50w Syringe) 25 ml Q15M  PRN IV DECREASED GLUCOSE;  Start 8/14/17 at 

01:00


Dextrose (D50w Syringe) 50 ml Q15M  PRN IV DECREASED GLUCOSE;  Start 8/14/17 at 

01:00


Glucagon (Glucagen) 1 mg Q15M  PRN IM DECREASED GLUCOSE;  Start 8/14/17 at 01:00


Glucose (Glutose) 15 gm Q15M  PRN BUCCAL DECREASED GLUCOSE;  Start 8/14/17 at 01

:00


Morphine Sulfate (morphine) 1 mg Q4H  PRN IV PAIN LEVEL 7-10 Last administered 

on 8/23/17at 14:51; Admin Dose 1 MG;  Start 8/14/17 at 14:00


Potassium Chloride (Klor-Con 20) 20 meq BID PO  Last administered on 8/23/17at 

08:23; Admin Dose 20 MEQ;  Start 8/14/17 at 21:00


Diagnostic Test (Pha) 1 ea 1 ea 02 XX  Last administered on 8/18/17at 02:35; 

Admin Dose 1 EA;  Start 8/15/17 at 02:00


Cefepime HCl (Maxipime 1gm/50 ml (Pmx)) 50 ml @  100 mls/hr Q12 IVPB  Last 

administered on 8/23/17at 09:49; Admin Dose 100 MLS/HR;  Start 8/16/17 at 21:00


Pantoprazole (Protonix Tab) 40 mg BID@06,18 PO  Last administered on 8/23/17at 

05:17; Admin Dose 40 MG;  Start 8/16/17 at 18:00


Rifaximin (Xifaxan) 550 mg BID PO  Last administered on 8/23/17at 08:24; Admin 

Dose 550 MG;  Start 8/16/17 at 21:00


Ondansetron HCl 4 mg 4 mg Q6H  PRN IV NAUSEA AND/OR VOMITING Last administered 

on 8/18/17at 18:40; Admin Dose 4 MG;  Start 8/16/17 at 22:30


Caspofungin/ Sodium Chloride (Cancidas/NS) 250 ml @  250 mls/hr Q24H IVPB  Last 

administered on 8/22/17at 17:15; Admin Dose 250 MLS/HR;  Start 8/18/17 at 16:00


Lactulose (Enulose) 26.7 gm BID PO  Last administered on 8/23/17at 12:43; Admin 

Dose 26.7 GM;  Start 8/19/17 at 21:00


Thiamine HCl (Vitamin B1) 100 mg DAILY PO  Last administered on 8/23/17at 08:23

; Admin Dose 100 MG;  Start 8/20/17 at 09:00


Multivitamins/ Minerals (Theragran-M) 1 tab DAILY PO  Last administered on 8/23/ 17at 08:23; Admin Dose 1 TAB;  Start 8/20/17 at 09:00


Insulin Glargine (Lantus) 18 unit DAILY@20 SC  Last administered on 8/22/17at 20

:43; Admin Dose 18 UNIT;  Start 8/21/17 at 20:00


Metoclopramide HCl (Reglan) 10 mg Q6 IV  Last administered on 8/23/17at 05:17; 

Admin Dose 10 MG;  Start 8/22/17 at 09:40











NEREYDA SANCHEZ MD Aug 23, 2017 16:11

## 2017-08-24 VITALS — DIASTOLIC BLOOD PRESSURE: 56 MMHG | HEART RATE: 85 BPM | RESPIRATION RATE: 18 BRPM | SYSTOLIC BLOOD PRESSURE: 105 MMHG

## 2017-08-24 VITALS — DIASTOLIC BLOOD PRESSURE: 56 MMHG | RESPIRATION RATE: 18 BRPM | SYSTOLIC BLOOD PRESSURE: 92 MMHG

## 2017-08-24 VITALS — DIASTOLIC BLOOD PRESSURE: 54 MMHG | RESPIRATION RATE: 18 BRPM | SYSTOLIC BLOOD PRESSURE: 87 MMHG

## 2017-08-24 VITALS — DIASTOLIC BLOOD PRESSURE: 62 MMHG | SYSTOLIC BLOOD PRESSURE: 103 MMHG | RESPIRATION RATE: 18 BRPM

## 2017-08-24 VITALS — DIASTOLIC BLOOD PRESSURE: 83 MMHG | SYSTOLIC BLOOD PRESSURE: 104 MMHG | RESPIRATION RATE: 19 BRPM

## 2017-08-24 VITALS — DIASTOLIC BLOOD PRESSURE: 60 MMHG | RESPIRATION RATE: 18 BRPM | SYSTOLIC BLOOD PRESSURE: 103 MMHG

## 2017-08-24 VITALS — HEART RATE: 94 BPM

## 2017-08-24 VITALS — RESPIRATION RATE: 19 BRPM | SYSTOLIC BLOOD PRESSURE: 100 MMHG | DIASTOLIC BLOOD PRESSURE: 63 MMHG

## 2017-08-24 VITALS — HEART RATE: 90 BPM

## 2017-08-24 VITALS — HEART RATE: 97 BPM

## 2017-08-24 VITALS — HEART RATE: 103 BPM

## 2017-08-24 LAB
ABNORMAL IP MESSAGE: 1
ALBUMIN SERPL-MCNC: 2.1 G/DL (ref 3.3–4.9)
ALBUMIN/GLOB SERPL: 0.55 {RATIO}
ALP SERPL-CCNC: 256 IU/L (ref 42–121)
ALT SERPL-CCNC: 96 IU/L (ref 13–69)
ANION GAP SERPL CALC-SCNC: 14 MMOL/L (ref 8–16)
AST SERPL-CCNC: 106 IU/L (ref 15–46)
BASOPHILS # BLD AUTO: 0 10^3/UL (ref 0–0.1)
BASOPHILS NFR BLD: 0.5 % (ref 0–2)
BILIRUB DIRECT SERPL-MCNC: 0 MG/DL (ref 0–0.2)
BILIRUB SERPL-MCNC: 1.5 MG/DL (ref 0.2–1.3)
BUN SERPL-MCNC: 10 MG/DL (ref 7–20)
CALCIUM SERPL-MCNC: 7.7 MG/DL (ref 8.4–10.2)
CHLORIDE SERPL-SCNC: 102 MMOL/L (ref 97–110)
CO2 SERPL-SCNC: 19 MMOL/L (ref 21–31)
CREAT SERPL-MCNC: 0.54 MG/DL (ref 0.61–1.24)
EOSINOPHIL # BLD: 0.1 10^3/UL (ref 0–0.5)
EOSINOPHIL NFR BLD: 1.4 % (ref 0–7)
ERYTHROCYTE [DISTWIDTH] IN BLOOD BY AUTOMATED COUNT: 19.7 % (ref 11.5–14.5)
GLOBULIN SER-MCNC: 3.8 G/DL (ref 1.3–3.2)
GLUCOSE SERPL-MCNC: 184 MG/DL (ref 70–220)
HCT VFR BLD CALC: 23.3 % (ref 42–52)
HCT VFR BLD CALC: 27.1 % (ref 42–52)
HGB BLD-MCNC: 7.6 G/DL (ref 14–18)
HGB BLD-MCNC: 8.8 G/DL (ref 14–18)
LYMPHOCYTES # BLD AUTO: 0.6 10^3/UL (ref 0.8–2.9)
LYMPHOCYTES NFR BLD AUTO: 6.8 % (ref 15–51)
MCH RBC QN AUTO: 28.4 PG (ref 29–33)
MCHC RBC AUTO-ENTMCNC: 32.6 G/DL (ref 32–37)
MCV RBC AUTO: 86.9 FL (ref 82–101)
MONOCYTES # BLD: 1 10^3/UL (ref 0.3–0.9)
MONOCYTES NFR BLD: 12.3 % (ref 0–11)
NEUTROPHILS NFR BLD AUTO: 78.6 % (ref 39–77)
NRBC # BLD MANUAL: 0 10^3/UL (ref 0–0)
NRBC BLD AUTO-RTO: 0 /100WBC (ref 0–0)
PLATELET # BLD: 95 10^3/UL (ref 140–415)
PMV BLD AUTO: 12.2 FL (ref 7.4–10.4)
POSITIVE DIFF: (no result)
POTASSIUM SERPL-SCNC: 4.6 MMOL/L (ref 3.5–5.1)
PROT SERPL-MCNC: 5.9 G/DL (ref 6.1–8.1)
RBC # BLD AUTO: 2.68 10^6/UL (ref 4.7–6.1)
SODIUM SERPL-SCNC: 130 MMOL/L (ref 135–144)
WBC # BLD AUTO: 8.1 10^3/UL (ref 4.8–10.8)

## 2017-08-24 RX ADMIN — METOCLOPRAMIDE HYDROCHLORIDE SCH MG: 10 INJECTION, SOLUTION INTRAMUSCULAR; INTRAVENOUS at 11:30

## 2017-08-24 RX ADMIN — MORPHINE SULFATE PRN MG: 2 INJECTION, SOLUTION INTRAMUSCULAR; INTRAVENOUS at 17:20

## 2017-08-24 RX ADMIN — LACTULOSE SCH GM: 10 SOLUTION ORAL at 20:29

## 2017-08-24 RX ADMIN — CASPOFUNGIN ACETATE SCH MLS/HR: 5 INJECTION, POWDER, LYOPHILIZED, FOR SOLUTION INTRAVENOUS at 16:09

## 2017-08-24 RX ADMIN — MORPHINE SULFATE PRN MG: 2 INJECTION, SOLUTION INTRAMUSCULAR; INTRAVENOUS at 05:03

## 2017-08-24 RX ADMIN — RIFAXIMIN SCH MG: 550 TABLET ORAL at 08:08

## 2017-08-24 RX ADMIN — MULTIPLE VITAMINS W/ MINERALS TAB SCH TAB: TAB at 09:49

## 2017-08-24 RX ADMIN — MORPHINE SULFATE PRN MG: 2 INJECTION, SOLUTION INTRAMUSCULAR; INTRAVENOUS at 13:34

## 2017-08-24 RX ADMIN — PANTOPRAZOLE SODIUM SCH MG: 40 TABLET, DELAYED RELEASE ORAL at 05:03

## 2017-08-24 RX ADMIN — RIFAXIMIN SCH MG: 550 TABLET ORAL at 20:28

## 2017-08-24 RX ADMIN — MORPHINE SULFATE PRN MG: 2 INJECTION, SOLUTION INTRAMUSCULAR; INTRAVENOUS at 21:24

## 2017-08-24 RX ADMIN — LACTULOSE SCH GM: 10 SOLUTION ORAL at 08:08

## 2017-08-24 RX ADMIN — METOCLOPRAMIDE HYDROCHLORIDE SCH MG: 10 INJECTION, SOLUTION INTRAMUSCULAR; INTRAVENOUS at 17:20

## 2017-08-24 RX ADMIN — CEFTRIAXONE SCH MLS/HR: 1 INJECTION, SOLUTION INTRAVENOUS at 17:19

## 2017-08-24 RX ADMIN — POTASSIUM CHLORIDE SCH MEQ: 20 TABLET, EXTENDED RELEASE ORAL at 08:08

## 2017-08-24 RX ADMIN — THIAMINE HCL TAB 100 MG SCH MG: 100 TAB at 08:08

## 2017-08-24 RX ADMIN — MORPHINE SULFATE PRN MG: 2 INJECTION, SOLUTION INTRAMUSCULAR; INTRAVENOUS at 01:04

## 2017-08-24 RX ADMIN — POTASSIUM CHLORIDE SCH MEQ: 20 TABLET, EXTENDED RELEASE ORAL at 20:28

## 2017-08-24 RX ADMIN — INSULIN GLARGINE SCH UNIT: 100 INJECTION, SOLUTION SUBCUTANEOUS at 20:35

## 2017-08-24 RX ADMIN — PANTOPRAZOLE SODIUM SCH MG: 40 TABLET, DELAYED RELEASE ORAL at 17:22

## 2017-08-24 RX ADMIN — METOCLOPRAMIDE HYDROCHLORIDE SCH MG: 10 INJECTION, SOLUTION INTRAMUSCULAR; INTRAVENOUS at 05:03

## 2017-08-24 NOTE — CONS
Date/Time of Note


Date/Time of Note


DATE: 8/24/17 


TIME: 15:05





Assessment/Plan


Assessment/Plan


Chief Complaint/Hosp Course


No acute changes overnight.  Patient is alert, looks comfortable, no fevers.





Diagnostics: WBC labeled nuclear scalp revealed mild symmetrical increased 

uptake in both kidneys likely due to inflammatory/infectious process.  No other 

definite abnormal focal areas of increased activity.  No abnormal areas of 

increased activity in the spine


Microbiology: Urine culture growing Candida glabrata and Candida albicans blood 

culture growing oxacillin sensitive staph aureus and yeast


  


Antimicrobials:  Cancidas, Rocephin


 


PHYSICAL EXAMINATION:


 


GENERAL:  This is a wasted, well-developed middle-aged [place]


man who is awake, in no distress.


 


HEENT:  Head atraumatic, normocephalic.  Sclerae anicteric.


Buccal mucosa dry.


 


NECK:  Supple.


 


CHEST:  Rise symmetrical.  Breath sounds diminished at the bases.


 


HEART:  S1, S2.


 


ABDOMEN:  Soft, bowel sounds present.


 


 ASSESSMENT:


 


1.   S/p sepsis with bacteremia and fungemia  


2.   End-stage liver disease, status post transjugular


intrahepatic portosystemic shunt (TIPS) procedure.


3.   History of cardiac stents.


4.   Anemia and thrombocytopenia.


5.   Resolving encephalopathy.


6.   Diabetes.


7.   UTI==>yeast


 


PLAN: Clinically stable, pending CT abdomen to evaluate WBC scan findings, 

continue abx, consider PICC, may need ODIN. 


dw staff/pt


Problems:  





Consultation Date/Type/Reason


Admit Date/Time


Aug 12, 2017 at 05:42


Initial Consult Date


8/12/17


Type of Consultation:  ID





Exam/Review of Systems


Vital Signs


Vitals





 Vital Signs








  Date Time  Temp Pulse Resp B/P Pulse Ox O2 Delivery O2 Flow Rate FiO2


 


8/24/17 12:14  97      


 


8/24/17 11:01 98.0  19 100/63 97   


 


8/22/17 09:12      Room Air  














 Intake and Output   


 


 8/23/17 8/23/17 8/24/17





 15:00 23:00 07:00


 


Intake Total  780 ml 500 ml


 


Output Total  500 ml 500 ml


 


Balance  280 ml 0 ml











Results


Result Diagram:  


8/24/17 1054                                                                   

             8/24/17 0607





Results 24 hrs





Laboratory Tests








Test


  8/23/17


20:39 8/24/17


06:07 8/24/17


10:54 8/24/17


11:29


 


Bedside Glucose 174     172  


 


White Blood Count  8.1  #  


 


Red Blood Count  2.68  L  


 


Hemoglobin  7.6  L 8.8  L 


 


Hematocrit  23.3  L 27.1  L 


 


Mean Corpuscular Volume  86.9    


 


Mean Corpuscular Hemoglobin  28.4  L  


 


Mean Corpuscular Hemoglobin


Concent 


  32.6  


  


  


 


 


Red Cell Distribution Width  19.7  H  


 


Platelet Count  95  #L  


 


Mean Platelet Volume  12.2  H  


 


Neutrophils %  78.6  H  


 


Lymphocytes %  6.8  L  


 


Monocytes %  12.3  H  


 


Eosinophils %  1.4    


 


Basophils %  0.5    


 


Nucleated Red Blood Cells %  0.0    


 


Neutrophils # (Manual)  6    


 


Lymphocytes #  0.6  L  


 


Monocytes #  1.0  H  


 


Eosinophils #  0.1    


 


Basophils #  0.0    


 


Nucleated Red Blood Cells #  0.0    


 


Sodium Level  130  L  


 


Potassium Level  4.6    


 


Chloride Level  102    


 


Carbon Dioxide Level  19  L  


 


Anion Gap  14    


 


Blood Urea Nitrogen  10    


 


Creatinine  0.54  L  


 


Glucose Level  184    


 


Calcium Level  7.7  L  


 


Total Bilirubin  1.5  H  


 


Direct Bilirubin  0.00    


 


Indirect Bilirubin  1.5  H  


 


Aspartate Amino Transf


(AST/SGOT) 


  106  H


  


  


 


 


Alanine Aminotransferase


(ALT/SGPT) 


  96  H


  


  


 


 


Alkaline Phosphatase  256  H  


 


Total Protein  5.9  L  


 


Albumin  2.1  L  


 


Globulin  3.80  H  


 


Albumin/Globulin Ratio  0.55    











Medications


Medications





 Current Medications


Lorazepam (Ativan) 1 mg Q6H  PRN IV anxiety/agitation Last administered on 8/15/

17at 16:55; Admin Dose 1 MG;  Start 8/12/17 at 11:00


Miscellaneous Information 1 ea NOTE XX ;  Start 8/14/17 at 01:00


Glucose (Glutose) 15 gm Q15M  PRN PO DECREASED GLUCOSE;  Start 8/14/17 at 01:00


Glucose (Glutose) 22.5 gm Q15M  PRN PO DECREASED GLUCOSE;  Start 8/14/17 at 01:

00


Dextrose (D50w Syringe) 25 ml Q15M  PRN IV DECREASED GLUCOSE;  Start 8/14/17 at 

01:00


Dextrose (D50w Syringe) 50 ml Q15M  PRN IV DECREASED GLUCOSE;  Start 8/14/17 at 

01:00


Glucagon (Glucagen) 1 mg Q15M  PRN IM DECREASED GLUCOSE;  Start 8/14/17 at 01:00


Glucose (Glutose) 15 gm Q15M  PRN BUCCAL DECREASED GLUCOSE;  Start 8/14/17 at 01

:00


Morphine Sulfate (morphine) 1 mg Q4H  PRN IV PAIN LEVEL 7-10 Last administered 

on 8/24/17at 13:34; Admin Dose 1 MG;  Start 8/14/17 at 14:00


Potassium Chloride (Klor-Con 20) 20 meq BID PO  Last administered on 8/24/17at 

08:08; Admin Dose 20 MEQ;  Start 8/14/17 at 21:00


Diagnostic Test (Pha) (Accu-Chek) 1 ea 02 XX  Last administered on 8/18/17at 02:

35; Admin Dose 1 EA;  Start 8/15/17 at 02:00


Pantoprazole (Protonix Tab) 40 mg BID@06,18 PO  Last administered on 8/24/17at 

05:03; Admin Dose 40 MG;  Start 8/16/17 at 18:00


Rifaximin (Xifaxan) 550 mg BID PO  Last administered on 8/24/17at 08:08; Admin 

Dose 550 MG;  Start 8/16/17 at 21:00


Ondansetron HCl 4 mg 4 mg Q6H  PRN IV NAUSEA AND/OR VOMITING Last administered 

on 8/18/17at 18:40; Admin Dose 4 MG;  Start 8/16/17 at 22:30


Caspofungin/ Sodium Chloride (Cancidas/NS) 250 ml @  250 mls/hr Q24H IVPB  Last 

administered on 8/23/17at 16:24; Admin Dose 250 MLS/HR;  Start 8/18/17 at 16:00


Lactulose (Enulose) 26.7 gm BID PO  Last administered on 8/24/17at 08:08; Admin 

Dose 26.7 GM;  Start 8/19/17 at 21:00


Thiamine HCl (Vitamin B1) 100 mg DAILY PO  Last administered on 8/24/17at 08:08

; Admin Dose 100 MG;  Start 8/20/17 at 09:00


Multivitamins/ Minerals (Theragran-M) 1 tab DAILY PO  Last administered on 8/24/ 17at 09:49; Admin Dose 1 TAB;  Start 8/20/17 at 09:00


Metoclopramide HCl (Reglan) 10 mg Q6 IV  Last administered on 8/24/17at 11:30; 

Admin Dose 10 MG;  Start 8/22/17 at 09:40


Insulin Glargine 25 unit 25 unit DAILY@20 SC  Last administered on 8/23/17at 20:

44; Admin Dose 25 UNIT;  Start 8/23/17 at 20:00


Ceftriaxone Sodium (Rocephin) 50 ml @  100 mls/hr Q24H IVPB  Last administered 

on 8/23/17at 20:53; Admin Dose 100 MLS/HR;  Start 8/23/17 at 17:00











ANTONI HAWTHORNE NP Aug 24, 2017 15:06

## 2017-08-24 NOTE — CONS
Date/Time of Note


Date/Time of Note


DATE: 8/24/17 


TIME: 20:37





Assessment/Plan


Assessment/Plan


Additional Assessment/Plan


1.  Cirrhosis of liver


2.  Hepatic encephalopathy mild


3.  Status post coronary artery stent 6 weeks ago


4.  Status post TIPS for recurrent ascites.


5.  Emesis


6.  Epistaxis


7.  Severe gastroparesis


Plan


Continue rifaximin and lactulose.


TIPS a cause of his worsening encephalopathy


Avoid all kinds of sedative


Reglan for diabetic gastroparesis


Ambulate with the help of physical therapy





Consultation Date/Type/Reason


Admit Date/Time


Aug 12, 2017 at 05:42


Initial Consult Date


8/12/17


Type of Consultation:  ID





24 HR Interval Summary


Constitutional:  improved, no complaints





Exam/Review of Systems


Vital Signs


Vitals





 Vital Signs








  Date Time  Temp Pulse Resp B/P Pulse Ox O2 Delivery O2 Flow Rate FiO2


 


8/24/17 20:13  97      


 


8/24/17 19:36 98.8  19 104/83 98   


 


8/24/17 17:51      Room Air  














 Intake and Output   


 


 8/23/17 8/23/17 8/24/17





 15:00 23:00 07:00


 


Intake Total  780 ml 500 ml


 


Output Total  500 ml 500 ml


 


Balance  280 ml 0 ml











Exam


Constitutional:  alert, oriented, well developed


Psych:  nl mood/affect, no complaints


Head:  atraumatic, normocephalic


Eyes:  EOMI, PERRL, nl conjunctiva, nl lids, nl sclera


ENMT:  nl external ears & nose, nl lips & teeth, nl nasal mucosa & septum


Neck:  non-tender, supple


Respiratory:  clear to auscultation, normal air movement


Cardiovascular:  nl pulses, regular rate and rhythm


Gastrointestinal:  nl liver, spleen, non-tender, soft


Musculoskeletal:  nl extremities to inspection, nl gait and stance


Extremities:  normal pulses


Neurological:  CNS II-XII intact, nl mental status, nl speech, nl strength


Skin:  nl turgor, 


   No rash or lesions


Lymph:  nl lymph nodes





Results


Result Diagram:  


8/24/17 1054                                                                   

             8/24/17 0607





Results 24 hrs





Laboratory Tests








Test


  8/23/17


20:39 8/24/17


06:07 8/24/17


10:54 8/24/17


11:29


 


Bedside Glucose 174     172  


 


White Blood Count  8.1  #  


 


Red Blood Count  2.68  L  


 


Hemoglobin  7.6  L 8.8  L 


 


Hematocrit  23.3  L 27.1  L 


 


Mean Corpuscular Volume  86.9    


 


Mean Corpuscular Hemoglobin  28.4  L  


 


Mean Corpuscular Hemoglobin


Concent 


  32.6  


  


  


 


 


Red Cell Distribution Width  19.7  H  


 


Platelet Count  95  #L  


 


Mean Platelet Volume  12.2  H  


 


Neutrophils %  78.6  H  


 


Lymphocytes %  6.8  L  


 


Monocytes %  12.3  H  


 


Eosinophils %  1.4    


 


Basophils %  0.5    


 


Nucleated Red Blood Cells %  0.0    


 


Neutrophils # (Manual)  6    


 


Lymphocytes #  0.6  L  


 


Monocytes #  1.0  H  


 


Eosinophils #  0.1    


 


Basophils #  0.0    


 


Nucleated Red Blood Cells #  0.0    


 


Sodium Level  130  L  


 


Potassium Level  4.6    


 


Chloride Level  102    


 


Carbon Dioxide Level  19  L  


 


Anion Gap  14    


 


Blood Urea Nitrogen  10    


 


Creatinine  0.54  L  


 


Glucose Level  184    


 


Calcium Level  7.7  L  


 


Total Bilirubin  1.5  H  


 


Direct Bilirubin  0.00    


 


Indirect Bilirubin  1.5  H  


 


Aspartate Amino Transf


(AST/SGOT) 


  106  H


  


  


 


 


Alanine Aminotransferase


(ALT/SGPT) 


  96  H


  


  


 


 


Alkaline Phosphatase  256  H  


 


Total Protein  5.9  L  


 


Albumin  2.1  L  


 


Globulin  3.80  H  


 


Albumin/Globulin Ratio  0.55    











Medications


Medications





 Current Medications


Lorazepam (Ativan) 1 mg Q6H  PRN IV anxiety/agitation Last administered on 8/15/

17at 16:55; Admin Dose 1 MG;  Start 8/12/17 at 11:00


Miscellaneous Information 1 ea NOTE XX ;  Start 8/14/17 at 01:00


Glucose (Glutose) 15 gm Q15M  PRN PO DECREASED GLUCOSE;  Start 8/14/17 at 01:00


Glucose (Glutose) 22.5 gm Q15M  PRN PO DECREASED GLUCOSE;  Start 8/14/17 at 01:

00


Dextrose (D50w Syringe) 25 ml Q15M  PRN IV DECREASED GLUCOSE;  Start 8/14/17 at 

01:00


Dextrose (D50w Syringe) 50 ml Q15M  PRN IV DECREASED GLUCOSE;  Start 8/14/17 at 

01:00


Glucagon (Glucagen) 1 mg Q15M  PRN IM DECREASED GLUCOSE;  Start 8/14/17 at 01:00


Glucose (Glutose) 15 gm Q15M  PRN BUCCAL DECREASED GLUCOSE;  Start 8/14/17 at 01

:00


Morphine Sulfate (morphine) 1 mg Q4H  PRN IV PAIN LEVEL 7-10 Last administered 

on 8/24/17at 17:20; Admin Dose 1 MG;  Start 8/14/17 at 14:00


Potassium Chloride (Klor-Con 20) 20 meq BID PO  Last administered on 8/24/17at 

20:28; Admin Dose 20 MEQ;  Start 8/14/17 at 21:00


Diagnostic Test (Pha) (Accu-Chek) 1 ea 02 XX  Last administered on 8/18/17at 02:

35; Admin Dose 1 EA;  Start 8/15/17 at 02:00


Pantoprazole (Protonix Tab) 40 mg BID@06,18 PO  Last administered on 8/24/17at 

17:22; Admin Dose 40 MG;  Start 8/16/17 at 18:00


Rifaximin (Xifaxan) 550 mg BID PO  Last administered on 8/24/17at 20:28; Admin 

Dose 550 MG;  Start 8/16/17 at 21:00


Ondansetron HCl 4 mg 4 mg Q6H  PRN IV NAUSEA AND/OR VOMITING Last administered 

on 8/18/17at 18:40; Admin Dose 4 MG;  Start 8/16/17 at 22:30


Caspofungin/ Sodium Chloride (Cancidas/NS) 250 ml @  250 mls/hr Q24H IVPB  Last 

administered on 8/24/17at 16:09; Admin Dose 250 MLS/HR;  Start 8/18/17 at 16:00


Lactulose (Enulose) 26.7 gm BID PO  Last administered on 8/24/17at 20:29; Admin 

Dose 26.7 GM;  Start 8/19/17 at 21:00


Thiamine HCl (Vitamin B1) 100 mg DAILY PO  Last administered on 8/24/17at 08:08

; Admin Dose 100 MG;  Start 8/20/17 at 09:00


Multivitamins/ Minerals (Theragran-M) 1 tab DAILY PO  Last administered on 8/24/ 17at 09:49; Admin Dose 1 TAB;  Start 8/20/17 at 09:00


Metoclopramide HCl (Reglan) 10 mg Q6 IV  Last administered on 8/24/17at 17:20; 

Admin Dose 10 MG;  Start 8/22/17 at 09:40


Insulin Glargine 25 unit 25 unit DAILY@20 SC  Last administered on 8/24/17at 20:

35; Admin Dose 25 UNIT;  Start 8/23/17 at 20:00


Ceftriaxone Sodium (Rocephin) 50 ml @  100 mls/hr Q24H IVPB  Last administered 

on 8/24/17at 17:19; Admin Dose 100 MLS/HR;  Start 8/23/17 at 17:00











SIDDHARTH RECINOS MD Aug 24, 2017 20:38

## 2017-08-24 NOTE — PN
Date/Time of Note


Date/Time of Note


DATE: 8/24/17 


TIME: 18:46





Assessment/Plan


VTE Prophylaxis


VTE Prophylaxis Intervention:  contraindicated


VTE Contraindication Reason:  bleeding





Lines/Catheters


IV Catheter Type (from Nrsg):  Saline Lock





Assessment/Plan


Chief Complaint/Hosp Course


Physical exam 





Constitutional:  alert, oriented


Respiratory:  diminished breath sounds


Cardiovascular:  regular rate and rhythm


Abdomen: soft, non tender





A/P 


1  Severe sepsis with  Staph bacteremia and fungemia, rule out   endocarditis, 

rule out discitis, poss other etiologies.CT A+P pending 


2.   End-stage liver disease, status post transjugular intrahepatic 

portosystemic shunt (TIPS) procedure.


3 Toxic Encephalopathy


4  DM better controlled


3. Anemia S/P EGD on 08/22 with gastroparesis


4. hs pancreatitis


5. Hx alcohol abuse


6. Hx esophageal varices


7. SIRS


8. S/p portal shunt 2017


9. S/p cardiac stenting 3 stents


 


PLAN:


- On PPI/Reglan


- c/w Cefepime and Caspofungin 


- MRI negative


- CT A+P to be done today convinced patient


- c/w Lantus to 25  and increase Novolog to 12 tid with meals


- PICC line


- C/W Lactulose


- F/U GI and I.D


- GI/DVT prophylaxis


Problems:  





Subjective


24 Hr Interval Summary


Free Text/Dictation


Pt insisted on going home


Explained to him importance of getting CT scan today


No bleeding noted





Exam/Review of Systems


Vital Signs


Vitals





 Vital Signs








  Date Time  Temp Pulse Resp B/P Pulse Ox O2 Delivery O2 Flow Rate FiO2


 


8/24/17 17:51 98.5 85 18 105/56 98 Room Air  














 Intake and Output   


 


 8/23/17 8/23/17 8/24/17





 15:00 23:00 07:00


 


Intake Total  780 ml 500 ml


 


Output Total  500 ml 500 ml


 


Balance  280 ml 0 ml











Results


Result Diagram:  


8/24/17 1054                                                                   

             8/24/17 0607





Results 24 hrs





Laboratory Tests








Test


  8/23/17


20:39 8/24/17


06:07 8/24/17


10:54 8/24/17


11:29


 


Bedside Glucose 174     172  


 


White Blood Count  8.1  #  


 


Red Blood Count  2.68  L  


 


Hemoglobin  7.6  L 8.8  L 


 


Hematocrit  23.3  L 27.1  L 


 


Mean Corpuscular Volume  86.9    


 


Mean Corpuscular Hemoglobin  28.4  L  


 


Mean Corpuscular Hemoglobin


Concent 


  32.6  


  


  


 


 


Red Cell Distribution Width  19.7  H  


 


Platelet Count  95  #L  


 


Mean Platelet Volume  12.2  H  


 


Neutrophils %  78.6  H  


 


Lymphocytes %  6.8  L  


 


Monocytes %  12.3  H  


 


Eosinophils %  1.4    


 


Basophils %  0.5    


 


Nucleated Red Blood Cells %  0.0    


 


Neutrophils # (Manual)  6    


 


Lymphocytes #  0.6  L  


 


Monocytes #  1.0  H  


 


Eosinophils #  0.1    


 


Basophils #  0.0    


 


Nucleated Red Blood Cells #  0.0    


 


Sodium Level  130  L  


 


Potassium Level  4.6    


 


Chloride Level  102    


 


Carbon Dioxide Level  19  L  


 


Anion Gap  14    


 


Blood Urea Nitrogen  10    


 


Creatinine  0.54  L  


 


Glucose Level  184    


 


Calcium Level  7.7  L  


 


Total Bilirubin  1.5  H  


 


Direct Bilirubin  0.00    


 


Indirect Bilirubin  1.5  H  


 


Aspartate Amino Transf


(AST/SGOT) 


  106  H


  


  


 


 


Alanine Aminotransferase


(ALT/SGPT) 


  96  H


  


  


 


 


Alkaline Phosphatase  256  H  


 


Total Protein  5.9  L  


 


Albumin  2.1  L  


 


Globulin  3.80  H  


 


Albumin/Globulin Ratio  0.55    











Medications


Medications





 Current Medications


Lorazepam (Ativan) 1 mg Q6H  PRN IV anxiety/agitation Last administered on 8/15/

17at 16:55; Admin Dose 1 MG;  Start 8/12/17 at 11:00


Miscellaneous Information 1 ea NOTE XX ;  Start 8/14/17 at 01:00


Glucose (Glutose) 15 gm Q15M  PRN PO DECREASED GLUCOSE;  Start 8/14/17 at 01:00


Glucose (Glutose) 22.5 gm Q15M  PRN PO DECREASED GLUCOSE;  Start 8/14/17 at 01:

00


Dextrose (D50w Syringe) 25 ml Q15M  PRN IV DECREASED GLUCOSE;  Start 8/14/17 at 

01:00


Dextrose (D50w Syringe) 50 ml Q15M  PRN IV DECREASED GLUCOSE;  Start 8/14/17 at 

01:00


Glucagon (Glucagen) 1 mg Q15M  PRN IM DECREASED GLUCOSE;  Start 8/14/17 at 01:00


Glucose (Glutose) 15 gm Q15M  PRN BUCCAL DECREASED GLUCOSE;  Start 8/14/17 at 01

:00


Morphine Sulfate (morphine) 1 mg Q4H  PRN IV PAIN LEVEL 7-10 Last administered 

on 8/24/17at 17:20; Admin Dose 1 MG;  Start 8/14/17 at 14:00


Potassium Chloride (Klor-Con 20) 20 meq BID PO  Last administered on 8/24/17at 

08:08; Admin Dose 20 MEQ;  Start 8/14/17 at 21:00


Diagnostic Test (Pha) (Accu-Chek) 1 ea 02 XX  Last administered on 8/18/17at 02:

35; Admin Dose 1 EA;  Start 8/15/17 at 02:00


Pantoprazole (Protonix Tab) 40 mg BID@06,18 PO  Last administered on 8/24/17at 

17:22; Admin Dose 40 MG;  Start 8/16/17 at 18:00


Rifaximin (Xifaxan) 550 mg BID PO  Last administered on 8/24/17at 08:08; Admin 

Dose 550 MG;  Start 8/16/17 at 21:00


Ondansetron HCl 4 mg 4 mg Q6H  PRN IV NAUSEA AND/OR VOMITING Last administered 

on 8/18/17at 18:40; Admin Dose 4 MG;  Start 8/16/17 at 22:30


Caspofungin/ Sodium Chloride (Cancidas/NS) 250 ml @  250 mls/hr Q24H IVPB  Last 

administered on 8/24/17at 16:09; Admin Dose 250 MLS/HR;  Start 8/18/17 at 16:00


Lactulose (Enulose) 26.7 gm BID PO  Last administered on 8/24/17at 08:08; Admin 

Dose 26.7 GM;  Start 8/19/17 at 21:00


Thiamine HCl (Vitamin B1) 100 mg DAILY PO  Last administered on 8/24/17at 08:08

; Admin Dose 100 MG;  Start 8/20/17 at 09:00


Multivitamins/ Minerals (Theragran-M) 1 tab DAILY PO  Last administered on 8/24/ 17at 09:49; Admin Dose 1 TAB;  Start 8/20/17 at 09:00


Metoclopramide HCl (Reglan) 10 mg Q6 IV  Last administered on 8/24/17at 17:20; 

Admin Dose 10 MG;  Start 8/22/17 at 09:40


Insulin Glargine 25 unit 25 unit DAILY@20 SC  Last administered on 8/23/17at 20:

44; Admin Dose 25 UNIT;  Start 8/23/17 at 20:00


Ceftriaxone Sodium (Rocephin) 50 ml @  100 mls/hr Q24H IVPB  Last administered 

on 8/24/17at 17:19; Admin Dose 100 MLS/HR;  Start 8/23/17 at 17:00











NEREYDA SANCHEZ MD Aug 24, 2017 18:48

## 2017-08-25 VITALS — SYSTOLIC BLOOD PRESSURE: 100 MMHG | DIASTOLIC BLOOD PRESSURE: 55 MMHG | RESPIRATION RATE: 20 BRPM

## 2017-08-25 VITALS — RESPIRATION RATE: 20 BRPM | DIASTOLIC BLOOD PRESSURE: 64 MMHG | SYSTOLIC BLOOD PRESSURE: 114 MMHG

## 2017-08-25 VITALS — HEART RATE: 87 BPM

## 2017-08-25 VITALS — HEART RATE: 96 BPM

## 2017-08-25 VITALS — HEART RATE: 106 BPM

## 2017-08-25 VITALS — RESPIRATION RATE: 18 BRPM | DIASTOLIC BLOOD PRESSURE: 66 MMHG | SYSTOLIC BLOOD PRESSURE: 100 MMHG

## 2017-08-25 VITALS — HEART RATE: 95 BPM

## 2017-08-25 VITALS — DIASTOLIC BLOOD PRESSURE: 69 MMHG | RESPIRATION RATE: 20 BRPM | SYSTOLIC BLOOD PRESSURE: 101 MMHG

## 2017-08-25 VITALS — HEART RATE: 103 BPM

## 2017-08-25 LAB
ABNORMAL IP MESSAGE: 1
ANION GAP SERPL CALC-SCNC: 13 MMOL/L (ref 8–16)
BASOPHILS # BLD AUTO: 0 10^3/UL (ref 0–0.1)
BASOPHILS NFR BLD: 0.5 % (ref 0–2)
BUN SERPL-MCNC: 12 MG/DL (ref 7–20)
CALCIUM SERPL-MCNC: 7.9 MG/DL (ref 8.4–10.2)
CHLORIDE SERPL-SCNC: 104 MMOL/L (ref 97–110)
CO2 SERPL-SCNC: 20 MMOL/L (ref 21–31)
CREAT SERPL-MCNC: 0.55 MG/DL (ref 0.61–1.24)
EOSINOPHIL # BLD: 0.1 10^3/UL (ref 0–0.5)
EOSINOPHIL NFR BLD: 1.2 % (ref 0–7)
ERYTHROCYTE [DISTWIDTH] IN BLOOD BY AUTOMATED COUNT: 20.3 % (ref 11.5–14.5)
GLUCOSE SERPL-MCNC: 140 MG/DL (ref 70–220)
HCT VFR BLD CALC: 24 % (ref 42–52)
HGB BLD-MCNC: 7.5 G/DL (ref 14–18)
LYMPHOCYTES # BLD AUTO: 0.5 10^3/UL (ref 0.8–2.9)
LYMPHOCYTES NFR BLD AUTO: 7.9 % (ref 15–51)
MCH RBC QN AUTO: 27.6 PG (ref 29–33)
MCHC RBC AUTO-ENTMCNC: 31.3 G/DL (ref 32–37)
MCV RBC AUTO: 88.2 FL (ref 82–101)
MONOCYTES # BLD: 1.1 10^3/UL (ref 0.3–0.9)
MONOCYTES NFR BLD: 17 % (ref 0–11)
NEUTROPHILS NFR BLD AUTO: 73.2 % (ref 39–77)
NRBC # BLD MANUAL: 0 10^3/UL (ref 0–0)
NRBC BLD AUTO-RTO: 0 /100WBC (ref 0–0)
PLATELET # BLD: 92 10^3/UL (ref 140–415)
PMV BLD AUTO: 12.6 FL (ref 7.4–10.4)
POSITIVE DIFF: (no result)
POTASSIUM SERPL-SCNC: 5.2 MMOL/L (ref 3.5–5.1)
RBC # BLD AUTO: 2.72 10^6/UL (ref 4.7–6.1)
SODIUM SERPL-SCNC: 132 MMOL/L (ref 135–144)
WBC # BLD AUTO: 6.5 10^3/UL (ref 4.8–10.8)

## 2017-08-25 PROCEDURE — 02HV33Z INSERTION OF INFUSION DEVICE INTO SUPERIOR VENA CAVA, PERCUTANEOUS APPROACH: ICD-10-PCS | Performed by: INTERNAL MEDICINE

## 2017-08-25 RX ADMIN — MORPHINE SULFATE PRN MG: 2 INJECTION, SOLUTION INTRAMUSCULAR; INTRAVENOUS at 01:17

## 2017-08-25 RX ADMIN — PANTOPRAZOLE SODIUM SCH MG: 40 TABLET, DELAYED RELEASE ORAL at 17:06

## 2017-08-25 RX ADMIN — MULTIPLE VITAMINS W/ MINERALS TAB SCH TAB: TAB at 08:00

## 2017-08-25 RX ADMIN — LACTULOSE SCH GM: 10 SOLUTION ORAL at 08:00

## 2017-08-25 RX ADMIN — MORPHINE SULFATE PRN MG: 2 INJECTION, SOLUTION INTRAMUSCULAR; INTRAVENOUS at 14:52

## 2017-08-25 RX ADMIN — METOCLOPRAMIDE HYDROCHLORIDE SCH MG: 10 INJECTION, SOLUTION INTRAMUSCULAR; INTRAVENOUS at 05:24

## 2017-08-25 RX ADMIN — RIFAXIMIN SCH MG: 550 TABLET ORAL at 08:00

## 2017-08-25 RX ADMIN — THIAMINE HCL TAB 100 MG SCH MG: 100 TAB at 08:00

## 2017-08-25 RX ADMIN — MORPHINE SULFATE PRN MG: 2 INJECTION, SOLUTION INTRAMUSCULAR; INTRAVENOUS at 10:30

## 2017-08-25 RX ADMIN — METOCLOPRAMIDE HYDROCHLORIDE SCH MG: 10 INJECTION, SOLUTION INTRAMUSCULAR; INTRAVENOUS at 11:38

## 2017-08-25 RX ADMIN — MORPHINE SULFATE PRN MG: 2 INJECTION, SOLUTION INTRAMUSCULAR; INTRAVENOUS at 05:24

## 2017-08-25 RX ADMIN — METOCLOPRAMIDE HYDROCHLORIDE SCH MG: 10 INJECTION, SOLUTION INTRAMUSCULAR; INTRAVENOUS at 01:18

## 2017-08-25 RX ADMIN — METOCLOPRAMIDE HYDROCHLORIDE SCH MG: 10 INJECTION, SOLUTION INTRAMUSCULAR; INTRAVENOUS at 17:06

## 2017-08-25 RX ADMIN — PANTOPRAZOLE SODIUM SCH MG: 40 TABLET, DELAYED RELEASE ORAL at 05:24

## 2017-08-25 RX ADMIN — POTASSIUM CHLORIDE SCH MEQ: 20 TABLET, EXTENDED RELEASE ORAL at 08:00

## 2017-08-25 RX ADMIN — CEFTRIAXONE SCH MLS/HR: 1 INJECTION, SOLUTION INTRAVENOUS at 16:41

## 2017-08-25 RX ADMIN — CASPOFUNGIN ACETATE SCH MLS/HR: 5 INJECTION, POWDER, LYOPHILIZED, FOR SOLUTION INTRAVENOUS at 15:22

## 2017-08-25 NOTE — RADRPT
PROCEDURE:   XR Chest. 

 

CLINICAL INDICATION:   Check PICC line position.   

 

TECHNIQUE:   Single frontal view.

 

COMPARISON:   08/14/2017. 

 

FINDINGS:

There is a right arm PICC line with the tip in the mid superior vena cava.  There is mild atelectasi
s at the left lung base.  The lungs are otherwise clear. 

The heart size is normal.  There is calcification in the aorta consistent with atherosclerosis. 

There is no pleural effusion. 

There is no pneumothorax. 

 

IMPRESSION:

1.  Right arm PICC line tip in satisfactory position.

2.  Mild left basilar atelectasis.

3.  Atherosclerosis. 

RPTAT: QQ

_____________________________________________ 

.Rene De La Cruz MD, MD           Date    Time 

Electronically viewed and signed by .Rene De La Cruz MD, MD on 08/25/2017 11:32 

 

D:  08/25/2017 11:32  T:  08/25/2017 11:32

.R/

## 2017-08-25 NOTE — RADRPT
PROCEDURE:   CT Abdomen and pelvis with contrast

 

CLINICAL INDICATION:   Fever and leukocytosis.  Recent white blood cell scan suggesting possible raine
al infection.

 

TECHNIQUE:   Spiral CT images through the abdomen and pelvis without administration of oral and duri
ng administration of 90 cc of Omnipaque-300 contrast material.  Multiplanar reconstructions.  The to
chilo exam CTDI equals 14.03 mGy and the total exam DLP equals 855.61 mGy-cm. One or more of the [facility]
wing dose reduction techniques were used: automated exposure control, adjustment of the mA and/or kV
 according to patient size, or use of iterative reconstruction technique. 

 

COMPARISON:   04/27/2017 

 

FINDINGS:

 

Slight atelectasis of the lung bases is seen.  No pleural effusion is seen. . cardiomegaly.

 

Aortic and coronary artery calcification. 

 

Again seen is a small multinodular liver consistent with cirrhosis.  There has been interval placeme
nt of a TIPS shunt which extends from the middle hepatic vein into the right portal vein and conflue
nce.  Moderate ascites of the abdomen pelvis is seen, slightly decreased in volume compared with bella
or.  Collateral vessels and hazy opacity throughout the mesentery and omentum.  Again seen is a calc
ified gallstone in the neck of the gallbladder with a tiny punctate calcification which may be in th
e gallbladder neck or cystic duct distal to the larger stone.  No intrahepatic biliary ductal dilata
tion.  Mild splenomegaly (14.7 cm).  Probable bilateral renal cortical cysts again seen. The kidneys
 enhance uniformly.  There is no evidence for pyelonephritis.  No urothelial enhancement or hydronep
hrosis. Unremarkable adrenals.  Again seen are multiple punctate calcifications throughout the pancr
eas consistent with chronic pancreatitis.  Pancreatic ductal dilatation may be unchanged but better 

seen on this contrast-enhanced examination.  The pancreatic duct measures up to 7 mm in diameter.  N
o definite solid pancreatic mass.  The portal vein is patent.  The left portal vein is not well seen
.  Recanalized periumbilical vein.  Small fat-containing right inguinal hernia.  Unremarkable prosta
te and urinary bladder.  Vas deferens calcification again seen.  Normal appendix.  No evidence for d
iverticulitis.  No definite small bowel obstruction, free air, or abscess.

 

 

IMPRESSION:

 

Interval placement of TIPS shunt.  Multiple other stable abnormal findings related to cirrhosis as d
etailed above. 

 

 Gallstone in the adjacent to the neck of the gallbladder with additional tiny punctate calcificatio
n which may be in the gallbladder neck or cystic duct.  These are unchanged compared with prior.  HI
DA scan could be obtained if cholecystitis is suspected clinically.

 

Changes from chronic pancreatitis.  Likely stable pancreatic ductal dilatation.

 

Additional nonacute findings as above.

 

RPTAT: HLBE

_____________________________________________ 

Physician Dany           Date    Time 

Electronically viewed and signed by Bharti Jacobson Physician on 08/25/2017 06:47 

 

D:  08/25/2017 06:47  T:  08/25/2017 06:47

LE/

## 2017-08-25 NOTE — CONS
Date/Time of Note


Date/Time of Note


DATE: 8/25/17 


TIME: 10:49





Assessment/Plan


Assessment/Plan


Additional Assessment/Plan


1.  Cirrhosis of liver


2.  Hepatic encephalopathy mild


3.  Status post coronary artery stent 6 weeks ago


4.  Status post TIPS for recurrent ascites.


5.  Emesis


6.  Epistaxis


7.  Severe gastroparesis


8.  Chronic pancreatitis


9.  Fungemia


Plan


Continue rifaximin and lactulose.


TIPS a cause of his worsening encephalopathy


Avoid all kinds of sedative


Complete the course of antifungal medication


Reglan for diabetic gastroparesis


Ambulate with the help of physical therapy





Consultation Date/Type/Reason


Admit Date/Time


Aug 12, 2017 at 05:42


Initial Consult Date


8/12/17


Type of Consultation:  ID





24 HR Interval Summary


Free Text/Dictation


No nausea no vomiting no abdominal pain


No epistaxis





Exam/Review of Systems


Vital Signs


Vitals





 Vital Signs








  Date Time  Temp Pulse Resp B/P Pulse Ox O2 Delivery O2 Flow Rate FiO2


 


8/25/17 08:10  87      


 


8/25/17 07:43 98.3  20 101/69 94   


 


8/24/17 17:51      Room Air  














 Intake and Output   


 


 8/24/17 8/24/17 8/25/17





 15:00 23:00 07:00


 


Intake Total  850 ml 1520 ml


 


Output Total   950 ml


 


Balance  850 ml 570 ml











Exam


Constitutional:  alert, oriented, well developed


Psych:  nl mood/affect, no complaints


Head:  atraumatic, normocephalic


Eyes:  EOMI, PERRL, nl conjunctiva, nl lids, nl sclera


ENMT:  nl external ears & nose, nl lips & teeth, nl nasal mucosa & septum


Neck:  non-tender, supple


Respiratory:  clear to auscultation, normal air movement


Cardiovascular:  nl pulses, regular rate and rhythm


Gastrointestinal:  nl liver, spleen, non-tender, soft


Musculoskeletal:  nl extremities to inspection, nl gait and stance


Extremities:  normal pulses


Neurological:  CNS II-XII intact, nl mental status, nl speech, nl strength


Skin:  nl turgor, 


   No rash or lesions


Lymph:  nl lymph nodes





Results


Result Diagram:  


8/25/17 0635                                                                   

             8/25/17 0638





Results 24 hrs





Laboratory Tests








Test


  8/24/17


10:54 8/24/17


11:29 8/24/17


20:27 8/25/17


01:22


 


Hemoglobin 8.8  L   


 


Hematocrit 27.1  L   


 


Bedside Glucose  172   191   194  














Test


  8/25/17


06:35 8/25/17


06:38 8/25/17


07:42 


 


 


White Blood Count 6.5     


 


Red Blood Count 2.72  L   


 


Hemoglobin 7.5  L   


 


Hematocrit 24.0  L   


 


Mean Corpuscular Volume 88.2     


 


Mean Corpuscular Hemoglobin 27.6  L   


 


Mean Corpuscular Hemoglobin


Concent 31.3  L


  


  


  


 


 


Red Cell Distribution Width 20.3  H   


 


Platelet Count 92  L   


 


Mean Platelet Volume 12.6  H   


 


Neutrophils % 73.2     


 


Lymphocytes % 7.9  L   


 


Monocytes % 17.0  H   


 


Eosinophils % 1.2     


 


Basophils % 0.5     


 


Nucleated Red Blood Cells % 0.0     


 


Neutrophils # (Manual) 4.7     


 


Lymphocytes # 0.5  L   


 


Monocytes # 1.1  H   


 


Eosinophils # 0.1     


 


Basophils # 0.0     


 


Nucleated Red Blood Cells # 0.0     


 


Sodium Level  132  L  


 


Potassium Level  5.2  H  


 


Chloride Level  104    


 


Carbon Dioxide Level  20  L  


 


Anion Gap  13    


 


Blood Urea Nitrogen  12    


 


Creatinine  0.55  L  


 


Glucose Level  140  #  


 


Calcium Level  7.9  L  


 


Bedside Glucose   142   











Medications


Medications





 Current Medications


Lorazepam (Ativan) 1 mg Q6H  PRN IV anxiety/agitation Last administered on 8/15/

17at 16:55; Admin Dose 1 MG;  Start 8/12/17 at 11:00


Miscellaneous Information 1 ea NOTE XX ;  Start 8/14/17 at 01:00


Glucose (Glutose) 15 gm Q15M  PRN PO DECREASED GLUCOSE;  Start 8/14/17 at 01:00


Glucose (Glutose) 22.5 gm Q15M  PRN PO DECREASED GLUCOSE;  Start 8/14/17 at 01:

00


Dextrose (D50w Syringe) 25 ml Q15M  PRN IV DECREASED GLUCOSE;  Start 8/14/17 at 

01:00


Dextrose (D50w Syringe) 50 ml Q15M  PRN IV DECREASED GLUCOSE;  Start 8/14/17 at 

01:00


Glucagon (Glucagen) 1 mg Q15M  PRN IM DECREASED GLUCOSE;  Start 8/14/17 at 01:00


Glucose (Glutose) 15 gm Q15M  PRN BUCCAL DECREASED GLUCOSE;  Start 8/14/17 at 01

:00


Morphine Sulfate (morphine) 1 mg Q4H  PRN IV PAIN LEVEL 7-10 Last administered 

on 8/25/17at 10:30; Admin Dose 1 MG;  Start 8/14/17 at 14:00


Diagnostic Test (Pha) (Accu-Chek) 1 ea 02 XX  Last administered on 8/18/17at 02:

35; Admin Dose 1 EA;  Start 8/15/17 at 02:00


Pantoprazole (Protonix Tab) 40 mg BID@06,18 PO  Last administered on 8/25/17at 

05:24; Admin Dose 40 MG;  Start 8/16/17 at 18:00


Rifaximin (Xifaxan) 550 mg BID PO  Last administered on 8/25/17at 08:00; Admin 

Dose 550 MG;  Start 8/16/17 at 21:00


Ondansetron HCl 4 mg 4 mg Q6H  PRN IV NAUSEA AND/OR VOMITING Last administered 

on 8/18/17at 18:40; Admin Dose 4 MG;  Start 8/16/17 at 22:30


Caspofungin/ Sodium Chloride (Cancidas/NS) 250 ml @  250 mls/hr Q24H IVPB  Last 

administered on 8/24/17at 16:09; Admin Dose 250 MLS/HR;  Start 8/18/17 at 16:00


Lactulose (Enulose) 26.7 gm BID PO  Last administered on 8/25/17at 08:00; Admin 

Dose 26.7 GM;  Start 8/19/17 at 21:00


Thiamine HCl (Vitamin B1) 100 mg DAILY PO  Last administered on 8/25/17at 08:00

; Admin Dose 100 MG;  Start 8/20/17 at 09:00


Multivitamins/ Minerals (Theragran-M) 1 tab DAILY PO  Last administered on 8/25/ 17at 08:00; Admin Dose 1 TAB;  Start 8/20/17 at 09:00


Metoclopramide HCl (Reglan) 10 mg Q6 IV  Last administered on 8/25/17at 05:24; 

Admin Dose 10 MG;  Start 8/22/17 at 09:40


Insulin Glargine 25 unit 25 unit DAILY@20 SC  Last administered on 8/24/17at 20:

35; Admin Dose 25 UNIT;  Start 8/23/17 at 20:00


Ceftriaxone Sodium (Rocephin) 50 ml @  100 mls/hr Q24H IVPB  Last administered 

on 8/24/17at 17:19; Admin Dose 100 MLS/HR;  Start 8/23/17 at 17:00


IV Flush (NS 10 ml) 10 ml PRN  PRN IV IV PROTOCOL;  Start 8/25/17 at 10:30











SIDDHARTH RECINOS MD Aug 25, 2017 10:51

## 2017-08-25 NOTE — RADRPT
PROCEDURE:   US guidance for PICC line

 

CLINICAL INDICATION:   PICC line placement

 

TECHNIQUE:   Multiple real-time images were acquired of the patient's arm utilizing a high resolutio
n transducer. This was performed by the PICC line nurse for venous access.

 

COMPARISON:   None 

 

FINDINGS:

 

Ultrasound guidance for PICC line placement.

 

IMPRESSION:

Ultrasound guidance for PICC line placement.

 

RPTAT: AA

_____________________________________________ 

.Jonathan Neal MD, MD           Date    Time 

Electronically viewed and signed by .Jonathan Neal MD, MD on 08/25/2017 10:16 

 

D:  08/25/2017 10:16  T:  08/25/2017 10:16

.S/

## 2017-08-25 NOTE — PN
Date/Time of Note


Date/Time of Note


DATE: 8/25/17 


TIME: 13:16





Assessment/Plan


VTE Prophylaxis


VTE Prophylaxis Intervention:  ambulation





Lines/Catheters


IV Catheter Type (from Nrs):  PICC Line


Central line still needed:  Yes


Urinary Cath still in place:  Yes (condom cath)


Reason Cath still needed:  other (indicate) (condom cath)





Assessment/Plan


Chief Complaint/Hosp Course


1. Liver cirrhosis


2. toxic Encephalopathy


3. Anemia


4. hs pancreatitis


5. Hx alcohol abuse


6. Hx esophageal varices


7. SIRS


8. S/p portal shunt 2017


9. S/p cardiac stenting 3 stents


Problems:  


Assessment/Plan


1. Continue rocephin 4 weeks, cancidas 5 days, continue lactulose.


2. Complete the course of antifungal medication


3. discharge home after 1 unit PRBC transfusion


4.  to arrange home health





Subjective


24 Hr Interval Summary


Constitutional:  improved, no complaints


Gastrointestinal:  no complaints


Genitourinary:  no complaints





Exam/Review of Systems


Vital Signs


Vitals





 Vital Signs








  Date Time  Temp Pulse Resp B/P Pulse Ox O2 Delivery O2 Flow Rate FiO2


 


8/25/17 12:10  103      


 


8/25/17 11:03 98.4  20 114/64 99   


 


8/24/17 17:51      Room Air  














 Intake and Output   


 


 8/24/17 8/24/17 8/25/17





 15:00 23:00 07:00


 


Intake Total  850 ml 1520 ml


 


Output Total   950 ml


 


Balance  850 ml 570 ml











Exam


Constitutional:  alert


Psych:  no complaints


Neck:  supple


Respiratory:  clear to auscultation


Cardiovascular:  regular rate and rhythm





Results


Result Diagram:  


8/25/17 0635                                                                   

             8/25/17 0638





Results 24 hrs





Laboratory Tests








Test


  8/24/17


20:27 8/25/17


01:22 8/25/17


06:35 8/25/17


06:38


 


Bedside Glucose 191   194    


 


White Blood Count   6.5   


 


Red Blood Count   2.72  L 


 


Hemoglobin   7.5  L 


 


Hematocrit   24.0  L 


 


Mean Corpuscular Volume   88.2   


 


Mean Corpuscular Hemoglobin   27.6  L 


 


Mean Corpuscular Hemoglobin


Concent 


  


  31.3  L


  


 


 


Red Cell Distribution Width   20.3  H 


 


Platelet Count   92  L 


 


Mean Platelet Volume   12.6  H 


 


Neutrophils %   73.2   


 


Lymphocytes %   7.9  L 


 


Monocytes %   17.0  H 


 


Eosinophils %   1.2   


 


Basophils %   0.5   


 


Nucleated Red Blood Cells %   0.0   


 


Neutrophils # (Manual)   4.7   


 


Lymphocytes #   0.5  L 


 


Monocytes #   1.1  H 


 


Eosinophils #   0.1   


 


Basophils #   0.0   


 


Nucleated Red Blood Cells #   0.0   


 


Sodium Level    132  L


 


Potassium Level    5.2  H


 


Chloride Level    104  


 


Carbon Dioxide Level    20  L


 


Anion Gap    13  


 


Blood Urea Nitrogen    12  


 


Creatinine    0.55  L


 


Glucose Level    140  #


 


Calcium Level    7.9  L














Test


  8/25/17


07:42 8/25/17


11:38 


  


 


 


Bedside Glucose 142   204    











Medications


Medications





 Current Medications


Lorazepam (Ativan) 1 mg Q6H  PRN IV anxiety/agitation Last administered on 8/15/

17at 16:55; Admin Dose 1 MG;  Start 8/12/17 at 11:00


Miscellaneous Information 1 ea NOTE XX ;  Start 8/14/17 at 01:00


Glucose (Glutose) 15 gm Q15M  PRN PO DECREASED GLUCOSE;  Start 8/14/17 at 01:00


Glucose (Glutose) 22.5 gm Q15M  PRN PO DECREASED GLUCOSE;  Start 8/14/17 at 01:

00


Dextrose (D50w Syringe) 25 ml Q15M  PRN IV DECREASED GLUCOSE;  Start 8/14/17 at 

01:00


Dextrose (D50w Syringe) 50 ml Q15M  PRN IV DECREASED GLUCOSE;  Start 8/14/17 at 

01:00


Glucagon (Glucagen) 1 mg Q15M  PRN IM DECREASED GLUCOSE;  Start 8/14/17 at 01:00


Glucose (Glutose) 15 gm Q15M  PRN BUCCAL DECREASED GLUCOSE;  Start 8/14/17 at 01

:00


Morphine Sulfate (morphine) 1 mg Q4H  PRN IV PAIN LEVEL 7-10 Last administered 

on 8/25/17at 10:30; Admin Dose 1 MG;  Start 8/14/17 at 14:00


Diagnostic Test (Pha) (Accu-Chek) 1 ea 02 XX  Last administered on 8/18/17at 02:

35; Admin Dose 1 EA;  Start 8/15/17 at 02:00


Pantoprazole (Protonix Tab) 40 mg BID@06,18 PO  Last administered on 8/25/17at 

05:24; Admin Dose 40 MG;  Start 8/16/17 at 18:00


Rifaximin (Xifaxan) 550 mg BID PO  Last administered on 8/25/17at 08:00; Admin 

Dose 550 MG;  Start 8/16/17 at 21:00


Ondansetron HCl 4 mg 4 mg Q6H  PRN IV NAUSEA AND/OR VOMITING Last administered 

on 8/18/17at 18:40; Admin Dose 4 MG;  Start 8/16/17 at 22:30


Caspofungin/ Sodium Chloride (Cancidas/NS) 250 ml @  250 mls/hr Q24H IVPB  Last 

administered on 8/24/17at 16:09; Admin Dose 250 MLS/HR;  Start 8/18/17 at 16:00


Lactulose (Enulose) 26.7 gm BID PO  Last administered on 8/25/17at 08:00; Admin 

Dose 26.7 GM;  Start 8/19/17 at 21:00


Thiamine HCl (Vitamin B1) 100 mg DAILY PO  Last administered on 8/25/17at 08:00

; Admin Dose 100 MG;  Start 8/20/17 at 09:00


Multivitamins/ Minerals (Theragran-M) 1 tab DAILY PO  Last administered on 8/25/ 17at 08:00; Admin Dose 1 TAB;  Start 8/20/17 at 09:00


Metoclopramide HCl (Reglan) 10 mg Q6 IV  Last administered on 8/25/17at 11:38; 

Admin Dose 10 MG;  Start 8/22/17 at 09:40


Insulin Glargine 25 unit 25 unit DAILY@20 SC  Last administered on 8/24/17at 20:

35; Admin Dose 25 UNIT;  Start 8/23/17 at 20:00


Ceftriaxone Sodium (Rocephin) 50 ml @  100 mls/hr Q24H IVPB  Last administered 

on 8/24/17at 17:19; Admin Dose 100 MLS/HR;  Start 8/23/17 at 17:00


IV Flush (NS 10 ml) 10 ml PRN  PRN IV IV PROTOCOL;  Start 8/25/17 at 10:30











WAI BELTRAN Aug 25, 2017 13:17

## 2017-08-25 NOTE — PDOCDIS
Discharge Instructions


DIAGNOSIS


Discharge Diagnosis


Liver cirrhosis, s/p TIPS, encephalopathy, sepsis





CONDITION


Patient Condition:  Guarded





HOME CARE INSTRUCTIONS:


Special Diet:  carb controlled





ACTIVITY:








Activity Restrictions:   Slowly Increase Activity











FOLLOW UP/APPOINTMENTS


Follow-up Plan


PCP 1 week, GI 1 week





SCHOOL/WORK RELEASE


May return to School/Work with:  With Restrictions











WAI BELTRAN Aug 25, 2017 13:33

## 2017-08-26 NOTE — PN
DATE:  08/25/2017

 

SUBJECTIVE DATA:  No acute changes.  The patient is alert, feels

good, looks comfortable.  Denies pain, discomfort.  Anxious to go

home.  No fevers.

 

OBJECTIVE DATA:

VITAL SIGNS:  Temperature 98.8, pulse 98, respirations 20, blood

pressure 100/55, saturation 99 percent on room air.

 

LABORATORY AND DIAGNOSTIC DATA:  WBC 6.5, H and H 7.5 and 24,

platelets 92, neutrophils 73.2.  BUN 12, creatinine 0.55.

 

INDWELLINGS:  Patient has a PICC line placed this morning.

 

ANTIMICROBIALS:  Rocephin, Cancidas.

 

PHYSICAL EXAMINATION:

GENERAL:  Well-developed, middle-aged [place] man, who is alert,

in no distress.

HEENT:  Head atraumatic, normocephalic.  Sclerae anicteric.

Buccal mucosa pink.

NECK:  Supple.

LUNGS:  Chest rise symmetrical.  Breath sounds clear.

HEART:  S1, S2.

ABDOMEN:  Soft, bowel sounds present.

EXTREMITIES:  Without cyanosis.

 

ASSESSMENT:

1.   Status post severe sepsis.

2.   Persistent oxacillin-sensitive Staphylococcus aureus

bacteremia on admission with repeated blood culture on August 13th and 14th and 15th persistently positive for Staphylococcus

aureus despite patient being on antibiotics.

3.   Fungemia.

4.   Urinary tract infection.

5.   End-stage liver disease, status post transjugular

intrahepatic portosystemic shunt (TIPS) procedure.

6.   Diabetes.

7.   Anemia and thrombocytopenia.

8.   Coronary artery disease with a history of cardiac stents.

 

PLAN:

1.   Patient remains stable.  WBC scan was negative.

  Lumbar and thoracic spine revealed no acute infectious process.

  CT of the abdomen and pelvis revealed no acute changes.  Patient

  has a interval placement of TIPS shunt, multiple other stable

  abnormal findings related to cirrhosis.

2.   Given persistent bacteremia,

recommend to keep the patient on long-term antibiotics with

Rocephin for another 4 weeks.  Complete treatment with Cancidas

for fungemia for 5 more days.  Recommend ODIN, which patient is

refusing at this time.  Continue present care as per primary team

and consultants.

 

 

 

Dictated By:  Ruddy Escobedo NP

 

/anna/rhona

Job#:  [id number]/Document#:  [id number]

D:  08/25/2017 16:11

T:  08/25/2017 23:54

VARGAS

## 2017-09-03 ENCOUNTER — HOSPITAL ENCOUNTER (EMERGENCY)
Dept: HOSPITAL 10 - E/R | Age: 57
Discharge: HOME | End: 2017-09-03
Payer: MEDICAID

## 2017-09-03 VITALS
HEIGHT: 70 IN | WEIGHT: 136.69 LBS | HEIGHT: 70 IN | BODY MASS INDEX: 19.57 KG/M2 | WEIGHT: 136.69 LBS | BODY MASS INDEX: 19.57 KG/M2

## 2017-09-03 VITALS
SYSTOLIC BLOOD PRESSURE: 112 MMHG | HEART RATE: 75 BPM | RESPIRATION RATE: 18 BRPM | TEMPERATURE: 97.7 F | DIASTOLIC BLOOD PRESSURE: 64 MMHG

## 2017-09-03 DIAGNOSIS — F17.210: ICD-10-CM

## 2017-09-03 DIAGNOSIS — E11.9: ICD-10-CM

## 2017-09-03 DIAGNOSIS — Z98.61: ICD-10-CM

## 2017-09-03 DIAGNOSIS — R14.0: Primary | ICD-10-CM

## 2017-09-03 DIAGNOSIS — I25.10: ICD-10-CM

## 2017-09-03 DIAGNOSIS — Z79.4: ICD-10-CM

## 2017-09-03 LAB
ALBUMIN SERPL-MCNC: 2.9 G/DL (ref 3.3–4.9)
ALBUMIN/GLOB SERPL: 0.61 {RATIO}
ALP SERPL-CCNC: 393 IU/L (ref 42–121)
ALT SERPL-CCNC: 69 IU/L (ref 13–69)
ANION GAP SERPL CALC-SCNC: 13 MMOL/L (ref 8–16)
APTT BLD: 39.9 SEC (ref 25–35)
AST SERPL-CCNC: 116 IU/L (ref 15–46)
BASOPHILS # BLD AUTO: 0 10^3/UL (ref 0–0.1)
BASOPHILS NFR BLD: 0.9 % (ref 0–2)
BILIRUB DIRECT SERPL-MCNC: 0 MG/DL (ref 0–0.2)
BILIRUB SERPL-MCNC: 1 MG/DL (ref 0.2–1.3)
BUN SERPL-MCNC: 11 MG/DL (ref 7–20)
CALCIUM SERPL-MCNC: 8.4 MG/DL (ref 8.4–10.2)
CHLORIDE SERPL-SCNC: 88 MMOL/L (ref 97–110)
CO2 SERPL-SCNC: 31 MMOL/L (ref 21–31)
CREAT SERPL-MCNC: 0.89 MG/DL (ref 0.61–1.24)
EOSINOPHIL # BLD: 0.1 10^3/UL (ref 0–0.5)
EOSINOPHIL NFR BLD: 1.3 % (ref 0–7)
ERYTHROCYTE [DISTWIDTH] IN BLOOD BY AUTOMATED COUNT: 20.4 % (ref 11.5–14.5)
GLOBULIN SER-MCNC: 4.7 G/DL (ref 1.3–3.2)
GLUCOSE SERPL-MCNC: 381 MG/DL (ref 70–220)
HCT VFR BLD CALC: 33.5 % (ref 42–52)
HGB BLD-MCNC: 11.1 G/DL (ref 14–18)
INR PPP: 1.61
LYMPHOCYTES # BLD AUTO: 0.6 10^3/UL (ref 0.8–2.9)
LYMPHOCYTES NFR BLD AUTO: 13.7 % (ref 15–51)
MCH RBC QN AUTO: 28.3 PG (ref 29–33)
MCHC RBC AUTO-ENTMCNC: 33.1 G/DL (ref 32–37)
MCV RBC AUTO: 85.5 FL (ref 82–101)
MONOCYTES # BLD: 0.6 10^3/UL (ref 0.3–0.9)
MONOCYTES NFR BLD: 11.8 % (ref 0–11)
NEUTROPHILS NFR BLD AUTO: 72.1 % (ref 39–77)
NRBC # BLD MANUAL: 0 10^3/UL (ref 0–0)
NRBC BLD AUTO-RTO: 0 /100WBC (ref 0–0)
PLATELET # BLD: 164 10^3/UL (ref 140–415)
PMV BLD AUTO: 10 FL (ref 7.4–10.4)
POTASSIUM SERPL-SCNC: 4.4 MMOL/L (ref 3.5–5.1)
PROT SERPL-MCNC: 7.6 G/DL (ref 6.1–8.1)
PROTHROMBIN TIME: 19.3 SEC (ref 12.2–14.2)
PT RATIO: 1.5
RBC # BLD AUTO: 3.92 10^6/UL (ref 4.7–6.1)
SODIUM SERPL-SCNC: 128 MMOL/L (ref 135–144)
WBC # BLD AUTO: 4.7 10^3/UL (ref 4.8–10.8)

## 2017-09-03 PROCEDURE — 96372 THER/PROPH/DIAG INJ SC/IM: CPT

## 2017-09-03 PROCEDURE — 80053 COMPREHEN METABOLIC PANEL: CPT

## 2017-09-03 PROCEDURE — 85730 THROMBOPLASTIN TIME PARTIAL: CPT

## 2017-09-03 PROCEDURE — 85610 PROTHROMBIN TIME: CPT

## 2017-09-03 PROCEDURE — 85025 COMPLETE CBC W/AUTO DIFF WBC: CPT

## 2017-09-03 PROCEDURE — 83690 ASSAY OF LIPASE: CPT

## 2017-09-03 PROCEDURE — 36415 COLL VENOUS BLD VENIPUNCTURE: CPT

## 2017-09-03 PROCEDURE — 76705 ECHO EXAM OF ABDOMEN: CPT

## 2017-09-03 NOTE — ERD
ER Documentation


Chief Complaint


Date/Time


DATE: 9/3/17 


TIME: 18:02


Chief Complaint


ABD PAIN WITH NAUSEA , NEEDS PARACENTESIS





HPI


This is a 47-year-old male with chronic liver issues who presents to the 

emergency room for evaluation of abdominal cramping and pain.  The patient has 

had multiple paracentesis in the past and states that he feels he needs another 

paracentesis.  He denies any aggravating factors for his pain and states that 

Dilaudid and paracentesis relieves his pain





ROS


All systems reviewed and are negative except as per history of present illness.





Medications


Home Meds


Active Scripts


Lactulose* (Cephulac*) 20 Gm/30 Ml Soln, 26.7 GM PO BID for 30 Days


   Prov:WAI BELTRAN         8/25/17


Insulin Glargine* (Lantus*) 100 Unit/Ml Soln, 25 UNIT SC DAILY@20 for 30 Days


   Prov:WAI BELTRAN         8/25/17


Reported Medications


Dextran/Hypromellose/Glycerin (Artificial Tears Drops) 15 Ml Drops, 2 DROP BOTH 

EYES TID Y for PRN, EA


   8/12/17


Melatonin-Pyridoxine Hcl (Melatonin) 5-10 Mg Tablet, 5 TAB PO HS, TAB


   8/12/17


Fluocinonide* (Fluocinonide* Cream) 0.1% - 120 Gm Cream..g., 1 APPLIC TOP BID, 

TUB


   8/12/17


Rifaximin* (Xifaxan*) 550 Mg Tablet, 550 MG PO BID, TAB


   8/12/17


Diphenhydramine Hcl* (Diphenhydramine Hcl*) 25 Mg Capsule, 25 MG PO Q6 Y for 

ITCHING, CAP


   8/12/17


Cyanocobalamin* (Vitamin B12*) 500 Mcg Tab, 500 MCG PO DAILY, TAB


   8/12/17


Calcium Acetate* (Calcium Acetate*) 667 Mg Capsule, 667 MG PO WITH MEALS, #30 

CAP


   8/12/17


Midodrine* (Midodrine*) 5 Mg Tablet, 5 MG PO TID, TAB


   8/12/17


Clopidogrel Bisulfate* (Clopidogrel Bisulfate*) 75 Mg Tablet, 75 MG PO DAILY, #

30 TAB


   8/12/17


Lidocaine/Menthol (LIDOPATCH) 1 Each Adh..patch, 1 EACH TP Q12, PATCH


   PLACE 1 PATCH ONTO SKIN Q12H ON / Q12H OFF


   8/12/17


Ezetimibe* (Zetia*) 10 Mg Tablet, 10 MG PO DAILY, TAB


   8/12/17


Insulin Lispro (Humalog Kwikpen U-100) 100 Unit/1 Ml Insuln.pen, 22 UNIT SQ


   INJECT 22units IF EATS >50% OR 10units IF EATS < 50% OF MEAL ; TID


   8/12/17


Spironolactone* (Aldactone*) 25 Mg Tablet, 200 MG PO DAILY, #30 TAB


   8/12/17


Pantoprazole* (Protonix*) 40 Mg Tablet.dr, 40 MG PO DAILY, TAB


   11/11/16





Allergies


Allergies:  


Coded Allergies:  


     No Known Drug Allergy (Verified  Allergy, Unknown, 7/1/17)





PMhx/Soc


Medical and Surgical Hx:  pt denies Surgical Hx


History of Surgery:  No


Anesthesia Reaction:  No


Hx Neurological Disorder:  No


Hx Respiratory Disorders:  No


Hx Cardiac Disorders:  Yes (3 stents)


Hx Psychiatric Problems:  No


Hx Miscellaneous Medical Probl:  Yes (CAD s/p stents, DM, GIB, pancreatitis, 

liver cirrhosis)


Hx Alcohol Use:  Yes


Hx Substance Use:  No


Hx Tobacco Use:  Yes (previous smoker quit in 2014)


Smoking Status:  Current every day smoker





Physical Exam


Vitals





Vital Signs








  Date Time  Temp Pulse Resp B/P Pulse Ox O2 Delivery O2 Flow Rate FiO2


 


9/3/17 09:25 97.9 72 18 142/62 97   








Physical Exam


Const: Frail-appearing elderly male no acute distress


Head:   Atraumatic 


Eyes:    Normal Conjunctiva


ENT:    Normal External Ears, Nose and Mouth.


Neck:               Full range of motion..~ No meningismus.


Resp:    Clear to auscultation bilaterally


Cardio:    Regular rate and rhythm, no murmurs


Abd:    Soft, non tender, Mild abdominal distention Normal bowel sounds


Skin:    No petechiae or rashes


Back:    No midline or flank tenderness


Ext:    No cyanosis, or edema


Neur:    Awake and alert


Psych:    Normal Mood and Affect


Result Diagram:  


9/3/17 1610





Results 24 hrs





 Laboratory Tests








Test


  9/3/17


16:10


 


White Blood Count 4.710^3/ul 


 


Red Blood Count 3.9210^6/ul 


 


Hemoglobin 11.1g/dl 


 


Hematocrit 33.5% 


 


Mean Corpuscular Volume 85.5fl 


 


Mean Corpuscular Hemoglobin 28.3pg 


 


Mean Corpuscular Hemoglobin


Concent 33.1g/dl 


 


 


Red Cell Distribution Width 20.4% 


 


Platelet Count 99696^3/UL 


 


Mean Platelet Volume 10.0fl 


 


Neutrophils % 72.1% 


 


Lymphocytes % 13.7% 


 


Monocytes % 11.8% 


 


Eosinophils % 1.3% 


 


Basophils % 0.9% 


 


Nucleated Red Blood Cells % 0.0/100WBC 


 


Neutrophils # (Manual) 3.410^3/ul 


 


Lymphocytes # 0.610^3/ul 


 


Monocytes # 0.610^3/ul 


 


Eosinophils # 0.110^3/ul 


 


Basophils # 0.010^3/ul 


 


Nucleated Red Blood Cells # 0.010^3/ul 


 


Prothrombin Time 19.3Sec 


 


Prothrombin Time Ratio 1.5 


 


INR International Normalized


Ratio 1.61 


 


 


Activated Partial


Thromboplast Time 39.9Sec 


 








 Current Medications








 Medications


  (Trade)  Dose


 Ordered  Sig/Johnathan


 Route


 PRN Reason  Start Time


 Stop Time Status Last Admin


Dose Admin


 


 Acetaminophen/


 Hydrocodone Bitart


  (Norco (5/325))  1 tab  ONCE  ONCE


 PO


   9/3/17 14:00


 9/3/17 14:01 DC 9/3/17 14:06


 











Procedures/MDM








Ultrasound: No ascites





This 47-year-old male presents to the ER for evaluation of abdominal cramping.  

The patient under went an ultrasound for paracentesis which did not demonstrate 

any fluid.  The patient states that he is having some abdominal cramping.  He 

is been afebrile, hemodynamically stable and nontoxic appearing in the 

emergency room.  The patient was given 0.5 mg of intramuscular Dilaudid, and 5 

mg of intramuscular Reglan for nausea.  He will be discharged home at this time.





Departure


Diagnosis:  


 Primary Impression:  


 Abdominal pain


Condition:  Stable











TAVO JENIKNS DO Sep 3, 2017 18:04

## 2017-09-03 NOTE — RADRPT
PROCEDURE:   Ultrasound of the four quadrants of the abdomen.

 

CLINICAL INDICATION:   Ascites. 

 

TECHNIQUE:   Gray scale sonographic evaluation of the four quadrants of the abdomen was performed.  

 

COMPARISON:  Exam dated 08/13/2017.

 

FINDINGS:

 

There is no ascites.  No focal drainable fluid collection is identified within the abdomen.

 

IMPRESSION:

 

1.  No significant ascites or focal drainable fluid collection.

 

RPTAT: HLBP

_____________________________________________ 

.Michael Ramirez MD, MD           Date    Time 

Electronically viewed and signed by .Michael Ramirez MD, MD on 09/03/2017 18:29 

 

D:  09/03/2017 18:29  T:  09/03/2017 18:29

.P/

## 2017-09-07 ENCOUNTER — HOSPITAL ENCOUNTER (EMERGENCY)
Dept: HOSPITAL 10 - E/R | Age: 57
Discharge: HOME | End: 2017-09-07
Payer: MEDICAID

## 2017-09-07 VITALS
DIASTOLIC BLOOD PRESSURE: 58 MMHG | RESPIRATION RATE: 18 BRPM | TEMPERATURE: 97.9 F | HEART RATE: 79 BPM | SYSTOLIC BLOOD PRESSURE: 102 MMHG

## 2017-09-07 VITALS
HEIGHT: 63 IN | BODY MASS INDEX: 28.12 KG/M2 | WEIGHT: 158.73 LBS | WEIGHT: 158.73 LBS | HEIGHT: 63 IN | BODY MASS INDEX: 28.12 KG/M2

## 2017-09-07 DIAGNOSIS — I12.0: ICD-10-CM

## 2017-09-07 DIAGNOSIS — G89.4: ICD-10-CM

## 2017-09-07 DIAGNOSIS — R11.2: Primary | ICD-10-CM

## 2017-09-07 DIAGNOSIS — N18.6: ICD-10-CM

## 2017-09-07 DIAGNOSIS — E11.22: ICD-10-CM

## 2017-09-07 DIAGNOSIS — F11.23: ICD-10-CM

## 2017-09-07 DIAGNOSIS — Z79.4: ICD-10-CM

## 2017-09-07 DIAGNOSIS — Z79.01: ICD-10-CM

## 2017-09-07 DIAGNOSIS — Z99.2: ICD-10-CM

## 2017-09-07 LAB
ANION GAP SERPL CALC-SCNC: 15 MMOL/L (ref 8–16)
BASOPHILS # BLD AUTO: 0.1 10^3/UL (ref 0–0.1)
BASOPHILS NFR BLD: 0.8 % (ref 0–2)
BUN SERPL-MCNC: 15 MG/DL (ref 7–20)
CALCIUM SERPL-MCNC: 9.9 MG/DL (ref 8.4–10.2)
CHLORIDE SERPL-SCNC: 98 MMOL/L (ref 97–110)
CO2 SERPL-SCNC: 24 MMOL/L (ref 21–31)
CREAT SERPL-MCNC: 0.86 MG/DL (ref 0.61–1.24)
EOSINOPHIL # BLD: 0.1 10^3/UL (ref 0–0.5)
EOSINOPHIL NFR BLD: 1.4 % (ref 0–7)
ERYTHROCYTE [DISTWIDTH] IN BLOOD BY AUTOMATED COUNT: 20.5 % (ref 11.5–14.5)
GLUCOSE SERPL-MCNC: 191 MG/DL (ref 70–220)
HCT VFR BLD CALC: 38.3 % (ref 42–52)
HGB BLD-MCNC: 12.7 G/DL (ref 14–18)
LYMPHOCYTES # BLD AUTO: 1 10^3/UL (ref 0.8–2.9)
LYMPHOCYTES NFR BLD AUTO: 15.8 % (ref 15–51)
MCH RBC QN AUTO: 28.2 PG (ref 29–33)
MCHC RBC AUTO-ENTMCNC: 33.2 G/DL (ref 32–37)
MCV RBC AUTO: 84.9 FL (ref 82–101)
MONOCYTES # BLD: 0.8 10^3/UL (ref 0.3–0.9)
MONOCYTES NFR BLD: 11.9 % (ref 0–11)
NEUTROPHILS NFR BLD AUTO: 69.6 % (ref 39–77)
NRBC # BLD MANUAL: 0 10^3/UL (ref 0–0)
NRBC BLD AUTO-RTO: 0 /100WBC (ref 0–0)
PLATELET # BLD: 158 10^3/UL (ref 140–415)
PMV BLD AUTO: 10.2 FL (ref 7.4–10.4)
POTASSIUM SERPL-SCNC: 4.3 MMOL/L (ref 3.5–5.1)
RBC # BLD AUTO: 4.51 10^6/UL (ref 4.7–6.1)
SODIUM SERPL-SCNC: 133 MMOL/L (ref 135–144)
TROPONIN I SERPL-MCNC: 0.03 NG/ML (ref 0–0.12)
WBC # BLD AUTO: 6.5 10^3/UL (ref 4.8–10.8)

## 2017-09-07 PROCEDURE — 96374 THER/PROPH/DIAG INJ IV PUSH: CPT

## 2017-09-07 PROCEDURE — 85025 COMPLETE CBC W/AUTO DIFF WBC: CPT

## 2017-09-07 PROCEDURE — 36415 COLL VENOUS BLD VENIPUNCTURE: CPT

## 2017-09-07 PROCEDURE — 84484 ASSAY OF TROPONIN QUANT: CPT

## 2017-09-07 PROCEDURE — 80048 BASIC METABOLIC PNL TOTAL CA: CPT

## 2017-09-07 PROCEDURE — 71010: CPT

## 2017-09-07 PROCEDURE — 96375 TX/PRO/DX INJ NEW DRUG ADDON: CPT

## 2017-09-07 PROCEDURE — 93005 ELECTROCARDIOGRAM TRACING: CPT

## 2017-09-07 NOTE — ERD
ER Documentation


Chief Complaint


Date/Time


DATE: 9/7/17 


TIME: 10:57


Chief Complaint


bib ra from home for cp 30 min pta, sob, stent placed 2 months ago [facility]





HPI


57-year-old man with a long history of liver failure, cirrhosis, recurrent 

ascites requiring paracentesis presents with full body aches, nausea, vomiting.

  He has had a few episodes of clear nonbloody nonbilious emesis at home and 

complains of full body ache.  Patient denies chest pain upon my evaluation and 

states his entire body hurts and wife who is at the bedside states his symptoms 

began really about 2 days ago when he ran out of his oral opioid analgesics. He 

also underwent hemodialysis recently and is scheduled 3 times a week. He takes 

opioid analgesics at home on a daily basis even multiple times.





ROS


All systems reviewed and are negative except as per history of present illness.





Medications


Home Meds


Active Scripts


Oxycodone HCl/Acetaminophen (Percocet 5-325 mg Tablet) 1 Each Tablet, 1 EACH PO 

TID for PAIN, #12 TAB


   Prov:HOLDEN CORONADO MD         9/7/17


Metoclopramide* (Reglan*) 5 Mg Tablet, 5 MG PO Q6H Y for NAUSEA AND OR VOMITING 

for 3 Days, TAB


   Prov:TAVO JENKINS DO         9/3/17


Lactulose* (Cephulac*) 20 Gm/30 Ml Soln, 26.7 GM PO BID for 30 Days


   Prov:WAI BELTRAN         8/25/17


Insulin Glargine* (Lantus*) 100 Unit/Ml Soln, 25 UNIT SC DAILY@20 for 30 Days


   Prov:WAI BELTRAN         8/25/17


Reported Medications


Dextran/Hypromellose/Glycerin (Artificial Tears Drops) 15 Ml Drops, 2 DROP BOTH 

EYES TID Y for PRN, EA


   8/12/17


Melatonin-Pyridoxine Hcl (Melatonin) 5-10 Mg Tablet, 5 TAB PO HS, TAB


   8/12/17


Fluocinonide* (Fluocinonide* Cream) 0.1% - 120 Gm Cream..g., 1 APPLIC TOP BID, 

TUB


   8/12/17


Rifaximin* (Xifaxan*) 550 Mg Tablet, 550 MG PO BID, TAB


   8/12/17


Diphenhydramine Hcl* (Diphenhydramine Hcl*) 25 Mg Capsule, 25 MG PO Q6 Y for 

ITCHING, CAP


   8/12/17


Cyanocobalamin* (Vitamin B12*) 500 Mcg Tab, 500 MCG PO DAILY, TAB


   8/12/17


Calcium Acetate* (Calcium Acetate*) 667 Mg Capsule, 667 MG PO WITH MEALS, #30 

CAP


   8/12/17


Midodrine* (Midodrine*) 5 Mg Tablet, 5 MG PO TID, TAB


   8/12/17


Clopidogrel Bisulfate* (Clopidogrel Bisulfate*) 75 Mg Tablet, 75 MG PO DAILY, #

30 TAB


   8/12/17


Lidocaine/Menthol (LIDOPATCH) 1 Each Adh..patch, 1 EACH TP Q12, PATCH


   PLACE 1 PATCH ONTO SKIN Q12H ON / Q12H OFF


   8/12/17


Ezetimibe* (Zetia*) 10 Mg Tablet, 10 MG PO DAILY, TAB


   8/12/17


Insulin Lispro (Humalog Kwikpen U-100) 100 Unit/1 Ml Insuln.pen, 22 UNIT SQ


   INJECT 22units IF EATS >50% OR 10units IF EATS < 50% OF MEAL ; TID


   8/12/17


Spironolactone* (Aldactone*) 25 Mg Tablet, 200 MG PO DAILY, #30 TAB


   8/12/17


Pantoprazole* (Protonix*) 40 Mg Tablet.dr, 40 MG PO DAILY, TAB


   11/11/16





Allergies


Allergies:  


Coded Allergies:  


     No Known Drug Allergy (Verified  Allergy, Unknown, 7/1/17)





PMhx/Soc


Opioid dependence, cirrhosis, recurrent ascites, end-stage kidney disease with 

recent hemodialysis, chronic pain syndrome, diabetes mellitus, hypertension


History of Surgery:  No


Anesthesia Reaction:  No


Hx Neurological Disorder:  No


Hx Respiratory Disorders:  No


Hx Cardiac Disorders:  Yes (3 stents)


Hx Psychiatric Problems:  No


Hx Miscellaneous Medical Probl:  Yes (CAD s/p stents, DM, GIB, pancreatitis, 

liver cirrhosis)


Hx Alcohol Use:  Yes


Hx Substance Use:  No


Hx Tobacco Use:  Yes (previous smoker quit in 2014)


Smoking Status:  Never smoker





FmHx


Family History:  No diabetes





Physical Exam


Vitals





Vital Signs








  Date Time  Temp Pulse Resp B/P Pulse Ox O2 Delivery O2 Flow Rate FiO2


 


9/7/17 08:31 97.9 79 18 102/58 99 Room Air  


 


9/7/17 06:40  82 18 123/72 99 Room Air  


 


9/7/17 05:18 97.9 97 18 96/79 100 Room Air  


 


9/7/17 05:15 97.7 87 18 97/61 96   








Physical Exam


GENERAL: Well-developed, well-nourished, appears to be withdrawing, afebrile


HEENT: Moist mucous membranes, pink conjunctiva, no cervical spine tenderness 

or step-off deformities, no goiter, no jaundice or icterus, extraocular 

movements intact without pain. No submandibular induration, and no pharyngeal 

erythema


NEURO: Alert and oriented 3, cranial nerves II through XII intact bilaterally, 

pupils equal round reactive to light, no focal deficits or facial asymmetry, 

sensation intact distally Strength 5/5 in upper and lower extremities 

bilaterally


CARDIAC: Regular rate and rhythm, no murmurs rubs or gallops


LUNGS: Clear bilaterally no wheezing crackles or stridor


ABDOMEN: Soft nontender, no guarding, no rigidity, no rebound, no psoas sign no 

obturator sign. Normoactive bowel sounds


SKIN: Warm and dry to touch, no abrasions, contusions, or hematomas, no 

lacerations, no ecchymosis, no target lesions, and without ulcers


EXTREMITIES: No clubbing cyanosis or edema, calves are bilaterally symmetrical, 

no Homans sign, no popliteal cord sign. Distal pulses equal and bilateral


PSYCH: Anxious


Result Diagram:  


9/7/17 0520 9/7/17 0520





Results 24 hrs





 Laboratory Tests








Test


  9/7/17


05:20


 


White Blood Count 6.510^3/ul 


 


Red Blood Count 4.5110^6/ul 


 


Hemoglobin 12.7g/dl 


 


Hematocrit 38.3% 


 


Mean Corpuscular Volume 84.9fl 


 


Mean Corpuscular Hemoglobin 28.2pg 


 


Mean Corpuscular Hemoglobin


Concent 33.2g/dl 


 


 


Red Cell Distribution Width 20.5% 


 


Platelet Count 95897^3/UL 


 


Mean Platelet Volume 10.2fl 


 


Neutrophils % 69.6% 


 


Lymphocytes % 15.8% 


 


Monocytes % 11.9% 


 


Eosinophils % 1.4% 


 


Basophils % 0.8% 


 


Nucleated Red Blood Cells % 0.0/100WBC 


 


Neutrophils # (Manual) 4.510^3/ul 


 


Lymphocytes # 1.010^3/ul 


 


Monocytes # 0.810^3/ul 


 


Eosinophils # 0.110^3/ul 


 


Basophils # 0.110^3/ul 


 


Nucleated Red Blood Cells # 0.010^3/ul 


 


Sodium Level 133mmol/L 


 


Potassium Level 4.3mmol/L 


 


Chloride Level 98mmol/L 


 


Carbon Dioxide Level 24mmol/L 


 


Anion Gap 15 


 


Blood Urea Nitrogen 15mg/dl 


 


Creatinine 0.86mg/dl 


 


Glucose Level 191mg/dl 


 


Calcium Level 9.9mg/dl 


 


Troponin I 0.027ng/ml 








 Current Medications








 Medications


  (Trade)  Dose


 Ordered  Sig/Johnathan


 Route


 PRN Reason  Start Time


 Stop Time Status Last Admin


Dose Admin


 


 Aspirin


  (Aspirin)  325 mg  ONCE  STAT


 PO


   9/7/17 05:16


 9/7/17 05:17 DC 9/7/17 05:31


 


 


 Ketorolac


 Tromethamine


  (Toradol)  15 mg  ONCE  STAT


 IV


   9/7/17 05:42


 9/7/17 05:44 DC 9/7/17 05:50


 


 


 Morphine Sulfate


  (morphine)  4 mg  ONCE  STAT


 IV


   9/7/17 06:29


 9/7/17 06:30 DC 9/7/17 06:45


 











Procedures/MDM








Patient was placed on cardiac monitor rhythm strip revealed a sinus rhythm at 

about 90 bpm with upright P and T waves.  Patient was afebrile.  Belly appears 

soft and without distention, patient does not require paracentesis at this time.





EKG performed, read by me revealed a normal sinus rhythm at 97 bpm, normal axis

, narrow QRS complex, no concerning ST elevations or depressions noted.





I administered morphine 4 mg IV for opioid withdrawal.





CBC and electrolytes are normal, troponin negative.





Differential diagnoses considered, included but not limited to acute coronary 

syndrome, pulmonary embolism, aortic dissection, abdominal aortic aneurysm, 

sepsis, stroke, meningitis, encephalitis, pneumonia, appendicitis, cholecystitis

, bowel obstruction, pyelonephritis, nephrolithiasis, cystitis, as well as 

metabolic, hematologic, and electrolyte abnormalities. As well as abscess, 

cellulitis, fractures, and dislocations.





Patient feels much better at this time, and vital signs are normal, symptoms 

have improved. I did give strict instructions to return to the ED if symptoms 

continue or worsen, patient will otherwise follow-up with primary care 

physician. Patient understood instructions and agreed to plan.





Disclaimer: Inadvertent spelling and grammatical errors are likely due to EHR/

dictation software use and do not reflect on the overall quality of patient 

care. Also, please note that the electronic time recorded on this note does not 

necessarily reflect the actual time of the patient encounter.





Departure


Diagnosis:  


 Primary Impression:  


 Vomiting


 Vomiting type:  unspecified  Vomiting Intractability:  non-intractable  Nausea 

presence:  with nausea  Qualified Code:  R11.2 - Non-intractable vomiting with 

nausea, unspecified vomiting type


 Additional Impressions:  


 Chronic pain


 Chronic pain type:  chronic pain syndrome  Qualified Code:  G89.4 - Chronic 

pain syndrome


 Opioid withdrawal


Condition:  Good


Patient Instructions:  Chronic Pain, Vomiting (8Y-Adult)











HOLDEN CORONADO MD Sep 7, 2017 11:03

## 2017-09-07 NOTE — RADRPT
PROCEDURE:   Chest. 

 

CLINICAL INDICATION:    Chest pain. 

 

TECHNIQUE:   Single frontal view of the chest was obtained. 

 

COMPARISON:   08/25/2017. 

 

FINDINGS:

There is a right-sided PICC line extending to the SVC. The cardiac silhouette is within normal limit
s.  The aortic arch is unremarkable.  There is no focal consolidation, vascular congestion or pleura
l effusion.  There is no pneumothorax. 

 

IMPRESSION:

No evidence for active cardiopulmonary disease. 

Right-sided PICC line in place.

_____________________________________________ 

.David Perez MD, MD           Date    Time 

Electronically viewed and signed by .David Perez MD, MD on 09/07/2017 05:53 

 

D:  09/07/2017 05:53  T:  09/07/2017 05:53

.T/

## 2017-10-24 ENCOUNTER — HOSPITAL ENCOUNTER (INPATIENT)
Dept: HOSPITAL 10 - E/R | Age: 57
LOS: 3 days | Discharge: LEFT BEFORE DISCHARGE | DRG: 313 | End: 2017-10-27
Attending: INTERNAL MEDICINE | Admitting: INTERNAL MEDICINE
Payer: MEDICAID

## 2017-10-24 VITALS
BODY MASS INDEX: 23.4 KG/M2 | HEIGHT: 63 IN | HEIGHT: 63 IN | WEIGHT: 132.06 LBS | BODY MASS INDEX: 23.4 KG/M2 | WEIGHT: 132.06 LBS

## 2017-10-24 VITALS — TEMPERATURE: 97.9 F

## 2017-10-24 DIAGNOSIS — N17.9: ICD-10-CM

## 2017-10-24 DIAGNOSIS — E87.1: ICD-10-CM

## 2017-10-24 DIAGNOSIS — R10.13: ICD-10-CM

## 2017-10-24 DIAGNOSIS — K74.60: ICD-10-CM

## 2017-10-24 DIAGNOSIS — I96: ICD-10-CM

## 2017-10-24 DIAGNOSIS — I50.9: ICD-10-CM

## 2017-10-24 DIAGNOSIS — R07.9: Primary | ICD-10-CM

## 2017-10-24 DIAGNOSIS — E11.9: ICD-10-CM

## 2017-10-24 DIAGNOSIS — Z95.5: ICD-10-CM

## 2017-10-24 DIAGNOSIS — K59.00: ICD-10-CM

## 2017-10-24 DIAGNOSIS — E11.65: ICD-10-CM

## 2017-10-24 DIAGNOSIS — E87.5: ICD-10-CM

## 2017-10-24 DIAGNOSIS — D64.9: ICD-10-CM

## 2017-10-24 LAB
ANION GAP SERPL CALC-SCNC: 15 MMOL/L (ref 8–16)
BASOPHILS # BLD AUTO: 0 10^3/UL (ref 0–0.1)
BASOPHILS NFR BLD: 0.4 % (ref 0–2)
BUN SERPL-MCNC: 24 MG/DL (ref 7–20)
CALCIUM SERPL-MCNC: 10.2 MG/DL (ref 8.4–10.2)
CHLORIDE SERPL-SCNC: 91 MMOL/L (ref 97–110)
CO2 SERPL-SCNC: 25 MMOL/L (ref 21–31)
CREAT SERPL-MCNC: 1.18 MG/DL (ref 0.61–1.24)
EOSINOPHIL # BLD: 0.1 10^3/UL (ref 0–0.5)
EOSINOPHIL NFR BLD: 0.7 % (ref 0–7)
ERYTHROCYTE [DISTWIDTH] IN BLOOD BY AUTOMATED COUNT: 17.7 % (ref 11.5–14.5)
GLUCOSE SERPL-MCNC: 119 MG/DL (ref 70–220)
HCT VFR BLD CALC: 31.2 % (ref 42–52)
HGB BLD-MCNC: 10.7 G/DL (ref 14–18)
LYMPHOCYTES # BLD AUTO: 0.7 10^3/UL (ref 0.8–2.9)
LYMPHOCYTES NFR BLD AUTO: 6.4 % (ref 15–51)
MCH RBC QN AUTO: 30.1 PG (ref 29–33)
MCHC RBC AUTO-ENTMCNC: 34.3 G/DL (ref 32–37)
MCV RBC AUTO: 87.9 FL (ref 82–101)
MONOCYTES # BLD: 1.2 10^3/UL (ref 0.3–0.9)
MONOCYTES NFR BLD: 11.2 % (ref 0–11)
NEUTROPHILS # BLD: 8.4 10^3/UL (ref 1.6–7.5)
NEUTROPHILS NFR BLD AUTO: 80.8 % (ref 39–77)
NRBC # BLD MANUAL: 0 10^3/UL (ref 0–0)
NRBC BLD AUTO-RTO: 0 /100WBC (ref 0–0)
PLATELET # BLD: 223 10^3/UL (ref 140–415)
PMV BLD AUTO: 10.1 FL (ref 7.4–10.4)
POTASSIUM SERPL-SCNC: 5.6 MMOL/L (ref 3.5–5.1)
RBC # BLD AUTO: 3.55 10^6/UL (ref 4.7–6.1)
SODIUM SERPL-SCNC: 125 MMOL/L (ref 135–144)
TROPONIN I SERPL-MCNC: 0.03 NG/ML (ref 0–0.12)
WBC # BLD AUTO: 10.4 10^3/UL (ref 4.8–10.8)

## 2017-10-24 PROCEDURE — 82550 ASSAY OF CK (CPK): CPT

## 2017-10-24 PROCEDURE — 71010: CPT

## 2017-10-24 PROCEDURE — 80048 BASIC METABOLIC PNL TOTAL CA: CPT

## 2017-10-24 PROCEDURE — 85610 PROTHROMBIN TIME: CPT

## 2017-10-24 PROCEDURE — 93306 TTE W/DOPPLER COMPLETE: CPT

## 2017-10-24 PROCEDURE — A9505 TL201 THALLIUM: HCPCS

## 2017-10-24 PROCEDURE — 93017 CV STRESS TEST TRACING ONLY: CPT

## 2017-10-24 PROCEDURE — 78452 HT MUSCLE IMAGE SPECT MULT: CPT

## 2017-10-24 PROCEDURE — 81003 URINALYSIS AUTO W/O SCOPE: CPT

## 2017-10-24 PROCEDURE — 83935 ASSAY OF URINE OSMOLALITY: CPT

## 2017-10-24 PROCEDURE — 36415 COLL VENOUS BLD VENIPUNCTURE: CPT

## 2017-10-24 PROCEDURE — 82140 ASSAY OF AMMONIA: CPT

## 2017-10-24 PROCEDURE — 82962 GLUCOSE BLOOD TEST: CPT

## 2017-10-24 PROCEDURE — A9500 TC99M SESTAMIBI: HCPCS

## 2017-10-24 PROCEDURE — 82553 CREATINE MB FRACTION: CPT

## 2017-10-24 PROCEDURE — P9047 ALBUMIN (HUMAN), 25%, 50ML: HCPCS

## 2017-10-24 PROCEDURE — 84300 ASSAY OF URINE SODIUM: CPT

## 2017-10-24 PROCEDURE — 93005 ELECTROCARDIOGRAM TRACING: CPT

## 2017-10-24 PROCEDURE — 85025 COMPLETE CBC W/AUTO DIFF WBC: CPT

## 2017-10-24 PROCEDURE — 83690 ASSAY OF LIPASE: CPT

## 2017-10-24 PROCEDURE — 84484 ASSAY OF TROPONIN QUANT: CPT

## 2017-10-24 PROCEDURE — 84295 ASSAY OF SERUM SODIUM: CPT

## 2017-10-24 PROCEDURE — 80053 COMPREHEN METABOLIC PANEL: CPT

## 2017-10-24 PROCEDURE — 83880 ASSAY OF NATRIURETIC PEPTIDE: CPT

## 2017-10-24 PROCEDURE — 93922 UPR/L XTREMITY ART 2 LEVELS: CPT

## 2017-10-24 NOTE — RADRPT
PROCEDURE:   XR Chest. 

 

CLINICAL INDICATION:   Chest pain.

 

TECHNIQUE:  Single frontal view of the chest.

 

COMPARISON:  04/27/2017.

 

FINDINGS:

The cardiomediastinal silhouette is within normal limits. Improved lung inflation over interval with
 interval resolution of previously seen discoid atelectasis at the right lung base and mild atelecta
sis at the left lung base. The lungs are clear. No signs of pleural fluid or pneumothorax are seen. 
The osseous structures and soft tissues are unremarkable. 

 

IMPRESSION:

1. Improved lung inflation over interval with resolution of previously seen lung base atelectasis.

2. No evidence for active cardiopulmonary disease. 

 

RPTAT: UU

_____________________________________________ 

Physician Helder           Date    Time 

Electronically viewed and signed by Physician Helder on 10/24/2017 23:00 

 

D:  10/24/2017 23:00  T:  10/24/2017 23:00

RS/

## 2017-10-24 NOTE — ERD
ER Documentation


Chief Complaint


Chief Complaint


BIBA RA81,C/O SUBSTERNAL CP,REC'D ASPIRIN & NITRO SPRAY





HPI


Patient is a 57-year-old male with diabetes and ascites who presents with chest 

pain and shortness of breath.  He said that he has had decreased intake by 

mouth over the past few days and had vomiting but no blood.  He has mid 

epigastric and sternal pain and shortness of breath.  He says "I cannot breathe.

"  He has no abdominal swelling however which he has had in the past from his 

ascites.  He was given aspirin nitroglycerin by paramedics and was brought in 

by ambulance.  Upon review of old medical records the patient has multiple 

visits for ascites but he is not here for that today.





ROS


All systems reviewed and are negative except as per history of present illness.





Medications


Home Meds


Active Scripts


Oxycodone HCl/Acetaminophen (Percocet 5-325 mg Tablet) 1 Each Tablet, 1 EACH PO 

TID for PAIN, #12 TAB


   Prov:HOLDEN CORONADO MD         9/7/17


Metoclopramide* (Reglan*) 5 Mg Tablet, 5 MG PO Q6H Y for NAUSEA AND OR VOMITING 

for 3 Days, TAB


   Prov:TAVO JENKINS DO         9/3/17


Lactulose* (Cephulac*) 20 Gm/30 Ml Soln, 26.7 GM PO BID for 30 Days


   Prov:WAI BELTRAN         8/25/17


Reported Medications


Insulin Glargine* (Lantus*) 100 Unit/Ml Soln, 34 UNIT SC QHS for 30 Days, #1 

VIAL


   10/24/17


Dextran/Hypromellose/Glycerin (Artificial Tears Drops) 15 Ml Drops, 2 DROP BOTH 

EYES TID Y for PRN, EA


   8/12/17


Melatonin-Pyridoxine Hcl (Melatonin) 5-10 Mg Tablet, 5 TAB PO HS, TAB


   8/12/17


Fluocinonide* (Fluocinonide* Cream) 0.1% - 120 Gm Cream..g., 1 APPLIC TOP BID, 

TUB


   8/12/17


Rifaximin* (Xifaxan*) 550 Mg Tablet, 550 MG PO BID, TAB


   8/12/17


Diphenhydramine Hcl* (Diphenhydramine Hcl*) 25 Mg Capsule, 25 MG PO Q6 Y for 

ITCHING, CAP


   8/12/17


Cyanocobalamin* (Vitamin B12*) 500 Mcg Tab, 500 MCG PO DAILY, TAB


   8/12/17


Calcium Acetate* (Calcium Acetate*) 667 Mg Capsule, 667 MG PO WITH MEALS, #30 

CAP


   8/12/17


Midodrine* (Midodrine*) 5 Mg Tablet, 5 MG PO TID, TAB


   8/12/17


Clopidogrel Bisulfate* (Clopidogrel Bisulfate*) 75 Mg Tablet, 75 MG PO DAILY, #

30 TAB


   8/12/17


Lidocaine/Menthol (LIDOPATCH) 1 Each Adh..patch, 1 EACH TP Q12, PATCH


   PLACE 1 PATCH ONTO SKIN Q12H ON / Q12H OFF


   8/12/17


Ezetimibe* (Zetia*) 10 Mg Tablet, 10 MG PO DAILY, TAB


   8/12/17


Insulin Lispro (Humalog Kwikpen U-100) 100 Unit/1 Ml Insuln.pen, 22 UNIT SQ 

DAILY


   INJECT 22units IF EATS >50% OR 10units IF EATS < 50% OF MEAL ; TID


   8/12/17


Spironolactone* (Aldactone*) 25 Mg Tablet, 200 MG PO DAILY, #30 TAB


   8/12/17


Pantoprazole* (Protonix*) 40 Mg Tablet.dr, 40 MG PO DAILY, TAB


   11/11/16


Discontinued Scripts


Insulin Glargine* (Lantus*) 100 Unit/Ml Soln, 25 UNIT SC DAILY@20 for 30 Days


   Prov:WAI BELTRAN         8/25/17





Allergies


Allergies:  


Coded Allergies:  


     No Known Drug Allergy (Unverified  Allergy, Unknown, 10/24/17)





PMhx/Soc


History of Surgery:  Yes (stent placed [facility] 2 months pta )


Anesthesia Reaction:  Yes


Hx Neurological Disorder:  No


Hx Respiratory Disorders:  Yes


Hx Cardiac Disorders:  Yes


Hx Psychiatric Problems:  No


Hx Miscellaneous Medical Probl:  Yes (DM, liver cirrhosis)


Hx Alcohol Use:  No


Hx Substance Use:  No


Hx Tobacco Use:  No


Smoking Status:  Former smoker





FmHx


Family History:  coronary disease





Physical Exam


Vitals





Vital Signs








  Date Time  Temp Pulse Resp B/P Pulse Ox O2 Delivery O2 Flow Rate FiO2


 


10/24/17 21:27 97.9 105 21 127/87 100   


 


10/24/17 21:26 97.9 118 18 126/68 100   








Physical Exam


Const: Moderate distress secondary to shortness of breath


Head:   Atraumatic 


Eyes:    Normal Conjunctiva


ENT:    Normal External Ears, Nose and Mouth.


Neck:               Full range of motion..~ No meningismus.


Resp:   Decreased breath sounds bilaterally


Cardio:    Regular rate and rhythm, no murmurs


Abd:    Soft, non tender, non distended. Normal bowel sounds


Skin:    No petechiae or rashes


Back:    No midline or flank tenderness


Ext:    No cyanosis, or edema


Neur:    Awake and alert


Psych:    Normal Mood and Affect


Result Diagram:  


10/24/17 2302





Results 24 hrs





 Laboratory Tests








Test


  10/24/17


23:07


 


White Blood Count 10.410^3/ul 


 


Red Blood Count 3.5510^6/ul 


 


Hemoglobin 10.7g/dl 


 


Hematocrit 31.2% 


 


Mean Corpuscular Volume 87.9fl 


 


Mean Corpuscular Hemoglobin 30.1pg 


 


Mean Corpuscular Hemoglobin


Concent 34.3g/dl 


 


 


Red Cell Distribution Width 17.7% 


 


Platelet Count 87340^3/UL 


 


Mean Platelet Volume 10.1fl 


 


Neutrophils % 80.8% 


 


Lymphocytes % 6.4% 


 


Monocytes % 11.2% 


 


Eosinophils % 0.7% 


 


Basophils % 0.4% 


 


Nucleated Red Blood Cells % 0.0/100WBC 


 


Neutrophils # 8.410^3/ul 


 


Lymphocytes # 0.710^3/ul 


 


Monocytes # 1.210^3/ul 


 


Eosinophils # 0.110^3/ul 


 


Basophils # 0.010^3/ul 


 


Nucleated Red Blood Cells # 0.010^3/ul 








 Current Medications








 Medications


  (Trade)  Dose


 Ordered  Sig/Johnathan


 Route


 PRN Reason  Start Time


 Stop Time Status Last Admin


Dose Admin


 


 Nitroglycerin


  (Nitroglycerin


 2% Oint)  1 inch  ONCE  STAT


 TD


   10/24/17 21:30


 10/24/17 21:31 DC 10/24/17 22:01


 


 


 Nitroglycerin


  (Nitroglycerin


  (Sl Tab) 0.4 Mg)  1 tab  Q5M UP TO 3 DOSES PRN


 SL


 CHEST PAIN  10/24/17 21:30


     


 


 


 Ondansetron HCl


  (Zofran Inj)  4 mg  ER BRIDGE PRN


 IV


 NAUSEA AND/OR VOMITING  10/24/17 23:00


 10/25/17 22:59   


 


 


 Acetaminophen


  (Tylenol Tab)  650 mg  ER BRIDGE PRN


 PO


 MILD PAIN/FEVER  10/24/17 23:00


 10/25/17 22:59   


 











Procedures/MDM


EKG read by me: 


Rate/Rhythm: Sinus tachycardia rate of 107


Intervals:    Normal


Impression:     Sinus tachycardia without ischemia





PROCEDURE:   XR Chest. 


 


CLINICAL INDICATION:   Chest pain.


 


TECHNIQUE:  Single frontal view of the chest.


 


COMPARISON:  04/27/2017.


 


FINDINGS:


The cardiomediastinal silhouette is within normal limits. Improved lung 

inflation over interval with interval resolution of previously seen discoid 

atelectasis at the right lung base and mild atelectasis at the left lung base. 

The lungs are clear. No signs of pleural fluid or pneumothorax are seen. The 

osseous structures and soft tissues are unremarkable. 


 


IMPRESSION:


1. Improved lung inflation over interval with resolution of previously seen 

lung base atelectasis.


2. No evidence for active cardiopulmonary disease. 


 


RPTAT: UU


_____________________________________________ 


Physician Helder           Date    Time 


Electronically viewed and signed by BETSY Arias Physician on 10/24/2017 23:00 


 


Patient is a 57-year-old male with diabetes and ascites who presents with chest 

pain and shortness of breath.  The patient will be admitted to the care of Dr. Crabtree to a telemetry bed.  He was given aspirin nitroglycerin.  I am concerned 

about potential acute coronary syndrome.  I doubt pneumonia, pneumothorax, 

pulmonary embolism, or aortic dissection.  BMP and troponin are pending at this 

time.





Departure


Diagnosis:  


 Primary Impression:  


 Chest pain


 Chest pain type:  unspecified  Qualified Code:  R07.9 - Chest pain, 

unspecified type


Condition:  FRANCESCA Abraham MD Oct 24, 2017 23:30

## 2017-10-25 VITALS — RESPIRATION RATE: 18 BRPM | DIASTOLIC BLOOD PRESSURE: 81 MMHG | SYSTOLIC BLOOD PRESSURE: 150 MMHG

## 2017-10-25 VITALS — HEART RATE: 98 BPM

## 2017-10-25 VITALS — RESPIRATION RATE: 18 BRPM | SYSTOLIC BLOOD PRESSURE: 117 MMHG | DIASTOLIC BLOOD PRESSURE: 58 MMHG

## 2017-10-25 VITALS — RESPIRATION RATE: 18 BRPM | SYSTOLIC BLOOD PRESSURE: 99 MMHG | DIASTOLIC BLOOD PRESSURE: 56 MMHG

## 2017-10-25 VITALS — SYSTOLIC BLOOD PRESSURE: 154 MMHG | RESPIRATION RATE: 16 BRPM | DIASTOLIC BLOOD PRESSURE: 87 MMHG

## 2017-10-25 VITALS — HEART RATE: 107 BPM

## 2017-10-25 VITALS — HEART RATE: 100 BPM

## 2017-10-25 VITALS — SYSTOLIC BLOOD PRESSURE: 102 MMHG | RESPIRATION RATE: 17 BRPM | DIASTOLIC BLOOD PRESSURE: 67 MMHG

## 2017-10-25 VITALS — HEART RATE: 104 BPM

## 2017-10-25 VITALS — RESPIRATION RATE: 16 BRPM | DIASTOLIC BLOOD PRESSURE: 85 MMHG | SYSTOLIC BLOOD PRESSURE: 158 MMHG

## 2017-10-25 VITALS — HEART RATE: 130 BPM

## 2017-10-25 LAB
ADD UMIC: NO
ALBUMIN SERPL-MCNC: 2.8 G/DL (ref 3.3–4.9)
ALBUMIN/GLOB SERPL: 0.7 {RATIO}
ALP SERPL-CCNC: 231 IU/L (ref 42–121)
ALT SERPL-CCNC: 60 IU/L (ref 13–69)
ANION GAP SERPL CALC-SCNC: 14 MMOL/L (ref 8–16)
ANION GAP SERPL CALC-SCNC: 17 MMOL/L (ref 8–16)
AST SERPL-CCNC: 84 IU/L (ref 15–46)
BILIRUB DIRECT SERPL-MCNC: 0 MG/DL (ref 0–0.2)
BILIRUB SERPL-MCNC: 1 MG/DL (ref 0.2–1.3)
BNP SERPL-MCNC: 390 PG/ML (ref 0–125)
BUN SERPL-MCNC: 22 MG/DL (ref 7–20)
BUN SERPL-MCNC: 22 MG/DL (ref 7–20)
CALCIUM SERPL-MCNC: 9 MG/DL (ref 8.4–10.2)
CALCIUM SERPL-MCNC: 9.1 MG/DL (ref 8.4–10.2)
CHLORIDE SERPL-SCNC: 85 MMOL/L (ref 97–110)
CHLORIDE SERPL-SCNC: 85 MMOL/L (ref 97–110)
CK MB SERPL-MCNC: 5.3 NG/ML (ref 0–2.4)
CK MB SERPL-MCNC: 5.93 NG/ML (ref 0–2.4)
CK SERPL-CCNC: 45 IU/L (ref 23–200)
CK SERPL-CCNC: 46 IU/L (ref 23–200)
CO2 SERPL-SCNC: 26 MMOL/L (ref 21–31)
CO2 SERPL-SCNC: 28 MMOL/L (ref 21–31)
COLOR UR: YELLOW
CREAT SERPL-MCNC: 0.93 MG/DL (ref 0.61–1.24)
CREAT SERPL-MCNC: 0.94 MG/DL (ref 0.61–1.24)
GLOBULIN SER-MCNC: 4 G/DL (ref 1.3–3.2)
GLUCOSE SERPL-MCNC: 217 MG/DL (ref 70–220)
GLUCOSE SERPL-MCNC: 235 MG/DL (ref 70–220)
GLUCOSE UR STRIP-MCNC: (no result) MG/DL
INR PPP: 1.46
KETONES UR STRIP.AUTO-MCNC: NEGATIVE MG/DL
NITRITE UR QL STRIP.AUTO: NEGATIVE MG/DL
POTASSIUM SERPL-SCNC: 4.7 MMOL/L (ref 3.5–5.1)
POTASSIUM SERPL-SCNC: 5 MMOL/L (ref 3.5–5.1)
PROT SERPL-MCNC: 6.8 G/DL (ref 6.1–8.1)
PROTHROMBIN TIME: 17.8 SEC (ref 12.2–14.2)
PT RATIO: 1.4
RBC # UR AUTO: NEGATIVE MG/DL
SODIUM SERPL-SCNC: 122 MMOL/L (ref 135–144)
SODIUM SERPL-SCNC: 123 MMOL/L (ref 135–144)
TROPONIN I SERPL-MCNC: 0.02 NG/ML (ref 0–0.12)
TROPONIN I SERPL-MCNC: 0.04 NG/ML (ref 0–0.12)
UR ASCORBIC ACID: 40 MG/DL
UR BILIRUBIN (DIP): NEGATIVE MG/DL
UR CLARITY: CLEAR
UR PH (DIP): 6 (ref 5–9)
UR SPECIFIC GRAVITY (DIP): 1.01 (ref 1–1.03)
UR TOTAL PROTEIN (DIP): NEGATIVE MG/DL
UROBILINOGEN UR STRIP-ACNC: NEGATIVE MG/DL
WBC # UR STRIP: NEGATIVE LEU/UL

## 2017-10-25 RX ADMIN — FLUOCINONIDE SCH APPLIC: 0.5 CREAM TOPICAL at 21:13

## 2017-10-25 RX ADMIN — RIFAXIMIN SCH MG: 550 TABLET ORAL at 21:05

## 2017-10-25 RX ADMIN — RIFAXIMIN SCH MG: 550 TABLET ORAL at 08:17

## 2017-10-25 RX ADMIN — CLOPIDOGREL SCH MG: 75 TABLET, FILM COATED ORAL at 08:16

## 2017-10-25 RX ADMIN — LACTULOSE SCH GM: 10 SOLUTION ORAL at 08:17

## 2017-10-25 RX ADMIN — DEXAMETHASONE SODIUM PHOSPHATE PRN MG: 10 INJECTION, SOLUTION INTRAMUSCULAR; INTRAVENOUS at 10:30

## 2017-10-25 RX ADMIN — INSULIN GLARGINE SCH UNIT: 100 INJECTION, SOLUTION SUBCUTANEOUS at 21:12

## 2017-10-25 RX ADMIN — EZETIMIBE SCH MG: 10 TABLET ORAL at 08:17

## 2017-10-25 RX ADMIN — METOCLOPRAMIDE HYDROCHLORIDE SCH MG: 10 INJECTION, SOLUTION INTRAMUSCULAR; INTRAVENOUS at 17:42

## 2017-10-25 RX ADMIN — PANTOPRAZOLE SODIUM SCH MG: 40 TABLET, DELAYED RELEASE ORAL at 06:01

## 2017-10-25 RX ADMIN — LACTULOSE SCH GM: 10 SOLUTION ORAL at 21:05

## 2017-10-25 RX ADMIN — MIDODRINE HYDROCHLORIDE SCH MG: 5 TABLET ORAL at 21:06

## 2017-10-25 RX ADMIN — ALBUMIN (HUMAN) SCH MLS/HR: 0.25 INJECTION, SOLUTION INTRAVENOUS at 12:24

## 2017-10-25 RX ADMIN — MIDODRINE HYDROCHLORIDE SCH MG: 5 TABLET ORAL at 08:17

## 2017-10-25 RX ADMIN — DEXAMETHASONE SODIUM PHOSPHATE PRN MG: 10 INJECTION, SOLUTION INTRAMUSCULAR; INTRAVENOUS at 03:28

## 2017-10-25 RX ADMIN — CYANOCOBALAMIN TAB 500 MCG SCH MCG: 500 TAB at 08:16

## 2017-10-25 RX ADMIN — METOCLOPRAMIDE HYDROCHLORIDE SCH MG: 10 INJECTION, SOLUTION INTRAMUSCULAR; INTRAVENOUS at 12:08

## 2017-10-25 RX ADMIN — FLUOCINONIDE SCH APPLIC: 0.5 CREAM TOPICAL at 10:13

## 2017-10-25 RX ADMIN — MIDODRINE HYDROCHLORIDE SCH MG: 5 TABLET ORAL at 12:24

## 2017-10-25 RX ADMIN — ALBUMIN (HUMAN) SCH MLS/HR: 0.25 INJECTION, SOLUTION INTRAVENOUS at 18:00

## 2017-10-25 NOTE — HP
DATE OF ADMISSION: 10/24/2017

 

REASON FOR ADMISSION:  Chest pain and abdominal pain.

 

HISTORY OF PRESENT ILLNESS:  This is a 57-year-old male with a past medical history of liver cirrhos
is, status post portal shunt tips in 2017 for recurrent ascites, history of esophageal varices, ETOH
 abuse, chronic pancreatitis.  He has history of coronary artery disease status post 3 stents at [facility]
A, history of encephalopathy, diabetes, anemia, status post EGD on 08/22/2017 with gastroparesis, wh
o was admitted to [facility] from 08/12/2017 to 08/25/2017 with a prolonged hospit
alization course.  At that time, patient was found to have severe sepsis with Staphylococcus bactere
zafar and fungemia.  Patient was followed by multiple consultants.  Patient was discharged home in a s
table condition.  However, according to the patient since the last discharge, he has been admitted, 
one time to [facility] for altered mental status.  He was found to have hepatic encephalopathy an
d UTI and was discharged home in 2 to 3 days.  The second time, he got admitted again for hepatic en
cephalopathy and vertigo [facility].  

 

Although according to the patient, overall his condition has been deteriorating.  Since the last 5 d
ays, he has been unable to eat anything p.o.  He has been also having some epigastric pain.  He feel
s that everything is stuck there.  He has hardly eaten anything.  Every time he drinks, he vomits ou
t.  The patient also has been feeling weak, dizzy.  The patient has also on and off experienced left
-sided chest pain and also shortness of breath.  According to the patient, he is also having worseni
ng bilateral lower extremity swelling for the last few weeks.  Per wife, the patient has been taking
 Lasix and Aldactone at home, but has not been urinating much, but it has been less for the last few
 days.  Per patient, he has also started noticing a blackish discoloration of the right great toe th
at probably started when he hit himself while on the wheelchair, but there were no fevers, no chills
.  

 

Due to persistent complaints, the family was worried and patient was brought into the ER.  On arriva
l to ED, vital signs were temperature 99.0, heart rate was 112, respirations 16, blood pressure 154/
87.  Sodium was 125, potassium 5.6, bicarbonate 25, BUN 24, creatinine 1.18.  Troponin 0.025 and the
 patient was given aspirin, nitro and was admitted for further management.  A chest x-ray was essent
ially negative.

 

PAST MEDICAL HISTORY:

1.  Liver cirrhosis.

2.  Chronic pancreatitis.

3.  Recurrent ascites, status post TIPS placement in 2017.

4.  History of coronary artery disease status post PCI x3.

5.  Diabetes.

6.  Hypertension.

7.  Bilateral lower extremity edema.

8.  Anemia.

9.  History of gastroparesis. 

 

ALLERGIES:  NONE.

 

PAST SURGICAL HISTORY:  TIPS placement and cardiac stents.

 

SOCIAL HISTORY:  Denies any history of smoking, alcohol or any drug use.  Currently lives at home wi
th his wife and uses a wheelchair.

 

FAMILY HISTORY:  Noncontributory.

 

REVIEW OF SYSTEMS:  The patient complained of decreased p.o. intake, nausea, vomiting, also have elena
e epigastric pain, left-sided chest pressure, some shortness of breath, worsening bilateral lower ex
tremity edema with some bleeding from the gums.  Denies any hematemesis, any melena, any bright red 
blood per rectum.  Denies any headache, any blurry vision.  Also noted to have decreased urine outpu
t.

 

PHYSICAL EXAMINATION

VITAL SIGNS:  Temperature 99.0, heart rate 112, now currently 104, blood pressure 154/87.

GENERAL:  The patient is awake, alert, oriented, appears to be in mild distress.  Oral mucosa is dry
.  Some bleeding noted on the lips.

NECK:  Supple, minimal JVD.

HEART:  Regular tachycardia.

LUNGS:  Decreased breaths sounds bilaterally.

ABDOMEN:  Soft, distended.  Minimal ascites.

EXTREMITIES:  2 to 3+ edema lower extremities.  Right great toe has a 3 x 4 cm area of black eschar.
  Pulses could not be appreciated because of the severe bilateral lower extremity edema. 

 

LABORATORIES:  Sodium 125, potassium 5.6, chloride 95, bicarbonate 25, BUN of 24, creatinine 1.18, c
alcium 10.2.  Troponin is 0.025.  White count is 10.4, hemoglobin 10.7, platelet count 223.  PT INR 
pending.  UA not sent.  

 

EKG does not show any acute ST or T wave changes.  The patient has sinus tachycardia.  

 

Chest x-ray, improved lung inflation over interval with resolution of previously lung base atelectas
is.

 

ASSESSMENT AND PLAN:  This is a 57-year-old male presenting with:

1.  Abdominal pain, nausea, vomiting, unable to take anything p.o.  There are multiple possibilities
 in this patient.  The patient has history of chronic pancreatitis.  Also, patient had an EGD done b
y Dr. Seth that showed patient's severe esophagus varicose vein  had completely subsided, gastropa
thy severe gastroparesis.

2.  Chest pain, shortness of breath could be deconditioning; however, the patient has history of cor
onary artery disease, cannot rule out ischemia.

3.  Hyponatremia, likely hypervolemic state.

4.  Hyperkalemia.  Patient is in hypervolemic state, plus the patient has not been eating for the pa
st few days, plus patient was taking spironolactone.

5.  Mild acute kidney injury, likely secondary to poor p.o. intake third spacing plus taking diureti
cs.

6.  Bilateral lower extremity edema could be secondary to liver cirrhosis versus CHF.

7.  Right toe questionable gangrene.

8.  Diabetes, uncontrolled.

9.  History of staph and fungus bacteremia.

10.  History of urinary tract infection.

 

PLAN:  The patient is admitted to telemetry.  We will keep the patient n.p.o., some Reglan, Zofran, 
Pepcid.  A GI consult with Dr. Seth.  Kayexalate has already been given.  We will continue the pat
ient on IV Lasix.  Echo, cardiology consult, duplex for right lower extremity.  Podiatry consult.  T
he rest of the treatment will depend on the patient's hospitalization course.  We will also check fo
r LFTs, PATIENT, INR.

 

 

Dictated By: NEREYDA GREGG/MARKO

DD:    10/25/2017 11:01:20

DT:    10/25/2017 13:41:51

Conf#: [id number]

DID#:  [id number]

## 2017-10-25 NOTE — RADRPT
PROCEDURE:   US Lower extremity Arteries. 

 

CLINICAL INDICATION:   Right foot gangrene

 

TECHNIQUE:   Multiple longitudinal and transverse images of the bilateral lower extremity arteries w
ere obtained with gray scale and color Doppler imaging. 

 

COMPARISON:   No prior studies are available for comparison. 

 

FINDINGS:

 

VesselRightLeft

FWM444 cm/sec98 cm/sec

USS849 cm/sec 107 cm/sec

FSH33-036 cm/dwj50-472 cm/sec

SHZ547 cm/sec79 cm/sec

PTA67 cm/sec24 cm/sec

DPA39 cm/sec27 cm/sec

 

Multiphasic flow is seen within the bilateral common femoral, superficial femoral, popliteal, poster
ior tibial, and dorsalis pedis arteries.

 

Arterial pressure indices:

VesselRightLeft

PTcalcifiedcalcified

DPcalcifiedcalcified

Toe0.851.02

 

There are calcifications along the bilateral posterior tibial and dorsalis pedis arteries.

 

 

IMPRESSION:

 

1. No focal stenosis is identified along the bilateral lower extremity arteries. 

2. Calcifications along the bilateral posterior tibial and dorsalis pedis arteries.

3. Decreased LUCIO measurement in the right toe (0.85), consistent with mild peripheral artery disease
. Bilateral posterior tibial and dorsalis pedis artery LUCIO measurements could not be obtained due to
 arterial calcifications.

 

 

RPTAT: HTAR

_____________________________________________ 

.Zachary Vu MD, MD           Date    Time 

Electronically viewed and signed by .Zachary Vu MD, MD on 10/25/2017 17:05 

 

D:  10/25/2017 17:05  T:  10/25/2017 17:05

.R/

## 2017-10-25 NOTE — QN
Documentation


Comment


Pt seen and examined at bedside. #[id number]











NEREYDA SANCHEZ MD Oct 25, 2017 11:02

## 2017-10-25 NOTE — RADRPT
Echocardiogram Report

 

Patient Name:  DONALD MIGUEL   Gender:       Male

MRN:           [id number]         Accession #:  [id number]

Birth Date:    1960     Study Date:   25-Oct-2017

Sonographer:   THADDEUS             Location:     [facility]

 

Ref. Physician: JCARLOS ARTHUR

Quality: Good

 

Procedures: Transthoracic echocardiogram with complete 2D, M-Mode, and 

doppler examination.

Indications: Chest Pain.

 

2D/M Mode                          Doppler

Measurement  Value  Normal Ranges  Measurement     Value  Normal Ranges

AoR Diam MM  2.9    cm             KLAUS Vmax        2.0    cm2

ACS MM       1.9    cm             KLAUS VTI         2.0    cm2

LA/Ao MM     1.1                   AV Peak Luis     1.4    m/sec

LA Dimen MM  3.1    cm             AV Peak PG      8.0    mmHg

LVIDd 2D     4.4    3.5 - 5.6 cm   LVOT Peak Luis   1.0    m/sec

LVIDs 2D     3.0    2.1 - 4.1 cm   LVOT Peak PG    4.4    mmHg

LVPWd 2D     1.0    0.6 - 1.1 cm   MV E Peak Luis   0.8    m/sec

IVSd 2D      1.0    0.6 - 1.1 cm   MV A Peak Luis   1.1    m/sec

EDV 2D       88.2   cm3            MV E/A          0.7

ESV 2D       27.3   cm3            MV Decel Time   316    msec

EF 2D        60.0   50.0 - 65.0 %  MV Decel Dale  3

LVOT Diam    1.8    cm             MV E/A          0.7

 

Findings

Left Ventricle: Normal left ventricular systolic function.  Normal left 

ventricular cavity size.  Normal left ventricular wall thickness.  

Ejection fraction is visually estimated at 6065 %.  Tissue 

Doppler/Mitral Doppler indices are within normal limits.

Right Ventricle: Normal right ventricular size.  Normal right 

ventricular systolic function.

Left Atrium: The left atrium is normal in size.

Right Atrium: The right atrium is normal in size.

Mitral Valve: Normal appearance and function of the mitral valve with 

trace physiologic regurgitation.

Aortic Valve: Normal appearance of the aortic valve.  No significant 

aortic stenosis or insufficiency.

Tricuspid Valve: Normal appearance of the tricuspid valve.  Normal 

appearance and function of the tricuspid valve with trace physiologic 

regurgitation.  Unable to obtain RVSP due to minimal presence of 

tricuspid regurgitation.

Pulmonic Valve: Normal pulmonic valve appearance.

Pericardium: Normal pericardium with no significant pericardial effusion.

Aorta: Normal aortic root.

IVC: Normal size and normal respiratory collapse consistent with normal 

right atrial pressure.

 

Conclusions

1.Normal left ventricular systolic function.  Normal left ventricular 

cavity size.  Normal left ventricular wall thickness.  Ejection fraction 

is visually estimated at 60-65 %.  Tissue Doppler/Mitral Doppler indices 

are within normal limits.

2.Normal appearance and function of the mitral valve with trace 

physiologic regurgitation.

3.Normal appearance and function of the tricuspid valve with trace 

physiologic regurgitation.  Unable to obtain RVSP due to minimal 

presence of tricuspid regurgitation.

 

Electronically Signed By:

Jcarlos Arthur

25-Oct-2017 17:17:42  -0700

 

Patient Name: DONALD MIGUEL

MRN: [id number]

Study Date: 25-Oct-2017

 

[id number]

## 2017-10-26 VITALS — HEART RATE: 101 BPM

## 2017-10-26 VITALS — DIASTOLIC BLOOD PRESSURE: 60 MMHG | RESPIRATION RATE: 18 BRPM | SYSTOLIC BLOOD PRESSURE: 101 MMHG

## 2017-10-26 VITALS — HEART RATE: 98 BPM

## 2017-10-26 VITALS — SYSTOLIC BLOOD PRESSURE: 102 MMHG | DIASTOLIC BLOOD PRESSURE: 62 MMHG | RESPIRATION RATE: 16 BRPM

## 2017-10-26 VITALS — SYSTOLIC BLOOD PRESSURE: 108 MMHG | RESPIRATION RATE: 20 BRPM | DIASTOLIC BLOOD PRESSURE: 62 MMHG

## 2017-10-26 VITALS — SYSTOLIC BLOOD PRESSURE: 103 MMHG | DIASTOLIC BLOOD PRESSURE: 58 MMHG | RESPIRATION RATE: 18 BRPM

## 2017-10-26 VITALS — HEART RATE: 95 BPM

## 2017-10-26 VITALS — SYSTOLIC BLOOD PRESSURE: 110 MMHG | DIASTOLIC BLOOD PRESSURE: 58 MMHG | RESPIRATION RATE: 18 BRPM

## 2017-10-26 VITALS — SYSTOLIC BLOOD PRESSURE: 100 MMHG | RESPIRATION RATE: 20 BRPM | DIASTOLIC BLOOD PRESSURE: 61 MMHG

## 2017-10-26 VITALS — HEART RATE: 93 BPM

## 2017-10-26 LAB
ANION GAP SERPL CALC-SCNC: 9 MMOL/L (ref 8–16)
BASOPHILS # BLD AUTO: 0 10^3/UL (ref 0–0.1)
BASOPHILS NFR BLD: 0.3 % (ref 0–2)
BUN SERPL-MCNC: 16 MG/DL (ref 7–20)
CALCIUM SERPL-MCNC: 9 MG/DL (ref 8.4–10.2)
CHLORIDE SERPL-SCNC: 90 MMOL/L (ref 97–110)
CO2 SERPL-SCNC: 29 MMOL/L (ref 21–31)
CREAT SERPL-MCNC: 0.81 MG/DL (ref 0.61–1.24)
EOSINOPHIL # BLD: 0.1 10^3/UL (ref 0–0.5)
EOSINOPHIL NFR BLD: 1.3 % (ref 0–7)
ERYTHROCYTE [DISTWIDTH] IN BLOOD BY AUTOMATED COUNT: 17 % (ref 11.5–14.5)
GLUCOSE SERPL-MCNC: 96 MG/DL (ref 70–220)
HCT VFR BLD CALC: 23.5 % (ref 42–52)
HGB BLD-MCNC: 7.9 G/DL (ref 14–18)
LYMPHOCYTES # BLD AUTO: 0.6 10^3/UL (ref 0.8–2.9)
LYMPHOCYTES NFR BLD AUTO: 9.1 % (ref 15–51)
MCH RBC QN AUTO: 28.7 PG (ref 29–33)
MCHC RBC AUTO-ENTMCNC: 33.6 G/DL (ref 32–37)
MCV RBC AUTO: 85.5 FL (ref 82–101)
MONOCYTES # BLD: 0.9 10^3/UL (ref 0.3–0.9)
MONOCYTES NFR BLD: 13.5 % (ref 0–11)
NEUTROPHILS # BLD: 5.1 10^3/UL (ref 1.6–7.5)
NEUTROPHILS NFR BLD AUTO: 75.5 % (ref 39–77)
NRBC # BLD MANUAL: 0 10^3/UL (ref 0–0)
NRBC BLD AUTO-RTO: 0 /100WBC (ref 0–0)
PLATELET # BLD: 122 10^3/UL (ref 140–415)
PMV BLD AUTO: 10.6 FL (ref 7.4–10.4)
POTASSIUM SERPL-SCNC: 4.3 MMOL/L (ref 3.5–5.1)
RBC # BLD AUTO: 2.75 10^6/UL (ref 4.7–6.1)
SODIUM SERPL-SCNC: 124 MMOL/L (ref 135–144)
WBC # BLD AUTO: 6.8 10^3/UL (ref 4.8–10.8)

## 2017-10-26 RX ADMIN — METOCLOPRAMIDE HYDROCHLORIDE SCH MG: 10 INJECTION, SOLUTION INTRAMUSCULAR; INTRAVENOUS at 00:13

## 2017-10-26 RX ADMIN — FLUOCINONIDE SCH APPLIC: 0.5 CREAM TOPICAL at 13:42

## 2017-10-26 RX ADMIN — LACTULOSE SCH GM: 10 SOLUTION ORAL at 21:40

## 2017-10-26 RX ADMIN — FLUOCINONIDE SCH APPLIC: 0.5 CREAM TOPICAL at 21:41

## 2017-10-26 RX ADMIN — CYANOCOBALAMIN TAB 500 MCG SCH MCG: 500 TAB at 13:41

## 2017-10-26 RX ADMIN — LACTULOSE SCH GM: 10 SOLUTION ORAL at 13:40

## 2017-10-26 RX ADMIN — MIDODRINE HYDROCHLORIDE SCH MG: 5 TABLET ORAL at 13:41

## 2017-10-26 RX ADMIN — INSULIN GLARGINE SCH UNIT: 100 INJECTION, SOLUTION SUBCUTANEOUS at 22:06

## 2017-10-26 RX ADMIN — MIDODRINE HYDROCHLORIDE SCH MG: 5 TABLET ORAL at 21:40

## 2017-10-26 RX ADMIN — RIFAXIMIN SCH MG: 550 TABLET ORAL at 13:40

## 2017-10-26 RX ADMIN — CLOPIDOGREL SCH MG: 75 TABLET, FILM COATED ORAL at 13:40

## 2017-10-26 RX ADMIN — METOCLOPRAMIDE HYDROCHLORIDE SCH MG: 10 INJECTION, SOLUTION INTRAMUSCULAR; INTRAVENOUS at 13:41

## 2017-10-26 RX ADMIN — RIFAXIMIN SCH MG: 550 TABLET ORAL at 21:40

## 2017-10-26 RX ADMIN — PANTOPRAZOLE SODIUM SCH MG: 40 TABLET, DELAYED RELEASE ORAL at 05:32

## 2017-10-26 RX ADMIN — METOCLOPRAMIDE HYDROCHLORIDE SCH MG: 10 INJECTION, SOLUTION INTRAMUSCULAR; INTRAVENOUS at 05:32

## 2017-10-26 RX ADMIN — METOCLOPRAMIDE HYDROCHLORIDE SCH MG: 10 INJECTION, SOLUTION INTRAMUSCULAR; INTRAVENOUS at 18:39

## 2017-10-26 RX ADMIN — MIDODRINE HYDROCHLORIDE SCH MG: 5 TABLET ORAL at 13:00

## 2017-10-26 RX ADMIN — EZETIMIBE SCH MG: 10 TABLET ORAL at 13:40

## 2017-10-26 RX ADMIN — ALBUMIN (HUMAN) SCH MLS/HR: 0.25 INJECTION, SOLUTION INTRAVENOUS at 03:00

## 2017-10-26 NOTE — CONS
Date/Time of Note


Date/Time of Note


DATE: 10/26/17 


TIME: 12:10





Assessment/Plan


Assessment/Plan


Chief Complaint/Hosp Course


IMPRESSION:


1.  Chest pain.  Assess for acute coronary syndrome in a patient with history 

of stents, most recently June 2017.-trop neg x 3/ NL EF by echo


2.  Tachycardia with telemetries consistent with sinus tachycardia at this time.


3.  History of hypotension, on midodrine, with currently well controlled, good 

blood pressure at this time.


4.  Dyslipidemia, on Zetia.


5.  Liver failure, on liver transplant list, status post TIPS procedure.


6.  History of hepatic encephalopathy.


7.  Diabetes mellitus.


8.  Anemia.


9.  Hyponatremia, worsening.


10.  Coagulopathy.





Recc;


-tele


-serial ecg's


-continue plavix and would resume asa as tolerated for stent patency


-Continue midodrine


-Continue lasix


-Lexiscan stress test today


Problems:  





Consultation Date/Type/Reason


Admit Date/Time


Oct 24, 2017 at 22:59


Initial Consult Date


10/25/17


Type of Consultation:  cardiology


Reason for Consultation


chest pain


Referring Provider:  NEREYDA SANCHEZ MD





Exam/Review of Systems


Vital Signs


Vitals





 Vital Signs








  Date Time  Temp Pulse Resp B/P Pulse Ox O2 Delivery O2 Flow Rate FiO2


 


10/26/17 08:22  98      


 


10/26/17 07:11 98.5  18 101/60 100   














 Intake and Output   


 


 10/25/17 10/25/17 10/26/17





 15:00 23:00 07:00


 


Intake Total  750 ml 400 ml


 


Output Total  800 ml 1200 ml


 


Balance  -50 ml -800 ml











Exam


Review of Systems:


CONSTITUTIONAL:  No fevers, chills.


PULMONARY:  No sob


CARDIOVASCULAR: No chest pain/palpitations


GASTROINTESTINAL:  No nausea/vomiting.


GENITOURINARY:  No hematuria/dysuria.


MUSCULOSKELETAL:  No myagias/arthalgias.


PSYCHIATRIC:  The patient denies depression.


NEUROLOGIC:   No weakness


Constitutional:  alert


Psych:  no complaints


Head:  normocephalic


ENMT:  mucosa pink and moist


Neck:  jvd, supple


Respiratory:  diminished breath sounds


Cardiovascular:  regular rate and rhythm


Gastrointestinal:  non-tender, soft


Musculoskeletal:  muscle weakness (mild generalized)


Extremities:  pitting pedal edema (bilateral)


Neurological:  other (No focal deficits)





Results


Result Diagram:  


10/26/17 0732                                                                  

              10/26/17 0731





Results 24 hrs





Laboratory Tests








Test


  10/25/17


13:40 10/25/17


17:44 10/25/17


21:02 10/26/17


03:05


 


Sodium Level 122  L   


 


Potassium Level 4.7     


 


Chloride Level 85  L   


 


Carbon Dioxide Level 28     


 


Anion Gap 14     


 


Blood Urea Nitrogen 22  H   


 


Creatinine 0.94     


 


Glucose Level 235  H   


 


Calcium Level 9.1     


 


Total Bilirubin 1.0     


 


Direct Bilirubin 0.00     


 


Indirect Bilirubin 1.0     


 


Aspartate Amino Transf


(AST/SGOT) 84  H


  


  


  


 


 


Alanine Aminotransferase


(ALT/SGPT) 60  


  


  


  


 


 


Alkaline Phosphatase 231  H   


 


Ammonia 23     


 


Creatine Kinase 45     


 


Creatine Kinase Index 11.8     


 


Creatinine Kinase MB (Mass) 5.30  H   


 


Troponin I 0.024     


 


B-Type Natriuretic Peptide 390  H   


 


Total Protein 6.8     


 


Albumin 2.8  L   


 


Globulin 4.00  H   


 


Albumin/Globulin Ratio 0.70     


 


Lipase 70     


 


Bedside Glucose  217   235  H 109  














Test


  10/26/17


07:31 10/26/17


07:32 10/26/17


07:53 


 


 


Sodium Level 124  L   


 


Potassium Level 4.3     


 


Chloride Level 90  L   


 


Carbon Dioxide Level 29     


 


Anion Gap 9  #   


 


Blood Urea Nitrogen 16     


 


Creatinine 0.81     


 


Glucose Level 96  #   


 


Calcium Level 9.0     


 


White Blood Count  6.8  #  


 


Red Blood Count  2.75  #L  


 


Hemoglobin  7.9  #L  


 


Hematocrit  23.5  #L  


 


Mean Corpuscular Volume  85.5    


 


Mean Corpuscular Hemoglobin  28.7  L  


 


Mean Corpuscular Hemoglobin


Concent 


  33.6  


  


  


 


 


Red Cell Distribution Width  17.0  H  


 


Platelet Count  122  #L  


 


Mean Platelet Volume  10.6  H  


 


Neutrophils %  75.5    


 


Lymphocytes %  9.1  L  


 


Monocytes %  13.5  H  


 


Eosinophils %  1.3    


 


Basophils %  0.3    


 


Nucleated Red Blood Cells %  0.0    


 


Neutrophils #  5.1    


 


Lymphocytes #  0.6  L  


 


Monocytes #  0.9    


 


Eosinophils #  0.1    


 


Basophils #  0.0    


 


Nucleated Red Blood Cells #  0.0    


 


Bedside Glucose   103   











Medications


Medications





 Current Medications


Ondansetron HCl (Zofran Inj) 4 mg Q6H  PRN IV NAUSEA AND/OR VOMITING;  Start 10/

25/17 at 00:30


Acetaminophen (Tylenol Tab) 650 mg Q6H  PRN PO PAIN LEVEL 1-3 OR FEVER;  Start 

10/25/17 at 00:30


Acetaminophen/ Hydrocodone Bitart (Norco (5/325)) 1 tab Q6H  PRN PO MODERATE 

PAIN LEVEL 4-6;  Start 10/25/17 at 00:30


Morphine Sulfate (morphine) 2 mg Q4H  PRN IV SEVERE PAIN LEVEL 7-10 Last 

administered on 10/25/17at 03:30; Admin Dose 2 MG;  Start 10/25/17 at 00:30


Docusate Sodium (Colace) 100 mg Q12H  PRN PO CONSTIPATION;  Start 10/25/17 at 00

:30


Clopidogrel Bisulfate (plaVIX) 75 mg DAILY PO  Last administered on 10/25/17at 

08:16; Admin Dose 75 MG;  Start 10/25/17 at 09:00


Cyanocobalamin (Vitamin B12) 500 mcg DAILY PO  Last administered on 10/25/17at 

08:16; Admin Dose 500 MCG;  Start 10/25/17 at 09:00


Eye Lubricant (Artificial Tears Oph) 2 drop TID  PRN BOTH EYES PRN;  Start 10/25

/17 at 00:30


Diphenhydramine HCl (Benadryl) 25 mg Q6  PRN PO ITCHING;  Start 10/25/17 at 00:

30


EZETIMIBE (Zetia) 10 mg DAILY PO  Last administered on 10/25/17at 08:17; Admin 

Dose 10 MG;  Start 10/25/17 at 09:00


Fluocinonide (Lidex 0.05% Cr) 1 applic BID TOP  Last administered on 10/25/17at 

21:13; Admin Dose 1 APPLIC;  Start 10/25/17 at 09:00


Lactulose (Enulose) 26.7 gm BID PO  Last administered on 10/25/17at 21:05; 

Admin Dose 26.7 GM;  Start 10/25/17 at 09:00


Midodrine (Proamatine) 5 mg TID PO  Last administered on 10/25/17at 21:06; 

Admin Dose 5 MG;  Start 10/25/17 at 09:00


Pantoprazole (Protonix Tab) 40 mg DAILY@06 PO  Last administered on 10/26/17at 

05:32; Admin Dose 40 MG;  Start 10/25/17 at 06:00


Rifaximin (Xifaxan) 550 mg BID PO  Last administered on 10/25/17at 21:05; Admin 

Dose 550 MG;  Start 10/25/17 at 09:00


Diagnostic Test (Pha) (Accu-Chek) 1 ea 02 XX  Last administered on 10/25/17at 02

:41; Admin Dose 1 EA;  Start 10/25/17 at 02:00


Miscellaneous Information 1 ea NOTE XX ;  Start 10/25/17 at 00:45


Glucose (Glutose) 15 gm Q15M  PRN PO DECREASED GLUCOSE;  Start 10/25/17 at 00:45


Glucose (Glutose) 22.5 gm Q15M  PRN PO DECREASED GLUCOSE;  Start 10/25/17 at 00:

45


Dextrose (D50w Syringe) 25 ml Q15M  PRN IV DECREASED GLUCOSE;  Start 10/25/17 

at 00:45


Dextrose (D50w Syringe) 50 ml Q15M  PRN IV DECREASED GLUCOSE;  Start 10/25/17 

at 00:45


Glucagon (Glucagen) 1 mg Q15M  PRN IM DECREASED GLUCOSE;  Start 10/25/17 at 00:

45


Glucose (Glutose) 15 gm Q15M  PRN BUCCAL DECREASED GLUCOSE;  Start 10/25/17 at 

00:45


Temazepam (Restoril) 15 mg QHS  PRN PO insomnia;  Start 10/25/17 at 02:30


Metoclopramide HCl (Reglan) 10 mg Q6 IV  Last administered on 10/26/17at 05:32; 

Admin Dose 10 MG;  Start 10/25/17 at 12:00


Lorazepam (Ativan) 0.5 mg Q12H  PRN IV Anxiety;  Start 10/25/17 at 21:00


Insulin Glargine (Lantus) 15 unit HS SC  Last administered on 10/25/17at 21:12; 

Admin Dose 15 UNIT;  Start 10/25/17 at 21:00


Furosemide (Lasix) 20 mg DAILY IV ;  Start 10/26/17 at 09:00


Nitroglycerin (Nitroglycerin (Sl Tab) 0.4 Mg) 1 tab Q5M  PRN SL ANGINA;  Start 

10/25/17 at 14:00











JCARLOS ROBERSON Oct 26, 2017 12:18

## 2017-10-26 NOTE — PN
Date/Time of Note


Date/Time of Note


DATE: 10/26/17 


TIME: 10:25





Assessment/Plan


VTE Prophylaxis


VTE Prophylaxis Intervention:  contraindicated





Lines/Catheters


IV Catheter Type (from [facility]):  Peripheral IV


Urinary Cath still in place:  No





Assessment/Plan


Chief Complaint/Hosp Course


58 y/o with 


1 Abdominal pain, nausea, vomiting, unable to take anything p.o.  There are 

multiple possibilities in this patient.  The patient has history of chronic 

pancreatitis.  Also, patient had an EGD done by Dr. Seth that showed patient'

s severe esophagus varicose vein  had completely subsided, gastropathy severe 

gastroparesis. It could be gastritis/gastroparesis


2.  Chest pain, shortness of breath could be deconditioning; however, the 

patient has history of coronary artery disease, cannot rule out ischemia. 

Lexiscan today


3.  Hyponatremia, likely hypervolemic state. will check U na and U osmolarity , 

fluid restriction and c/w Lasix


4.  Hyperkalemia.  likely secondary to  decreased UOP , plus the patient has 

not been eating for the past few days, plus patient was taking spironolactone.


5.  Mild acute kidney injury, likely secondary to poor p.o. intake third 

spacing plus taking diuretics.


6.  Bilateral lower extremity edema could be secondary to liver cirrhosis 

versus CHF.


7.  Right toe questionable gangrene. ,Duplex showed reduced LUCIO on Rt 


8.  Diabetes, uncontrolled.


9.  History of staph and fungus bacteremia.


10.  History of urinary tract infection.


11. Anemia 10.7>7.9 ? Upper GI vs Bleeding lips?





Recs


- c/w iv lasix


- Monitor H/H closely,


- Fluid restriction , U na and  U osm


- Lexiscan today


- Wound care


-  If H/H continue to drop will ask cards about plavix


- Lantus and ISS


Problems:  





Subjective


24 Hr Interval Summary


Free Text/Dictation


Mild bleeding noted from lips


Feels better today


Going for lexiscan today





Exam/Review of Systems


Vital Signs


Vitals





 Vital Signs








  Date Time  Temp Pulse Resp B/P Pulse Ox O2 Delivery O2 Flow Rate FiO2


 


10/26/17 08:22  98      


 


10/26/17 07:11 98.5  18 101/60 100   














 Intake and Output   


 


 10/25/17 10/25/17 10/26/17





 15:00 23:00 07:00


 


Intake Total  750 ml 400 ml


 


Output Total  800 ml 1200 ml


 


Balance  -50 ml -800 ml











Exam


GENERAL:  The patient is awake, alert, oriented, appears to be in mild 

distress.  Oral mucosa is dry.  Some bleeding noted on the lips.


NECK:  Supple, minimal JVD.


HEART:  Regular rate and rthym 


LUNGS:  Decreased breaths sounds bilaterally.


ABDOMEN:  Soft, distended.  Minimal ascites.


EXTREMITIES:  2 to 3+ edema lower extremities.  Right great toe has a 3 x 4 cm 

area of black eschar.  Pulses could not be appreciated because of the severe 

bilateral lower extremity edema.





Results


Result Diagram:  


10/26/17 0732                                                                  

              10/26/17 0731





Results 24 hrs





Laboratory Tests








Test


  10/25/17


11:13 10/25/17


12:03 10/25/17


13:40 10/25/17


17:44


 


Prothrombin Time 17.8  H   


 


Prothrombin Time Ratio 1.4     


 


INR International Normalized


Ratio 1.46  


  


  


  


 


 


Sodium Level 123  L  122  L 


 


Potassium Level 5.0    4.7   


 


Chloride Level 85  L  85  L 


 


Carbon Dioxide Level 26    28   


 


Anion Gap 17  H  14   


 


Blood Urea Nitrogen 22  H  22  H 


 


Creatinine 0.93    0.94   


 


Glucose Level 217    235  H 


 


Calcium Level 9.0    9.1   


 


Bedside Glucose  258  H  217  


 


Total Bilirubin   1.0   


 


Direct Bilirubin   0.00   


 


Indirect Bilirubin   1.0   


 


Aspartate Amino Transf


(AST/SGOT) 


  


  84  H


  


 


 


Alanine Aminotransferase


(ALT/SGPT) 


  


  60  


  


 


 


Alkaline Phosphatase   231  H 


 


Ammonia   23   


 


Creatine Kinase   45   


 


Creatine Kinase Index   11.8   


 


Creatinine Kinase MB (Mass)   5.30  H 


 


Troponin I   0.024   


 


B-Type Natriuretic Peptide   390  H 


 


Total Protein   6.8   


 


Albumin   2.8  L 


 


Globulin   4.00  H 


 


Albumin/Globulin Ratio   0.70   


 


Lipase   70   














Test


  10/25/17


21:02 10/26/17


03:05 10/26/17


07:31 10/26/17


07:32


 


Bedside Glucose 235  H 109    


 


Sodium Level   124  L 


 


Potassium Level   4.3   


 


Chloride Level   90  L 


 


Carbon Dioxide Level   29   


 


Anion Gap   9  # 


 


Blood Urea Nitrogen   16   


 


Creatinine   0.81   


 


Glucose Level   96  # 


 


Calcium Level   9.0   


 


White Blood Count    6.8  #


 


Red Blood Count    2.75  #L


 


Hemoglobin    7.9  #L


 


Hematocrit    23.5  #L


 


Mean Corpuscular Volume    85.5  


 


Mean Corpuscular Hemoglobin    28.7  L


 


Mean Corpuscular Hemoglobin


Concent 


  


  


  33.6  


 


 


Red Cell Distribution Width    17.0  H


 


Platelet Count    122  #L


 


Mean Platelet Volume    10.6  H


 


Neutrophils %    75.5  


 


Lymphocytes %    9.1  L


 


Monocytes %    13.5  H


 


Eosinophils %    1.3  


 


Basophils %    0.3  


 


Nucleated Red Blood Cells %    0.0  


 


Neutrophils #    5.1  


 


Lymphocytes #    0.6  L


 


Monocytes #    0.9  


 


Eosinophils #    0.1  


 


Basophils #    0.0  


 


Nucleated Red Blood Cells #    0.0  














Test


  10/26/17


07:53 


  


  


 


 


Bedside Glucose 103     











Medications


Medications





 Current Medications


Ondansetron HCl (Zofran Inj) 4 mg Q6H  PRN IV NAUSEA AND/OR VOMITING;  Start 10/

25/17 at 00:30


Acetaminophen (Tylenol Tab) 650 mg Q6H  PRN PO PAIN LEVEL 1-3 OR FEVER;  Start 

10/25/17 at 00:30


Acetaminophen/ Hydrocodone Bitart (Norco (5/325)) 1 tab Q6H  PRN PO MODERATE 

PAIN LEVEL 4-6;  Start 10/25/17 at 00:30


Morphine Sulfate (morphine) 2 mg Q4H  PRN IV SEVERE PAIN LEVEL 7-10 Last 

administered on 10/25/17at 03:30; Admin Dose 2 MG;  Start 10/25/17 at 00:30


Docusate Sodium (Colace) 100 mg Q12H  PRN PO CONSTIPATION;  Start 10/25/17 at 00

:30


Clopidogrel Bisulfate (plaVIX) 75 mg DAILY PO  Last administered on 10/25/17at 

08:16; Admin Dose 75 MG;  Start 10/25/17 at 09:00


Cyanocobalamin (Vitamin B12) 500 mcg DAILY PO  Last administered on 10/25/17at 

08:16; Admin Dose 500 MCG;  Start 10/25/17 at 09:00


Eye Lubricant (Artificial Tears Oph) 2 drop TID  PRN BOTH EYES PRN;  Start 10/25

/17 at 00:30


Diphenhydramine HCl (Benadryl) 25 mg Q6  PRN PO ITCHING;  Start 10/25/17 at 00:

30


EZETIMIBE (Zetia) 10 mg DAILY PO  Last administered on 10/25/17at 08:17; Admin 

Dose 10 MG;  Start 10/25/17 at 09:00


Fluocinonide (Lidex 0.05% Cr) 1 applic BID TOP  Last administered on 10/25/17at 

21:13; Admin Dose 1 APPLIC;  Start 10/25/17 at 09:00


Lactulose (Enulose) 26.7 gm BID PO  Last administered on 10/25/17at 21:05; 

Admin Dose 26.7 GM;  Start 10/25/17 at 09:00


Midodrine (Proamatine) 5 mg TID PO  Last administered on 10/25/17at 21:06; 

Admin Dose 5 MG;  Start 10/25/17 at 09:00


Pantoprazole (Protonix Tab) 40 mg DAILY@06 PO  Last administered on 10/26/17at 

05:32; Admin Dose 40 MG;  Start 10/25/17 at 06:00


Rifaximin (Xifaxan) 550 mg BID PO  Last administered on 10/25/17at 21:05; Admin 

Dose 550 MG;  Start 10/25/17 at 09:00


Diagnostic Test (Pha) (Accu-Chek) 1 ea 02 XX  Last administered on 10/25/17at 02

:41; Admin Dose 1 EA;  Start 10/25/17 at 02:00


Miscellaneous Information 1 ea NOTE XX ;  Start 10/25/17 at 00:45


Glucose (Glutose) 15 gm Q15M  PRN PO DECREASED GLUCOSE;  Start 10/25/17 at 00:45


Glucose (Glutose) 22.5 gm Q15M  PRN PO DECREASED GLUCOSE;  Start 10/25/17 at 00:

45


Dextrose (D50w Syringe) 25 ml Q15M  PRN IV DECREASED GLUCOSE;  Start 10/25/17 

at 00:45


Dextrose (D50w Syringe) 50 ml Q15M  PRN IV DECREASED GLUCOSE;  Start 10/25/17 

at 00:45


Glucagon (Glucagen) 1 mg Q15M  PRN IM DECREASED GLUCOSE;  Start 10/25/17 at 00:

45


Glucose (Glutose) 15 gm Q15M  PRN BUCCAL DECREASED GLUCOSE;  Start 10/25/17 at 

00:45


Temazepam (Restoril) 15 mg QHS  PRN PO insomnia;  Start 10/25/17 at 02:30


Metoclopramide HCl (Reglan) 10 mg Q6 IV  Last administered on 10/26/17at 05:32; 

Admin Dose 10 MG;  Start 10/25/17 at 12:00


Lorazepam (Ativan) 0.5 mg Q12H  PRN IV Anxiety;  Start 10/25/17 at 21:00


Insulin Glargine (Lantus) 15 unit HS SC  Last administered on 10/25/17at 21:12; 

Admin Dose 15 UNIT;  Start 10/25/17 at 21:00


Furosemide (Lasix) 20 mg DAILY IV ;  Start 10/26/17 at 09:00


Nitroglycerin (Nitroglycerin (Sl Tab) 0.4 Mg) 1 tab Q5M  PRN SL ANGINA;  Start 

10/25/17 at 14:00











NEREYDA SANCHEZ MD Oct 26, 2017 10:25

## 2017-10-26 NOTE — RADRPT
Vent Rate: 96 bpm

RR Interval: 0 msec

VT Interval: 108 msec

QRS Duration: 70 msec

QT Interval: 372 msec

QTC Interval: 469 msec

P-R-T Axis: 63 - 55 - 66 degrees

 

 Sinus rhythm with sinus arrhythmia with short VT

 Otherwise normal ECG

 

Electronically Signed By: Han Rjoas

[id number]

## 2017-10-26 NOTE — CONS
DATE OF ADMISSION: 10/24/2017

DATE OF CONSULTATION:  

 

 

 

TYPE OF CONSULTATION:  Gastroenterology.

 

HISTORY OF PRESENT ILLNESS:  The patient is a 57-year-old male with cirrhosis of liver, status post 
TIPS procedure, _____ abuse in the past, chronic pancreatitis, coronary artery disease status post s
tent, admitted to the hospital with nausea, vomiting.  He feels food is getting stuck and he has bee
n losing weight.  No shortness of breath.  He had some chest discomfort.  No  or CNS problem, no f
ever, no chills.  

 

PAST MEDICAL HISTORY:  Please review the old chart for further details.

 

ALLERGIES:  NONE.

 

PAST SURGICAL HISTORY:  TIPS procedure and cardiac stent.

 

SOCIAL HISTORY:  No smoking, no alcohol.

 

PHYSICAL EXAMINATION:

GENERAL:  Alert, awake, not in distress.

CARDIOVASCULAR:  No murmur, gallop or click.

LUNGS:  Clear.

ABDOMEN:  Benign.

EXTREMITIES:  No edema.

CENTRAL NERVOUS SYSTEM:  Grossly within normal limits.

NEUROLOGIC:  He has got mild _____.  He has got also 1+ pedal edema.

 

LABORATORY DATA:  The patient's INR is 1.4, BUN is 22, creatinine is within normal limits.  Glucose 
is 235.  Hematocrit was 31.2, dropped to 23, platelet count is 122.

 

IMPRESSION:

1.  Nausea, vomiting, most probably related to diabetic gastroparesis.

2.  Diabetes mellitus.

3.  Chronic pancreatitis.

4.  Cirrhosis of liver, status post TIPS procedure.

5.  History of encephalopathy, most probably aggravated by the TIPS procedure.

 

PLAN:  To start the patient empirically on Reglan to optimize the blood sugar.  Continue lactulose a
nd rifaximin and if despite that continues to have emesis, then we will do EGD.  We will monitor hem
atocrit also closely.  At this point as per the staff, there is no evidence of active bleeding; hughes
nikolas, if hematocrit drops down further we will transfuse him.

 

 

Dictated By: SIDDHARTH TELLEZ/MARKO

DD:    10/26/2017 12:34:46

DT:    10/26/2017 13:47:59

Conf#: [id number]

DID#:  [id number]

CC: NEREYDA SANCHEZ;*EndCC*

## 2017-10-26 NOTE — CONS
DATE OF ADMISSION: 10/24/2017

DATE OF CONSULTATION:  10/25/2017

 

 

 

CARDIOLOGY CONSULTATION

 

REASON FOR CONSULTATION:  Chest pain, assess for acute coronary syndrome.

 

REQUESTING PHYSICIAN:  Dr. Crabtree

 

HISTORY OF PRESENT ILLNESS:  Mr. Irizarry is a 57-year-old male with a history of end-stage liver disea
se, status post prior TIPS procedure, who is on liver transplant list at [facility], prior hepatic encepha
lopathy, anemia, prior GI bleed, history of ETOH abuse, history of esophageal varices, history of pr
ior stenting, most recently in June at [facility] per patient, prior to listing, who presented with compla
ints of substernal chest pain and associated shortness of breath.  Upon arrival, temperature of 97.9
, blood pressure 126/68, pulse 118, respiratory rate 18, satting 100%.  Patient's labs:  White blood
 cell count 10.4, hemoglobin 10.7, platelet count of 223.  Sodium 135, potassium 5.6, creatinine 1.1
, BUN 24.  Troponin negative.  INR 1.46.  UA negative.  The patient underwent a chest x-ray revealin
g improved lung inflation over interval with resolution of previously seen lung base atelectasis.  T
he patient's electrocardiogram reveals sinus tach, rate of 107, normal axis, normal intervals, with 
isolated T-wave flattening in lead aVL.  The patient subsequently admitted to the telemetry floor.  
Denies ongoing chest pain.  The patient describes the chest pain as a tightening to pressure-like fe
eling, occurring at rest.

 

PAST MEDICAL HISTORY:  As above in HPI.

 

MEDICATIONS CURRENTLY IN HOSPITAL:

1.  Ativan p.r.n.

2.  Lantus 10 units subQ at bedtime.

3.  Reglan _____ IV q.6.  

4.  Albumin IV q.8.

5.  Plavix 75 mg daily.

6.  Zetia 10 mg daily.

7.  Vitamin B12.

8.  Lactulose.

9.  Midodrine 5 mg p.o. t.i.d.

10.  Rifaximin.

 

ALLERGIES:  NO KNOWN DRUG ALLERGIES.

 

SOCIAL HISTORY:  No tobacco, ETOH or illicit drug use.

 

FAMILY HISTORY:  No history of sudden cardiac death or early CAD.

 

REVIEW OF SYSTEMS:  As above in HPI.

CONSTITUTIONAL:  No fevers, chills.

PULMONARY:  Shortness of breath.

CARDIOVASCULAR:  Chest pain, tachycardia.

GASTROINTESTINAL:  No vomiting.

GENITOURINARY:  No hematuria.

MUSCULOSKELETAL:  Degenerative joint disease.

PSYCHIATRIC:  The patient denies depression, but positive anxiety.

NEUROLOGIC:  History of encephalopathy.

 

PHYSICAL EXAMINATION:

VITAL SIGNS:  Temperature 99.2, blood pressure most recently 117/58, pulse 104, respiratory rate 18,
 saturating 99%.

GENERAL:  The patient is alert, awake, in no acute distress.

NECK:  JVP approximately 8 cm water.

CHEST:  Fair air movement throughout.

HEART:  Tachycardic, regular rhythm, normal S1, S2, I/VI systolic murmur, nondisplaced PMI.

ABDOMEN:  Positive bowel sounds, soft.

EXTREMITIES:  1 to 2+ edema bilaterally at the ankles, 1+ pulses bilaterally, posterior tibial.

 

LABORATORY DATA:  As above in HPI with most recently from today, sodium 123, potassium 5.0, creatini
ne 0.93, BUN 22.  Troponin negative x2.

 

IMAGING STUDIES:  As above in HPI.  No further imaging studies for my review at this time.

 

ECG:  No further electrocardiograms for my review at this time.

 

IMPRESSION:

1.  Chest pain.  Assess for acute coronary syndrome in a patient with history of stents, most recent
ly June 2017.

2.  Tachycardia with telemetries consistent with sinus tachycardia at this time.

3.  History of hypotension, on midodrine, with currently well controlled, good blood pressure at thi
s time.

4.  Dyslipidemia, on Zetia.

5.  Liver failure, on liver transplant list, status post TIPS procedure.

6.  History of hepatic encephalopathy.

7.  Diabetes mellitus.

8.  Anemia.

9.  Hyponatremia, worsening.

10.  Coagulopathy.

 

RECOMMENDATIONS:

1.  At this time, would maintain patient on telemetry monitoring to follow rhythm and rate control c
losely.

2.  Would continue the patient's midodrine for now, following blood pressure closely, with possibili
ty to wean off given elevated blood pressures.

3.  Continue the patient's Plavix for stent patency at this time and complete the patient's rule out
 for myocardial infarction, ensure that the patient's chest pain was not due to an acute coronary sy
ndrome such as acute myocardial infarction.

4.  We will check a 2D echocardiogram to further assess the patient's ejection fraction, wall motion
 and major valve abnormalities, and for ongoing chest pain, we will consider stress testing in this 
patient to further evaluate for the possibility of recurrent significant obstructive coronary _____ 
chest pain and subsequent admit to the hospital.

5.  Would give patient gentle intravenous Lasix diuresis, following sodium closely and creatinine.

 

Thank you for allowing me to take part in the care of this patient.  I will continue to follow along
 very closely with you.  Further recommendations will be made as the patient progresses through his 
inpatient hospital clinical course.

 

 

Dictated By: JCARLOS STEELE/MARKO

DD:    10/25/2017 13:36:50

DT:    10/26/2017 01:40:08

Conf#: [id number]

DID#:  [id number]

CC: NEREYDA CRABTREE;*End*

## 2017-10-26 NOTE — CONS
Date/Time of Note


Date/Time of Note


DATE: 10/26/17 


TIME: 21:13





Assessment/Plan


Assessment/Plan


Problems:  


(1) Lower leg edema


(2) Peripheral vascular disease


(3) Flank pain


Status:  Acute


(4) UTI (urinary tract infection)


Status:  Acute


(5) Opioid withdrawal


Status:  Acute


(6) Vomiting


Status:  Acute


(7) Chronic pain


Status:  Acute


Additional Assessment/Plan


No surgery recommended at this time.  Monitor patient daily.  Elevate heels 

bilaterally with heel to the air.  Moisturize skin daily.





Thank you again for involving me in the care of this patient.  If you have any 

questions regarding this case, please feel free to contact me at pager: [phone] or reach me at mobile: [phone].





Consultation Date/Type/Reason


Admit Date/Time


Oct 24, 2017 at 22:59


Date of Consultation:  Oct 26, 2017


Type of Consultation:  Foot and ankle surgery


Reason for Consultation


Lower extremity evaluation





Hx of Present Illness


Thank you very much for your kind consultation.  This is a 57-year-old male 

patient with verbal medical problems including diabetes mellitus with 

peripheral neuropathy, history of liver cirrhosis, history of esophageal varices

, ethanol alcohol abuse, chronic pancreatitis.  I was consulted to evaluate 

gangrenous changes in the right foot.  Patient reports pain in the right foot 

and denies fever and chills.  Reports no chest pain or shortness of breath.


Psychological:  no complaints





Past Medical History


As per history of present illness.





Past Surgical History


As per history of present illness.


Past Surgical Hx:  coronary bypass surgery





Social History


As per history of present illness.


Smoking Status:  Former smoker





Exam/Review of Systems


Vital Signs


Vitals





 Vital Signs








  Date Time  Temp Pulse Resp B/P Pulse Ox O2 Delivery O2 Flow Rate FiO2


 


10/26/17 20:33  95      


 


10/26/17 20:05 98.4  20 100/61 99   














 Intake and Output   


 


 10/25/17 10/25/17 10/26/17





 15:00 23:00 07:00


 


Intake Total  750 ml 400 ml


 


Output Total  800 ml 1200 ml


 


Balance  -50 ml -800 ml











Exam


Patient is laying supine in bed in no acute distress.  Distal right hallux 

gangrenous changes noted with no surrounding erythema.  Bilateral lower 

extremity edema noted.  Left fourth toe onychodystrophy and discoloration of 

nail noted.  Left third toe open wound present on the lateral aspect with mild 

edema and erythema.  Tender to palpation.  Palpable pulses but weak bilaterally 

with decreased sensation to sharp dull vibratory temperature stimuli.  Labs 

reviewed.





Results


Result Diagram:  


10/26/17 0732                                                                  

              10/26/17 1905





Results 24 hrs





Laboratory Tests








Test


  10/26/17


03:05 10/26/17


07:31 10/26/17


07:32 10/26/17


07:53


 


Bedside Glucose 109     103  


 


Sodium Level  124  L  


 


Potassium Level  4.3    


 


Chloride Level  90  L  


 


Carbon Dioxide Level  29    


 


Anion Gap  9  #  


 


Blood Urea Nitrogen  16    


 


Creatinine  0.81    


 


Glucose Level  96  #  


 


Calcium Level  9.0    


 


White Blood Count   6.8  # 


 


Red Blood Count   2.75  #L 


 


Hemoglobin   7.9  #L 


 


Hematocrit   23.5  #L 


 


Mean Corpuscular Volume   85.5   


 


Mean Corpuscular Hemoglobin   28.7  L 


 


Mean Corpuscular Hemoglobin


Concent 


  


  33.6  


  


 


 


Red Cell Distribution Width   17.0  H 


 


Platelet Count   122  #L 


 


Mean Platelet Volume   10.6  H 


 


Neutrophils %   75.5   


 


Lymphocytes %   9.1  L 


 


Monocytes %   13.5  H 


 


Eosinophils %   1.3   


 


Basophils %   0.3   


 


Nucleated Red Blood Cells %   0.0   


 


Neutrophils #   5.1   


 


Lymphocytes #   0.6  L 


 


Monocytes #   0.9   


 


Eosinophils #   0.1   


 


Basophils #   0.0   


 


Nucleated Red Blood Cells #   0.0   














Test


  10/26/17


12:56 10/26/17


17:30 10/26/17


19:05 


 


 


Bedside Glucose 133   259  H  


 


Sodium Level   122  L 











Medications


Medications





 Current Medications


Ondansetron HCl (Zofran Inj) 4 mg Q6H  PRN IV NAUSEA AND/OR VOMITING;  Start 10/

25/17 at 00:30


Acetaminophen (Tylenol Tab) 650 mg Q6H  PRN PO PAIN LEVEL 1-3 OR FEVER;  Start 

10/25/17 at 00:30


Acetaminophen/ Hydrocodone Bitart (Norco (5/325)) 1 tab Q6H  PRN PO MODERATE 

PAIN LEVEL 4-6;  Start 10/25/17 at 00:30


Morphine Sulfate (morphine) 2 mg Q4H  PRN IV SEVERE PAIN LEVEL 7-10 Last 

administered on 10/25/17at 03:30; Admin Dose 2 MG;  Start 10/25/17 at 00:30


Docusate Sodium (Colace) 100 mg Q12H  PRN PO CONSTIPATION;  Start 10/25/17 at 00

:30


Clopidogrel Bisulfate (plaVIX) 75 mg DAILY PO  Last administered on 10/26/17at 

13:40; Admin Dose 75 MG;  Start 10/25/17 at 09:00


Cyanocobalamin (Vitamin B12) 500 mcg DAILY PO  Last administered on 10/26/17at 

13:41; Admin Dose 500 MCG;  Start 10/25/17 at 09:00


Eye Lubricant (Artificial Tears Oph) 2 drop TID  PRN BOTH EYES PRN;  Start 10/25

/17 at 00:30


Diphenhydramine HCl (Benadryl) 25 mg Q6  PRN PO ITCHING;  Start 10/25/17 at 00:

30


EZETIMIBE (Zetia) 10 mg DAILY PO  Last administered on 10/26/17at 13:40; Admin 

Dose 10 MG;  Start 10/25/17 at 09:00


Fluocinonide (Lidex 0.05% Cr) 1 applic BID TOP  Last administered on 10/26/17at 

13:42; Admin Dose 1 APPLIC;  Start 10/25/17 at 09:00


Lactulose (Enulose) 26.7 gm BID PO  Last administered on 10/26/17at 13:40; 

Admin Dose 26.7 GM;  Start 10/25/17 at 09:00


Midodrine (Proamatine) 5 mg TID PO  Last administered on 10/26/17at 13:41; 

Admin Dose 5 MG;  Start 10/25/17 at 09:00


Pantoprazole (Protonix Tab) 40 mg DAILY@06 PO  Last administered on 10/26/17at 

05:32; Admin Dose 40 MG;  Start 10/25/17 at 06:00


Rifaximin (Xifaxan) 550 mg BID PO  Last administered on 10/26/17at 13:40; Admin 

Dose 550 MG;  Start 10/25/17 at 09:00


Diagnostic Test (Pha) (Accu-Chek) 1 ea 02 XX  Last administered on 10/25/17at 02

:41; Admin Dose 1 EA;  Start 10/25/17 at 02:00


Miscellaneous Information 1 ea NOTE XX ;  Start 10/25/17 at 00:45


Glucose (Glutose) 15 gm Q15M  PRN PO DECREASED GLUCOSE;  Start 10/25/17 at 00:45


Glucose (Glutose) 22.5 gm Q15M  PRN PO DECREASED GLUCOSE;  Start 10/25/17 at 00:

45


Dextrose (D50w Syringe) 25 ml Q15M  PRN IV DECREASED GLUCOSE;  Start 10/25/17 

at 00:45


Dextrose (D50w Syringe) 50 ml Q15M  PRN IV DECREASED GLUCOSE;  Start 10/25/17 

at 00:45


Glucagon (Glucagen) 1 mg Q15M  PRN IM DECREASED GLUCOSE;  Start 10/25/17 at 00:

45


Glucose (Glutose) 15 gm Q15M  PRN BUCCAL DECREASED GLUCOSE;  Start 10/25/17 at 

00:45


Temazepam (Restoril) 15 mg QHS  PRN PO insomnia;  Start 10/25/17 at 02:30


Metoclopramide HCl (Reglan) 10 mg Q6 IV  Last administered on 10/26/17at 18:39; 

Admin Dose 10 MG;  Start 10/25/17 at 12:00


Lorazepam (Ativan) 0.5 mg Q12H  PRN IV Anxiety;  Start 10/25/17 at 21:00


Insulin Glargine (Lantus) 15 unit HS SC  Last administered on 10/25/17at 21:12; 

Admin Dose 15 UNIT;  Start 10/25/17 at 21:00


Furosemide (Lasix) 20 mg DAILY IV  Last administered on 10/26/17at 13:42; Admin 

Dose 20 MG;  Start 10/26/17 at 09:00


Nitroglycerin (Nitroglycerin (Sl Tab) 0.4 Mg) 1 tab Q5M  PRN SL ANGINA;  Start 

10/25/17 at 14:00











NANY STAPLES DPM Oct 26, 2017 21:13

## 2017-10-26 NOTE — CARRPT
DATE OF PROCEDURE:  10/26/2017

 

 

PROCEDURE:  Lexiscan Cardiolite stress test _____ 

 

REASON FOR STRESS TESTING:  Chest pain, assess for ischemia.

 

BASELINE VITAL SIGNS AND ELECTROCARDIOGRAM:  Pulse 93, blood pressure 105/57.  Electrocardiogram was
 normal sinus rhythm, rate 93, normal axis, normal intervals, with isolated T-wave inversion in lead
 aVL.

 

DESCRIPTION OF PROCEDURE:  The patient underwent standard Lexiscan infusion protocol over 10 seconds
 followed by radiotracer.  The patient's test was stopped due to completion of protocol.  Maximal ac
hieved blood pressure during the test 100/58.  Maximum heart rate during the test _____ 100.

 

ELECTROCARDIOGRAM FINDINGS:  The patient did not develop any new Lexiscan-induced ST or T-wave alcantar
es from baseline abnormalities.  No documented PVCs.

 

SYMPTOMS:  The patient had no complaints of chest pain or shortness of breath during stress testing.

 

IMPRESSION:

1.  No Lexiscan-induced ST or T-wave changes from baseline abnormalities or diagnostic cardiac ische
zafar.

2.  No complaints of chest pain or shortness of breath during stress testing.

3.  No documented premature ventricular contractions during stress testing.

4.  Report of nuclear images to follow in separate dictation.

 

 

Dictated By: JCARLOS STEELE/MARKO

DD:    10/26/2017 12:21:38

DT:    10/26/2017 19:51:21

Conf#: [id number]

DID#:  [id number]

CC: NEREYDA SANCHEZ;*EndCC*

## 2017-10-27 VITALS — SYSTOLIC BLOOD PRESSURE: 107 MMHG | HEART RATE: 82 BPM | DIASTOLIC BLOOD PRESSURE: 65 MMHG

## 2017-10-27 VITALS — HEART RATE: 91 BPM

## 2017-10-27 VITALS — SYSTOLIC BLOOD PRESSURE: 90 MMHG | DIASTOLIC BLOOD PRESSURE: 52 MMHG | RESPIRATION RATE: 18 BRPM

## 2017-10-27 VITALS — HEART RATE: 98 BPM

## 2017-10-27 VITALS — RESPIRATION RATE: 18 BRPM | SYSTOLIC BLOOD PRESSURE: 92 MMHG | DIASTOLIC BLOOD PRESSURE: 78 MMHG

## 2017-10-27 VITALS — RESPIRATION RATE: 18 BRPM | DIASTOLIC BLOOD PRESSURE: 58 MMHG | SYSTOLIC BLOOD PRESSURE: 101 MMHG

## 2017-10-27 VITALS — HEART RATE: 94 BPM

## 2017-10-27 VITALS — HEART RATE: 88 BPM

## 2017-10-27 VITALS — DIASTOLIC BLOOD PRESSURE: 57 MMHG | RESPIRATION RATE: 18 BRPM | SYSTOLIC BLOOD PRESSURE: 99 MMHG

## 2017-10-27 LAB
ABNORMAL IP MESSAGE: 1
ANION GAP SERPL CALC-SCNC: 10 MMOL/L (ref 8–16)
BASOPHILS # BLD AUTO: 0.1 10^3/UL (ref 0–0.1)
BASOPHILS NFR BLD: 0.8 % (ref 0–2)
BUN SERPL-MCNC: 10 MG/DL (ref 7–20)
CALCIUM SERPL-MCNC: 9.5 MG/DL (ref 8.4–10.2)
CHLORIDE SERPL-SCNC: 97 MMOL/L (ref 97–110)
CO2 SERPL-SCNC: 25 MMOL/L (ref 21–31)
CREAT SERPL-MCNC: 0.73 MG/DL (ref 0.61–1.24)
EOSINOPHIL # BLD: 0.2 10^3/UL (ref 0–0.5)
EOSINOPHIL NFR BLD: 2.7 % (ref 0–7)
ERYTHROCYTE [DISTWIDTH] IN BLOOD BY AUTOMATED COUNT: 16.8 % (ref 11.5–14.5)
GLUCOSE SERPL-MCNC: 103 MG/DL (ref 70–220)
HCT VFR BLD CALC: 26.6 % (ref 42–52)
HGB BLD-MCNC: 8.8 G/DL (ref 14–18)
LYMPHOCYTES # BLD AUTO: 0.5 10^3/UL (ref 0.8–2.9)
LYMPHOCYTES NFR BLD AUTO: 7.9 % (ref 15–51)
MCH RBC QN AUTO: 28.5 PG (ref 29–33)
MCHC RBC AUTO-ENTMCNC: 33.1 G/DL (ref 32–37)
MCV RBC AUTO: 86.1 FL (ref 82–101)
MONOCYTES # BLD: 0.9 10^3/UL (ref 0.3–0.9)
MONOCYTES NFR BLD: 13.1 % (ref 0–11)
NEUTROPHILS # BLD: 5 10^3/UL (ref 1.6–7.5)
NEUTROPHILS NFR BLD AUTO: 75 % (ref 39–77)
NRBC # BLD MANUAL: 0 10^3/UL (ref 0–0)
NRBC BLD AUTO-RTO: 0 /100WBC (ref 0–0)
PLATELET # BLD: 130 10^3/UL (ref 140–415)
PMV BLD AUTO: 10.5 FL (ref 7.4–10.4)
POSITIVE DIFF: (no result)
POTASSIUM SERPL-SCNC: 4.2 MMOL/L (ref 3.5–5.1)
RBC # BLD AUTO: 3.09 10^6/UL (ref 4.7–6.1)
SODIUM SERPL-SCNC: 128 MMOL/L (ref 135–144)
WBC # BLD AUTO: 6.6 10^3/UL (ref 4.8–10.8)

## 2017-10-27 RX ADMIN — LACTULOSE SCH GM: 10 SOLUTION ORAL at 08:22

## 2017-10-27 RX ADMIN — PANTOPRAZOLE SODIUM SCH MG: 40 TABLET, DELAYED RELEASE ORAL at 06:28

## 2017-10-27 RX ADMIN — METOCLOPRAMIDE HYDROCHLORIDE SCH MG: 10 INJECTION, SOLUTION INTRAMUSCULAR; INTRAVENOUS at 06:28

## 2017-10-27 RX ADMIN — METOCLOPRAMIDE HYDROCHLORIDE SCH MG: 10 INJECTION, SOLUTION INTRAMUSCULAR; INTRAVENOUS at 00:00

## 2017-10-27 RX ADMIN — MIDODRINE HYDROCHLORIDE SCH MG: 5 TABLET ORAL at 08:24

## 2017-10-27 RX ADMIN — RIFAXIMIN SCH MG: 550 TABLET ORAL at 08:25

## 2017-10-27 RX ADMIN — FLUOCINONIDE SCH APPLIC: 0.5 CREAM TOPICAL at 08:25

## 2017-10-27 RX ADMIN — EZETIMIBE SCH MG: 10 TABLET ORAL at 08:22

## 2017-10-27 RX ADMIN — MIDODRINE HYDROCHLORIDE SCH MG: 5 TABLET ORAL at 12:35

## 2017-10-27 RX ADMIN — CLOPIDOGREL SCH MG: 75 TABLET, FILM COATED ORAL at 08:23

## 2017-10-27 RX ADMIN — CYANOCOBALAMIN TAB 500 MCG SCH MCG: 500 TAB at 08:22

## 2017-10-27 RX ADMIN — METOCLOPRAMIDE HYDROCHLORIDE SCH MG: 10 INJECTION, SOLUTION INTRAMUSCULAR; INTRAVENOUS at 12:34

## 2017-10-27 NOTE — PN
Date/Time of Note


Date/Time of Note


DATE: 10/27/17 


TIME: 13:45





Assessment/Plan


VTE Prophylaxis


VTE Prophylaxis Intervention:  SCD's





Lines/Catheters


IV Catheter Type (from Nrs):  Saline Lock


Urinary Cath still in place:  No





Assessment/Plan


Chief Complaint/Hosp Course


1.  Abdominal pain, nausea, vomiting, resolved


2.  Chest pain, resolved


3.  Hyponatremia, better


4.  Hyperkalemia, better.


5.  Mild acute kidney injury, likely secondary to poor p.o. intake third 

spacing plus taking diuretics.


6.  Bilateral lower extremity edema could be secondary to liver cirrhosis 

versus CHF.


7.  Right toe questionable gangrene. Duplex showed reduced LUCIO on Rt 


8.  Diabetes, uncontrolled.


9.  History of staph and fungus bacteremia.


10.  History of urinary tract infection.


11. Anemia 10.7>7.9 ? Upper GI vs Bleeding lips?


12. Constipation for 4 days


Problems:  


Assessment/Plan


1. continue current regime


2. tap enema





Exam/Review of Systems


Vital Signs


Vitals





 Vital Signs








  Date Time  Temp Pulse Resp B/P Pulse Ox O2 Delivery O2 Flow Rate FiO2


 


10/27/17 12:19  98      


 


10/27/17 11:46 98.0  18 92/78 100   














 Intake and Output   


 


 10/26/17 10/26/17 10/27/17





 15:00 23:00 07:00


 


Intake Total  780 ml 400 ml


 


Output Total  800 ml 600 ml


 


Balance  -20 ml -200 ml











Results


Result Diagram:  


10/27/17 0741                                                                  

              10/27/17 0741





Results 24 hrs





Laboratory Tests








Test


  10/26/17


17:30 10/26/17


19:05 10/26/17


21:38 10/27/17


02:08


 


Bedside Glucose 259  H  218   155  


 


Sodium Level  122  L  














Test


  10/27/17


07:41 10/27/17


08:21 10/27/17


12:32 


 


 


White Blood Count 6.6     


 


Red Blood Count 3.09  L   


 


Hemoglobin 8.8  L   


 


Hematocrit 26.6  L   


 


Mean Corpuscular Volume 86.1     


 


Mean Corpuscular Hemoglobin 28.5  L   


 


Mean Corpuscular Hemoglobin


Concent 33.1  


  


  


  


 


 


Red Cell Distribution Width 16.8  H   


 


Platelet Count 130  L   


 


Mean Platelet Volume 10.5  H   


 


Neutrophils % 75.0     


 


Lymphocytes % 7.9  L   


 


Monocytes % 13.1  H   


 


Eosinophils % 2.7     


 


Basophils % 0.8     


 


Nucleated Red Blood Cells % 0.0     


 


Neutrophils # 5.0     


 


Lymphocytes # 0.5  L   


 


Monocytes # 0.9     


 


Eosinophils # 0.2     


 


Basophils # 0.1     


 


Nucleated Red Blood Cells # 0.0     


 


Sodium Level 128  L   


 


Potassium Level 4.2     


 


Chloride Level 97     


 


Carbon Dioxide Level 25     


 


Anion Gap 10     


 


Blood Urea Nitrogen 10     


 


Creatinine 0.73     


 


Glucose Level 103     


 


Calcium Level 9.5     


 


Bedside Glucose  111   249  H 











Medications


Medications





 Current Medications


Ondansetron HCl (Zofran Inj) 4 mg Q6H  PRN IV NAUSEA AND/OR VOMITING;  Start 10/

25/17 at 00:30


Acetaminophen (Tylenol Tab) 650 mg Q6H  PRN PO PAIN LEVEL 1-3 OR FEVER;  Start 

10/25/17 at 00:30


Acetaminophen/ Hydrocodone Bitart (Norco (5/325)) 1 tab Q6H  PRN PO MODERATE 

PAIN LEVEL 4-6;  Start 10/25/17 at 00:30


Morphine Sulfate (morphine) 2 mg Q4H  PRN IV SEVERE PAIN LEVEL 7-10 Last 

administered on 10/25/17at 03:30; Admin Dose 2 MG;  Start 10/25/17 at 00:30


Docusate Sodium (Colace) 100 mg Q12H  PRN PO CONSTIPATION Last administered on 

10/27/17at 08:50; Admin Dose 100 MG;  Start 10/25/17 at 00:30


Clopidogrel Bisulfate (plaVIX) 75 mg DAILY PO  Last administered on 10/27/17at 

08:23; Admin Dose 75 MG;  Start 10/25/17 at 09:00


Cyanocobalamin (Vitamin B12) 500 mcg DAILY PO  Last administered on 10/27/17at 

08:22; Admin Dose 500 MCG;  Start 10/25/17 at 09:00


Eye Lubricant (Artificial Tears Oph) 2 drop TID  PRN BOTH EYES PRN;  Start 10/25

/17 at 00:30


Diphenhydramine HCl (Benadryl) 25 mg Q6  PRN PO ITCHING;  Start 10/25/17 at 00:

30


EZETIMIBE (Zetia) 10 mg DAILY PO  Last administered on 10/27/17at 08:22; Admin 

Dose 10 MG;  Start 10/25/17 at 09:00


Fluocinonide (Lidex 0.05% Cr) 1 applic BID TOP  Last administered on 10/27/17at 

08:25; Admin Dose 1 APPLIC;  Start 10/25/17 at 09:00


Lactulose (Enulose) 26.7 gm BID PO  Last administered on 10/27/17at 08:22; 

Admin Dose 26.7 GM;  Start 10/25/17 at 09:00


Midodrine (Proamatine) 5 mg TID PO  Last administered on 10/27/17at 12:35; 

Admin Dose 5 MG;  Start 10/25/17 at 09:00


Pantoprazole (Protonix Tab) 40 mg DAILY@06 PO  Last administered on 10/27/17at 

06:28; Admin Dose 40 MG;  Start 10/25/17 at 06:00


Rifaximin (Xifaxan) 550 mg BID PO  Last administered on 10/27/17at 08:25; Admin 

Dose 550 MG;  Start 10/25/17 at 09:00


Diagnostic Test (Pha) (Accu-Chek) 1 ea 02 XX  Last administered on 10/27/17at 02

:45; Admin Dose 1 EA;  Start 10/25/17 at 02:00


Miscellaneous Information 1 ea NOTE XX ;  Start 10/25/17 at 00:45


Glucose (Glutose) 15 gm Q15M  PRN PO DECREASED GLUCOSE;  Start 10/25/17 at 00:45


Glucose (Glutose) 22.5 gm Q15M  PRN PO DECREASED GLUCOSE;  Start 10/25/17 at 00:

45


Dextrose (D50w Syringe) 25 ml Q15M  PRN IV DECREASED GLUCOSE;  Start 10/25/17 

at 00:45


Dextrose (D50w Syringe) 50 ml Q15M  PRN IV DECREASED GLUCOSE;  Start 10/25/17 

at 00:45


Glucagon (Glucagen) 1 mg Q15M  PRN IM DECREASED GLUCOSE;  Start 10/25/17 at 00:

45


Glucose (Glutose) 15 gm Q15M  PRN BUCCAL DECREASED GLUCOSE;  Start 10/25/17 at 

00:45


Temazepam (Restoril) 15 mg QHS  PRN PO insomnia Last administered on 10/27/17at 

00:00; Admin Dose 15 MG;  Start 10/25/17 at 02:30


Metoclopramide HCl (Reglan) 10 mg Q6 IV  Last administered on 10/27/17at 12:34; 

Admin Dose 10 MG;  Start 10/25/17 at 12:00


Lorazepam (Ativan) 0.5 mg Q12H  PRN IV Anxiety;  Start 10/25/17 at 21:00


Insulin Glargine (Lantus) 15 unit HS SC  Last administered on 10/26/17at 22:06; 

Admin Dose 15 UNIT;  Start 10/25/17 at 21:00


Furosemide (Lasix) 20 mg DAILY IV  Last administered on 10/26/17at 13:42; Admin 

Dose 20 MG;  Start 10/26/17 at 09:00


Nitroglycerin (Nitroglycerin (Sl Tab) 0.4 Mg) 1 tab Q5M  PRN SL ANGINA;  Start 

10/25/17 at 14:00











WAI BELTRAN Oct 27, 2017 13:50

## 2017-10-29 NOTE — DS
Date/Time of Note


Date/Time of Note


DATE: 10/29/17 


TIME: 08:44





Discharge Summary


Admission/Discharge Info


Admit Date/Time


Oct 24, 2017 at 22:59


Discharge Date/Time


Oct 27, 2017 at 17:56


Discharge Diagnosis


Liver cirrhosis with encephalopathy.


Patient Condition:  Serious


Consults


Dr Seth, Dr Kirby, dr Sandhya Doaniscan negative


Hx of Present Illness


This is a 57-year-old male with a past medical history of liver cirrhosis, 

status post portal shunt tips in 2017 for recurrent ascites, history of 

esophageal varices, ETOH abuse, chronic pancreatitis.  He has history of 

coronary artery disease status post 3 stents at [facility], history of encephalopathy

, diabetes, anemia, status post EGD on 08/22/2017 with gastroparesis, who was 

admitted to [facility] from 08/12/2017 to 08/25/2017 with a 

prolonged hospitalization course.   According to the patient since the last 

discharge, he has been admitted, one time to [facility] for altered mental 

status.  He was found to have hepatic encephalopathy and UTI and was discharged 

home in 2 to 3 days.  The second time, he got admitted again for hepatic 

encephalopathy to [facility].  


 


Although according to the patient, overall his condition has been 

deteriorating.  Since the last 5 days, he has been unable to eat anything p.o.  

He has been also having some epigastric pain.  He feels that everything is 

stuck there.  He has hardly eaten anything.  Every time he drinks, he vomits 

out.  The patient also has been feeling weak, dizzy.  The patient has also on 

and off experienced left-sided chest pain and also shortness of breath.  

According to the patient, he is also having worsening bilateral lower extremity 

swelling for the last few weeks.  Per wife, the patient has been taking Lasix 

and Aldactone at home, but has not been urinating much, but it has been less 

for the last few days.  Per patient, he has also started noticing a blackish 

discoloration of the right great toe that probably started when he hit himself 

while on the wheelchair, but there were no fevers, no chills.  


 


Due to persistent complaints, the family was worried and patient was brought 

into the ER.  On arrival to ED, vital signs were temperature 99.0, heart rate 

was 112, respirations 16, blood pressure 154/87.  Sodium was 125, potassium 5.6

, bicarbonate 25, BUN 24, creatinine 1.18.  Troponin 0.025 and the patient was 

given aspirin, nitro and was admitted for further management.  A chest x-ray 

was essentially negative.


 


PAST MEDICAL HISTORY:


1.  Liver cirrhosis.


2.  Chronic pancreatitis.


3.  Recurrent ascites, status post TIPS placement in 2017.


4.  History of coronary artery disease status post PCI x3.


5.  Diabetes.


6.  Hypertension.


7.  Bilateral lower extremity edema.


8.  Anemia.


9.  History of gastroparesis.


On 10/27/2017 Pt decided to go home, his wife called and spoke to nursing 

requesting discharge her  home. Patient was not medically stable to be 

discharged.  His wife picked him up and AMA was signed.


Hospital Course


1.  Abdominal pain, nausea, vomiting, resolved


2.  Chest pain, resolved


3.  Hyponatremia, better


4.  Hyperkalemia, better.


5.  Mild acute kidney injury, likely secondary to poor p.o. intake third 

spacing plus taking diuretics.


6.  Bilateral lower extremity edema could be secondary to liver cirrhosis 

versus CHF.


7.  Right toe questionable gangrene. Duplex showed reduced LUCIO on Rt 


8.  Diabetes, uncontrolled.


9.  History of staph and fungus bacteremia.


10.  History of urinary tract infection.


11. Anemia 10.7>7.9 ? Upper GI vs Bleeding lips?


12. Constipation for 4 days


Home Meds


Active Scripts


Oxycodone HCl/Acetaminophen (Percocet 5-325 mg Tablet) 1 Each Tablet, 1 EACH PO 

TID for PAIN, #12 TAB


   Prov:HOLDEN CORONADO MD         9/7/17


Metoclopramide* (Reglan*) 5 Mg Tablet, 5 MG PO Q6H Y for NAUSEA AND OR VOMITING 

for 3 Days, TAB


   Prov:TAVO JENKINS DO         9/3/17


Lactulose* (Cephulac*) 20 Gm/30 Ml Soln, 26.7 GM PO BID for 30 Days


   Prov:WAI BELTRAN         8/25/17


Reported Medications


Insulin Glargine* (Lantus*) 100 Unit/Ml Soln, 34 UNIT SC QHS for 30 Days, #1 

VIAL


   10/24/17


Dextran/Hypromellose/Glycerin (Artificial Tears Drops) 15 Ml Drops, 2 DROP BOTH 

EYES TID Y for PRN, EA


   8/12/17


Melatonin-Pyridoxine Hcl (Melatonin) 5-10 Mg Tablet, 5 TAB PO HS, TAB


   8/12/17


Fluocinonide* (Fluocinonide* Cream) 0.1% - 120 Gm Cream..g., 1 APPLIC TOP BID, 

TUB


   8/12/17


Rifaximin* (Xifaxan*) 550 Mg Tablet, 550 MG PO BID, TAB


   8/12/17


Diphenhydramine Hcl* (Diphenhydramine Hcl*) 25 Mg Capsule, 25 MG PO Q6 Y for 

ITCHING, CAP


   8/12/17


Cyanocobalamin* (Vitamin B12*) 500 Mcg Tab, 500 MCG PO DAILY, TAB


   8/12/17


Calcium Acetate* (Calcium Acetate*) 667 Mg Capsule, 667 MG PO WITH MEALS, #30 

CAP


   8/12/17


Midodrine* (Midodrine*) 5 Mg Tablet, 5 MG PO TID, TAB


   8/12/17


Clopidogrel Bisulfate* (Clopidogrel Bisulfate*) 75 Mg Tablet, 75 MG PO DAILY, #

30 TAB


   8/12/17


Lidocaine/Menthol (LIDOPATCH) 1 Each Adh..patch, 1 EACH TP Q12, PATCH


   PLACE 1 PATCH ONTO SKIN Q12H ON / Q12H OFF


   8/12/17


Ezetimibe* (Zetia*) 10 Mg Tablet, 10 MG PO DAILY, TAB


   8/12/17


Insulin Lispro (Humalog Kwikpen U-100) 100 Unit/1 Ml Insuln.pen, 22 UNIT SQ 

DAILY


   INJECT 22units IF EATS >50% OR 10units IF EATS < 50% OF MEAL ; TID


   8/12/17


Spironolactone* (Aldactone*) 25 Mg Tablet, 200 MG PO DAILY, #30 TAB


   8/12/17


Pantoprazole* (Protonix*) 40 Mg Tablet.dr, 40 MG PO DAILY, TAB


   11/11/16


Discontinued Scripts


Insulin Glargine* (Lantus*) 100 Unit/Ml Soln, 25 UNIT SC DAILY@20 for 30 Days


   Prov:WAI BELTRAN         8/25/17


Primary Care Provider


WAI Roblero Oct 29, 2017 08:51

## 2017-11-28 ENCOUNTER — HOSPITAL ENCOUNTER (EMERGENCY)
Dept: HOSPITAL 10 - E/R | Age: 57
Discharge: LEFT BEFORE DISCHARGE | End: 2017-11-28
Payer: MEDICAID

## 2017-11-28 VITALS
RESPIRATION RATE: 18 BRPM | HEART RATE: 77 BPM | DIASTOLIC BLOOD PRESSURE: 80 MMHG | TEMPERATURE: 98 F | SYSTOLIC BLOOD PRESSURE: 136 MMHG

## 2017-11-28 VITALS
BODY MASS INDEX: 24.22 KG/M2 | BODY MASS INDEX: 24.22 KG/M2 | WEIGHT: 154.32 LBS | HEIGHT: 67 IN | WEIGHT: 154.32 LBS | HEIGHT: 67 IN

## 2017-11-28 DIAGNOSIS — R60.1: Primary | ICD-10-CM

## 2017-11-28 DIAGNOSIS — Z79.01: ICD-10-CM

## 2017-11-28 DIAGNOSIS — Z79.4: ICD-10-CM

## 2017-11-28 DIAGNOSIS — K70.31: ICD-10-CM

## 2017-11-28 DIAGNOSIS — E11.9: ICD-10-CM

## 2017-11-28 PROCEDURE — 93005 ELECTROCARDIOGRAM TRACING: CPT

## 2017-11-28 PROCEDURE — 71010: CPT

## 2017-11-28 NOTE — RADRPT
PROCEDURE:   XR Chest. 

 

CLINICAL INDICATION:    Abdominal pain

 

TECHNIQUE:   Single frontal view  of the chest was obtained 

 

COMPARISON:   04/27/2017 

 

FINDINGS:

The heart is not enlarged. Calcification in the aortic arch.

Linear atelectasis at the left lung base. Right PICC line tip near atriocaval junction. The patient'
s chin is projected over the medial lung apices.

There is no pleural effusion or pneumothorax seen.   

 

 

IMPRESSION:

Linear atelectasis at left lung base and to a lesser extent right lung base.. Please see above.

 

 

RPTAT: HJES

_____________________________________________ 

.Kentrell Lizarraga MD, MD           Date    Time 

Electronically viewed and signed by .Kentrell Lizarraga MD, MD on 11/28/2017 16:48 

 

D:  11/28/2017 16:48  T:  11/28/2017 16:48

.S/

## 2017-11-28 NOTE — ERD
ER Documentation


Chief Complaint


Chief Complaint


BIB [facility] FOR PARACENTESIS.





HPI


Patient is a 57-year-old male with history of alcoholic cirrhosis who was 

discharged from [facility] yesterday after a 20 day admission for sepsis with unknown 

source.  He was sent home on IV antibiotics.  Since arriving home, he complains 

of increasing dyspnea and diffuse abdominal pain.  He denies vomiting or 

diarrhea.  He denies fever.  He denies chest pain.  He reports that he receives 

paracentesis approximately every 2-3 days and would like a paracentesis.





ROS


All systems reviewed and are negative except as per history of present illness.





Medications


Home Meds


Active Scripts


Oxycodone HCl/Acetaminophen (Percocet 5-325 mg Tablet) 1 Each Tablet, 1 EACH PO 

TID for PAIN, #12 TAB


   Prov:HOLDEN CORONADO MD         9/7/17


Metoclopramide* (Reglan*) 5 Mg Tablet, 5 MG PO Q6H Y for NAUSEA AND OR VOMITING 

for 3 Days, TAB


   Prov:TAVO JENKINS DO         9/3/17


Lactulose* (Cephulac*) 20 Gm/30 Ml Soln, 26.7 GM PO BID for 30 Days


   Prov:WAI BELTRAN         8/25/17


Reported Medications


Insulin Glargine* (Lantus*) 100 Unit/Ml Soln, 34 UNIT SC QHS for 30 Days, #1 

VIAL


   10/24/17


Dextran/Hypromellose/Glycerin (Artificial Tears Drops) 15 Ml Drops, 2 DROP BOTH 

EYES TID Y for PRN, EA


   8/12/17


Melatonin-Pyridoxine Hcl (Melatonin) 5-10 Mg Tablet, 5 TAB PO HS, TAB


   8/12/17


Fluocinonide* (Fluocinonide* Cream) 0.1% - 120 Gm Cream..g., 1 APPLIC TOP BID, 

TUB


   8/12/17


Rifaximin* (Xifaxan*) 550 Mg Tablet, 550 MG PO BID, TAB


   8/12/17


Diphenhydramine Hcl* (Diphenhydramine Hcl*) 25 Mg Capsule, 25 MG PO Q6 Y for 

ITCHING, CAP


   8/12/17


Cyanocobalamin* (Vitamin B12*) 500 Mcg Tab, 500 MCG PO DAILY, TAB


   8/12/17


Calcium Acetate* (Calcium Acetate*) 667 Mg Capsule, 667 MG PO WITH MEALS, #30 

CAP


   8/12/17


Midodrine* (Midodrine*) 5 Mg Tablet, 5 MG PO TID, TAB


   8/12/17


Clopidogrel Bisulfate* (Clopidogrel Bisulfate*) 75 Mg Tablet, 75 MG PO DAILY, #

30 TAB


   8/12/17


Lidocaine/Menthol (LIDOPATCH) 1 Each Adh..patch, 1 EACH TP Q12, PATCH


   PLACE 1 PATCH ONTO SKIN Q12H ON / Q12H OFF


   8/12/17


Ezetimibe* (Zetia*) 10 Mg Tablet, 10 MG PO DAILY, TAB


   8/12/17


Insulin Lispro (Humalog Kwikpen U-100) 100 Unit/1 Ml Insuln.pen, 22 UNIT SQ 

DAILY


   INJECT 22units IF EATS >50% OR 10units IF EATS < 50% OF MEAL ; TID


   8/12/17


Spironolactone* (Aldactone*) 25 Mg Tablet, 200 MG PO DAILY, #30 TAB


   8/12/17


Pantoprazole* (Protonix*) 40 Mg Tablet.dr, 40 MG PO DAILY, TAB


   11/11/16





Allergies


Allergies:  


Coded Allergies:  


     No Known Drug Allergy (Unverified  Allergy, Unknown, 10/24/17)





PMhx/Soc


Past medical history: Alcoholic cirrhosis, diabetes mellitus


Past surgical history: Unknown brain surgery


Social history: Denies current alcohol or tobacco, previously drink heavily.


History of Surgery:  Yes (stent placed at [facility] 2 months ago)


Hx Neurological Disorder:  Yes


Hx Respiratory Disorders:  Yes


Hx Cardiac Disorders:  Yes


Hx Psychiatric Problems:  No


Hx Miscellaneous Medical Probl:  Yes (ETOH abuse )


Hx Alcohol Use:  No


Hx Substance Use:  No


Hx Tobacco Use:  No





FmHx


Noncontributory





Physical Exam


Vitals





Vital Signs








  Date Time  Temp Pulse Resp B/P Pulse Ox O2 Delivery O2 Flow Rate FiO2


 


11/28/17 16:00 98.0 77 18 136/80  Room Air  


 


11/28/17 15:02 97.2 70 18 102/56 99   








Physical Exam


Const:     Alert, no acute distress


Head:   Atraumatic 


Eyes:    Normal Conjunctiva, Mild pallor, mild icterus


ENT:    Normal External Ears, Nose and Mouth.  Mucous membranes moist


Neck:               Full range of motion..~ No meningismus.


Resp:    Clear to auscultation bilaterally, No wheezes, no rales


Cardio:    Regular rate and rhythm, no murmurs


Abd:    Soft, Mild to moderately distended, Mild diffuse tenderness, No rebound 

or guarding


Skin:    Diffuse petechia, no other rash


Ext:    No cyanosis, 3+ pitting edema/anasarca to abdominal wall.  Small 

necrotic lesion on right great toe without any discharge or erythema.


Neur:    Awake and alert, Cranial nerves II through XII intact bilaterally, 

strength and sensation grossly intact in 4 extremities, no asterixis.


Psych:    Normal Mood and Affect





Procedures/MDM


EKG read by me: Time 1618, rate 86


Rhythm: Normal sinus, sinus arrhythmia


Axis: Normal


Intervals: Prolonged QTC


ST-T waves: no ischemic changes.  Poor R-wave progression.


Ectopy: No


Q-waves: No


Impression:     No evidence of ischemia or arrhythmia





MDM: Patient is a 57-year-old male who presents to the ER one day after being 

discharged from [facility] liver service after a 20 day admission.  The patient 

initially complained of acute abdominal pain and dyspnea.  He is on IV 

antibiotics with PICC line for an unknown infection.  I attempted to contact 

[facility] to obtain records, but was unable to do so.  I advised the patient of my 

intended plan to obtain blood and urine tests, chest x-ray, and to perform a 

diagnostic paracentesis.  I did not see significant ascites to warrant a 

therapeutic paracentesis.  When the patient was advised of this, he stated that 

he had only come to the emergency department in order to get a therapeutic 

paracentesis, and refused all blood tests and other tests.  He also stated that 

he does not have acute abdominal pain, but has stable chronic abdominal pain.  

He stated that he does not feel short of breath currently, but has intermittent 

shortness of breath.  I explained to him the risk of spontaneous bacterial 

peritonitis, possibility of systemic infection, electrolyte abnormality, 

bleeding, endorgan dysfunction.  The patient understood these concerns and 

signed out AGAINST MEDICAL ADVICE.  He has stable vital signs and is acting 

appropriately.  I do not suspect encephalopathy.





Departure


Diagnosis:  


 Primary Impression:  


 Anasarca


 Additional Impressions:  


 Ascites


 Ascites type:  due to alcoholic cirrhosis  Qualified Code:  K70.31 - Ascites 

due to alcoholic cirrhosis


 Alcoholic cirrhosis


 Ascites presence:  with ascites  Qualified Code:  K70.31 - Alcoholic cirrhosis 

of liver with ascites


Condition:  Stable











STEPHEN DUBOSE MD Nov 28, 2017 16:12

## 2018-01-30 ENCOUNTER — HOSPITAL ENCOUNTER (EMERGENCY)
Dept: HOSPITAL 91 - E/R | Age: 58
Discharge: HOME | End: 2018-01-30
Payer: MEDICAID

## 2018-01-30 ENCOUNTER — HOSPITAL ENCOUNTER (EMERGENCY)
Age: 58
Discharge: HOME | End: 2018-01-30

## 2018-01-30 DIAGNOSIS — Z98.61: ICD-10-CM

## 2018-01-30 DIAGNOSIS — Z79.4: ICD-10-CM

## 2018-01-30 DIAGNOSIS — R11.2: Primary | ICD-10-CM

## 2018-01-30 LAB
ADD MAN DIFF?: NO
ALANINE AMINOTRANSFERASE: 48 IU/L (ref 13–69)
ALBUMIN/GLOBULIN RATIO: 0.7
ALBUMIN: 2.8 G/DL (ref 3.3–4.9)
ALKALINE PHOSPHATASE: 178 IU/L (ref 42–121)
ANION GAP: 18 (ref 8–16)
ASPARTATE AMINO TRANSFERASE: 60 IU/L (ref 15–46)
BASOPHIL #: 0.1 10^3/UL (ref 0–0.1)
BASOPHILS %: 1.1 % (ref 0–2)
BILIRUBIN,DIRECT: 0 MG/DL (ref 0–0.2)
BILIRUBIN,TOTAL: 1.1 MG/DL (ref 0.2–1.3)
BLOOD UREA NITROGEN: 13 MG/DL (ref 7–20)
CALCIUM: 9 MG/DL (ref 8.4–10.2)
CARBON DIOXIDE: 22 MMOL/L (ref 21–31)
CHLORIDE: 96 MMOL/L (ref 97–110)
CREATININE: 0.82 MG/DL (ref 0.61–1.24)
EOSINOPHILS #: 0.2 10^3/UL (ref 0–0.5)
EOSINOPHILS %: 2.3 % (ref 0–7)
GLOBULIN: 4 G/DL (ref 1.3–3.2)
GLUCOSE: 347 MG/DL (ref 70–220)
HEMATOCRIT: 34.7 % (ref 42–52)
HEMOGLOBIN: 12.2 G/DL (ref 14–18)
LIPASE: 22 U/L (ref 23–300)
LYMPHOCYTES #: 0.7 10^3/UL (ref 0.8–2.9)
LYMPHOCYTES %: 11.1 % (ref 15–51)
MEAN CORPUSCULAR HEMOGLOBIN: 33.3 PG (ref 29–33)
MEAN CORPUSCULAR HGB CONC: 35.2 G/DL (ref 32–37)
MEAN CORPUSCULAR VOLUME: 94.8 FL (ref 82–101)
MEAN PLATELET VOLUME: 11 FL (ref 7.4–10.4)
MONOCYTE #: 0.9 10^3/UL (ref 0.3–0.9)
MONOCYTES %: 13.4 % (ref 0–11)
NEUTROPHIL #: 4.7 10^3/UL (ref 1.6–7.5)
NEUTROPHILS %: 71.9 % (ref 39–77)
NUCLEATED RED BLOOD CELLS #: 0 10^3/UL (ref 0–0)
NUCLEATED RED BLOOD CELLS%: 0 /100WBC (ref 0–0)
PLATELET COUNT: 124 10^3/UL (ref 140–415)
POTASSIUM: 3.6 MMOL/L (ref 3.5–5.1)
RED BLOOD COUNT: 3.66 10^6/UL (ref 4.7–6.1)
RED CELL DISTRIBUTION WIDTH: 15.7 % (ref 11.5–14.5)
SODIUM: 132 MMOL/L (ref 135–144)
TOTAL PROTEIN: 6.8 G/DL (ref 6.1–8.1)
TROPONIN-I: < 0.012 NG/ML (ref 0–0.12)
WHITE BLOOD COUNT: 6.5 10^3/UL (ref 4.8–10.8)

## 2018-01-30 PROCEDURE — 36415 COLL VENOUS BLD VENIPUNCTURE: CPT

## 2018-01-30 PROCEDURE — 85025 COMPLETE CBC W/AUTO DIFF WBC: CPT

## 2018-01-30 PROCEDURE — 74176 CT ABD & PELVIS W/O CONTRAST: CPT

## 2018-01-30 PROCEDURE — 96376 TX/PRO/DX INJ SAME DRUG ADON: CPT

## 2018-01-30 PROCEDURE — 96374 THER/PROPH/DIAG INJ IV PUSH: CPT

## 2018-01-30 PROCEDURE — 84484 ASSAY OF TROPONIN QUANT: CPT

## 2018-01-30 PROCEDURE — 96375 TX/PRO/DX INJ NEW DRUG ADDON: CPT

## 2018-01-30 PROCEDURE — 99285 EMERGENCY DEPT VISIT HI MDM: CPT

## 2018-01-30 PROCEDURE — 80053 COMPREHEN METABOLIC PANEL: CPT

## 2018-01-30 PROCEDURE — 83690 ASSAY OF LIPASE: CPT

## 2018-01-30 RX ADMIN — ONDANSETRON HYDROCHLORIDE 1 MG: 2 INJECTION, SOLUTION INTRAMUSCULAR; INTRAVENOUS at 18:29

## 2018-01-30 RX ADMIN — HYDROMORPHONE HYDROCHLORIDE 1 MG: 1 INJECTION, SOLUTION INTRAMUSCULAR; INTRAVENOUS; SUBCUTANEOUS at 19:50

## 2018-01-30 RX ADMIN — ONDANSETRON HYDROCHLORIDE 1 MG: 2 INJECTION, SOLUTION INTRAMUSCULAR; INTRAVENOUS at 17:07

## 2018-01-30 RX ADMIN — HYDROMORPHONE HYDROCHLORIDE 1 MG: 1 INJECTION, SOLUTION INTRAMUSCULAR; INTRAVENOUS; SUBCUTANEOUS at 18:29

## 2018-01-30 RX ADMIN — LIDOCAINE HYDROCHLORIDE 1 MLS/HR: 10 INJECTION, SOLUTION EPIDURAL; INFILTRATION; INTRACAUDAL; PERINEURAL at 17:08

## 2018-01-30 RX ADMIN — ONDANSETRON HYDROCHLORIDE 1 MG: 2 INJECTION, SOLUTION INTRAMUSCULAR; INTRAVENOUS at 18:50

## 2018-03-24 ENCOUNTER — HOSPITAL ENCOUNTER (INPATIENT)
Age: 58
LOS: 14 days | Discharge: HOME | DRG: 640 | End: 2018-04-07

## 2018-03-24 ENCOUNTER — HOSPITAL ENCOUNTER (INPATIENT)
Dept: HOSPITAL 91 - ICU | Age: 58
LOS: 14 days | Discharge: HOME | DRG: 640 | End: 2018-04-07
Payer: MEDICAID

## 2018-03-24 DIAGNOSIS — I25.10: ICD-10-CM

## 2018-03-24 DIAGNOSIS — I95.9: ICD-10-CM

## 2018-03-24 DIAGNOSIS — Z87.891: ICD-10-CM

## 2018-03-24 DIAGNOSIS — Z95.1: ICD-10-CM

## 2018-03-24 DIAGNOSIS — E87.5: Primary | ICD-10-CM

## 2018-03-24 DIAGNOSIS — R63.0: ICD-10-CM

## 2018-03-24 DIAGNOSIS — E11.65: ICD-10-CM

## 2018-03-24 DIAGNOSIS — Z79.02: ICD-10-CM

## 2018-03-24 DIAGNOSIS — Z79.4: ICD-10-CM

## 2018-03-24 DIAGNOSIS — K92.0: ICD-10-CM

## 2018-03-24 DIAGNOSIS — R04.1: ICD-10-CM

## 2018-03-24 DIAGNOSIS — L30.9: ICD-10-CM

## 2018-03-24 DIAGNOSIS — K72.00: ICD-10-CM

## 2018-03-24 DIAGNOSIS — D69.6: ICD-10-CM

## 2018-03-24 DIAGNOSIS — K74.60: ICD-10-CM

## 2018-03-24 DIAGNOSIS — Z79.82: ICD-10-CM

## 2018-03-24 DIAGNOSIS — G89.29: ICD-10-CM

## 2018-03-24 DIAGNOSIS — E87.1: ICD-10-CM

## 2018-03-24 LAB
ADD MAN DIFF?: NO
ALANINE AMINOTRANSFERASE: 71 IU/L (ref 13–69)
ALANINE AMINOTRANSFERASE: 77 IU/L (ref 13–69)
ALBUMIN/GLOBULIN RATIO: 0.68
ALBUMIN/GLOBULIN RATIO: 0.76
ALBUMIN: 2.6 G/DL (ref 3.3–4.9)
ALBUMIN: 3.3 G/DL (ref 3.3–4.9)
ALKALINE PHOSPHATASE: 217 IU/L (ref 42–121)
ALKALINE PHOSPHATASE: 231 IU/L (ref 42–121)
AMMONIA: 81 UMOL/L (ref 9–30)
ANION GAP: 16 (ref 8–16)
ANION GAP: 17 (ref 8–16)
ASPARTATE AMINO TRANSFERASE: 124 IU/L (ref 15–46)
ASPARTATE AMINO TRANSFERASE: 93 IU/L (ref 15–46)
BASOPHIL #: 0.1 10^3/UL (ref 0–0.1)
BASOPHILS %: 0.7 % (ref 0–2)
BILIRUBIN,DIRECT: 0 MG/DL (ref 0–0.2)
BILIRUBIN,DIRECT: 0.3 MG/DL (ref 0–0.2)
BILIRUBIN,TOTAL: 1.8 MG/DL (ref 0.2–1.3)
BILIRUBIN,TOTAL: 2.3 MG/DL (ref 0.2–1.3)
BLOOD UREA NITROGEN: 16 MG/DL (ref 7–20)
BLOOD UREA NITROGEN: 18 MG/DL (ref 7–20)
CALCIUM: 8.6 MG/DL (ref 8.4–10.2)
CALCIUM: 9 MG/DL (ref 8.4–10.2)
CARBON DIOXIDE: 23 MMOL/L (ref 21–31)
CARBON DIOXIDE: 25 MMOL/L (ref 21–31)
CHLORIDE: 92 MMOL/L (ref 97–110)
CHLORIDE: 96 MMOL/L (ref 97–110)
CREATININE: 0.86 MG/DL (ref 0.61–1.24)
CREATININE: 0.95 MG/DL (ref 0.61–1.24)
EOSINOPHILS #: 0.1 10^3/UL (ref 0–0.5)
EOSINOPHILS %: 1 % (ref 0–7)
GLOBULIN: 3.8 G/DL (ref 1.3–3.2)
GLOBULIN: 4.3 G/DL (ref 1.3–3.2)
GLUCOSE: 274 MG/DL (ref 70–220)
GLUCOSE: 441 MG/DL (ref 70–220)
HEMATOCRIT: 37.8 % (ref 42–52)
HEMOGLOBIN A1C: 8.1 % (ref 0–5.9)
HEMOGLOBIN: 13.2 G/DL (ref 14–18)
LIPASE: 19 U/L (ref 23–300)
LYMPHOCYTES #: 0.6 10^3/UL (ref 0.8–2.9)
LYMPHOCYTES %: 7.7 % (ref 15–51)
MEAN CORPUSCULAR HEMOGLOBIN: 33.1 PG (ref 29–33)
MEAN CORPUSCULAR HGB CONC: 34.9 G/DL (ref 32–37)
MEAN CORPUSCULAR VOLUME: 94.7 FL (ref 82–101)
MEAN PLATELET VOLUME: 10.9 FL (ref 7.4–10.4)
MONOCYTE #: 0.9 10^3/UL (ref 0.3–0.9)
MONOCYTES %: 11.3 % (ref 0–11)
NEUTROPHIL #: 6.5 10^3/UL (ref 1.6–7.5)
NEUTROPHILS %: 78.6 % (ref 39–77)
NUCLEATED RED BLOOD CELLS #: 0 10^3/UL (ref 0–0)
NUCLEATED RED BLOOD CELLS%: 0 /100WBC (ref 0–0)
PLATELET COUNT: 144 10^3/UL (ref 140–415)
POTASSIUM: 4.5 MMOL/L (ref 3.5–5.1)
POTASSIUM: 6.8 MMOL/L (ref 3.5–5.1)
RED BLOOD COUNT: 3.99 10^6/UL (ref 4.7–6.1)
RED CELL DISTRIBUTION WIDTH: 14.3 % (ref 11.5–14.5)
SODIUM: 127 MMOL/L (ref 135–144)
SODIUM: 130 MMOL/L (ref 135–144)
TOTAL PROTEIN: 6.4 G/DL (ref 6.1–8.1)
TOTAL PROTEIN: 7.6 G/DL (ref 6.1–8.1)
TROPONIN-I: < 0.012 NG/ML (ref 0–0.12)
WHITE BLOOD COUNT: 8.2 10^3/UL (ref 4.8–10.8)

## 2018-03-24 PROCEDURE — 74181 MRI ABDOMEN W/O CONTRAST: CPT

## 2018-03-24 PROCEDURE — 99291 CRITICAL CARE FIRST HOUR: CPT

## 2018-03-24 PROCEDURE — 82105 ALPHA-FETOPROTEIN SERUM: CPT

## 2018-03-24 PROCEDURE — 82962 GLUCOSE BLOOD TEST: CPT

## 2018-03-24 PROCEDURE — 84484 ASSAY OF TROPONIN QUANT: CPT

## 2018-03-24 PROCEDURE — 92526 ORAL FUNCTION THERAPY: CPT

## 2018-03-24 PROCEDURE — 92610 EVALUATE SWALLOWING FUNCTION: CPT

## 2018-03-24 PROCEDURE — 83036 HEMOGLOBIN GLYCOSYLATED A1C: CPT

## 2018-03-24 PROCEDURE — 80076 HEPATIC FUNCTION PANEL: CPT

## 2018-03-24 PROCEDURE — 74177 CT ABD & PELVIS W/CONTRAST: CPT

## 2018-03-24 PROCEDURE — 76705 ECHO EXAM OF ABDOMEN: CPT

## 2018-03-24 PROCEDURE — 71045 X-RAY EXAM CHEST 1 VIEW: CPT

## 2018-03-24 PROCEDURE — 36415 COLL VENOUS BLD VENIPUNCTURE: CPT

## 2018-03-24 PROCEDURE — 93005 ELECTROCARDIOGRAM TRACING: CPT

## 2018-03-24 PROCEDURE — 85610 PROTHROMBIN TIME: CPT

## 2018-03-24 PROCEDURE — 97530 THERAPEUTIC ACTIVITIES: CPT

## 2018-03-24 PROCEDURE — 83735 ASSAY OF MAGNESIUM: CPT

## 2018-03-24 PROCEDURE — 94644 CONT INHLJ TX 1ST HOUR: CPT

## 2018-03-24 PROCEDURE — 84100 ASSAY OF PHOSPHORUS: CPT

## 2018-03-24 PROCEDURE — 85018 HEMOGLOBIN: CPT

## 2018-03-24 PROCEDURE — 83690 ASSAY OF LIPASE: CPT

## 2018-03-24 PROCEDURE — 86038 ANTINUCLEAR ANTIBODIES: CPT

## 2018-03-24 PROCEDURE — 97162 PT EVAL MOD COMPLEX 30 MIN: CPT

## 2018-03-24 PROCEDURE — 80053 COMPREHEN METABOLIC PANEL: CPT

## 2018-03-24 PROCEDURE — 85025 COMPLETE CBC W/AUTO DIFF WBC: CPT

## 2018-03-24 PROCEDURE — 87081 CULTURE SCREEN ONLY: CPT

## 2018-03-24 PROCEDURE — 96374 THER/PROPH/DIAG INJ IV PUSH: CPT

## 2018-03-24 PROCEDURE — 82140 ASSAY OF AMMONIA: CPT

## 2018-03-24 PROCEDURE — 85014 HEMATOCRIT: CPT

## 2018-03-24 PROCEDURE — 97110 THERAPEUTIC EXERCISES: CPT

## 2018-03-24 PROCEDURE — 96375 TX/PRO/DX INJ NEW DRUG ADDON: CPT

## 2018-03-24 PROCEDURE — 97116 GAIT TRAINING THERAPY: CPT

## 2018-03-24 PROCEDURE — 87040 BLOOD CULTURE FOR BACTERIA: CPT

## 2018-03-24 PROCEDURE — 80048 BASIC METABOLIC PNL TOTAL CA: CPT

## 2018-03-24 PROCEDURE — 81001 URINALYSIS AUTO W/SCOPE: CPT

## 2018-03-24 PROCEDURE — 86255 FLUORESCENT ANTIBODY SCREEN: CPT

## 2018-03-24 PROCEDURE — 70450 CT HEAD/BRAIN W/O DYE: CPT

## 2018-03-24 RX ADMIN — METOCLOPRAMIDE HYDROCHLORIDE 1 MG: 5 TABLET ORAL at 15:00

## 2018-03-24 RX ADMIN — METOCLOPRAMIDE HYDROCHLORIDE 1 MG: 5 TABLET ORAL at 20:37

## 2018-03-24 RX ADMIN — DICYCLOMINE HYDROCHLORIDE 1 MG: 10 CAPSULE ORAL at 17:46

## 2018-03-24 RX ADMIN — INSULIN ASPART 1 UNIT: 100 INJECTION, SOLUTION INTRAVENOUS; SUBCUTANEOUS at 22:27

## 2018-03-24 RX ADMIN — ONDANSETRON HYDROCHLORIDE 1 MG: 2 INJECTION, SOLUTION INTRAMUSCULAR; INTRAVENOUS at 12:25

## 2018-03-24 RX ADMIN — INSULIN GLARGINE 1 UNIT: 100 INJECTION, SOLUTION SUBCUTANEOUS at 22:26

## 2018-03-24 RX ADMIN — LACTULOSE 1 GM: 20 SOLUTION ORAL at 20:37

## 2018-03-24 RX ADMIN — SODIUM POLYSTYRENE SULFONATE 1 GM: 15 SUSPENSION ORAL; RECTAL at 13:51

## 2018-03-24 RX ADMIN — LIDOCAINE HYDROCHLORIDE 1 MLS/HR: 10 INJECTION, SOLUTION EPIDURAL; INFILTRATION; INTRACAUDAL; PERINEURAL at 12:25

## 2018-03-24 RX ADMIN — ALBUTEROL SULFATE 1 MG: 2.5 SOLUTION RESPIRATORY (INHALATION) at 14:06

## 2018-03-24 RX ADMIN — MIDODRINE HYDROCHLORIDE 1 MG: 5 TABLET ORAL at 20:37

## 2018-03-24 RX ADMIN — SODIUM BICARBONATE 1 ML: 84 INJECTION INTRAVENOUS at 13:48

## 2018-03-24 RX ADMIN — DICYCLOMINE HYDROCHLORIDE 1 MG: 10 CAPSULE ORAL at 21:00

## 2018-03-24 RX ADMIN — CALCIUM ACETATE 1 MG: 667 CAPSULE ORAL at 18:07

## 2018-03-24 RX ADMIN — INSULIN HUMAN 1 UNIT: 100 INJECTION, SOLUTION PARENTERAL at 13:45

## 2018-03-24 RX ADMIN — LORAZEPAM 1 MG: 2 INJECTION, SOLUTION INTRAMUSCULAR; INTRAVENOUS at 22:29

## 2018-03-24 RX ADMIN — METOCLOPRAMIDE HYDROCHLORIDE 1 MG: 5 TABLET ORAL at 17:00

## 2018-03-24 RX ADMIN — HYDROMORPHONE HYDROCHLORIDE 1 MG: 4 TABLET ORAL at 22:07

## 2018-03-25 LAB
ADD MAN DIFF?: NO
AMMONIA: 107 UMOL/L (ref 9–30)
BASOPHIL #: 0.1 10^3/UL (ref 0–0.1)
BASOPHILS %: 1.5 % (ref 0–2)
EOSINOPHILS #: 0.3 10^3/UL (ref 0–0.5)
EOSINOPHILS %: 3.7 % (ref 0–7)
HEMATOCRIT: 34.3 % (ref 42–52)
HEMOGLOBIN: 12.2 G/DL (ref 14–18)
LYMPHOCYTES #: 0.8 10^3/UL (ref 0.8–2.9)
LYMPHOCYTES %: 12.2 % (ref 15–51)
MAGNESIUM: 1.9 MG/DL (ref 1.7–2.5)
MEAN CORPUSCULAR HEMOGLOBIN: 33.7 PG (ref 29–33)
MEAN CORPUSCULAR HGB CONC: 35.6 G/DL (ref 32–37)
MEAN CORPUSCULAR VOLUME: 94.8 FL (ref 82–101)
MEAN PLATELET VOLUME: 10.8 FL (ref 7.4–10.4)
MONOCYTE #: 1 10^3/UL (ref 0.3–0.9)
MONOCYTES %: 14.8 % (ref 0–11)
NEUTROPHIL #: 4.6 10^3/UL (ref 1.6–7.5)
NEUTROPHILS %: 67.2 % (ref 39–77)
NUCLEATED RED BLOOD CELLS #: 0 10^3/UL (ref 0–0)
NUCLEATED RED BLOOD CELLS%: 0 /100WBC (ref 0–0)
PLATELET COUNT: 107 10^3/UL (ref 140–415)
RED BLOOD COUNT: 3.62 10^6/UL (ref 4.7–6.1)
RED CELL DISTRIBUTION WIDTH: 14.1 % (ref 11.5–14.5)
WHITE BLOOD COUNT: 6.8 10^3/UL (ref 4.8–10.8)

## 2018-03-25 RX ADMIN — LACTULOSE 1 GM: 20 SOLUTION ORAL at 20:10

## 2018-03-25 RX ADMIN — DICYCLOMINE HYDROCHLORIDE 1 MG: 10 CAPSULE ORAL at 12:19

## 2018-03-25 RX ADMIN — SPIRONOLACTONE 1 MG: 25 TABLET, FILM COATED ORAL at 08:23

## 2018-03-25 RX ADMIN — LACTULOSE 1 GM: 20 SOLUTION ORAL at 12:19

## 2018-03-25 RX ADMIN — DICYCLOMINE HYDROCHLORIDE 1 MG: 10 CAPSULE ORAL at 20:10

## 2018-03-25 RX ADMIN — INSULIN GLARGINE 1 UNIT: 100 INJECTION, SOLUTION SUBCUTANEOUS at 20:25

## 2018-03-25 RX ADMIN — METOCLOPRAMIDE HYDROCHLORIDE 1 MG: 5 TABLET ORAL at 17:28

## 2018-03-25 RX ADMIN — CALCIUM ACETATE 1 MG: 667 CAPSULE ORAL at 08:23

## 2018-03-25 RX ADMIN — EZETIMIBE 1 MG: 10 TABLET ORAL at 08:22

## 2018-03-25 RX ADMIN — INSULIN ASPART 1 UNIT: 100 INJECTION, SOLUTION INTRAVENOUS; SUBCUTANEOUS at 20:25

## 2018-03-25 RX ADMIN — SPIRONOLACTONE 1 MG: 25 TABLET, FILM COATED ORAL at 20:10

## 2018-03-25 RX ADMIN — PANTOPRAZOLE SODIUM 1 MG: 40 TABLET, DELAYED RELEASE ORAL at 06:14

## 2018-03-25 RX ADMIN — INSULIN ASPART 1 UNIT: 100 INJECTION, SOLUTION INTRAVENOUS; SUBCUTANEOUS at 12:26

## 2018-03-25 RX ADMIN — DICYCLOMINE HYDROCHLORIDE 1 MG: 10 CAPSULE ORAL at 08:23

## 2018-03-25 RX ADMIN — MIDODRINE HYDROCHLORIDE 1 MG: 5 TABLET ORAL at 08:29

## 2018-03-25 RX ADMIN — CALCIUM ACETATE 1 MG: 667 CAPSULE ORAL at 17:28

## 2018-03-25 RX ADMIN — CALCIUM ACETATE 1 MG: 667 CAPSULE ORAL at 12:19

## 2018-03-25 RX ADMIN — LACTULOSE 1 GM: 20 SOLUTION ORAL at 08:21

## 2018-03-25 RX ADMIN — MIDODRINE HYDROCHLORIDE 1 MG: 5 TABLET ORAL at 20:11

## 2018-03-25 RX ADMIN — METOCLOPRAMIDE HYDROCHLORIDE 1 MG: 5 TABLET ORAL at 12:19

## 2018-03-25 RX ADMIN — INSULIN ASPART 1 UNIT: 100 INJECTION, SOLUTION INTRAVENOUS; SUBCUTANEOUS at 17:31

## 2018-03-25 RX ADMIN — MIDODRINE HYDROCHLORIDE 1 MG: 5 TABLET ORAL at 12:21

## 2018-03-25 RX ADMIN — DICYCLOMINE HYDROCHLORIDE 1 MG: 10 CAPSULE ORAL at 17:27

## 2018-03-25 RX ADMIN — METOCLOPRAMIDE HYDROCHLORIDE 1 MG: 5 TABLET ORAL at 20:11

## 2018-03-25 RX ADMIN — CYANOCOBALAMIN TAB 500 MCG 1 MCG: 500 TAB at 08:23

## 2018-03-25 RX ADMIN — INSULIN ASPART 1 UNIT: 100 INJECTION, SOLUTION INTRAVENOUS; SUBCUTANEOUS at 08:27

## 2018-03-25 RX ADMIN — METOCLOPRAMIDE HYDROCHLORIDE 1 MG: 5 TABLET ORAL at 08:22

## 2018-03-25 RX ADMIN — CLOPIDOGREL 1 MG: 75 TABLET, FILM COATED ORAL at 08:22

## 2018-03-26 LAB
ABNORMAL IP MESSAGE: 1
ADD MAN DIFF?: NO
ADD UMIC: YES
ALANINE AMINOTRANSFERASE: 57 IU/L (ref 13–69)
ALBUMIN/GLOBULIN RATIO: 0.72
ALBUMIN: 2.6 G/DL (ref 3.3–4.9)
ALKALINE PHOSPHATASE: 215 IU/L (ref 42–121)
AMMONIA: 242 UMOL/L (ref 9–30)
ANION GAP: 12 (ref 8–16)
ASPARTATE AMINO TRANSFERASE: 75 IU/L (ref 15–46)
BASOPHIL #: 0.1 10^3/UL (ref 0–0.1)
BASOPHILS %: 1.1 % (ref 0–2)
BILIRUBIN,DIRECT: 0 MG/DL (ref 0–0.2)
BILIRUBIN,TOTAL: 0.9 MG/DL (ref 0.2–1.3)
BLOOD UREA NITROGEN: 16 MG/DL (ref 7–20)
CALCIUM: 8.8 MG/DL (ref 8.4–10.2)
CARBON DIOXIDE: 26 MMOL/L (ref 21–31)
CHLORIDE: 97 MMOL/L (ref 97–110)
CREATININE: 0.77 MG/DL (ref 0.61–1.24)
EOSINOPHILS #: 0.2 10^3/UL (ref 0–0.5)
EOSINOPHILS %: 3.1 % (ref 0–7)
GLOBULIN: 3.6 G/DL (ref 1.3–3.2)
GLUCOSE: 273 MG/DL (ref 70–220)
HEMATOCRIT: 32.5 % (ref 42–52)
HEMOGLOBIN: 11.7 G/DL (ref 14–18)
LYMPHOCYTES #: 1 10^3/UL (ref 0.8–2.9)
LYMPHOCYTES %: 14 % (ref 15–51)
MEAN CORPUSCULAR HEMOGLOBIN: 33.3 PG (ref 29–33)
MEAN CORPUSCULAR HGB CONC: 36 G/DL (ref 32–37)
MEAN CORPUSCULAR VOLUME: 92.6 FL (ref 82–101)
MEAN PLATELET VOLUME: 10.7 FL (ref 7.4–10.4)
MONOCYTE #: 0.8 10^3/UL (ref 0.3–0.9)
MONOCYTES %: 11.9 % (ref 0–11)
NEUTROPHIL #: 4.9 10^3/UL (ref 1.6–7.5)
NEUTROPHILS %: 69.3 % (ref 39–77)
NUCLEATED RED BLOOD CELLS #: 0 10^3/UL (ref 0–0)
NUCLEATED RED BLOOD CELLS%: 0 /100WBC (ref 0–0)
PLATELET COUNT: 96 10^3/UL (ref 140–415)
POSITIVE DIFF: (no result)
POTASSIUM: 4.6 MMOL/L (ref 3.5–5.1)
RED BLOOD COUNT: 3.51 10^6/UL (ref 4.7–6.1)
RED CELL DISTRIBUTION WIDTH: 13.9 % (ref 11.5–14.5)
SODIUM: 130 MMOL/L (ref 135–144)
TOTAL PROTEIN: 6.2 G/DL (ref 6.1–8.1)
UR ASCORBIC ACID: NEGATIVE MG/DL
UR BILIRUBIN (DIP): NEGATIVE MG/DL
UR BLOOD (DIP): (no result) MG/DL
UR CLARITY: CLEAR
UR COLOR: YELLOW
UR GLUCOSE (DIP): (no result) MG/DL
UR KETONES (DIP): NEGATIVE MG/DL
UR LEUKOCYTE ESTERASE (DIP): NEGATIVE LEU/UL
UR NITRITE (DIP): NEGATIVE MG/DL
UR PH (DIP): 8 (ref 5–9)
UR RBC: 4 /HPF (ref 0–5)
UR SPECIFIC GRAVITY (DIP): 1.01 (ref 1–1.03)
UR TOTAL PROTEIN (DIP): NEGATIVE MG/DL
UR UROBILINOGEN (DIP): (no result) MG/DL
UR WBC: 2 /HPF (ref 0–5)
WHITE BLOOD COUNT: 7.1 10^3/UL (ref 4.8–10.8)

## 2018-03-26 RX ADMIN — ASCORBIC ACID, VITAMIN A PALMITATE, CHOLECALCIFEROL, THIAMINE HYDROCHLORIDE, RIBOFLAVIN-5 PHOSPHATE SODIUM, PYRIDOXINE HYDROCHLORIDE, NIACINAMIDE, DEXPANTHENOL, ALPHA-TOCOPHEROL ACETATE, VITAMIN K1, FOLIC ACID, BIOTIN, CYANOCOBALAMIN 1 MLS/HR: 200; 3300; 200; 6; 3.6; 6; 40; 15; 10; 150; 600; 60; 5 INJECTION, SOLUTION INTRAVENOUS at 16:52

## 2018-03-26 RX ADMIN — METOCLOPRAMIDE HYDROCHLORIDE 1 MG: 5 TABLET ORAL at 17:22

## 2018-03-26 RX ADMIN — MIDODRINE HYDROCHLORIDE 1 MG: 5 TABLET ORAL at 12:16

## 2018-03-26 RX ADMIN — CALCIUM ACETATE 1 MG: 667 CAPSULE ORAL at 12:14

## 2018-03-26 RX ADMIN — METOCLOPRAMIDE HYDROCHLORIDE 1 MG: 5 TABLET ORAL at 21:21

## 2018-03-26 RX ADMIN — INSULIN ASPART 1 UNIT: 100 INJECTION, SOLUTION INTRAVENOUS; SUBCUTANEOUS at 17:25

## 2018-03-26 RX ADMIN — METOCLOPRAMIDE HYDROCHLORIDE 1 MG: 5 TABLET ORAL at 12:15

## 2018-03-26 RX ADMIN — RIFAXIMIN 1 MG: 550 TABLET ORAL at 12:14

## 2018-03-26 RX ADMIN — SPIRONOLACTONE 1 MG: 25 TABLET, FILM COATED ORAL at 08:29

## 2018-03-26 RX ADMIN — LACTULOSE 1 GM: 20 SOLUTION ORAL at 08:30

## 2018-03-26 RX ADMIN — INSULIN ASPART 1 UNIT: 100 INJECTION, SOLUTION INTRAVENOUS; SUBCUTANEOUS at 17:26

## 2018-03-26 RX ADMIN — DICYCLOMINE HYDROCHLORIDE 1 MG: 10 CAPSULE ORAL at 12:15

## 2018-03-26 RX ADMIN — INSULIN ASPART 1 UNIT: 100 INJECTION, SOLUTION INTRAVENOUS; SUBCUTANEOUS at 12:42

## 2018-03-26 RX ADMIN — RIFAXIMIN 1 MG: 550 TABLET ORAL at 21:20

## 2018-03-26 RX ADMIN — INSULIN ASPART 1 UNIT: 100 INJECTION, SOLUTION INTRAVENOUS; SUBCUTANEOUS at 08:40

## 2018-03-26 RX ADMIN — DICYCLOMINE HYDROCHLORIDE 1 MG: 10 CAPSULE ORAL at 21:20

## 2018-03-26 RX ADMIN — EZETIMIBE 1 MG: 10 TABLET ORAL at 08:28

## 2018-03-26 RX ADMIN — MIDODRINE HYDROCHLORIDE 1 MG: 5 TABLET ORAL at 08:30

## 2018-03-26 RX ADMIN — INSULIN ASPART 1 UNIT: 100 INJECTION, SOLUTION INTRAVENOUS; SUBCUTANEOUS at 12:43

## 2018-03-26 RX ADMIN — INSULIN GLARGINE 1 UNIT: 100 INJECTION, SOLUTION SUBCUTANEOUS at 21:38

## 2018-03-26 RX ADMIN — LORAZEPAM 1 MG: 2 INJECTION, SOLUTION INTRAMUSCULAR; INTRAVENOUS at 03:59

## 2018-03-26 RX ADMIN — PANTOPRAZOLE SODIUM 1 MG: 40 TABLET, DELAYED RELEASE ORAL at 06:13

## 2018-03-26 RX ADMIN — Medication 1 MLS/HR: at 12:18

## 2018-03-26 RX ADMIN — CLOPIDOGREL 1 MG: 75 TABLET, FILM COATED ORAL at 08:28

## 2018-03-26 RX ADMIN — LACTULOSE 1 GM: 20 SOLUTION ORAL at 12:17

## 2018-03-26 RX ADMIN — LACTULOSE 1 GM: 20 SOLUTION ORAL at 17:23

## 2018-03-26 RX ADMIN — MIDODRINE HYDROCHLORIDE 1 MG: 5 TABLET ORAL at 21:21

## 2018-03-26 RX ADMIN — METOCLOPRAMIDE HYDROCHLORIDE 1 MG: 5 TABLET ORAL at 08:28

## 2018-03-26 RX ADMIN — SPIRONOLACTONE 1 MG: 25 TABLET, FILM COATED ORAL at 21:21

## 2018-03-26 RX ADMIN — CYANOCOBALAMIN TAB 500 MCG 1 MCG: 500 TAB at 08:29

## 2018-03-26 RX ADMIN — DICYCLOMINE HYDROCHLORIDE 1 MG: 10 CAPSULE ORAL at 08:29

## 2018-03-26 RX ADMIN — INSULIN ASPART 1 UNIT: 100 INJECTION, SOLUTION INTRAVENOUS; SUBCUTANEOUS at 21:39

## 2018-03-26 RX ADMIN — DICYCLOMINE HYDROCHLORIDE 1 MG: 10 CAPSULE ORAL at 17:22

## 2018-03-26 RX ADMIN — CALCIUM ACETATE 1 MG: 667 CAPSULE ORAL at 08:29

## 2018-03-26 RX ADMIN — CALCIUM ACETATE 1 MG: 667 CAPSULE ORAL at 17:22

## 2018-03-27 LAB
ABNORMAL IP MESSAGE: 1
ADD MAN DIFF?: NO
AMMONIA: 175 UMOL/L (ref 9–30)
ANION GAP: 16 (ref 8–16)
BASOPHIL #: 0.1 10^3/UL (ref 0–0.1)
BASOPHILS %: 1 % (ref 0–2)
BLOOD UREA NITROGEN: 11 MG/DL (ref 7–20)
CALCIUM: 9.4 MG/DL (ref 8.4–10.2)
CARBON DIOXIDE: 18 MMOL/L (ref 21–31)
CHLORIDE: 105 MMOL/L (ref 97–110)
CREATININE: 0.55 MG/DL (ref 0.61–1.24)
EOSINOPHILS #: 0.2 10^3/UL (ref 0–0.5)
EOSINOPHILS %: 2.5 % (ref 0–7)
GLUCOSE: 181 MG/DL (ref 70–220)
HEMATOCRIT: 35.1 % (ref 42–52)
HEMOGLOBIN: 12.9 G/DL (ref 14–18)
LYMPHOCYTES #: 0.8 10^3/UL (ref 0.8–2.9)
LYMPHOCYTES %: 11.5 % (ref 15–51)
MAGNESIUM: 1.7 MG/DL (ref 1.7–2.5)
MEAN CORPUSCULAR HEMOGLOBIN: 33.9 PG (ref 29–33)
MEAN CORPUSCULAR HGB CONC: 36.8 G/DL (ref 32–37)
MEAN CORPUSCULAR VOLUME: 92.1 FL (ref 82–101)
MEAN PLATELET VOLUME: 10.9 FL (ref 7.4–10.4)
MONOCYTE #: 0.7 10^3/UL (ref 0.3–0.9)
MONOCYTES %: 10.5 % (ref 0–11)
NEUTROPHIL #: 5 10^3/UL (ref 1.6–7.5)
NEUTROPHILS %: 74.1 % (ref 39–77)
NUCLEATED RED BLOOD CELLS #: 0 10^3/UL (ref 0–0)
NUCLEATED RED BLOOD CELLS%: 0 /100WBC (ref 0–0)
PHOSPHORUS: 3.2 MG/DL (ref 2.5–4.9)
PLATELET COUNT: 96 10^3/UL (ref 140–415)
POSITIVE DIFF: (no result)
POTASSIUM: 4.8 MMOL/L (ref 3.5–5.1)
RED BLOOD COUNT: 3.81 10^6/UL (ref 4.7–6.1)
RED CELL DISTRIBUTION WIDTH: 14.2 % (ref 11.5–14.5)
SODIUM: 134 MMOL/L (ref 135–144)
WHITE BLOOD COUNT: 6.8 10^3/UL (ref 4.8–10.8)

## 2018-03-27 RX ADMIN — LORAZEPAM 1 MG: 2 INJECTION, SOLUTION INTRAMUSCULAR; INTRAVENOUS at 03:32

## 2018-03-27 RX ADMIN — RIFAXIMIN 1 MG: 550 TABLET ORAL at 21:01

## 2018-03-27 RX ADMIN — MIDODRINE HYDROCHLORIDE 1 MG: 5 TABLET ORAL at 13:00

## 2018-03-27 RX ADMIN — LACTULOSE 1 GM: 20 SOLUTION ORAL at 01:19

## 2018-03-27 RX ADMIN — DICYCLOMINE HYDROCHLORIDE 1 MG: 10 CAPSULE ORAL at 21:04

## 2018-03-27 RX ADMIN — INSULIN ASPART 1 UNIT: 100 INJECTION, SOLUTION INTRAVENOUS; SUBCUTANEOUS at 17:39

## 2018-03-27 RX ADMIN — DICYCLOMINE HYDROCHLORIDE 1 MG: 10 CAPSULE ORAL at 13:18

## 2018-03-27 RX ADMIN — DICYCLOMINE HYDROCHLORIDE 1 MG: 10 CAPSULE ORAL at 17:28

## 2018-03-27 RX ADMIN — METOCLOPRAMIDE HYDROCHLORIDE 1 MG: 5 TABLET ORAL at 08:48

## 2018-03-27 RX ADMIN — SPIRONOLACTONE 1 MG: 25 TABLET, FILM COATED ORAL at 21:00

## 2018-03-27 RX ADMIN — LACTULOSE 1 GM: 20 SOLUTION ORAL at 06:33

## 2018-03-27 RX ADMIN — LACTULOSE 1 GM: 20 SOLUTION ORAL at 12:28

## 2018-03-27 RX ADMIN — METOCLOPRAMIDE HYDROCHLORIDE 1 MG: 5 TABLET ORAL at 13:18

## 2018-03-27 RX ADMIN — CALCIUM ACETATE 1 MG: 667 CAPSULE ORAL at 08:48

## 2018-03-27 RX ADMIN — DICYCLOMINE HYDROCHLORIDE 1 MG: 10 CAPSULE ORAL at 08:47

## 2018-03-27 RX ADMIN — SPIRONOLACTONE 1 MG: 25 TABLET, FILM COATED ORAL at 08:48

## 2018-03-27 RX ADMIN — PANTOPRAZOLE SODIUM 1 MG: 40 TABLET, DELAYED RELEASE ORAL at 06:33

## 2018-03-27 RX ADMIN — METOCLOPRAMIDE HYDROCHLORIDE 1 MG: 5 TABLET ORAL at 17:27

## 2018-03-27 RX ADMIN — MIDODRINE HYDROCHLORIDE 1 MG: 5 TABLET ORAL at 21:01

## 2018-03-27 RX ADMIN — CLOPIDOGREL 1 MG: 75 TABLET, FILM COATED ORAL at 08:47

## 2018-03-27 RX ADMIN — CYANOCOBALAMIN TAB 500 MCG 1 MCG: 500 TAB at 09:04

## 2018-03-27 RX ADMIN — INSULIN ASPART 1 UNIT: 100 INJECTION, SOLUTION INTRAVENOUS; SUBCUTANEOUS at 17:40

## 2018-03-27 RX ADMIN — ASCORBIC ACID, VITAMIN A PALMITATE, CHOLECALCIFEROL, THIAMINE HYDROCHLORIDE, RIBOFLAVIN-5 PHOSPHATE SODIUM, PYRIDOXINE HYDROCHLORIDE, NIACINAMIDE, DEXPANTHENOL, ALPHA-TOCOPHEROL ACETATE, VITAMIN K1, FOLIC ACID, BIOTIN, CYANOCOBALAMIN 1 MLS/HR: 200; 3300; 200; 6; 3.6; 6; 40; 15; 10; 150; 600; 60; 5 INJECTION, SOLUTION INTRAVENOUS at 12:00

## 2018-03-27 RX ADMIN — METOCLOPRAMIDE HYDROCHLORIDE 1 MG: 5 TABLET ORAL at 21:01

## 2018-03-27 RX ADMIN — INSULIN ASPART 1 UNIT: 100 INJECTION, SOLUTION INTRAVENOUS; SUBCUTANEOUS at 09:03

## 2018-03-27 RX ADMIN — CALCIUM ACETATE 1 MG: 667 CAPSULE ORAL at 12:28

## 2018-03-27 RX ADMIN — RIFAXIMIN 1 MG: 550 TABLET ORAL at 08:47

## 2018-03-27 RX ADMIN — LACTULOSE 1 GM: 20 SOLUTION ORAL at 17:27

## 2018-03-27 RX ADMIN — ASCORBIC ACID, VITAMIN A PALMITATE, CHOLECALCIFEROL, THIAMINE HYDROCHLORIDE, RIBOFLAVIN-5 PHOSPHATE SODIUM, PYRIDOXINE HYDROCHLORIDE, NIACINAMIDE, DEXPANTHENOL, ALPHA-TOCOPHEROL ACETATE, VITAMIN K1, FOLIC ACID, BIOTIN, CYANOCOBALAMIN 1 MLS/HR: 200; 3300; 200; 6; 3.6; 6; 40; 15; 10; 150; 600; 60; 5 INJECTION, SOLUTION INTRAVENOUS at 17:28

## 2018-03-27 RX ADMIN — EZETIMIBE 1 MG: 10 TABLET ORAL at 08:47

## 2018-03-27 RX ADMIN — INSULIN ASPART 1 UNIT: 100 INJECTION, SOLUTION INTRAVENOUS; SUBCUTANEOUS at 12:36

## 2018-03-27 RX ADMIN — CALCIUM ACETATE 1 MG: 667 CAPSULE ORAL at 17:28

## 2018-03-27 RX ADMIN — INSULIN ASPART 1 UNIT: 100 INJECTION, SOLUTION INTRAVENOUS; SUBCUTANEOUS at 21:17

## 2018-03-27 RX ADMIN — MIDODRINE HYDROCHLORIDE 1 MG: 5 TABLET ORAL at 08:48

## 2018-03-27 RX ADMIN — INSULIN GLARGINE 1 UNIT: 100 INJECTION, SOLUTION SUBCUTANEOUS at 21:16

## 2018-03-28 LAB
ADD MAN DIFF?: NO
AMMONIA: 155 UMOL/L (ref 9–30)
ANION GAP: 17 (ref 8–16)
BASOPHIL #: 0.1 10^3/UL (ref 0–0.1)
BASOPHILS %: 0.4 % (ref 0–2)
BLOOD UREA NITROGEN: 12 MG/DL (ref 7–20)
CALCIUM: 10 MG/DL (ref 8.4–10.2)
CARBON DIOXIDE: 14 MMOL/L (ref 21–31)
CHLORIDE: 108 MMOL/L (ref 97–110)
CREATININE: 0.66 MG/DL (ref 0.61–1.24)
EOSINOPHILS #: 0 10^3/UL (ref 0–0.5)
EOSINOPHILS %: 0.3 % (ref 0–7)
GLUCOSE: 237 MG/DL (ref 70–220)
HEMATOCRIT: 36.9 % (ref 42–52)
HEMATOCRIT: 37.7 % (ref 42–52)
HEMATOCRIT: 37.9 % (ref 42–52)
HEMOGLOBIN: 13.5 G/DL (ref 14–18)
HEMOGLOBIN: 13.6 G/DL (ref 14–18)
HEMOGLOBIN: 13.8 G/DL (ref 14–18)
INR: 1.37
LYMPHOCYTES #: 0.9 10^3/UL (ref 0.8–2.9)
LYMPHOCYTES %: 5.7 % (ref 15–51)
MAGNESIUM: 1.5 MG/DL (ref 1.7–2.5)
MEAN CORPUSCULAR HEMOGLOBIN: 33.4 PG (ref 29–33)
MEAN CORPUSCULAR HGB CONC: 36.6 G/DL (ref 32–37)
MEAN CORPUSCULAR VOLUME: 91.3 FL (ref 82–101)
MEAN PLATELET VOLUME: 10.9 FL (ref 7.4–10.4)
MONOCYTE #: 1 10^3/UL (ref 0.3–0.9)
MONOCYTES %: 6.3 % (ref 0–11)
NEUTROPHIL #: 13 10^3/UL (ref 1.6–7.5)
NEUTROPHILS %: 86.6 % (ref 39–77)
NUCLEATED RED BLOOD CELLS #: 0 10^3/UL (ref 0–0)
NUCLEATED RED BLOOD CELLS%: 0 /100WBC (ref 0–0)
PHOSPHORUS: 2.8 MG/DL (ref 2.5–4.9)
PLATELET COUNT: 141 10^3/UL (ref 140–415)
POTASSIUM: 4.8 MMOL/L (ref 3.5–5.1)
PROTIME: 17.1 SEC (ref 11.9–14.9)
PT RATIO: 1.3
RED BLOOD COUNT: 4.04 10^6/UL (ref 4.7–6.1)
RED CELL DISTRIBUTION WIDTH: 14.3 % (ref 11.5–14.5)
SODIUM: 134 MMOL/L (ref 135–144)
WHITE BLOOD COUNT: 15 10^3/UL (ref 4.8–10.8)

## 2018-03-28 PROCEDURE — 0DJ08ZZ INSPECTION OF UPPER INTESTINAL TRACT, VIA NATURAL OR ARTIFICIAL OPENING ENDOSCOPIC: ICD-10-PCS

## 2018-03-28 RX ADMIN — THIAMINE HYDROCHLORIDE 1 MLS/HR: 100 INJECTION, SOLUTION INTRAMUSCULAR; INTRAVENOUS at 23:58

## 2018-03-28 RX ADMIN — PROPOFOL 1: 10 INJECTION, EMULSION INTRAVENOUS at 15:53

## 2018-03-28 RX ADMIN — LACTULOSE 1 GM: 20 SOLUTION ORAL at 13:15

## 2018-03-28 RX ADMIN — SPIRONOLACTONE 1 MG: 25 TABLET, FILM COATED ORAL at 09:00

## 2018-03-28 RX ADMIN — VASOPRESSIN 1 MLS/HR: 20 INJECTION, SOLUTION INTRAMUSCULAR; SUBCUTANEOUS at 06:15

## 2018-03-28 RX ADMIN — LACTULOSE 1 GM: 20 SOLUTION ORAL at 23:58

## 2018-03-28 RX ADMIN — CLOPIDOGREL 1 MG: 75 TABLET, FILM COATED ORAL at 10:00

## 2018-03-28 RX ADMIN — THIAMINE HYDROCHLORIDE 1 MLS/HR: 100 INJECTION, SOLUTION INTRAMUSCULAR; INTRAVENOUS at 14:10

## 2018-03-28 RX ADMIN — LACTULOSE 1 GM: 20 SOLUTION ORAL at 13:00

## 2018-03-28 RX ADMIN — ONDANSETRON HYDROCHLORIDE 1 MG: 2 INJECTION, SOLUTION INTRAMUSCULAR; INTRAVENOUS at 01:02

## 2018-03-28 RX ADMIN — METOCLOPRAMIDE HYDROCHLORIDE 1 MG: 5 TABLET ORAL at 12:02

## 2018-03-28 RX ADMIN — PHYTONADIONE 1 MG: 10 INJECTION, EMULSION INTRAMUSCULAR; INTRAVENOUS; SUBCUTANEOUS at 18:50

## 2018-03-28 RX ADMIN — VASOPRESSIN 1 MLS/HR: 20 INJECTION, SOLUTION INTRAMUSCULAR; SUBCUTANEOUS at 14:15

## 2018-03-28 RX ADMIN — EZETIMIBE 1 MG: 10 TABLET ORAL at 10:00

## 2018-03-28 RX ADMIN — CALCIUM ACETATE 1 MG: 667 CAPSULE ORAL at 07:55

## 2018-03-28 RX ADMIN — INSULIN ASPART 1 UNIT: 100 INJECTION, SOLUTION INTRAVENOUS; SUBCUTANEOUS at 18:00

## 2018-03-28 RX ADMIN — LACTULOSE 1 GM: 20 SOLUTION ORAL at 18:00

## 2018-03-28 RX ADMIN — MIDODRINE HYDROCHLORIDE 1 MG: 5 TABLET ORAL at 21:00

## 2018-03-28 RX ADMIN — FENTANYL CITRATE 1 MCG: 50 INJECTION, SOLUTION INTRAMUSCULAR; INTRAVENOUS at 15:53

## 2018-03-28 RX ADMIN — VASOPRESSIN 1 MLS/HR: 20 INJECTION, SOLUTION INTRAMUSCULAR; SUBCUTANEOUS at 23:14

## 2018-03-28 RX ADMIN — DICYCLOMINE HYDROCHLORIDE 1 MG: 10 CAPSULE ORAL at 10:00

## 2018-03-28 RX ADMIN — METOCLOPRAMIDE HYDROCHLORIDE 1 MG: 5 TABLET ORAL at 10:00

## 2018-03-28 RX ADMIN — DICYCLOMINE HYDROCHLORIDE 1 MG: 10 CAPSULE ORAL at 16:32

## 2018-03-28 RX ADMIN — OCTREOTIDE ACETATE 1 MLS/HR: 500 INJECTION, SOLUTION INTRAVENOUS; SUBCUTANEOUS at 05:33

## 2018-03-28 RX ADMIN — INSULIN GLARGINE 1 UNIT: 100 INJECTION, SOLUTION SUBCUTANEOUS at 21:00

## 2018-03-28 RX ADMIN — CALCIUM ACETATE 1 MG: 667 CAPSULE ORAL at 16:59

## 2018-03-28 RX ADMIN — INSULIN ASPART 1 UNIT: 100 INJECTION, SOLUTION INTRAVENOUS; SUBCUTANEOUS at 07:55

## 2018-03-28 RX ADMIN — LACTULOSE 1 GM: 20 SOLUTION ORAL at 06:00

## 2018-03-28 RX ADMIN — CEFTRIAXONE 1 MLS/HR: 1 INJECTION, SOLUTION INTRAVENOUS at 13:15

## 2018-03-28 RX ADMIN — METOCLOPRAMIDE HYDROCHLORIDE 1 MG: 5 TABLET ORAL at 21:00

## 2018-03-28 RX ADMIN — INSULIN ASPART 1 UNIT: 100 INJECTION, SOLUTION INTRAVENOUS; SUBCUTANEOUS at 23:59

## 2018-03-28 RX ADMIN — INSULIN ASPART 1 UNIT: 100 INJECTION, SOLUTION INTRAVENOUS; SUBCUTANEOUS at 08:40

## 2018-03-28 RX ADMIN — MIDODRINE HYDROCHLORIDE 1 MG: 5 TABLET ORAL at 10:00

## 2018-03-28 RX ADMIN — RIFAXIMIN 1 MG: 550 TABLET ORAL at 21:00

## 2018-03-28 RX ADMIN — PANTOPRAZOLE SODIUM 1 MLS/HR: 40 INJECTION, POWDER, FOR SOLUTION INTRAVENOUS at 05:40

## 2018-03-28 RX ADMIN — CYANOCOBALAMIN TAB 500 MCG 1 MCG: 500 TAB at 09:00

## 2018-03-28 RX ADMIN — INSULIN ASPART 1 UNIT: 100 INJECTION, SOLUTION INTRAVENOUS; SUBCUTANEOUS at 11:22

## 2018-03-28 RX ADMIN — MAGNESIUM SULFATE HEPTAHYDRATE 1 MLS/HR: 40 INJECTION, SOLUTION INTRAVENOUS at 14:10

## 2018-03-28 RX ADMIN — RIFAXIMIN 1 MG: 550 TABLET ORAL at 10:00

## 2018-03-28 RX ADMIN — METOCLOPRAMIDE HYDROCHLORIDE 1 MG: 5 TABLET ORAL at 16:32

## 2018-03-28 RX ADMIN — DICYCLOMINE HYDROCHLORIDE 1 MG: 10 CAPSULE ORAL at 12:01

## 2018-03-28 RX ADMIN — SPIRONOLACTONE 1 MG: 25 TABLET, FILM COATED ORAL at 21:00

## 2018-03-28 RX ADMIN — LACTULOSE 1 GM: 20 SOLUTION ORAL at 00:11

## 2018-03-28 RX ADMIN — CALCIUM ACETATE 1 MG: 667 CAPSULE ORAL at 11:21

## 2018-03-28 RX ADMIN — MIDODRINE HYDROCHLORIDE 1 MG: 5 TABLET ORAL at 12:01

## 2018-03-28 RX ADMIN — LACTULOSE 1 GM: 20 SOLUTION ORAL at 11:21

## 2018-03-28 RX ADMIN — INSULIN ASPART 1 UNIT: 100 INJECTION, SOLUTION INTRAVENOUS; SUBCUTANEOUS at 12:34

## 2018-03-28 RX ADMIN — OCTREOTIDE ACETATE 1 MLS/HR: 500 INJECTION, SOLUTION INTRAVENOUS; SUBCUTANEOUS at 23:14

## 2018-03-28 RX ADMIN — DICYCLOMINE HYDROCHLORIDE 1 MG: 10 CAPSULE ORAL at 21:00

## 2018-03-29 LAB
ADD MAN DIFF?: NO
AMMONIA: 110 UMOL/L (ref 9–30)
ANION GAP: 15 (ref 8–16)
BASOPHIL #: 0.1 10^3/UL (ref 0–0.1)
BASOPHILS %: 0.3 % (ref 0–2)
BLOOD UREA NITROGEN: 20 MG/DL (ref 7–20)
CALCIUM: 9.6 MG/DL (ref 8.4–10.2)
CARBON DIOXIDE: 15 MMOL/L (ref 21–31)
CHLORIDE: 113 MMOL/L (ref 97–110)
CREATININE: 0.78 MG/DL (ref 0.61–1.24)
EOSINOPHILS #: 0.1 10^3/UL (ref 0–0.5)
EOSINOPHILS %: 0.5 % (ref 0–7)
GLUCOSE: 222 MG/DL (ref 70–220)
HEMATOCRIT: 35.4 % (ref 42–52)
HEMATOCRIT: 37.1 % (ref 42–52)
HEMOGLOBIN: 12.8 G/DL (ref 14–18)
HEMOGLOBIN: 13.4 G/DL (ref 14–18)
LYMPHOCYTES #: 1 10^3/UL (ref 0.8–2.9)
LYMPHOCYTES %: 5.1 % (ref 15–51)
MAGNESIUM: 1.6 MG/DL (ref 1.7–2.5)
MEAN CORPUSCULAR HEMOGLOBIN: 33.2 PG (ref 29–33)
MEAN CORPUSCULAR HGB CONC: 36.2 G/DL (ref 32–37)
MEAN CORPUSCULAR VOLUME: 91.7 FL (ref 82–101)
MEAN PLATELET VOLUME: 10.4 FL (ref 7.4–10.4)
MONOCYTE #: 1.3 10^3/UL (ref 0.3–0.9)
MONOCYTES %: 7 % (ref 0–11)
NEUTROPHIL #: 16.2 10^3/UL (ref 1.6–7.5)
NEUTROPHILS %: 86.2 % (ref 39–77)
NUCLEATED RED BLOOD CELLS #: 0 10^3/UL (ref 0–0)
NUCLEATED RED BLOOD CELLS%: 0 /100WBC (ref 0–0)
PHOSPHORUS: 3.3 MG/DL (ref 2.5–4.9)
PLATELET COUNT: 127 10^3/UL (ref 140–415)
POTASSIUM: 4.2 MMOL/L (ref 3.5–5.1)
RED BLOOD COUNT: 3.86 10^6/UL (ref 4.7–6.1)
RED CELL DISTRIBUTION WIDTH: 14.7 % (ref 11.5–14.5)
SODIUM: 139 MMOL/L (ref 135–144)
WHITE BLOOD COUNT: 18.8 10^3/UL (ref 4.8–10.8)

## 2018-03-29 RX ADMIN — METOCLOPRAMIDE HYDROCHLORIDE 1 MG: 5 TABLET ORAL at 21:00

## 2018-03-29 RX ADMIN — CEFTRIAXONE 1 MLS/HR: 1 INJECTION, SOLUTION INTRAVENOUS at 13:18

## 2018-03-29 RX ADMIN — DICYCLOMINE HYDROCHLORIDE 1 MG: 10 CAPSULE ORAL at 13:00

## 2018-03-29 RX ADMIN — CLOPIDOGREL 1 MG: 75 TABLET, FILM COATED ORAL at 08:53

## 2018-03-29 RX ADMIN — LACTULOSE 1 GM: 20 SOLUTION ORAL at 05:42

## 2018-03-29 RX ADMIN — INSULIN ASPART 1 UNIT: 100 INJECTION, SOLUTION INTRAVENOUS; SUBCUTANEOUS at 13:21

## 2018-03-29 RX ADMIN — METOCLOPRAMIDE HYDROCHLORIDE 1 MG: 5 TABLET ORAL at 08:54

## 2018-03-29 RX ADMIN — METOCLOPRAMIDE HYDROCHLORIDE 1 MG: 5 TABLET ORAL at 17:00

## 2018-03-29 RX ADMIN — THIAMINE HYDROCHLORIDE 1 MLS/HR: 100 INJECTION, SOLUTION INTRAMUSCULAR; INTRAVENOUS at 20:08

## 2018-03-29 RX ADMIN — DICYCLOMINE HYDROCHLORIDE 1 MG: 10 CAPSULE ORAL at 21:00

## 2018-03-29 RX ADMIN — METOCLOPRAMIDE HYDROCHLORIDE 1 MG: 5 TABLET ORAL at 13:00

## 2018-03-29 RX ADMIN — VASOPRESSIN 1 MLS/HR: 20 INJECTION, SOLUTION INTRAMUSCULAR; SUBCUTANEOUS at 18:04

## 2018-03-29 RX ADMIN — RIFAXIMIN 1 MG: 550 TABLET ORAL at 08:54

## 2018-03-29 RX ADMIN — THIAMINE HYDROCHLORIDE 1 MLS/HR: 100 INJECTION, SOLUTION INTRAMUSCULAR; INTRAVENOUS at 09:38

## 2018-03-29 RX ADMIN — MORPHINE SULFATE 1 MG: 2 INJECTION, SOLUTION INTRAMUSCULAR; INTRAVENOUS at 14:08

## 2018-03-29 RX ADMIN — CYANOCOBALAMIN TAB 500 MCG 1 MCG: 500 TAB at 08:54

## 2018-03-29 RX ADMIN — EZETIMIBE 1 MG: 10 TABLET ORAL at 08:54

## 2018-03-29 RX ADMIN — OCTREOTIDE ACETATE 1 MLS/HR: 500 INJECTION, SOLUTION INTRAVENOUS; SUBCUTANEOUS at 08:05

## 2018-03-29 RX ADMIN — MIDODRINE HYDROCHLORIDE 1 MG: 5 TABLET ORAL at 21:00

## 2018-03-29 RX ADMIN — INSULIN ASPART 1 UNIT: 100 INJECTION, SOLUTION INTRAVENOUS; SUBCUTANEOUS at 18:07

## 2018-03-29 RX ADMIN — SPIRONOLACTONE 1 MG: 25 TABLET, FILM COATED ORAL at 08:53

## 2018-03-29 RX ADMIN — DICYCLOMINE HYDROCHLORIDE 1 MG: 10 CAPSULE ORAL at 08:53

## 2018-03-29 RX ADMIN — CALCIUM ACETATE 1 MG: 667 CAPSULE ORAL at 07:35

## 2018-03-29 RX ADMIN — INSULIN ASPART 1 UNIT: 100 INJECTION, SOLUTION INTRAVENOUS; SUBCUTANEOUS at 09:29

## 2018-03-29 RX ADMIN — MAGNESIUM SULFATE HEPTAHYDRATE 1 MLS/HR: 40 INJECTION, SOLUTION INTRAVENOUS at 12:10

## 2018-03-29 RX ADMIN — RIFAXIMIN 1 MG: 550 TABLET ORAL at 21:00

## 2018-03-29 RX ADMIN — MIDODRINE HYDROCHLORIDE 1 MG: 5 TABLET ORAL at 08:54

## 2018-03-29 RX ADMIN — MIDODRINE HYDROCHLORIDE 1 MG: 5 TABLET ORAL at 13:00

## 2018-03-29 RX ADMIN — WHITE PETROLATUM 1 APPLIC: 1 OINTMENT TOPICAL at 21:00

## 2018-03-29 RX ADMIN — DICYCLOMINE HYDROCHLORIDE 1 MG: 10 CAPSULE ORAL at 17:00

## 2018-03-29 RX ADMIN — CALCIUM ACETATE 1 MG: 667 CAPSULE ORAL at 11:30

## 2018-03-29 RX ADMIN — VASOPRESSIN 1 MLS/HR: 20 INJECTION, SOLUTION INTRAMUSCULAR; SUBCUTANEOUS at 09:38

## 2018-03-29 RX ADMIN — SPIRONOLACTONE 1 MG: 25 TABLET, FILM COATED ORAL at 21:00

## 2018-03-29 RX ADMIN — LACTULOSE 1 GM: 20 SOLUTION ORAL at 12:10

## 2018-03-29 RX ADMIN — VASOPRESSIN 1 MLS/HR: 20 INJECTION, SOLUTION INTRAMUSCULAR; SUBCUTANEOUS at 20:14

## 2018-03-29 RX ADMIN — LACTULOSE 1 GM: 20 SOLUTION ORAL at 18:03

## 2018-03-29 RX ADMIN — CALCIUM ACETATE 1 MG: 667 CAPSULE ORAL at 17:35

## 2018-03-30 LAB
ABNORMAL IP MESSAGE: 1
ADD MAN DIFF?: NO
ALANINE AMINOTRANSFERASE: 44 IU/L (ref 13–69)
ALBUMIN: 2.2 G/DL (ref 3.3–4.9)
ALKALINE PHOSPHATASE: 157 IU/L (ref 42–121)
AMMONIA: 79 UMOL/L (ref 9–30)
ANION GAP: 11 (ref 8–16)
ASPARTATE AMINO TRANSFERASE: 37 IU/L (ref 15–46)
BASOPHIL #: 0 10^3/UL (ref 0–0.1)
BASOPHILS %: 0.1 % (ref 0–2)
BILIRUBIN,DIRECT: 0 MG/DL (ref 0–0.2)
BILIRUBIN,TOTAL: 1 MG/DL (ref 0.2–1.3)
BLOOD UREA NITROGEN: 27 MG/DL (ref 7–20)
CALCIUM: 9 MG/DL (ref 8.4–10.2)
CARBON DIOXIDE: 15 MMOL/L (ref 21–31)
CHLORIDE: 122 MMOL/L (ref 97–110)
CREATININE: 0.62 MG/DL (ref 0.61–1.24)
EOSINOPHILS #: 0.1 10^3/UL (ref 0–0.5)
EOSINOPHILS %: 0.9 % (ref 0–7)
GLUCOSE: 163 MG/DL (ref 70–220)
HEMATOCRIT: 28.9 % (ref 42–52)
HEMATOCRIT: 30.2 % (ref 42–52)
HEMATOCRIT: 31.8 % (ref 42–52)
HEMOGLOBIN: 10.3 G/DL (ref 14–18)
HEMOGLOBIN: 10.7 G/DL (ref 14–18)
HEMOGLOBIN: 11.4 G/DL (ref 14–18)
LYMPHOCYTES #: 0.7 10^3/UL (ref 0.8–2.9)
LYMPHOCYTES %: 5.2 % (ref 15–51)
MAGNESIUM: 2.1 MG/DL (ref 1.7–2.5)
MEAN CORPUSCULAR HEMOGLOBIN: 33.5 PG (ref 29–33)
MEAN CORPUSCULAR HGB CONC: 35.4 G/DL (ref 32–37)
MEAN CORPUSCULAR VOLUME: 94.7 FL (ref 82–101)
MEAN PLATELET VOLUME: 10.6 FL (ref 7.4–10.4)
MONOCYTE #: 1.2 10^3/UL (ref 0.3–0.9)
MONOCYTES %: 8.3 % (ref 0–11)
NEUTROPHIL #: 11.9 10^3/UL (ref 1.6–7.5)
NEUTROPHILS %: 84.5 % (ref 39–77)
NUCLEATED RED BLOOD CELLS #: 0 10^3/UL (ref 0–0)
NUCLEATED RED BLOOD CELLS%: 0 /100WBC (ref 0–0)
PHOSPHORUS: 2.6 MG/DL (ref 2.5–4.9)
PLATELET COUNT: 97 10^3/UL (ref 140–415)
POSITIVE DIFF: (no result)
POTASSIUM: 3.9 MMOL/L (ref 3.5–5.1)
RED BLOOD COUNT: 3.19 10^6/UL (ref 4.7–6.1)
RED CELL DISTRIBUTION WIDTH: 15.1 % (ref 11.5–14.5)
SODIUM: 144 MMOL/L (ref 135–144)
TOTAL PROTEIN: 5.5 G/DL (ref 6.1–8.1)
WHITE BLOOD COUNT: 14.1 10^3/UL (ref 4.8–10.8)

## 2018-03-30 RX ADMIN — MIDODRINE HYDROCHLORIDE 1 MG: 5 TABLET ORAL at 13:00

## 2018-03-30 RX ADMIN — SPIRONOLACTONE 1 MG: 25 TABLET, FILM COATED ORAL at 21:00

## 2018-03-30 RX ADMIN — LACTULOSE 1 GM: 20 SOLUTION ORAL at 06:57

## 2018-03-30 RX ADMIN — ASCORBIC ACID, VITAMIN A PALMITATE, CHOLECALCIFEROL, THIAMINE HYDROCHLORIDE, RIBOFLAVIN-5 PHOSPHATE SODIUM, PYRIDOXINE HYDROCHLORIDE, NIACINAMIDE, DEXPANTHENOL, ALPHA-TOCOPHEROL ACETATE, VITAMIN K1, FOLIC ACID, BIOTIN, CYANOCOBALAMIN 1 MLS/HR: 200; 3300; 200; 6; 3.6; 6; 40; 15; 10; 150; 600; 60; 5 INJECTION, SOLUTION INTRAVENOUS at 16:02

## 2018-03-30 RX ADMIN — CEFTRIAXONE 1 MLS/HR: 1 INJECTION, SOLUTION INTRAVENOUS at 13:31

## 2018-03-30 RX ADMIN — LORAZEPAM 1 MG: 2 INJECTION, SOLUTION INTRAMUSCULAR; INTRAVENOUS at 23:52

## 2018-03-30 RX ADMIN — METOCLOPRAMIDE HYDROCHLORIDE 1 MG: 5 TABLET ORAL at 13:00

## 2018-03-30 RX ADMIN — METOCLOPRAMIDE HYDROCHLORIDE 1 MG: 10 INJECTION, SOLUTION INTRAMUSCULAR; INTRAVENOUS at 09:39

## 2018-03-30 RX ADMIN — METOCLOPRAMIDE HYDROCHLORIDE 1 MG: 10 INJECTION, SOLUTION INTRAMUSCULAR; INTRAVENOUS at 17:04

## 2018-03-30 RX ADMIN — LACTULOSE 1 GM: 20 SOLUTION ORAL at 19:30

## 2018-03-30 RX ADMIN — CYANOCOBALAMIN TAB 500 MCG 1 MCG: 500 TAB at 09:00

## 2018-03-30 RX ADMIN — CALCIUM ACETATE 1 MG: 667 CAPSULE ORAL at 17:35

## 2018-03-30 RX ADMIN — PANTOPRAZOLE SODIUM 1 MG: 40 INJECTION, POWDER, FOR SOLUTION INTRAVENOUS at 17:57

## 2018-03-30 RX ADMIN — MIDODRINE HYDROCHLORIDE 1 MG: 5 TABLET ORAL at 21:00

## 2018-03-30 RX ADMIN — MIDODRINE HYDROCHLORIDE 1 MG: 5 TABLET ORAL at 09:00

## 2018-03-30 RX ADMIN — DICYCLOMINE HYDROCHLORIDE 1 MG: 10 CAPSULE ORAL at 09:00

## 2018-03-30 RX ADMIN — METOCLOPRAMIDE HYDROCHLORIDE 1 MG: 5 TABLET ORAL at 21:00

## 2018-03-30 RX ADMIN — CALCIUM ACETATE 1 MG: 667 CAPSULE ORAL at 11:30

## 2018-03-30 RX ADMIN — DICYCLOMINE HYDROCHLORIDE 1 MG: 10 CAPSULE ORAL at 13:00

## 2018-03-30 RX ADMIN — SPIRONOLACTONE 1 MG: 25 TABLET, FILM COATED ORAL at 09:00

## 2018-03-30 RX ADMIN — METOCLOPRAMIDE HYDROCHLORIDE 1 MG: 10 INJECTION, SOLUTION INTRAMUSCULAR; INTRAVENOUS at 13:19

## 2018-03-30 RX ADMIN — THIAMINE HYDROCHLORIDE 1 MLS/HR: 100 INJECTION, SOLUTION INTRAMUSCULAR; INTRAVENOUS at 05:19

## 2018-03-30 RX ADMIN — LACTULOSE 1 GM: 20 SOLUTION ORAL at 02:44

## 2018-03-30 RX ADMIN — VASOPRESSIN 1 MLS/HR: 20 INJECTION, SOLUTION INTRAMUSCULAR; SUBCUTANEOUS at 15:33

## 2018-03-30 RX ADMIN — LACTULOSE 1 GM: 20 SOLUTION ORAL at 13:19

## 2018-03-30 RX ADMIN — VASOPRESSIN 1 MLS/HR: 20 INJECTION, SOLUTION INTRAMUSCULAR; SUBCUTANEOUS at 05:19

## 2018-03-30 RX ADMIN — DICYCLOMINE HYDROCHLORIDE 1 MG: 10 CAPSULE ORAL at 17:00

## 2018-03-30 RX ADMIN — EZETIMIBE 1 MG: 10 TABLET ORAL at 09:00

## 2018-03-30 RX ADMIN — CALCIUM ACETATE 1 MG: 667 CAPSULE ORAL at 07:35

## 2018-03-30 RX ADMIN — DICYCLOMINE HYDROCHLORIDE 1 MG: 10 CAPSULE ORAL at 21:00

## 2018-03-30 RX ADMIN — LACTULOSE 1 GM: 20 SOLUTION ORAL at 18:00

## 2018-03-30 RX ADMIN — METOCLOPRAMIDE HYDROCHLORIDE 1 MG: 5 TABLET ORAL at 09:00

## 2018-03-30 RX ADMIN — METOCLOPRAMIDE HYDROCHLORIDE 1 MG: 5 TABLET ORAL at 17:00

## 2018-03-30 RX ADMIN — THIAMINE HYDROCHLORIDE 1 MLS/HR: 100 INJECTION, SOLUTION INTRAMUSCULAR; INTRAVENOUS at 15:14

## 2018-03-30 RX ADMIN — RIFAXIMIN 1 MG: 550 TABLET ORAL at 21:00

## 2018-03-30 RX ADMIN — MORPHINE SULFATE 1 MG: 2 INJECTION, SOLUTION INTRAMUSCULAR; INTRAVENOUS at 20:36

## 2018-03-30 RX ADMIN — METOCLOPRAMIDE HYDROCHLORIDE 1 MG: 10 INJECTION, SOLUTION INTRAMUSCULAR; INTRAVENOUS at 21:38

## 2018-03-30 RX ADMIN — RIFAXIMIN 1 MG: 550 TABLET ORAL at 09:00

## 2018-03-30 RX ADMIN — WHITE PETROLATUM 1 APPLIC: 1 OINTMENT TOPICAL at 22:05

## 2018-03-30 RX ADMIN — WHITE PETROLATUM 1 APPLIC: 1 OINTMENT TOPICAL at 13:12

## 2018-03-31 LAB
ABNORMAL IP MESSAGE: 1
ADD MAN DIFF?: NO
AMMONIA: 80 UMOL/L (ref 9–30)
ANION GAP: 13 (ref 8–16)
BASOPHIL #: 0 10^3/UL (ref 0–0.1)
BASOPHILS %: 0.6 % (ref 0–2)
BLOOD UREA NITROGEN: 19 MG/DL (ref 7–20)
CALCIUM: 8.8 MG/DL (ref 8.4–10.2)
CARBON DIOXIDE: 15 MMOL/L (ref 21–31)
CHLORIDE: 120 MMOL/L (ref 97–110)
CREATININE: 0.56 MG/DL (ref 0.61–1.24)
EOSINOPHILS #: 0.4 10^3/UL (ref 0–0.5)
EOSINOPHILS %: 6.4 % (ref 0–7)
GLUCOSE: 125 MG/DL (ref 70–220)
HEMATOCRIT: 28.8 % (ref 42–52)
HEMATOCRIT: 29.4 % (ref 42–52)
HEMOGLOBIN: 10.1 G/DL (ref 14–18)
HEMOGLOBIN: 10.3 G/DL (ref 14–18)
LYMPHOCYTES #: 0.8 10^3/UL (ref 0.8–2.9)
LYMPHOCYTES %: 11.8 % (ref 15–51)
MAGNESIUM: 1.8 MG/DL (ref 1.7–2.5)
MEAN CORPUSCULAR HEMOGLOBIN: 34 PG (ref 29–33)
MEAN CORPUSCULAR HGB CONC: 35 G/DL (ref 32–37)
MEAN CORPUSCULAR VOLUME: 97 FL (ref 82–101)
MEAN PLATELET VOLUME: 10.8 FL (ref 7.4–10.4)
MONOCYTE #: 0.7 10^3/UL (ref 0.3–0.9)
MONOCYTES %: 11.1 % (ref 0–11)
NEUTROPHIL #: 4.7 10^3/UL (ref 1.6–7.5)
NEUTROPHILS %: 69.5 % (ref 39–77)
NUCLEATED RED BLOOD CELLS #: 0 10^3/UL (ref 0–0)
NUCLEATED RED BLOOD CELLS%: 0 /100WBC (ref 0–0)
PHOSPHORUS: 2.4 MG/DL (ref 2.5–4.9)
PLATELET COUNT: 80 10^3/UL (ref 140–415)
POSITIVE DIFF: (no result)
POTASSIUM: 3.5 MMOL/L (ref 3.5–5.1)
RED BLOOD COUNT: 3.03 10^6/UL (ref 4.7–6.1)
RED CELL DISTRIBUTION WIDTH: 15.1 % (ref 11.5–14.5)
SODIUM: 144 MMOL/L (ref 135–144)
WHITE BLOOD COUNT: 6.7 10^3/UL (ref 4.8–10.8)

## 2018-03-31 RX ADMIN — INSULIN ASPART 1 UNIT: 100 INJECTION, SOLUTION INTRAVENOUS; SUBCUTANEOUS at 17:04

## 2018-03-31 RX ADMIN — METOCLOPRAMIDE HYDROCHLORIDE 1 MG: 5 TABLET ORAL at 08:05

## 2018-03-31 RX ADMIN — LACTULOSE 1 GM: 20 SOLUTION ORAL at 06:30

## 2018-03-31 RX ADMIN — INSULIN GLARGINE 1 UNIT: 100 INJECTION, SOLUTION SUBCUTANEOUS at 20:39

## 2018-03-31 RX ADMIN — WHITE PETROLATUM 1 APPLIC: 1 OINTMENT TOPICAL at 08:06

## 2018-03-31 RX ADMIN — METOCLOPRAMIDE HYDROCHLORIDE 1 MG: 10 INJECTION, SOLUTION INTRAMUSCULAR; INTRAVENOUS at 20:48

## 2018-03-31 RX ADMIN — EZETIMIBE 1 MG: 10 TABLET ORAL at 08:06

## 2018-03-31 RX ADMIN — METOCLOPRAMIDE HYDROCHLORIDE 1 MG: 5 TABLET ORAL at 21:00

## 2018-03-31 RX ADMIN — INSULIN ASPART 1 UNIT: 100 INJECTION, SOLUTION INTRAVENOUS; SUBCUTANEOUS at 17:03

## 2018-03-31 RX ADMIN — PANTOPRAZOLE SODIUM 1 MG: 40 INJECTION, POWDER, FOR SOLUTION INTRAVENOUS at 17:27

## 2018-03-31 RX ADMIN — METOCLOPRAMIDE HYDROCHLORIDE 1 MG: 10 INJECTION, SOLUTION INTRAMUSCULAR; INTRAVENOUS at 12:02

## 2018-03-31 RX ADMIN — SPIRONOLACTONE 1 MG: 25 TABLET, FILM COATED ORAL at 20:48

## 2018-03-31 RX ADMIN — CALCIUM ACETATE 1 MG: 667 CAPSULE ORAL at 11:11

## 2018-03-31 RX ADMIN — WHITE PETROLATUM 1 APPLIC: 1 OINTMENT TOPICAL at 21:03

## 2018-03-31 RX ADMIN — LACTULOSE 1 GM: 20 SOLUTION ORAL at 17:27

## 2018-03-31 RX ADMIN — PANTOPRAZOLE SODIUM 1 MG: 40 INJECTION, POWDER, FOR SOLUTION INTRAVENOUS at 05:13

## 2018-03-31 RX ADMIN — POTASSIUM ACETATE 1 MLS/HR: 3.93 INJECTION, SOLUTION, CONCENTRATE INTRAVENOUS at 14:31

## 2018-03-31 RX ADMIN — CEFTRIAXONE 1 MLS/HR: 1 INJECTION, SOLUTION INTRAVENOUS at 13:13

## 2018-03-31 RX ADMIN — CALCIUM ACETATE 1 MG: 667 CAPSULE ORAL at 17:04

## 2018-03-31 RX ADMIN — ASCORBIC ACID, VITAMIN A PALMITATE, CHOLECALCIFEROL, THIAMINE HYDROCHLORIDE, RIBOFLAVIN-5 PHOSPHATE SODIUM, PYRIDOXINE HYDROCHLORIDE, NIACINAMIDE, DEXPANTHENOL, ALPHA-TOCOPHEROL ACETATE, VITAMIN K1, FOLIC ACID, BIOTIN, CYANOCOBALAMIN 1 MLS/HR: 200; 3300; 200; 6; 3.6; 6; 40; 15; 10; 150; 600; 60; 5 INJECTION, SOLUTION INTRAVENOUS at 02:05

## 2018-03-31 RX ADMIN — INSULIN ASPART 1 UNIT: 100 INJECTION, SOLUTION INTRAVENOUS; SUBCUTANEOUS at 20:40

## 2018-03-31 RX ADMIN — MIDODRINE HYDROCHLORIDE 1 MG: 5 TABLET ORAL at 12:02

## 2018-03-31 RX ADMIN — DICYCLOMINE HYDROCHLORIDE 1 MG: 10 CAPSULE ORAL at 08:05

## 2018-03-31 RX ADMIN — MORPHINE SULFATE 1 MG: 2 INJECTION, SOLUTION INTRAMUSCULAR; INTRAVENOUS at 02:37

## 2018-03-31 RX ADMIN — MIDODRINE HYDROCHLORIDE 1 MG: 5 TABLET ORAL at 08:05

## 2018-03-31 RX ADMIN — LACTULOSE 1 GM: 20 SOLUTION ORAL at 12:02

## 2018-03-31 RX ADMIN — METOCLOPRAMIDE HYDROCHLORIDE 1 MG: 5 TABLET ORAL at 16:44

## 2018-03-31 RX ADMIN — LACTULOSE 1 GM: 20 SOLUTION ORAL at 00:00

## 2018-03-31 RX ADMIN — CALCIUM ACETATE 1 MG: 667 CAPSULE ORAL at 07:35

## 2018-03-31 RX ADMIN — CYANOCOBALAMIN TAB 500 MCG 1 MCG: 500 TAB at 08:05

## 2018-03-31 RX ADMIN — METOCLOPRAMIDE HYDROCHLORIDE 1 MG: 5 TABLET ORAL at 13:00

## 2018-03-31 RX ADMIN — DICYCLOMINE HYDROCHLORIDE 1 MG: 10 CAPSULE ORAL at 17:04

## 2018-03-31 RX ADMIN — ASCORBIC ACID, VITAMIN A PALMITATE, CHOLECALCIFEROL, THIAMINE HYDROCHLORIDE, RIBOFLAVIN-5 PHOSPHATE SODIUM, PYRIDOXINE HYDROCHLORIDE, NIACINAMIDE, DEXPANTHENOL, ALPHA-TOCOPHEROL ACETATE, VITAMIN K1, FOLIC ACID, BIOTIN, CYANOCOBALAMIN 1 MLS/HR: 200; 3300; 200; 6; 3.6; 6; 40; 15; 10; 150; 600; 60; 5 INJECTION, SOLUTION INTRAVENOUS at 11:13

## 2018-03-31 RX ADMIN — METOCLOPRAMIDE HYDROCHLORIDE 1 MG: 10 INJECTION, SOLUTION INTRAMUSCULAR; INTRAVENOUS at 17:04

## 2018-03-31 RX ADMIN — SPIRONOLACTONE 1 MG: 25 TABLET, FILM COATED ORAL at 08:05

## 2018-03-31 RX ADMIN — DICYCLOMINE HYDROCHLORIDE 1 MG: 10 CAPSULE ORAL at 12:02

## 2018-03-31 RX ADMIN — METOCLOPRAMIDE HYDROCHLORIDE 1 MG: 10 INJECTION, SOLUTION INTRAMUSCULAR; INTRAVENOUS at 08:05

## 2018-03-31 RX ADMIN — RIFAXIMIN 1 MG: 550 TABLET ORAL at 08:05

## 2018-03-31 RX ADMIN — MIDODRINE HYDROCHLORIDE 1 MG: 5 TABLET ORAL at 20:48

## 2018-03-31 RX ADMIN — DICYCLOMINE HYDROCHLORIDE 1 MG: 10 CAPSULE ORAL at 20:51

## 2018-03-31 RX ADMIN — RIFAXIMIN 1 MG: 550 TABLET ORAL at 20:48

## 2018-04-01 LAB
ABNORMAL IP MESSAGE: 1
ADD MAN DIFF?: NO
AMMONIA: 65 UMOL/L (ref 9–30)
BASOPHIL #: 0.1 10^3/UL (ref 0–0.1)
BASOPHILS %: 1 % (ref 0–2)
EOSINOPHILS #: 0.3 10^3/UL (ref 0–0.5)
EOSINOPHILS %: 5.1 % (ref 0–7)
HEMATOCRIT: 28.8 % (ref 42–52)
HEMOGLOBIN: 10.1 G/DL (ref 14–18)
LYMPHOCYTES #: 0.9 10^3/UL (ref 0.8–2.9)
LYMPHOCYTES %: 14.4 % (ref 15–51)
MEAN CORPUSCULAR HEMOGLOBIN: 33.3 PG (ref 29–33)
MEAN CORPUSCULAR HGB CONC: 35.1 G/DL (ref 32–37)
MEAN CORPUSCULAR VOLUME: 95 FL (ref 82–101)
MEAN PLATELET VOLUME: 10.6 FL (ref 7.4–10.4)
MONOCYTE #: 0.8 10^3/UL (ref 0.3–0.9)
MONOCYTES %: 12.9 % (ref 0–11)
NEUTROPHIL #: 4.1 10^3/UL (ref 1.6–7.5)
NEUTROPHILS %: 65.5 % (ref 39–77)
NUCLEATED RED BLOOD CELLS #: 0 10^3/UL (ref 0–0)
NUCLEATED RED BLOOD CELLS%: 0 /100WBC (ref 0–0)
PLATELET COUNT: 91 10^3/UL (ref 140–415)
POSITIVE DIFF: (no result)
RED BLOOD COUNT: 3.03 10^6/UL (ref 4.7–6.1)
RED CELL DISTRIBUTION WIDTH: 14.6 % (ref 11.5–14.5)
WHITE BLOOD COUNT: 6.3 10^3/UL (ref 4.8–10.8)

## 2018-04-01 RX ADMIN — DICYCLOMINE HYDROCHLORIDE 1 MG: 10 CAPSULE ORAL at 20:25

## 2018-04-01 RX ADMIN — METOCLOPRAMIDE HYDROCHLORIDE 1 MG: 10 INJECTION, SOLUTION INTRAMUSCULAR; INTRAVENOUS at 08:06

## 2018-04-01 RX ADMIN — SPIRONOLACTONE 1 MG: 25 TABLET, FILM COATED ORAL at 08:06

## 2018-04-01 RX ADMIN — DICYCLOMINE HYDROCHLORIDE 1 MG: 10 CAPSULE ORAL at 16:59

## 2018-04-01 RX ADMIN — INSULIN ASPART 1 UNIT: 100 INJECTION, SOLUTION INTRAVENOUS; SUBCUTANEOUS at 16:59

## 2018-04-01 RX ADMIN — CYANOCOBALAMIN TAB 500 MCG 1 MCG: 500 TAB at 08:06

## 2018-04-01 RX ADMIN — INSULIN GLARGINE 1 UNIT: 100 INJECTION, SOLUTION SUBCUTANEOUS at 20:28

## 2018-04-01 RX ADMIN — INSULIN ASPART 1 UNIT: 100 INJECTION, SOLUTION INTRAVENOUS; SUBCUTANEOUS at 07:31

## 2018-04-01 RX ADMIN — INSULIN ASPART 1 UNIT: 100 INJECTION, SOLUTION INTRAVENOUS; SUBCUTANEOUS at 07:29

## 2018-04-01 RX ADMIN — INSULIN ASPART 1 UNIT: 100 INJECTION, SOLUTION INTRAVENOUS; SUBCUTANEOUS at 16:58

## 2018-04-01 RX ADMIN — INSULIN ASPART 1 UNIT: 100 INJECTION, SOLUTION INTRAVENOUS; SUBCUTANEOUS at 20:24

## 2018-04-01 RX ADMIN — DICYCLOMINE HYDROCHLORIDE 1 MG: 10 CAPSULE ORAL at 12:06

## 2018-04-01 RX ADMIN — POTASSIUM ACETATE 1 MLS/HR: 3.93 INJECTION, SOLUTION, CONCENTRATE INTRAVENOUS at 05:23

## 2018-04-01 RX ADMIN — CALCIUM ACETATE 1 MG: 667 CAPSULE ORAL at 16:59

## 2018-04-01 RX ADMIN — INSULIN ASPART 1 UNIT: 100 INJECTION, SOLUTION INTRAVENOUS; SUBCUTANEOUS at 11:27

## 2018-04-01 RX ADMIN — WHITE PETROLATUM 1 APPLIC: 1 OINTMENT TOPICAL at 20:40

## 2018-04-01 RX ADMIN — METOCLOPRAMIDE HYDROCHLORIDE 1 MG: 10 INJECTION, SOLUTION INTRAMUSCULAR; INTRAVENOUS at 20:25

## 2018-04-01 RX ADMIN — METOCLOPRAMIDE HYDROCHLORIDE 1 MG: 10 INJECTION, SOLUTION INTRAMUSCULAR; INTRAVENOUS at 12:06

## 2018-04-01 RX ADMIN — METOCLOPRAMIDE HYDROCHLORIDE 1 MG: 10 INJECTION, SOLUTION INTRAMUSCULAR; INTRAVENOUS at 16:33

## 2018-04-01 RX ADMIN — LACTULOSE 1 GM: 20 SOLUTION ORAL at 00:13

## 2018-04-01 RX ADMIN — EZETIMIBE 1 MG: 10 TABLET ORAL at 08:06

## 2018-04-01 RX ADMIN — WHITE PETROLATUM 1 APPLIC: 1 OINTMENT TOPICAL at 08:07

## 2018-04-01 RX ADMIN — LACTULOSE 1 GM: 20 SOLUTION ORAL at 17:04

## 2018-04-01 RX ADMIN — CALCIUM ACETATE 1 MG: 667 CAPSULE ORAL at 07:33

## 2018-04-01 RX ADMIN — MIDODRINE HYDROCHLORIDE 1 MG: 5 TABLET ORAL at 16:59

## 2018-04-01 RX ADMIN — RIFAXIMIN 1 MG: 550 TABLET ORAL at 20:25

## 2018-04-01 RX ADMIN — PANTOPRAZOLE SODIUM 1 MG: 40 INJECTION, POWDER, FOR SOLUTION INTRAVENOUS at 06:35

## 2018-04-01 RX ADMIN — LACTULOSE 1 GM: 20 SOLUTION ORAL at 11:12

## 2018-04-01 RX ADMIN — DICYCLOMINE HYDROCHLORIDE 1 MG: 10 CAPSULE ORAL at 08:06

## 2018-04-01 RX ADMIN — CALCIUM ACETATE 1 MG: 667 CAPSULE ORAL at 11:12

## 2018-04-01 RX ADMIN — MIDODRINE HYDROCHLORIDE 1 MG: 5 TABLET ORAL at 12:06

## 2018-04-01 RX ADMIN — INSULIN ASPART 1 UNIT: 100 INJECTION, SOLUTION INTRAVENOUS; SUBCUTANEOUS at 11:26

## 2018-04-01 RX ADMIN — RIFAXIMIN 1 MG: 550 TABLET ORAL at 08:06

## 2018-04-01 RX ADMIN — SPIRONOLACTONE 1 MG: 25 TABLET, FILM COATED ORAL at 20:25

## 2018-04-01 RX ADMIN — LACTULOSE 1 GM: 20 SOLUTION ORAL at 06:35

## 2018-04-02 LAB
ADD MAN DIFF?: NO
AMMONIA: 47 UMOL/L (ref 9–30)
ANION GAP: 10 (ref 8–16)
BASOPHIL #: 0.1 10^3/UL (ref 0–0.1)
BASOPHILS %: 1 % (ref 0–2)
BLOOD UREA NITROGEN: 6 MG/DL (ref 7–20)
CALCIUM: 8.3 MG/DL (ref 8.4–10.2)
CARBON DIOXIDE: 20 MMOL/L (ref 21–31)
CHLORIDE: 111 MMOL/L (ref 97–110)
CREATININE: 0.51 MG/DL (ref 0.61–1.24)
EOSINOPHILS #: 0.4 10^3/UL (ref 0–0.5)
EOSINOPHILS %: 5.1 % (ref 0–7)
GLUCOSE: 51 MG/DL (ref 70–220)
HEMATOCRIT: 29.5 % (ref 42–52)
HEMOGLOBIN: 10.4 G/DL (ref 14–18)
LYMPHOCYTES #: 1 10^3/UL (ref 0.8–2.9)
LYMPHOCYTES %: 14 % (ref 15–51)
MAGNESIUM: 1.5 MG/DL (ref 1.7–2.5)
MEAN CORPUSCULAR HEMOGLOBIN: 33 PG (ref 29–33)
MEAN CORPUSCULAR HGB CONC: 35.3 G/DL (ref 32–37)
MEAN CORPUSCULAR VOLUME: 93.7 FL (ref 82–101)
MEAN PLATELET VOLUME: 10.2 FL (ref 7.4–10.4)
MONOCYTE #: 0.8 10^3/UL (ref 0.3–0.9)
MONOCYTES %: 11.8 % (ref 0–11)
NEUTROPHIL #: 4.7 10^3/UL (ref 1.6–7.5)
NEUTROPHILS %: 66.8 % (ref 39–77)
NUCLEATED RED BLOOD CELLS #: 0 10^3/UL (ref 0–0)
NUCLEATED RED BLOOD CELLS%: 0 /100WBC (ref 0–0)
PHOSPHORUS: 2.5 MG/DL (ref 2.5–4.9)
PLATELET COUNT: 103 10^3/UL (ref 140–415)
POTASSIUM: 3.6 MMOL/L (ref 3.5–5.1)
RED BLOOD COUNT: 3.15 10^6/UL (ref 4.7–6.1)
RED CELL DISTRIBUTION WIDTH: 14.6 % (ref 11.5–14.5)
SODIUM: 137 MMOL/L (ref 135–144)
WHITE BLOOD COUNT: 7 10^3/UL (ref 4.8–10.8)

## 2018-04-02 RX ADMIN — CALCIUM ACETATE 1 MG: 667 CAPSULE ORAL at 17:26

## 2018-04-02 RX ADMIN — MORPHINE SULFATE 1 MG: 10 SOLUTION ORAL at 20:22

## 2018-04-02 RX ADMIN — INSULIN ASPART 1 UNIT: 100 INJECTION, SOLUTION INTRAVENOUS; SUBCUTANEOUS at 17:19

## 2018-04-02 RX ADMIN — POTASSIUM ACETATE 1 MLS/HR: 3.93 INJECTION, SOLUTION, CONCENTRATE INTRAVENOUS at 03:14

## 2018-04-02 RX ADMIN — SPIRONOLACTONE 1 MG: 25 TABLET, FILM COATED ORAL at 20:22

## 2018-04-02 RX ADMIN — DICYCLOMINE HYDROCHLORIDE 1 MG: 10 CAPSULE ORAL at 20:22

## 2018-04-02 RX ADMIN — INSULIN GLARGINE 1 UNIT: 100 INJECTION, SOLUTION SUBCUTANEOUS at 21:35

## 2018-04-02 RX ADMIN — MIDODRINE HYDROCHLORIDE 1 MG: 5 TABLET ORAL at 12:16

## 2018-04-02 RX ADMIN — MIDODRINE HYDROCHLORIDE 1 MG: 5 TABLET ORAL at 09:00

## 2018-04-02 RX ADMIN — RIFAXIMIN 1 MG: 550 TABLET ORAL at 08:55

## 2018-04-02 RX ADMIN — DICYCLOMINE HYDROCHLORIDE 1 MG: 10 CAPSULE ORAL at 17:26

## 2018-04-02 RX ADMIN — METOCLOPRAMIDE HYDROCHLORIDE 1 MG: 10 INJECTION, SOLUTION INTRAMUSCULAR; INTRAVENOUS at 12:12

## 2018-04-02 RX ADMIN — CALCIUM ACETATE 1 MG: 667 CAPSULE ORAL at 12:15

## 2018-04-02 RX ADMIN — CYANOCOBALAMIN TAB 500 MCG 1 MCG: 500 TAB at 08:55

## 2018-04-02 RX ADMIN — CALCIUM ACETATE 1 MG: 667 CAPSULE ORAL at 08:55

## 2018-04-02 RX ADMIN — SPIRONOLACTONE 1 MG: 25 TABLET, FILM COATED ORAL at 08:55

## 2018-04-02 RX ADMIN — INSULIN ASPART 1 UNIT: 100 INJECTION, SOLUTION INTRAVENOUS; SUBCUTANEOUS at 09:38

## 2018-04-02 RX ADMIN — WHITE PETROLATUM 1 APPLIC: 1 OINTMENT TOPICAL at 09:04

## 2018-04-02 RX ADMIN — METOCLOPRAMIDE HYDROCHLORIDE 1 MG: 10 INJECTION, SOLUTION INTRAMUSCULAR; INTRAVENOUS at 17:27

## 2018-04-02 RX ADMIN — INSULIN ASPART 1 UNIT: 100 INJECTION, SOLUTION INTRAVENOUS; SUBCUTANEOUS at 12:11

## 2018-04-02 RX ADMIN — EZETIMIBE 1 MG: 10 TABLET ORAL at 08:55

## 2018-04-02 RX ADMIN — INSULIN ASPART 1 UNIT: 100 INJECTION, SOLUTION INTRAVENOUS; SUBCUTANEOUS at 08:00

## 2018-04-02 RX ADMIN — RIFAXIMIN 1 MG: 550 TABLET ORAL at 20:22

## 2018-04-02 RX ADMIN — METOCLOPRAMIDE HYDROCHLORIDE 1 MG: 10 INJECTION, SOLUTION INTRAMUSCULAR; INTRAVENOUS at 20:22

## 2018-04-02 RX ADMIN — DICYCLOMINE HYDROCHLORIDE 1 MG: 10 CAPSULE ORAL at 12:09

## 2018-04-02 RX ADMIN — WHITE PETROLATUM 1 APPLIC: 1 OINTMENT TOPICAL at 20:23

## 2018-04-02 RX ADMIN — INSULIN ASPART 1 UNIT: 100 INJECTION, SOLUTION INTRAVENOUS; SUBCUTANEOUS at 17:20

## 2018-04-02 RX ADMIN — PANTOPRAZOLE SODIUM 1 MG: 40 TABLET, DELAYED RELEASE ORAL at 05:50

## 2018-04-02 RX ADMIN — LACTULOSE 1 GM: 20 SOLUTION ORAL at 17:26

## 2018-04-02 RX ADMIN — METOCLOPRAMIDE HYDROCHLORIDE 1 MG: 10 INJECTION, SOLUTION INTRAMUSCULAR; INTRAVENOUS at 08:56

## 2018-04-02 RX ADMIN — INSULIN ASPART 1 UNIT: 100 INJECTION, SOLUTION INTRAVENOUS; SUBCUTANEOUS at 12:12

## 2018-04-02 RX ADMIN — DICYCLOMINE HYDROCHLORIDE 1 MG: 10 CAPSULE ORAL at 08:55

## 2018-04-02 RX ADMIN — LACTULOSE 1 GM: 20 SOLUTION ORAL at 05:50

## 2018-04-02 RX ADMIN — LACTULOSE 1 GM: 20 SOLUTION ORAL at 12:15

## 2018-04-02 RX ADMIN — POTASSIUM CHLORIDE 1 MEQ: 1500 TABLET, EXTENDED RELEASE ORAL at 12:09

## 2018-04-02 RX ADMIN — LACTULOSE 1 GM: 20 SOLUTION ORAL at 00:39

## 2018-04-02 RX ADMIN — MAGNESIUM OXIDE TAB 400 MG (241.3 MG ELEMENTAL MG) 1 MG: 400 (241.3 MG) TAB at 13:18

## 2018-04-02 RX ADMIN — INSULIN ASPART 1 UNIT: 100 INJECTION, SOLUTION INTRAVENOUS; SUBCUTANEOUS at 20:21

## 2018-04-02 RX ADMIN — MORPHINE SULFATE 1 MG: 2 INJECTION, SOLUTION INTRAMUSCULAR; INTRAVENOUS at 03:18

## 2018-04-02 RX ADMIN — MIDODRINE HYDROCHLORIDE 1 MG: 5 TABLET ORAL at 17:26

## 2018-04-03 LAB
ALANINE AMINOTRANSFERASE: 95 IU/L (ref 13–69)
ALBUMIN/GLOBULIN RATIO: 0.64
ALBUMIN: 2 G/DL (ref 3.3–4.9)
ALKALINE PHOSPHATASE: 196 IU/L (ref 42–121)
AMMONIA: 73 UMOL/L (ref 9–30)
ANION GAP: 9 (ref 8–16)
ASPARTATE AMINO TRANSFERASE: 126 IU/L (ref 15–46)
BILIRUBIN,DIRECT: 0 MG/DL (ref 0–0.2)
BILIRUBIN,TOTAL: 0.7 MG/DL (ref 0.2–1.3)
BLOOD UREA NITROGEN: 6 MG/DL (ref 7–20)
CALCIUM: 8.4 MG/DL (ref 8.4–10.2)
CARBON DIOXIDE: 20 MMOL/L (ref 21–31)
CHLORIDE: 107 MMOL/L (ref 97–110)
CREATININE: 0.4 MG/DL (ref 0.61–1.24)
GLOBULIN: 3.1 G/DL (ref 1.3–3.2)
GLUCOSE: 194 MG/DL (ref 70–220)
MAGNESIUM: 1.5 MG/DL (ref 1.7–2.5)
POTASSIUM: 4.3 MMOL/L (ref 3.5–5.1)
SODIUM: 132 MMOL/L (ref 135–144)
TOTAL PROTEIN: 5.1 G/DL (ref 6.1–8.1)

## 2018-04-03 RX ADMIN — INSULIN GLARGINE 1 UNIT: 100 INJECTION, SOLUTION SUBCUTANEOUS at 21:19

## 2018-04-03 RX ADMIN — METOCLOPRAMIDE HYDROCHLORIDE 1 MG: 10 INJECTION, SOLUTION INTRAMUSCULAR; INTRAVENOUS at 17:21

## 2018-04-03 RX ADMIN — CALCIUM ACETATE 1 MG: 667 CAPSULE ORAL at 12:11

## 2018-04-03 RX ADMIN — DICYCLOMINE HYDROCHLORIDE 1 MG: 10 CAPSULE ORAL at 20:58

## 2018-04-03 RX ADMIN — SPIRONOLACTONE 1 MG: 25 TABLET, FILM COATED ORAL at 20:58

## 2018-04-03 RX ADMIN — POTASSIUM ACETATE 1 MLS/HR: 3.93 INJECTION, SOLUTION, CONCENTRATE INTRAVENOUS at 20:59

## 2018-04-03 RX ADMIN — POTASSIUM ACETATE 1 MLS/HR: 3.93 INJECTION, SOLUTION, CONCENTRATE INTRAVENOUS at 00:40

## 2018-04-03 RX ADMIN — DICYCLOMINE HYDROCHLORIDE 1 MG: 10 CAPSULE ORAL at 17:20

## 2018-04-03 RX ADMIN — RIFAXIMIN 1 MG: 550 TABLET ORAL at 08:22

## 2018-04-03 RX ADMIN — EZETIMIBE 1 MG: 10 TABLET ORAL at 08:22

## 2018-04-03 RX ADMIN — WHITE PETROLATUM 1 APPLIC: 1 OINTMENT TOPICAL at 21:00

## 2018-04-03 RX ADMIN — LACTULOSE 1 GM: 20 SOLUTION ORAL at 12:12

## 2018-04-03 RX ADMIN — METOCLOPRAMIDE HYDROCHLORIDE 1 MG: 10 INJECTION, SOLUTION INTRAMUSCULAR; INTRAVENOUS at 20:59

## 2018-04-03 RX ADMIN — INSULIN ASPART 1 UNIT: 100 INJECTION, SOLUTION INTRAVENOUS; SUBCUTANEOUS at 21:19

## 2018-04-03 RX ADMIN — INSULIN ASPART 1 UNIT: 100 INJECTION, SOLUTION INTRAVENOUS; SUBCUTANEOUS at 17:17

## 2018-04-03 RX ADMIN — INSULIN ASPART 1 UNIT: 100 INJECTION, SOLUTION INTRAVENOUS; SUBCUTANEOUS at 08:25

## 2018-04-03 RX ADMIN — MIDODRINE HYDROCHLORIDE 1 MG: 5 TABLET ORAL at 08:30

## 2018-04-03 RX ADMIN — WHITE PETROLATUM 1 APPLIC: 1 OINTMENT TOPICAL at 08:23

## 2018-04-03 RX ADMIN — METOCLOPRAMIDE HYDROCHLORIDE 1 MG: 10 INJECTION, SOLUTION INTRAMUSCULAR; INTRAVENOUS at 12:12

## 2018-04-03 RX ADMIN — CALCIUM ACETATE 1 MG: 667 CAPSULE ORAL at 08:22

## 2018-04-03 RX ADMIN — DICYCLOMINE HYDROCHLORIDE 1 MG: 10 CAPSULE ORAL at 08:22

## 2018-04-03 RX ADMIN — SPIRONOLACTONE 1 MG: 25 TABLET, FILM COATED ORAL at 08:22

## 2018-04-03 RX ADMIN — LACTULOSE 1 GM: 20 SOLUTION ORAL at 00:00

## 2018-04-03 RX ADMIN — CYANOCOBALAMIN TAB 500 MCG 1 MCG: 500 TAB at 08:22

## 2018-04-03 RX ADMIN — PANTOPRAZOLE SODIUM 1 MG: 40 TABLET, DELAYED RELEASE ORAL at 06:19

## 2018-04-03 RX ADMIN — MIDODRINE HYDROCHLORIDE 1 MG: 5 TABLET ORAL at 12:12

## 2018-04-03 RX ADMIN — DICYCLOMINE HYDROCHLORIDE 1 MG: 10 CAPSULE ORAL at 12:11

## 2018-04-03 RX ADMIN — MORPHINE SULFATE 1 MG: 10 SOLUTION ORAL at 20:59

## 2018-04-03 RX ADMIN — RIFAXIMIN 1 MG: 550 TABLET ORAL at 20:58

## 2018-04-03 RX ADMIN — LACTULOSE 1 GM: 20 SOLUTION ORAL at 23:34

## 2018-04-03 RX ADMIN — METOCLOPRAMIDE HYDROCHLORIDE 1 MG: 10 INJECTION, SOLUTION INTRAMUSCULAR; INTRAVENOUS at 08:23

## 2018-04-03 RX ADMIN — LACTULOSE 1 GM: 20 SOLUTION ORAL at 08:23

## 2018-04-03 RX ADMIN — MIDODRINE HYDROCHLORIDE 1 MG: 5 TABLET ORAL at 17:21

## 2018-04-03 RX ADMIN — INSULIN ASPART 1 UNIT: 100 INJECTION, SOLUTION INTRAVENOUS; SUBCUTANEOUS at 12:05

## 2018-04-03 RX ADMIN — INSULIN ASPART 1 UNIT: 100 INJECTION, SOLUTION INTRAVENOUS; SUBCUTANEOUS at 12:00

## 2018-04-03 RX ADMIN — CALCIUM ACETATE 1 MG: 667 CAPSULE ORAL at 17:21

## 2018-04-03 RX ADMIN — LACTULOSE 1 GM: 20 SOLUTION ORAL at 06:00

## 2018-04-03 RX ADMIN — LACTULOSE 1 GM: 20 SOLUTION ORAL at 17:20

## 2018-04-04 LAB
ALANINE AMINOTRANSFERASE: 97 IU/L (ref 13–69)
ALBUMIN/GLOBULIN RATIO: 0.66
ALBUMIN: 2.2 G/DL (ref 3.3–4.9)
ALKALINE PHOSPHATASE: 200 IU/L (ref 42–121)
ALPHA FETOPROTEIN: 3.27 IU/L (ref 0–7.21)
AMMONIA: 40 UMOL/L (ref 9–30)
ANION GAP: 10 (ref 8–16)
ASPARTATE AMINO TRANSFERASE: 121 IU/L (ref 15–46)
BILIRUBIN,DIRECT: 0 MG/DL (ref 0–0.2)
BILIRUBIN,TOTAL: 1.2 MG/DL (ref 0.2–1.3)
BLOOD UREA NITROGEN: 5 MG/DL (ref 7–20)
CALCIUM: 8.6 MG/DL (ref 8.4–10.2)
CARBON DIOXIDE: 22 MMOL/L (ref 21–31)
CHLORIDE: 108 MMOL/L (ref 97–110)
CREATININE: 0.43 MG/DL (ref 0.61–1.24)
GLOBULIN: 3.3 G/DL (ref 1.3–3.2)
GLUCOSE: 113 MG/DL (ref 70–220)
MAGNESIUM: 1.5 MG/DL (ref 1.7–2.5)
POTASSIUM: 4.1 MMOL/L (ref 3.5–5.1)
SODIUM: 136 MMOL/L (ref 135–144)
TOTAL PROTEIN: 5.5 G/DL (ref 6.1–8.1)

## 2018-04-04 RX ADMIN — LACTULOSE 1 GM: 20 SOLUTION ORAL at 12:26

## 2018-04-04 RX ADMIN — MORPHINE SULFATE 1 MG: 10 SOLUTION ORAL at 17:24

## 2018-04-04 RX ADMIN — MORPHINE SULFATE 1 MG: 10 SOLUTION ORAL at 01:06

## 2018-04-04 RX ADMIN — DICYCLOMINE HYDROCHLORIDE 1 MG: 10 CAPSULE ORAL at 13:50

## 2018-04-04 RX ADMIN — INSULIN GLARGINE 1 UNIT: 100 INJECTION, SOLUTION SUBCUTANEOUS at 21:34

## 2018-04-04 RX ADMIN — WHITE PETROLATUM 1 EA: 1 OINTMENT TOPICAL at 21:44

## 2018-04-04 RX ADMIN — RIFAXIMIN 1 MG: 550 TABLET ORAL at 21:30

## 2018-04-04 RX ADMIN — DICYCLOMINE HYDROCHLORIDE 1 MG: 10 CAPSULE ORAL at 17:26

## 2018-04-04 RX ADMIN — CALCIUM ACETATE 1 MG: 667 CAPSULE ORAL at 17:25

## 2018-04-04 RX ADMIN — INSULIN ASPART 1 UNIT: 100 INJECTION, SOLUTION INTRAVENOUS; SUBCUTANEOUS at 17:30

## 2018-04-04 RX ADMIN — METOCLOPRAMIDE HYDROCHLORIDE 1 MG: 10 INJECTION, SOLUTION INTRAMUSCULAR; INTRAVENOUS at 21:30

## 2018-04-04 RX ADMIN — LACTULOSE 1 GM: 20 SOLUTION ORAL at 05:42

## 2018-04-04 RX ADMIN — MIDODRINE HYDROCHLORIDE 1 MG: 5 TABLET ORAL at 17:50

## 2018-04-04 RX ADMIN — INSULIN ASPART 1 UNIT: 100 INJECTION, SOLUTION INTRAVENOUS; SUBCUTANEOUS at 21:33

## 2018-04-04 RX ADMIN — METOCLOPRAMIDE HYDROCHLORIDE 1 MG: 10 INJECTION, SOLUTION INTRAMUSCULAR; INTRAVENOUS at 13:50

## 2018-04-04 RX ADMIN — SPIRONOLACTONE 1 MG: 25 TABLET, FILM COATED ORAL at 21:30

## 2018-04-04 RX ADMIN — CALCIUM ACETATE 1 MG: 667 CAPSULE ORAL at 08:30

## 2018-04-04 RX ADMIN — METOCLOPRAMIDE HYDROCHLORIDE 1 MG: 10 INJECTION, SOLUTION INTRAMUSCULAR; INTRAVENOUS at 17:43

## 2018-04-04 RX ADMIN — PANTOPRAZOLE SODIUM 1 MG: 40 TABLET, DELAYED RELEASE ORAL at 05:42

## 2018-04-04 RX ADMIN — SPIRONOLACTONE 1 MG: 25 TABLET, FILM COATED ORAL at 09:23

## 2018-04-04 RX ADMIN — WHITE PETROLATUM 1 EA: 1 OINTMENT TOPICAL at 13:41

## 2018-04-04 RX ADMIN — METOCLOPRAMIDE HYDROCHLORIDE 1 MG: 10 INJECTION, SOLUTION INTRAMUSCULAR; INTRAVENOUS at 09:27

## 2018-04-04 RX ADMIN — DICYCLOMINE HYDROCHLORIDE 1 MG: 10 CAPSULE ORAL at 09:22

## 2018-04-04 RX ADMIN — INSULIN ASPART 1 UNIT: 100 INJECTION, SOLUTION INTRAVENOUS; SUBCUTANEOUS at 12:24

## 2018-04-04 RX ADMIN — EZETIMIBE 1 MG: 10 TABLET ORAL at 09:33

## 2018-04-04 RX ADMIN — LACTULOSE 1 GM: 20 SOLUTION ORAL at 05:44

## 2018-04-04 RX ADMIN — MIDODRINE HYDROCHLORIDE 1 MG: 5 TABLET ORAL at 09:22

## 2018-04-04 RX ADMIN — CYANOCOBALAMIN TAB 500 MCG 1 MCG: 500 TAB at 09:23

## 2018-04-04 RX ADMIN — DICYCLOMINE HYDROCHLORIDE 1 MG: 10 CAPSULE ORAL at 21:30

## 2018-04-04 RX ADMIN — INSULIN ASPART 1 UNIT: 100 INJECTION, SOLUTION INTRAVENOUS; SUBCUTANEOUS at 01:55

## 2018-04-04 RX ADMIN — LACTULOSE 1 GM: 20 SOLUTION ORAL at 17:25

## 2018-04-04 RX ADMIN — RIFAXIMIN 1 MG: 550 TABLET ORAL at 09:21

## 2018-04-04 RX ADMIN — INSULIN ASPART 1 UNIT: 100 INJECTION, SOLUTION INTRAVENOUS; SUBCUTANEOUS at 12:23

## 2018-04-04 RX ADMIN — INSULIN ASPART 1 UNIT: 100 INJECTION, SOLUTION INTRAVENOUS; SUBCUTANEOUS at 17:31

## 2018-04-04 RX ADMIN — CALCIUM ACETATE 1 MG: 667 CAPSULE ORAL at 13:41

## 2018-04-04 RX ADMIN — INSULIN ASPART 1 UNIT: 100 INJECTION, SOLUTION INTRAVENOUS; SUBCUTANEOUS at 08:15

## 2018-04-04 RX ADMIN — MIDODRINE HYDROCHLORIDE 1 MG: 5 TABLET ORAL at 13:49

## 2018-04-04 RX ADMIN — INSULIN ASPART 1 UNIT: 100 INJECTION, SOLUTION INTRAVENOUS; SUBCUTANEOUS at 08:27

## 2018-04-05 LAB
ABNORMAL IP MESSAGE: 1
ADD MAN DIFF?: NO
ALANINE AMINOTRANSFERASE: 116 IU/L (ref 13–69)
ALBUMIN/GLOBULIN RATIO: 0.64
ALBUMIN: 2 G/DL (ref 3.3–4.9)
ALKALINE PHOSPHATASE: 213 IU/L (ref 42–121)
AMMONIA: 72 UMOL/L (ref 9–30)
ANION GAP: 9 (ref 8–16)
ASPARTATE AMINO TRANSFERASE: 150 IU/L (ref 15–46)
BASOPHIL #: 0.1 10^3/UL (ref 0–0.1)
BASOPHILS %: 1.3 % (ref 0–2)
BILIRUBIN,DIRECT: 0 MG/DL (ref 0–0.2)
BILIRUBIN,TOTAL: 0.6 MG/DL (ref 0.2–1.3)
BLOOD UREA NITROGEN: 8 MG/DL (ref 7–20)
CALCIUM: 8.5 MG/DL (ref 8.4–10.2)
CARBON DIOXIDE: 23 MMOL/L (ref 21–31)
CHLORIDE: 107 MMOL/L (ref 97–110)
CREATININE: 0.45 MG/DL (ref 0.61–1.24)
EOSINOPHILS #: 0.4 10^3/UL (ref 0–0.5)
EOSINOPHILS %: 6.3 % (ref 0–7)
GLOBULIN: 3.1 G/DL (ref 1.3–3.2)
GLUCOSE: 134 MG/DL (ref 70–220)
HEMATOCRIT: 27.8 % (ref 42–52)
HEMOGLOBIN: 10 G/DL (ref 14–18)
INR: 1.34
LYMPHOCYTES #: 0.8 10^3/UL (ref 0.8–2.9)
LYMPHOCYTES %: 12.2 % (ref 15–51)
MAGNESIUM: 1.5 MG/DL (ref 1.7–2.5)
MEAN CORPUSCULAR HEMOGLOBIN: 34 PG (ref 29–33)
MEAN CORPUSCULAR HGB CONC: 36 G/DL (ref 32–37)
MEAN CORPUSCULAR VOLUME: 94.6 FL (ref 82–101)
MEAN PLATELET VOLUME: 10.6 FL (ref 7.4–10.4)
MONOCYTE #: 0.9 10^3/UL (ref 0.3–0.9)
MONOCYTES %: 14.1 % (ref 0–11)
NEUTROPHIL #: 4.2 10^3/UL (ref 1.6–7.5)
NEUTROPHILS %: 64.8 % (ref 39–77)
NUCLEATED RED BLOOD CELLS #: 0 10^3/UL (ref 0–0)
NUCLEATED RED BLOOD CELLS%: 0 /100WBC (ref 0–0)
PLATELET COUNT: 99 10^3/UL (ref 140–415)
POSITIVE DIFF: (no result)
POTASSIUM: 4.4 MMOL/L (ref 3.5–5.1)
PROTIME: 16.8 SEC (ref 11.9–14.9)
PT RATIO: 1.3
RED BLOOD COUNT: 2.94 10^6/UL (ref 4.7–6.1)
RED CELL DISTRIBUTION WIDTH: 15.3 % (ref 11.5–14.5)
SODIUM: 135 MMOL/L (ref 135–144)
TOTAL PROTEIN: 5.1 G/DL (ref 6.1–8.1)
WHITE BLOOD COUNT: 6.4 10^3/UL (ref 4.8–10.8)

## 2018-04-05 RX ADMIN — VASOPRESSIN 1 ML/8 S: 20 INJECTION, SOLUTION INTRAMUSCULAR; SUBCUTANEOUS at 19:05

## 2018-04-05 RX ADMIN — RIFAXIMIN 1 MG: 550 TABLET ORAL at 20:22

## 2018-04-05 RX ADMIN — LACTULOSE 1 GM: 20 SOLUTION ORAL at 23:26

## 2018-04-05 RX ADMIN — METOCLOPRAMIDE HYDROCHLORIDE 1 MG: 10 INJECTION, SOLUTION INTRAMUSCULAR; INTRAVENOUS at 17:21

## 2018-04-05 RX ADMIN — INSULIN ASPART 1 UNIT: 100 INJECTION, SOLUTION INTRAVENOUS; SUBCUTANEOUS at 17:23

## 2018-04-05 RX ADMIN — INSULIN ASPART 1 UNIT: 100 INJECTION, SOLUTION INTRAVENOUS; SUBCUTANEOUS at 12:25

## 2018-04-05 RX ADMIN — MIDODRINE HYDROCHLORIDE 1 MG: 5 TABLET ORAL at 17:31

## 2018-04-05 RX ADMIN — MIDODRINE HYDROCHLORIDE 1 MG: 5 TABLET ORAL at 13:58

## 2018-04-05 RX ADMIN — INSULIN ASPART 1 UNIT: 100 INJECTION, SOLUTION INTRAVENOUS; SUBCUTANEOUS at 17:24

## 2018-04-05 RX ADMIN — WHITE PETROLATUM 1 EA: 1 OINTMENT TOPICAL at 08:07

## 2018-04-05 RX ADMIN — INSULIN ASPART 1 UNIT: 100 INJECTION, SOLUTION INTRAVENOUS; SUBCUTANEOUS at 22:14

## 2018-04-05 RX ADMIN — IODIXANOL 1 ML: 320 INJECTION, SOLUTION INTRAVASCULAR at 19:05

## 2018-04-05 RX ADMIN — INSULIN ASPART 1 UNIT: 100 INJECTION, SOLUTION INTRAVENOUS; SUBCUTANEOUS at 08:31

## 2018-04-05 RX ADMIN — METOCLOPRAMIDE HYDROCHLORIDE 1 MG: 10 INJECTION, SOLUTION INTRAMUSCULAR; INTRAVENOUS at 22:10

## 2018-04-05 RX ADMIN — METOCLOPRAMIDE HYDROCHLORIDE 1 MG: 10 INJECTION, SOLUTION INTRAMUSCULAR; INTRAVENOUS at 08:05

## 2018-04-05 RX ADMIN — EZETIMIBE 1 MG: 10 TABLET ORAL at 08:07

## 2018-04-05 RX ADMIN — INSULIN ASPART 1 UNIT: 100 INJECTION, SOLUTION INTRAVENOUS; SUBCUTANEOUS at 12:24

## 2018-04-05 RX ADMIN — WHITE PETROLATUM 1 EA: 1 OINTMENT TOPICAL at 22:17

## 2018-04-05 RX ADMIN — MORPHINE SULFATE 1 MG: 10 SOLUTION ORAL at 00:38

## 2018-04-05 RX ADMIN — SODIUM CHLORIDE 1 ML: 9 INJECTION, SOLUTION INTRAMUSCULAR; INTRAVENOUS; SUBCUTANEOUS at 19:05

## 2018-04-05 RX ADMIN — LACTULOSE 1 GM: 20 SOLUTION ORAL at 06:03

## 2018-04-05 RX ADMIN — MORPHINE SULFATE 1 MG: 10 SOLUTION ORAL at 20:24

## 2018-04-05 RX ADMIN — CLOPIDOGREL 1 MG: 75 TABLET, FILM COATED ORAL at 08:06

## 2018-04-05 RX ADMIN — DICYCLOMINE HYDROCHLORIDE 1 MG: 10 CAPSULE ORAL at 12:23

## 2018-04-05 RX ADMIN — INSULIN ASPART 1 UNIT: 100 INJECTION, SOLUTION INTRAVENOUS; SUBCUTANEOUS at 08:04

## 2018-04-05 RX ADMIN — CYANOCOBALAMIN TAB 500 MCG 1 MCG: 500 TAB at 08:07

## 2018-04-05 RX ADMIN — LACTULOSE 1 GM: 20 SOLUTION ORAL at 00:05

## 2018-04-05 RX ADMIN — SPIRONOLACTONE 1 MG: 25 TABLET, FILM COATED ORAL at 20:18

## 2018-04-05 RX ADMIN — DICYCLOMINE HYDROCHLORIDE 1 MG: 10 CAPSULE ORAL at 17:22

## 2018-04-05 RX ADMIN — RIFAXIMIN 1 MG: 550 TABLET ORAL at 08:07

## 2018-04-05 RX ADMIN — METOCLOPRAMIDE HYDROCHLORIDE 1 MG: 10 INJECTION, SOLUTION INTRAMUSCULAR; INTRAVENOUS at 12:38

## 2018-04-05 RX ADMIN — DICYCLOMINE HYDROCHLORIDE 1 MG: 10 CAPSULE ORAL at 22:10

## 2018-04-05 RX ADMIN — CALCIUM ACETATE 1 MG: 667 CAPSULE ORAL at 12:22

## 2018-04-05 RX ADMIN — SPIRONOLACTONE 1 MG: 25 TABLET, FILM COATED ORAL at 08:05

## 2018-04-05 RX ADMIN — LACTULOSE 1 GM: 20 SOLUTION ORAL at 12:22

## 2018-04-05 RX ADMIN — PANTOPRAZOLE SODIUM 1 MG: 40 TABLET, DELAYED RELEASE ORAL at 06:03

## 2018-04-05 RX ADMIN — CALCIUM ACETATE 1 MG: 667 CAPSULE ORAL at 17:22

## 2018-04-05 RX ADMIN — MIDODRINE HYDROCHLORIDE 1 MG: 5 TABLET ORAL at 08:06

## 2018-04-05 RX ADMIN — CALCIUM ACETATE 1 MG: 667 CAPSULE ORAL at 08:05

## 2018-04-05 RX ADMIN — DICYCLOMINE HYDROCHLORIDE 1 MG: 10 CAPSULE ORAL at 08:06

## 2018-04-05 RX ADMIN — LACTULOSE 1 GM: 20 SOLUTION ORAL at 17:22

## 2018-04-05 RX ADMIN — INSULIN GLARGINE 1 UNIT: 100 INJECTION, SOLUTION SUBCUTANEOUS at 20:22

## 2018-04-05 RX ADMIN — MAGNESIUM SULFATE HEPTAHYDRATE 1 MLS/HR: 40 INJECTION, SOLUTION INTRAVENOUS at 12:22

## 2018-04-06 LAB
ABNORMAL IP MESSAGE: 1
ADD MAN DIFF?: NO
ALANINE AMINOTRANSFERASE: 132 IU/L (ref 13–69)
ALBUMIN/GLOBULIN RATIO: 0.66
ALBUMIN: 2.2 G/DL (ref 3.3–4.9)
ALKALINE PHOSPHATASE: 258 IU/L (ref 42–121)
ANION GAP: 12 (ref 8–16)
ASPARTATE AMINO TRANSFERASE: 171 IU/L (ref 15–46)
BASOPHIL #: 0.1 10^3/UL (ref 0–0.1)
BASOPHILS %: 1.2 % (ref 0–2)
BILIRUBIN,DIRECT: 0 MG/DL (ref 0–0.2)
BILIRUBIN,TOTAL: 0.7 MG/DL (ref 0.2–1.3)
BLOOD UREA NITROGEN: 8 MG/DL (ref 7–20)
CALCIUM: 8.5 MG/DL (ref 8.4–10.2)
CARBON DIOXIDE: 23 MMOL/L (ref 21–31)
CHLORIDE: 104 MMOL/L (ref 97–110)
CREATININE: 0.47 MG/DL (ref 0.61–1.24)
EOSINOPHILS #: 0.4 10^3/UL (ref 0–0.5)
EOSINOPHILS %: 5.4 % (ref 0–7)
GLOBULIN: 3.3 G/DL (ref 1.3–3.2)
GLUCOSE: 70 MG/DL (ref 70–220)
HEMATOCRIT: 29.6 % (ref 42–52)
HEMOGLOBIN: 10.5 G/DL (ref 14–18)
INR: 1.32
LYMPHOCYTES #: 1.1 10^3/UL (ref 0.8–2.9)
LYMPHOCYTES %: 13.9 % (ref 15–51)
MEAN CORPUSCULAR HEMOGLOBIN: 33.1 PG (ref 29–33)
MEAN CORPUSCULAR HGB CONC: 35.5 G/DL (ref 32–37)
MEAN CORPUSCULAR VOLUME: 93.4 FL (ref 82–101)
MEAN PLATELET VOLUME: 10.7 FL (ref 7.4–10.4)
MONOCYTE #: 1 10^3/UL (ref 0.3–0.9)
MONOCYTES %: 13.1 % (ref 0–11)
NEUTROPHIL #: 5 10^3/UL (ref 1.6–7.5)
NEUTROPHILS %: 65.5 % (ref 39–77)
NUCLEATED RED BLOOD CELLS #: 0 10^3/UL (ref 0–0)
NUCLEATED RED BLOOD CELLS%: 0 /100WBC (ref 0–0)
PLATELET COUNT: 96 10^3/UL (ref 140–415)
POSITIVE DIFF: (no result)
POTASSIUM: 4.5 MMOL/L (ref 3.5–5.1)
PROTIME: 16.6 SEC (ref 11.9–14.9)
PT RATIO: 1.3
RED BLOOD COUNT: 3.17 10^6/UL (ref 4.7–6.1)
RED CELL DISTRIBUTION WIDTH: 15.5 % (ref 11.5–14.5)
SODIUM: 134 MMOL/L (ref 135–144)
TOTAL PROTEIN: 5.5 G/DL (ref 6.1–8.1)
WHITE BLOOD COUNT: 7.6 10^3/UL (ref 4.8–10.8)

## 2018-04-06 RX ADMIN — INSULIN ASPART 1 UNIT: 100 INJECTION, SOLUTION INTRAVENOUS; SUBCUTANEOUS at 21:28

## 2018-04-06 RX ADMIN — MORPHINE SULFATE 1 MG: 10 SOLUTION ORAL at 23:14

## 2018-04-06 RX ADMIN — METOCLOPRAMIDE HYDROCHLORIDE 1 MG: 10 INJECTION, SOLUTION INTRAMUSCULAR; INTRAVENOUS at 08:42

## 2018-04-06 RX ADMIN — LACTULOSE 1 GM: 20 SOLUTION ORAL at 06:00

## 2018-04-06 RX ADMIN — INSULIN GLARGINE 1 UNIT: 100 INJECTION, SOLUTION SUBCUTANEOUS at 21:29

## 2018-04-06 RX ADMIN — CLOPIDOGREL 1 MG: 75 TABLET, FILM COATED ORAL at 08:42

## 2018-04-06 RX ADMIN — INSULIN ASPART 1 UNIT: 100 INJECTION, SOLUTION INTRAVENOUS; SUBCUTANEOUS at 08:15

## 2018-04-06 RX ADMIN — WHITE PETROLATUM 1 EA: 1 OINTMENT TOPICAL at 08:43

## 2018-04-06 RX ADMIN — DICYCLOMINE HYDROCHLORIDE 1 MG: 10 CAPSULE ORAL at 17:43

## 2018-04-06 RX ADMIN — RIFAXIMIN 1 MG: 550 TABLET ORAL at 08:43

## 2018-04-06 RX ADMIN — INSULIN ASPART 1 UNIT: 100 INJECTION, SOLUTION INTRAVENOUS; SUBCUTANEOUS at 17:49

## 2018-04-06 RX ADMIN — METOCLOPRAMIDE HYDROCHLORIDE 1 MG: 10 INJECTION, SOLUTION INTRAMUSCULAR; INTRAVENOUS at 12:33

## 2018-04-06 RX ADMIN — CALCIUM ACETATE 1 MG: 667 CAPSULE ORAL at 17:43

## 2018-04-06 RX ADMIN — DICYCLOMINE HYDROCHLORIDE 1 MG: 10 CAPSULE ORAL at 08:42

## 2018-04-06 RX ADMIN — SPIRONOLACTONE 1 MG: 25 TABLET, FILM COATED ORAL at 21:25

## 2018-04-06 RX ADMIN — MIDODRINE HYDROCHLORIDE 1 MG: 5 TABLET ORAL at 12:35

## 2018-04-06 RX ADMIN — CYANOCOBALAMIN TAB 500 MCG 1 MCG: 500 TAB at 08:43

## 2018-04-06 RX ADMIN — LACTULOSE 1 GM: 20 SOLUTION ORAL at 17:43

## 2018-04-06 RX ADMIN — MIDODRINE HYDROCHLORIDE 1 MG: 5 TABLET ORAL at 17:45

## 2018-04-06 RX ADMIN — WHITE PETROLATUM 1 EA: 1 OINTMENT TOPICAL at 21:00

## 2018-04-06 RX ADMIN — MORPHINE SULFATE 1 MG: 10 SOLUTION ORAL at 02:45

## 2018-04-06 RX ADMIN — DICYCLOMINE HYDROCHLORIDE 1 MG: 10 CAPSULE ORAL at 21:25

## 2018-04-06 RX ADMIN — MIDODRINE HYDROCHLORIDE 1 MG: 5 TABLET ORAL at 08:43

## 2018-04-06 RX ADMIN — LACTULOSE 1 GM: 20 SOLUTION ORAL at 23:37

## 2018-04-06 RX ADMIN — EZETIMIBE 1 MG: 10 TABLET ORAL at 08:43

## 2018-04-06 RX ADMIN — CALCIUM ACETATE 1 MG: 667 CAPSULE ORAL at 08:41

## 2018-04-06 RX ADMIN — RIFAXIMIN 1 MG: 550 TABLET ORAL at 21:25

## 2018-04-06 RX ADMIN — CALCIUM ACETATE 1 MG: 667 CAPSULE ORAL at 12:33

## 2018-04-06 RX ADMIN — INSULIN ASPART 1 UNIT: 100 INJECTION, SOLUTION INTRAVENOUS; SUBCUTANEOUS at 12:27

## 2018-04-06 RX ADMIN — DICYCLOMINE HYDROCHLORIDE 1 MG: 10 CAPSULE ORAL at 12:33

## 2018-04-06 RX ADMIN — LACTULOSE 1 GM: 20 SOLUTION ORAL at 12:33

## 2018-04-06 RX ADMIN — METOCLOPRAMIDE HYDROCHLORIDE 1 MG: 10 INJECTION, SOLUTION INTRAMUSCULAR; INTRAVENOUS at 21:25

## 2018-04-06 RX ADMIN — PANTOPRAZOLE SODIUM 1 MG: 40 TABLET, DELAYED RELEASE ORAL at 06:19

## 2018-04-06 RX ADMIN — SPIRONOLACTONE 1 MG: 25 TABLET, FILM COATED ORAL at 08:42

## 2018-04-06 RX ADMIN — METOCLOPRAMIDE HYDROCHLORIDE 1 MG: 10 INJECTION, SOLUTION INTRAMUSCULAR; INTRAVENOUS at 17:43

## 2018-04-07 LAB
ADD MAN DIFF?: NO
ALANINE AMINOTRANSFERASE: 134 IU/L (ref 13–69)
ALBUMIN/GLOBULIN RATIO: 0.6
ALBUMIN: 2.1 G/DL (ref 3.3–4.9)
ALKALINE PHOSPHATASE: 290 IU/L (ref 42–121)
AMMONIA: 115 UMOL/L (ref 9–30)
ANA SCREEN: NEGATIVE
ANION GAP: 13 (ref 8–16)
ASPARTATE AMINO TRANSFERASE: 187 IU/L (ref 15–46)
BASOPHIL #: 0.1 10^3/UL (ref 0–0.1)
BASOPHILS %: 0.8 % (ref 0–2)
BILIRUBIN,DIRECT: 0 MG/DL (ref 0–0.2)
BILIRUBIN,TOTAL: 0.7 MG/DL (ref 0.2–1.3)
BLOOD UREA NITROGEN: 10 MG/DL (ref 7–20)
CALCIUM: 8.4 MG/DL (ref 8.4–10.2)
CARBON DIOXIDE: 21 MMOL/L (ref 21–31)
CHLORIDE: 102 MMOL/L (ref 97–110)
CREATININE: 0.51 MG/DL (ref 0.61–1.24)
EOSINOPHILS #: 0.2 10^3/UL (ref 0–0.5)
EOSINOPHILS %: 3.3 % (ref 0–7)
GLOBULIN: 3.5 G/DL (ref 1.3–3.2)
GLUCOSE: 232 MG/DL (ref 70–220)
HEMATOCRIT: 28.7 % (ref 42–52)
HEMOGLOBIN: 10.2 G/DL (ref 14–18)
LYMPHOCYTES #: 1 10^3/UL (ref 0.8–2.9)
LYMPHOCYTES %: 13.2 % (ref 15–51)
MEAN CORPUSCULAR HEMOGLOBIN: 33.4 PG (ref 29–33)
MEAN CORPUSCULAR HGB CONC: 35.5 G/DL (ref 32–37)
MEAN CORPUSCULAR VOLUME: 94.1 FL (ref 82–101)
MEAN PLATELET VOLUME: 10.5 FL (ref 7.4–10.4)
MITOCHONDRIAL TB: NEGATIVE
MONOCYTE #: 0.8 10^3/UL (ref 0.3–0.9)
MONOCYTES %: 11.2 % (ref 0–11)
NEUTROPHIL #: 5.1 10^3/UL (ref 1.6–7.5)
NEUTROPHILS %: 70.8 % (ref 39–77)
NUCLEATED RED BLOOD CELLS #: 0 10^3/UL (ref 0–0)
NUCLEATED RED BLOOD CELLS%: 0 /100WBC (ref 0–0)
PLATELET COUNT: 101 10^3/UL (ref 140–415)
POTASSIUM: 4.7 MMOL/L (ref 3.5–5.1)
RED BLOOD COUNT: 3.05 10^6/UL (ref 4.7–6.1)
RED CELL DISTRIBUTION WIDTH: 15.9 % (ref 11.5–14.5)
SMOOTH MUSCLE AB SCREEN: NEGATIVE
SODIUM: 131 MMOL/L (ref 135–144)
TOTAL PROTEIN: 5.6 G/DL (ref 6.1–8.1)
WHITE BLOOD COUNT: 7.2 10^3/UL (ref 4.8–10.8)

## 2018-04-07 RX ADMIN — METOCLOPRAMIDE HYDROCHLORIDE 1 MG: 10 INJECTION, SOLUTION INTRAMUSCULAR; INTRAVENOUS at 12:21

## 2018-04-07 RX ADMIN — DICYCLOMINE HYDROCHLORIDE 1 MG: 10 CAPSULE ORAL at 09:45

## 2018-04-07 RX ADMIN — RIFAXIMIN 1 MG: 550 TABLET ORAL at 08:22

## 2018-04-07 RX ADMIN — LORAZEPAM 1 MG: 2 INJECTION, SOLUTION INTRAMUSCULAR; INTRAVENOUS at 01:14

## 2018-04-07 RX ADMIN — SPIRONOLACTONE 1 MG: 25 TABLET, FILM COATED ORAL at 08:22

## 2018-04-07 RX ADMIN — LACTULOSE 1 GM: 20 SOLUTION ORAL at 12:16

## 2018-04-07 RX ADMIN — INSULIN ASPART 1 UNIT: 100 INJECTION, SOLUTION INTRAVENOUS; SUBCUTANEOUS at 08:03

## 2018-04-07 RX ADMIN — CYANOCOBALAMIN TAB 500 MCG 1 MCG: 500 TAB at 08:22

## 2018-04-07 RX ADMIN — MIDODRINE HYDROCHLORIDE 1 MG: 5 TABLET ORAL at 13:49

## 2018-04-07 RX ADMIN — METOCLOPRAMIDE HYDROCHLORIDE 1 MG: 10 INJECTION, SOLUTION INTRAMUSCULAR; INTRAVENOUS at 08:22

## 2018-04-07 RX ADMIN — CALCIUM ACETATE 1 MG: 667 CAPSULE ORAL at 12:16

## 2018-04-07 RX ADMIN — DICYCLOMINE HYDROCHLORIDE 1 MG: 10 CAPSULE ORAL at 12:21

## 2018-04-07 RX ADMIN — INSULIN ASPART 1 UNIT: 100 INJECTION, SOLUTION INTRAVENOUS; SUBCUTANEOUS at 12:14

## 2018-04-07 RX ADMIN — CALCIUM ACETATE 1 MG: 667 CAPSULE ORAL at 08:22

## 2018-04-07 RX ADMIN — PANTOPRAZOLE SODIUM 1 MG: 40 TABLET, DELAYED RELEASE ORAL at 05:14

## 2018-04-07 RX ADMIN — INSULIN ASPART 1 UNIT: 100 INJECTION, SOLUTION INTRAVENOUS; SUBCUTANEOUS at 08:07

## 2018-04-07 RX ADMIN — LACTULOSE 1 GM: 20 SOLUTION ORAL at 05:14

## 2018-04-07 RX ADMIN — CLOPIDOGREL 1 MG: 75 TABLET, FILM COATED ORAL at 08:22

## 2018-04-07 RX ADMIN — EZETIMIBE 1 MG: 10 TABLET ORAL at 08:22

## 2018-04-07 RX ADMIN — INSULIN ASPART 1 UNIT: 100 INJECTION, SOLUTION INTRAVENOUS; SUBCUTANEOUS at 12:15

## 2018-04-07 RX ADMIN — MIDODRINE HYDROCHLORIDE 1 MG: 5 TABLET ORAL at 09:47

## 2018-04-07 RX ADMIN — HYDROCORTISONE 1 APPLIC: 1 CREAM TOPICAL at 15:06

## 2018-04-07 RX ADMIN — WHITE PETROLATUM 1 APPLIC: 1 OINTMENT TOPICAL at 11:07

## 2018-04-24 ENCOUNTER — HOSPITAL ENCOUNTER (INPATIENT)
Dept: HOSPITAL 91 - TEL | Age: 58
LOS: 1 days | Discharge: LEFT BEFORE DISCHARGE | DRG: 392 | End: 2018-04-25
Payer: MEDICAID

## 2018-04-24 DIAGNOSIS — R10.9: ICD-10-CM

## 2018-04-24 DIAGNOSIS — Z76.82: ICD-10-CM

## 2018-04-24 DIAGNOSIS — R73.9: ICD-10-CM

## 2018-04-24 DIAGNOSIS — R11.2: Primary | ICD-10-CM

## 2018-04-24 DIAGNOSIS — K70.30: ICD-10-CM

## 2018-04-24 LAB
ADD MAN DIFF?: NO
ADD UMIC: NO
ALANINE AMINOTRANSFERASE: 50 IU/L (ref 13–69)
ALBUMIN/GLOBULIN RATIO: 0.71
ALBUMIN: 3.3 G/DL (ref 3.3–4.9)
ALKALINE PHOSPHATASE: 321 IU/L (ref 42–121)
ANION GAP: 22 (ref 8–16)
ASPARTATE AMINO TRANSFERASE: 75 IU/L (ref 15–46)
BASOPHIL #: 0.1 10^3/UL (ref 0–0.1)
BASOPHILS %: 0.5 % (ref 0–2)
BILIRUBIN,DIRECT: 0 MG/DL (ref 0–0.2)
BILIRUBIN,TOTAL: 1.3 MG/DL (ref 0.2–1.3)
BLOOD UREA NITROGEN: 19 MG/DL (ref 7–20)
CALCIUM: 9.4 MG/DL (ref 8.4–10.2)
CARBON DIOXIDE: 24 MMOL/L (ref 21–31)
CHLORIDE: 91 MMOL/L (ref 97–110)
CREATININE: 0.88 MG/DL (ref 0.61–1.24)
EOSINOPHILS #: 0 10^3/UL (ref 0–0.5)
EOSINOPHILS %: 0.3 % (ref 0–7)
GLOBULIN: 4.6 G/DL (ref 1.3–3.2)
GLUCOSE: 187 MG/DL (ref 70–220)
HEMATOCRIT: 37.9 % (ref 42–52)
HEMOGLOBIN: 13.2 G/DL (ref 14–18)
INR: 1.29
LACTIC ACID: 7.3 MMOL/L (ref 0.5–2)
LYMPHOCYTES #: 0.7 10^3/UL (ref 0.8–2.9)
LYMPHOCYTES %: 7.6 % (ref 15–51)
MEAN CORPUSCULAR HEMOGLOBIN: 33.2 PG (ref 29–33)
MEAN CORPUSCULAR HGB CONC: 34.8 G/DL (ref 32–37)
MEAN CORPUSCULAR VOLUME: 95.2 FL (ref 82–101)
MEAN PLATELET VOLUME: 10.9 FL (ref 7.4–10.4)
MONOCYTE #: 0.7 10^3/UL (ref 0.3–0.9)
MONOCYTES %: 7.5 % (ref 0–11)
NEUTROPHIL #: 8.1 10^3/UL (ref 1.6–7.5)
NEUTROPHILS %: 83.4 % (ref 39–77)
NUCLEATED RED BLOOD CELLS #: 0 10^3/UL (ref 0–0)
NUCLEATED RED BLOOD CELLS%: 0 /100WBC (ref 0–0)
PARTIAL THROMBOPLASTIN TIME: 32.6 SEC (ref 25–35)
PLATELET COUNT: 181 10^3/UL (ref 140–415)
POTASSIUM: 4.7 MMOL/L (ref 3.5–5.1)
PROTIME: 16.3 SEC (ref 11.9–14.9)
PT RATIO: 1.3
RED BLOOD COUNT: 3.98 10^6/UL (ref 4.7–6.1)
RED CELL DISTRIBUTION WIDTH: 15.1 % (ref 11.5–14.5)
SODIUM: 132 MMOL/L (ref 135–144)
TOTAL PROTEIN: 7.9 G/DL (ref 6.1–8.1)
TROPONIN-I: 0.03 NG/ML (ref 0–0.12)
UR ASCORBIC ACID: NEGATIVE MG/DL
UR BILIRUBIN (DIP): NEGATIVE MG/DL
UR BLOOD (DIP): NEGATIVE MG/DL
UR CLARITY: CLEAR
UR COLOR: YELLOW
UR GLUCOSE (DIP): NEGATIVE MG/DL
UR KETONES (DIP): NEGATIVE MG/DL
UR LEUKOCYTE ESTERASE (DIP): NEGATIVE LEU/UL
UR NITRITE (DIP): NEGATIVE MG/DL
UR PH (DIP): 5 (ref 5–9)
UR SPECIFIC GRAVITY (DIP): 1.01 (ref 1–1.03)
UR TOTAL PROTEIN (DIP): NEGATIVE MG/DL
UR UROBILINOGEN (DIP): (no result) MG/DL
WHITE BLOOD COUNT: 9.8 10^3/UL (ref 4.8–10.8)

## 2018-04-24 PROCEDURE — 96374 THER/PROPH/DIAG INJ IV PUSH: CPT

## 2018-04-24 PROCEDURE — 96375 TX/PRO/DX INJ NEW DRUG ADDON: CPT

## 2018-04-24 PROCEDURE — 82140 ASSAY OF AMMONIA: CPT

## 2018-04-24 PROCEDURE — 80053 COMPREHEN METABOLIC PANEL: CPT

## 2018-04-24 PROCEDURE — 82962 GLUCOSE BLOOD TEST: CPT

## 2018-04-24 PROCEDURE — 71045 X-RAY EXAM CHEST 1 VIEW: CPT

## 2018-04-24 PROCEDURE — 81003 URINALYSIS AUTO W/O SCOPE: CPT

## 2018-04-24 PROCEDURE — 87040 BLOOD CULTURE FOR BACTERIA: CPT

## 2018-04-24 PROCEDURE — 99285 EMERGENCY DEPT VISIT HI MDM: CPT

## 2018-04-24 PROCEDURE — 84484 ASSAY OF TROPONIN QUANT: CPT

## 2018-04-24 PROCEDURE — 36415 COLL VENOUS BLD VENIPUNCTURE: CPT

## 2018-04-24 PROCEDURE — 83735 ASSAY OF MAGNESIUM: CPT

## 2018-04-24 PROCEDURE — 85610 PROTHROMBIN TIME: CPT

## 2018-04-24 PROCEDURE — 96372 THER/PROPH/DIAG INJ SC/IM: CPT

## 2018-04-24 PROCEDURE — 93005 ELECTROCARDIOGRAM TRACING: CPT

## 2018-04-24 PROCEDURE — 85730 THROMBOPLASTIN TIME PARTIAL: CPT

## 2018-04-24 PROCEDURE — 87086 URINE CULTURE/COLONY COUNT: CPT

## 2018-04-24 PROCEDURE — 83605 ASSAY OF LACTIC ACID: CPT

## 2018-04-24 PROCEDURE — 96376 TX/PRO/DX INJ SAME DRUG ADON: CPT

## 2018-04-24 PROCEDURE — 83036 HEMOGLOBIN GLYCOSYLATED A1C: CPT

## 2018-04-24 PROCEDURE — 74176 CT ABD & PELVIS W/O CONTRAST: CPT

## 2018-04-24 PROCEDURE — 80048 BASIC METABOLIC PNL TOTAL CA: CPT

## 2018-04-24 PROCEDURE — 87045 FECES CULTURE AEROBIC BACT: CPT

## 2018-04-24 PROCEDURE — 85025 COMPLETE CBC W/AUTO DIFF WBC: CPT

## 2018-04-24 RX ADMIN — ONDANSETRON 1 MG: 4 TABLET, ORALLY DISINTEGRATING ORAL at 22:10

## 2018-04-24 RX ADMIN — ONDANSETRON HYDROCHLORIDE 1 MG: 2 INJECTION, SOLUTION INTRAMUSCULAR; INTRAVENOUS at 21:48

## 2018-04-25 ENCOUNTER — HOSPITAL ENCOUNTER (INPATIENT)
Age: 58
Discharge: LEFT BEFORE DISCHARGE | DRG: 392 | End: 2018-04-25

## 2018-04-25 LAB
ADD MAN DIFF?: NO
AMMONIA: 91 UMOL/L (ref 9–30)
ANION GAP: 19 (ref 8–16)
BASOPHIL #: 0 10^3/UL (ref 0–0.1)
BASOPHILS %: 0.3 % (ref 0–2)
BLOOD UREA NITROGEN: 19 MG/DL (ref 7–20)
CALCIUM: 8.7 MG/DL (ref 8.4–10.2)
CARBON DIOXIDE: 25 MMOL/L (ref 21–31)
CHLORIDE: 96 MMOL/L (ref 97–110)
CREATININE: 0.82 MG/DL (ref 0.61–1.24)
EOSINOPHILS #: 0 10^3/UL (ref 0–0.5)
EOSINOPHILS %: 0.2 % (ref 0–7)
GLUCOSE: 230 MG/DL (ref 70–220)
HEMATOCRIT: 33.7 % (ref 42–52)
HEMOGLOBIN A1C: 6.4 % (ref 0–5.9)
HEMOGLOBIN: 11.4 G/DL (ref 14–18)
LACTIC ACID: 4.5 MMOL/L (ref 0.5–2)
LACTIC ACID: 6.4 MMOL/L (ref 0.5–2)
LACTIC ACID: 8 MMOL/L (ref 0.5–2)
LYMPHOCYTES #: 0.7 10^3/UL (ref 0.8–2.9)
LYMPHOCYTES %: 6.6 % (ref 15–51)
MAGNESIUM: 1.9 MG/DL (ref 1.7–2.5)
MEAN CORPUSCULAR HEMOGLOBIN: 32.5 PG (ref 29–33)
MEAN CORPUSCULAR HGB CONC: 33.8 G/DL (ref 32–37)
MEAN CORPUSCULAR VOLUME: 96 FL (ref 82–101)
MEAN PLATELET VOLUME: 10.4 FL (ref 7.4–10.4)
MONOCYTE #: 0.5 10^3/UL (ref 0.3–0.9)
MONOCYTES %: 4.9 % (ref 0–11)
NEUTROPHIL #: 9.2 10^3/UL (ref 1.6–7.5)
NEUTROPHILS %: 87.5 % (ref 39–77)
NUCLEATED RED BLOOD CELLS #: 0 10^3/UL (ref 0–0)
NUCLEATED RED BLOOD CELLS%: 0 /100WBC (ref 0–0)
PLATELET COUNT: 120 10^3/UL (ref 140–415)
POTASSIUM: 4.8 MMOL/L (ref 3.5–5.1)
RED BLOOD COUNT: 3.51 10^6/UL (ref 4.7–6.1)
RED CELL DISTRIBUTION WIDTH: 15.2 % (ref 11.5–14.5)
SODIUM: 135 MMOL/L (ref 135–144)
WHITE BLOOD COUNT: 10.5 10^3/UL (ref 4.8–10.8)

## 2018-04-25 RX ADMIN — CALCIUM ACETATE 1 MG: 667 CAPSULE ORAL at 08:00

## 2018-04-25 RX ADMIN — ONDANSETRON HYDROCHLORIDE 1 MG: 2 INJECTION, SOLUTION INTRAMUSCULAR; INTRAVENOUS at 10:58

## 2018-04-25 RX ADMIN — THIAMINE HYDROCHLORIDE 1 MLS/HR: 100 INJECTION, SOLUTION INTRAMUSCULAR; INTRAVENOUS at 10:51

## 2018-04-25 RX ADMIN — HYDROCORTISONE 1 APPLIC: 5 OINTMENT TOPICAL at 09:46

## 2018-04-25 RX ADMIN — THIAMINE HYDROCHLORIDE 1 ML: 100 INJECTION, SOLUTION INTRAMUSCULAR; INTRAVENOUS at 00:15

## 2018-04-25 RX ADMIN — VANCOMYCIN HYDROCHLORIDE 1 MLS/HR: 1 INJECTION, POWDER, LYOPHILIZED, FOR SOLUTION INTRAVENOUS at 00:39

## 2018-04-25 RX ADMIN — CEFEPIME HYDROCHLORIDE 1 MLS/HR: 2 INJECTION, POWDER, FOR SOLUTION INTRAVENOUS at 00:39

## 2018-04-25 RX ADMIN — HYDROCORTISONE 1 APPLIC: 5 OINTMENT TOPICAL at 13:26

## 2018-04-25 RX ADMIN — RIFAXIMIN 1 MG: 550 TABLET ORAL at 09:45

## 2018-04-25 RX ADMIN — LACTULOSE 1 GM: 20 SOLUTION ORAL at 06:01

## 2018-04-25 RX ADMIN — CALCIUM ACETATE 1 MG: 667 CAPSULE ORAL at 13:15

## 2018-04-25 RX ADMIN — EZETIMIBE 1 MG: 10 TABLET ORAL at 09:45

## 2018-04-25 RX ADMIN — CYANOCOBALAMIN TAB 500 MCG 1 MCG: 500 TAB at 09:45

## 2018-04-25 RX ADMIN — MORPHINE SULFATE 1 MG: 2 INJECTION, SOLUTION INTRAMUSCULAR; INTRAVENOUS at 01:54

## 2018-04-25 RX ADMIN — THIAMINE HYDROCHLORIDE 1 MLS/HR: 100 INJECTION, SOLUTION INTRAMUSCULAR; INTRAVENOUS at 01:57

## 2018-04-25 RX ADMIN — FERROUS SULFATE TAB 325 MG (65 MG ELEMENTAL FE) 1 MG: 325 (65 FE) TAB at 09:45

## 2018-04-25 RX ADMIN — MIDODRINE HYDROCHLORIDE 1 MG: 5 TABLET ORAL at 09:44

## 2018-04-25 RX ADMIN — MIDODRINE HYDROCHLORIDE 1 MG: 5 TABLET ORAL at 13:15

## 2018-04-25 RX ADMIN — MORPHINE SULFATE 1 MG: 2 INJECTION, SOLUTION INTRAMUSCULAR; INTRAVENOUS at 05:07

## 2018-04-25 RX ADMIN — METOCLOPRAMIDE HYDROCHLORIDE 1 MG: 10 INJECTION, SOLUTION INTRAMUSCULAR; INTRAVENOUS at 14:30

## 2018-04-25 RX ADMIN — SPIRONOLACTONE 1 MG: 50 TABLET, FILM COATED ORAL at 09:44

## 2018-04-25 RX ADMIN — CLOPIDOGREL 1 MG: 75 TABLET, FILM COATED ORAL at 09:45

## 2018-04-25 RX ADMIN — INSULIN ASPART 1 UNIT: 100 INJECTION, SOLUTION INTRAVENOUS; SUBCUTANEOUS at 13:20

## 2018-04-25 RX ADMIN — MORPHINE SULFATE 1 MG: 2 INJECTION, SOLUTION INTRAMUSCULAR; INTRAVENOUS at 10:58

## 2018-04-25 RX ADMIN — ASPIRIN 1 MG: 81 TABLET, COATED ORAL at 09:45

## 2018-04-25 RX ADMIN — LACTULOSE 1 GM: 20 SOLUTION ORAL at 13:15

## 2018-04-26 ENCOUNTER — HOSPITAL ENCOUNTER (INPATIENT)
Age: 58
LOS: 3 days | Discharge: HOME | DRG: 369 | End: 2018-04-29

## 2018-04-26 ENCOUNTER — HOSPITAL ENCOUNTER (INPATIENT)
Dept: HOSPITAL 91 - E/R | Age: 58
LOS: 3 days | Discharge: HOME | DRG: 369 | End: 2018-04-29
Payer: MEDICAID

## 2018-04-26 DIAGNOSIS — K22.6: Primary | ICD-10-CM

## 2018-04-26 DIAGNOSIS — K70.30: ICD-10-CM

## 2018-04-26 DIAGNOSIS — Z95.1: ICD-10-CM

## 2018-04-26 DIAGNOSIS — I95.89: ICD-10-CM

## 2018-04-26 DIAGNOSIS — R91.1: ICD-10-CM

## 2018-04-26 DIAGNOSIS — D47.3: ICD-10-CM

## 2018-04-26 DIAGNOSIS — Z87.891: ICD-10-CM

## 2018-04-26 DIAGNOSIS — E87.2: ICD-10-CM

## 2018-04-26 DIAGNOSIS — E11.43: ICD-10-CM

## 2018-04-26 DIAGNOSIS — Z79.4: ICD-10-CM

## 2018-04-26 DIAGNOSIS — E88.09: ICD-10-CM

## 2018-04-26 DIAGNOSIS — D50.0: ICD-10-CM

## 2018-04-26 DIAGNOSIS — D62: ICD-10-CM

## 2018-04-26 DIAGNOSIS — I25.10: ICD-10-CM

## 2018-04-26 DIAGNOSIS — G89.29: ICD-10-CM

## 2018-04-26 DIAGNOSIS — K31.84: ICD-10-CM

## 2018-04-26 LAB
ABNORMAL IP MESSAGE: 1
ADD MAN DIFF?: NO
ADD UMIC: NO
ALANINE AMINOTRANSFERASE: 53 IU/L (ref 13–69)
ALBUMIN/GLOBULIN RATIO: 0.69
ALBUMIN: 2.7 G/DL (ref 3.3–4.9)
ALKALINE PHOSPHATASE: 254 IU/L (ref 42–121)
AMMONIA: < 9 UMOL/L (ref 9–30)
ANION GAP: 13 (ref 8–16)
ASPARTATE AMINO TRANSFERASE: 58 IU/L (ref 15–46)
BASOPHIL #: 0 10^3/UL (ref 0–0.1)
BASOPHILS %: 0.1 % (ref 0–2)
BILIRUBIN,DIRECT: 0 MG/DL (ref 0–0.2)
BILIRUBIN,TOTAL: 1.2 MG/DL (ref 0.2–1.3)
BLOOD UREA NITROGEN: 13 MG/DL (ref 7–20)
CALCIUM: 9.2 MG/DL (ref 8.4–10.2)
CARBON DIOXIDE: 26 MMOL/L (ref 21–31)
CHLORIDE: 95 MMOL/L (ref 97–110)
CREATININE: 0.65 MG/DL (ref 0.61–1.24)
EOSINOPHILS #: 0 10^3/UL (ref 0–0.5)
EOSINOPHILS %: 0 % (ref 0–7)
GLOBULIN: 3.9 G/DL (ref 1.3–3.2)
GLUCOSE: 435 MG/DL (ref 70–220)
HEMATOCRIT: 27.4 % (ref 42–52)
HEMATOCRIT: 32.5 % (ref 42–52)
HEMOGLOBIN: 11.2 G/DL (ref 14–18)
HEMOGLOBIN: 9.3 G/DL (ref 14–18)
INR: 1.34
LACTIC ACID: 2.5 MMOL/L (ref 0.5–2)
LACTIC ACID: 3.3 MMOL/L (ref 0.5–2)
LACTIC ACID: 3.4 MMOL/L (ref 0.5–2)
LACTIC ACID: 3.4 MMOL/L (ref 0.5–2)
LIPASE: 17 U/L (ref 23–300)
LYMPHOCYTES #: 0.3 10^3/UL (ref 0.8–2.9)
LYMPHOCYTES %: 3.4 % (ref 15–51)
MEAN CORPUSCULAR HEMOGLOBIN: 33.1 PG (ref 29–33)
MEAN CORPUSCULAR HGB CONC: 34.5 G/DL (ref 32–37)
MEAN CORPUSCULAR VOLUME: 96.2 FL (ref 82–101)
MEAN PLATELET VOLUME: 10.5 FL (ref 7.4–10.4)
MONOCYTE #: 0.3 10^3/UL (ref 0.3–0.9)
MONOCYTES %: 3.3 % (ref 0–11)
NEUTROPHIL #: 9.4 10^3/UL (ref 1.6–7.5)
NEUTROPHILS %: 92.7 % (ref 39–77)
NUCLEATED RED BLOOD CELLS #: 0 10^3/UL (ref 0–0)
NUCLEATED RED BLOOD CELLS%: 0 /100WBC (ref 0–0)
PARTIAL THROMBOPLASTIN TIME: 29.7 SEC (ref 25–35)
PLATELET COUNT: 95 10^3/UL (ref 140–415)
POSITIVE DIFF: (no result)
POTASSIUM: 4.8 MMOL/L (ref 3.5–5.1)
PROTIME: 16.8 SEC (ref 11.9–14.9)
PT RATIO: 1.3
RED BLOOD COUNT: 3.38 10^6/UL (ref 4.7–6.1)
RED CELL DISTRIBUTION WIDTH: 14.7 % (ref 11.5–14.5)
SODIUM: 129 MMOL/L (ref 135–144)
TOTAL PROTEIN: 6.6 G/DL (ref 6.1–8.1)
TROPONIN-I: 0.03 NG/ML (ref 0–0.12)
UR ASCORBIC ACID: NEGATIVE MG/DL
UR BILIRUBIN (DIP): NEGATIVE MG/DL
UR BLOOD (DIP): NEGATIVE MG/DL
UR CLARITY: CLEAR
UR COLOR: YELLOW
UR GLUCOSE (DIP): (no result) MG/DL
UR KETONES (DIP): NEGATIVE MG/DL
UR LEUKOCYTE ESTERASE (DIP): NEGATIVE LEU/UL
UR NITRITE (DIP): NEGATIVE MG/DL
UR PH (DIP): 6 (ref 5–9)
UR SPECIFIC GRAVITY (DIP): 1.02 (ref 1–1.03)
UR TOTAL PROTEIN (DIP): NEGATIVE MG/DL
UR UROBILINOGEN (DIP): NEGATIVE MG/DL
WHITE BLOOD COUNT: 10.1 10^3/UL (ref 4.8–10.8)

## 2018-04-26 PROCEDURE — 80061 LIPID PANEL: CPT

## 2018-04-26 PROCEDURE — 86900 BLOOD TYPING SEROLOGIC ABO: CPT

## 2018-04-26 PROCEDURE — 86850 RBC ANTIBODY SCREEN: CPT

## 2018-04-26 PROCEDURE — 96361 HYDRATE IV INFUSION ADD-ON: CPT

## 2018-04-26 PROCEDURE — 96372 THER/PROPH/DIAG INJ SC/IM: CPT

## 2018-04-26 PROCEDURE — 83605 ASSAY OF LACTIC ACID: CPT

## 2018-04-26 PROCEDURE — 96375 TX/PRO/DX INJ NEW DRUG ADDON: CPT

## 2018-04-26 PROCEDURE — 36415 COLL VENOUS BLD VENIPUNCTURE: CPT

## 2018-04-26 PROCEDURE — 96365 THER/PROPH/DIAG IV INF INIT: CPT

## 2018-04-26 PROCEDURE — 83735 ASSAY OF MAGNESIUM: CPT

## 2018-04-26 PROCEDURE — 96376 TX/PRO/DX INJ SAME DRUG ADON: CPT

## 2018-04-26 PROCEDURE — 81003 URINALYSIS AUTO W/O SCOPE: CPT

## 2018-04-26 PROCEDURE — 85025 COMPLETE CBC W/AUTO DIFF WBC: CPT

## 2018-04-26 PROCEDURE — 87086 URINE CULTURE/COLONY COUNT: CPT

## 2018-04-26 PROCEDURE — 86901 BLOOD TYPING SEROLOGIC RH(D): CPT

## 2018-04-26 PROCEDURE — 85018 HEMOGLOBIN: CPT

## 2018-04-26 PROCEDURE — 85014 HEMATOCRIT: CPT

## 2018-04-26 PROCEDURE — 99285 EMERGENCY DEPT VISIT HI MDM: CPT

## 2018-04-26 PROCEDURE — 84484 ASSAY OF TROPONIN QUANT: CPT

## 2018-04-26 PROCEDURE — 85730 THROMBOPLASTIN TIME PARTIAL: CPT

## 2018-04-26 PROCEDURE — 83690 ASSAY OF LIPASE: CPT

## 2018-04-26 PROCEDURE — 85610 PROTHROMBIN TIME: CPT

## 2018-04-26 PROCEDURE — 87040 BLOOD CULTURE FOR BACTERIA: CPT

## 2018-04-26 PROCEDURE — 82962 GLUCOSE BLOOD TEST: CPT

## 2018-04-26 PROCEDURE — 80053 COMPREHEN METABOLIC PANEL: CPT

## 2018-04-26 PROCEDURE — 93005 ELECTROCARDIOGRAM TRACING: CPT

## 2018-04-26 PROCEDURE — 71045 X-RAY EXAM CHEST 1 VIEW: CPT

## 2018-04-26 PROCEDURE — 82140 ASSAY OF AMMONIA: CPT

## 2018-04-26 RX ADMIN — ONDANSETRON HYDROCHLORIDE 1 MG: 2 INJECTION, SOLUTION INTRAMUSCULAR; INTRAVENOUS at 10:50

## 2018-04-26 RX ADMIN — VANCOMYCIN HYDROCHLORIDE 1 MLS/HR: 1 INJECTION, POWDER, LYOPHILIZED, FOR SOLUTION INTRAVENOUS at 14:18

## 2018-04-26 RX ADMIN — THIAMINE HYDROCHLORIDE 1 ML: 100 INJECTION, SOLUTION INTRAMUSCULAR; INTRAVENOUS at 13:49

## 2018-04-26 RX ADMIN — INSULIN ASPART 1 UNIT: 100 INJECTION, SOLUTION INTRAVENOUS; SUBCUTANEOUS at 19:46

## 2018-04-26 RX ADMIN — PANTOPRAZOLE SODIUM 1 MLS/HR: 40 INJECTION, POWDER, FOR SOLUTION INTRAVENOUS at 11:35

## 2018-04-26 RX ADMIN — CEFTRIAXONE 1 MLS/HR: 1 INJECTION, SOLUTION INTRAVENOUS at 23:49

## 2018-04-26 RX ADMIN — VASOPRESSIN 1 MLS/HR: 20 INJECTION, SOLUTION INTRAMUSCULAR; SUBCUTANEOUS at 19:23

## 2018-04-26 RX ADMIN — METOCLOPRAMIDE HYDROCHLORIDE 1 MG: 10 INJECTION, SOLUTION INTRAMUSCULAR; INTRAVENOUS at 18:20

## 2018-04-26 RX ADMIN — RIFAXIMIN 1 MG: 550 TABLET ORAL at 03:00

## 2018-04-26 RX ADMIN — INSULIN GLARGINE 1 UNIT: 100 INJECTION, SOLUTION SUBCUTANEOUS at 22:08

## 2018-04-26 RX ADMIN — MIDODRINE HYDROCHLORIDE 1 MG: 5 TABLET ORAL at 23:49

## 2018-04-26 RX ADMIN — SPIRONOLACTONE 1 MG: 50 TABLET, FILM COATED ORAL at 03:00

## 2018-04-26 RX ADMIN — LACTULOSE 1 GM: 20 SOLUTION ORAL at 17:59

## 2018-04-26 RX ADMIN — PANTOPRAZOLE SODIUM 1 MLS/HR: 40 INJECTION, POWDER, FOR SOLUTION INTRAVENOUS at 13:50

## 2018-04-26 RX ADMIN — MORPHINE SULFATE 1 MG: 2 INJECTION, SOLUTION INTRAMUSCULAR; INTRAVENOUS at 14:51

## 2018-04-26 RX ADMIN — INSULIN ASPART 1 UNIT: 100 INJECTION, SOLUTION INTRAVENOUS; SUBCUTANEOUS at 22:09

## 2018-04-26 RX ADMIN — LACTULOSE 1 ML: 10 SOLUTION ORAL; RECTAL at 19:00

## 2018-04-26 RX ADMIN — SODIUM CHLORIDE 1 MLS/HR: 9 INJECTION, SOLUTION INTRAVENOUS at 11:35

## 2018-04-26 RX ADMIN — LACTULOSE 1 ML: 10 SOLUTION ORAL; RECTAL at 19:34

## 2018-04-26 RX ADMIN — OCTREOTIDE ACETATE 1 MLS/HR: 500 INJECTION, SOLUTION INTRAVENOUS; SUBCUTANEOUS at 13:50

## 2018-04-26 RX ADMIN — CEFEPIME HYDROCHLORIDE 1 MLS/HR: 2 INJECTION, POWDER, FOR SOLUTION INTRAVENOUS at 13:50

## 2018-04-26 RX ADMIN — THIAMINE HYDROCHLORIDE 1 MLS/HR: 100 INJECTION, SOLUTION INTRAMUSCULAR; INTRAVENOUS at 10:55

## 2018-04-26 RX ADMIN — ONDANSETRON HYDROCHLORIDE 1 MG: 2 INJECTION, SOLUTION INTRAMUSCULAR; INTRAVENOUS at 13:44

## 2018-04-26 RX ADMIN — INSULIN ASPART 1 UNIT: 100 INJECTION, SOLUTION INTRAVENOUS; SUBCUTANEOUS at 19:48

## 2018-04-26 RX ADMIN — CALCIUM ACETATE 1 MG: 667 CAPSULE ORAL at 17:59

## 2018-04-26 RX ADMIN — THIAMINE HYDROCHLORIDE 1 MLS/HR: 100 INJECTION, SOLUTION INTRAMUSCULAR; INTRAVENOUS at 14:00

## 2018-04-27 LAB
ABNORMAL IP MESSAGE: 1
ADD MAN DIFF?: NO
ALANINE AMINOTRANSFERASE: 39 IU/L (ref 13–69)
ALBUMIN/GLOBULIN RATIO: 0.58
ALBUMIN: 1.7 G/DL (ref 3.3–4.9)
ALKALINE PHOSPHATASE: 142 IU/L (ref 42–121)
ANION GAP: 4 (ref 8–16)
ASPARTATE AMINO TRANSFERASE: 36 IU/L (ref 15–46)
BASOPHIL #: 0.1 10^3/UL (ref 0–0.1)
BASOPHILS %: 0.9 % (ref 0–2)
BILIRUBIN,DIRECT: 0 MG/DL (ref 0–0.2)
BILIRUBIN,TOTAL: 0.7 MG/DL (ref 0.2–1.3)
BLOOD UREA NITROGEN: 11 MG/DL (ref 7–20)
CALCIUM: 8.1 MG/DL (ref 8.4–10.2)
CARBON DIOXIDE: 27 MMOL/L (ref 21–31)
CHLORIDE: 106 MMOL/L (ref 97–110)
CHOL/HDL RATIO: 2.4 RATIO
CHOLESTEROL: 56 MG/DL (ref 100–200)
CREATININE: 0.55 MG/DL (ref 0.61–1.24)
EOSINOPHILS #: 0.1 10^3/UL (ref 0–0.5)
EOSINOPHILS %: 1.9 % (ref 0–7)
GLOBULIN: 2.9 G/DL (ref 1.3–3.2)
GLUCOSE: 162 MG/DL (ref 70–220)
HDL CHOLESTEROL: 23 MG/DL (ref 28–71)
HEMATOCRIT: 26.1 % (ref 42–52)
HEMATOCRIT: 27.1 % (ref 42–52)
HEMOGLOBIN: 8.8 G/DL (ref 14–18)
HEMOGLOBIN: 9.3 G/DL (ref 14–18)
LACTIC ACID: 1.8 MMOL/L (ref 0.5–2)
LDL CHOLESTEROL,CALCULATED: 24 MG/DL
LYMPHOCYTES #: 0.9 10^3/UL (ref 0.8–2.9)
LYMPHOCYTES %: 12.5 % (ref 15–51)
MAGNESIUM: 1.6 MG/DL (ref 1.7–2.5)
MEAN CORPUSCULAR HEMOGLOBIN: 32.6 PG (ref 29–33)
MEAN CORPUSCULAR HGB CONC: 33.7 G/DL (ref 32–37)
MEAN CORPUSCULAR VOLUME: 96.7 FL (ref 82–101)
MEAN PLATELET VOLUME: 10.6 FL (ref 7.4–10.4)
MONOCYTE #: 0.8 10^3/UL (ref 0.3–0.9)
MONOCYTES %: 11.1 % (ref 0–11)
NEUTROPHIL #: 5.1 10^3/UL (ref 1.6–7.5)
NEUTROPHILS %: 73.2 % (ref 39–77)
NUCLEATED RED BLOOD CELLS #: 0 10^3/UL (ref 0–0)
NUCLEATED RED BLOOD CELLS%: 0 /100WBC (ref 0–0)
PLATELET COUNT: 78 10^3/UL (ref 140–415)
POSITIVE DIFF: (no result)
POTASSIUM: 4.2 MMOL/L (ref 3.5–5.1)
RED BLOOD COUNT: 2.7 10^6/UL (ref 4.7–6.1)
RED CELL DISTRIBUTION WIDTH: 14.7 % (ref 11.5–14.5)
SODIUM: 133 MMOL/L (ref 135–144)
TOTAL PROTEIN: 4.6 G/DL (ref 6.1–8.1)
TRIGLYCERIDES: 46 MG/DL (ref 0–149)
WHITE BLOOD COUNT: 7 10^3/UL (ref 4.8–10.8)

## 2018-04-27 RX ADMIN — INSULIN ASPART 1 UNIT: 100 INJECTION, SOLUTION INTRAVENOUS; SUBCUTANEOUS at 08:33

## 2018-04-27 RX ADMIN — CALCIUM ACETATE 1 MG: 667 CAPSULE ORAL at 12:18

## 2018-04-27 RX ADMIN — ONDANSETRON HYDROCHLORIDE 1 MG: 2 INJECTION, SOLUTION INTRAMUSCULAR; INTRAVENOUS at 12:17

## 2018-04-27 RX ADMIN — INSULIN GLARGINE 1 UNIT: 100 INJECTION, SOLUTION SUBCUTANEOUS at 21:44

## 2018-04-27 RX ADMIN — METOCLOPRAMIDE HYDROCHLORIDE 1 MG: 10 INJECTION, SOLUTION INTRAMUSCULAR; INTRAVENOUS at 05:36

## 2018-04-27 RX ADMIN — EZETIMIBE 1 MG: 10 TABLET ORAL at 08:30

## 2018-04-27 RX ADMIN — INSULIN ASPART 1 UNIT: 100 INJECTION, SOLUTION INTRAVENOUS; SUBCUTANEOUS at 17:38

## 2018-04-27 RX ADMIN — MORPHINE SULFATE 1 MG: 10 SOLUTION ORAL at 15:45

## 2018-04-27 RX ADMIN — RIFAXIMIN 1 MG: 550 TABLET ORAL at 21:43

## 2018-04-27 RX ADMIN — LACTULOSE 1 GM: 20 SOLUTION ORAL at 00:00

## 2018-04-27 RX ADMIN — ONDANSETRON HYDROCHLORIDE 1 MG: 2 INJECTION, SOLUTION INTRAMUSCULAR; INTRAVENOUS at 01:25

## 2018-04-27 RX ADMIN — METOCLOPRAMIDE HYDROCHLORIDE 1 MG: 10 INJECTION, SOLUTION INTRAMUSCULAR; INTRAVENOUS at 11:09

## 2018-04-27 RX ADMIN — LACTULOSE 1 ML: 10 SOLUTION ORAL; RECTAL at 00:00

## 2018-04-27 RX ADMIN — INSULIN ASPART 1 UNIT: 100 INJECTION, SOLUTION INTRAVENOUS; SUBCUTANEOUS at 08:00

## 2018-04-27 RX ADMIN — MIDODRINE HYDROCHLORIDE 1 MG: 5 TABLET ORAL at 13:55

## 2018-04-27 RX ADMIN — LACTULOSE 1 GM: 20 SOLUTION ORAL at 17:25

## 2018-04-27 RX ADMIN — INSULIN ASPART 1 UNIT: 100 INJECTION, SOLUTION INTRAVENOUS; SUBCUTANEOUS at 12:20

## 2018-04-27 RX ADMIN — METOCLOPRAMIDE HYDROCHLORIDE 1 MG: 10 INJECTION, SOLUTION INTRAMUSCULAR; INTRAVENOUS at 17:37

## 2018-04-27 RX ADMIN — SPIRONOLACTONE 1 MG: 50 TABLET, FILM COATED ORAL at 10:09

## 2018-04-27 RX ADMIN — INSULIN ASPART 1 UNIT: 100 INJECTION, SOLUTION INTRAVENOUS; SUBCUTANEOUS at 17:39

## 2018-04-27 RX ADMIN — LORAZEPAM 1 MG: 0.5 TABLET ORAL at 21:55

## 2018-04-27 RX ADMIN — CYANOCOBALAMIN TAB 500 MCG 1 MCG: 500 TAB at 08:34

## 2018-04-27 RX ADMIN — LACTULOSE 1 ML: 10 SOLUTION ORAL; RECTAL at 23:58

## 2018-04-27 RX ADMIN — MIDODRINE HYDROCHLORIDE 1 MG: 5 TABLET ORAL at 21:46

## 2018-04-27 RX ADMIN — LACTULOSE 1 GM: 20 SOLUTION ORAL at 12:00

## 2018-04-27 RX ADMIN — THIAMINE HYDROCHLORIDE 1 MLS/HR: 100 INJECTION, SOLUTION INTRAMUSCULAR; INTRAVENOUS at 15:00

## 2018-04-27 RX ADMIN — METOCLOPRAMIDE HYDROCHLORIDE 1 MG: 10 INJECTION, SOLUTION INTRAMUSCULAR; INTRAVENOUS at 03:00

## 2018-04-27 RX ADMIN — INSULIN ASPART 1 UNIT: 100 INJECTION, SOLUTION INTRAVENOUS; SUBCUTANEOUS at 21:46

## 2018-04-27 RX ADMIN — MORPHINE SULFATE 1 MG: 2 INJECTION, SOLUTION INTRAMUSCULAR; INTRAVENOUS at 01:25

## 2018-04-27 RX ADMIN — LACTULOSE 1 ML: 10 SOLUTION ORAL; RECTAL at 17:26

## 2018-04-27 RX ADMIN — HYDROCODONE BITARTRATE AND ACETAMINOPHEN 1 TAB: 5; 325 TABLET ORAL at 00:26

## 2018-04-27 RX ADMIN — VASOPRESSIN 1 MLS/HR: 20 INJECTION, SOLUTION INTRAMUSCULAR; SUBCUTANEOUS at 05:35

## 2018-04-27 RX ADMIN — CALCIUM ACETATE 1 MG: 667 CAPSULE ORAL at 08:31

## 2018-04-27 RX ADMIN — MORPHINE SULFATE 1 MG: 2 INJECTION, SOLUTION INTRAMUSCULAR; INTRAVENOUS at 11:07

## 2018-04-27 RX ADMIN — INSULIN ASPART 1 UNIT: 100 INJECTION, SOLUTION INTRAVENOUS; SUBCUTANEOUS at 12:00

## 2018-04-27 RX ADMIN — LACTULOSE 1 GM: 20 SOLUTION ORAL at 23:59

## 2018-04-27 RX ADMIN — CALCIUM ACETATE 1 MG: 667 CAPSULE ORAL at 17:39

## 2018-04-27 RX ADMIN — SPIRONOLACTONE 1 MG: 50 TABLET, FILM COATED ORAL at 21:43

## 2018-04-27 RX ADMIN — THIAMINE HYDROCHLORIDE 1 MLS/HR: 100 INJECTION, SOLUTION INTRAMUSCULAR; INTRAVENOUS at 00:27

## 2018-04-27 RX ADMIN — LACTULOSE 1 GM: 20 SOLUTION ORAL at 05:36

## 2018-04-27 RX ADMIN — PANTOPRAZOLE SODIUM 1 MG: 40 TABLET, DELAYED RELEASE ORAL at 17:39

## 2018-04-27 RX ADMIN — RIFAXIMIN 1 MG: 550 TABLET ORAL at 08:30

## 2018-04-27 RX ADMIN — MIDODRINE HYDROCHLORIDE 1 MG: 5 TABLET ORAL at 08:30

## 2018-04-27 RX ADMIN — LACTULOSE 1 ML: 10 SOLUTION ORAL; RECTAL at 03:06

## 2018-04-27 RX ADMIN — LACTULOSE 1 ML: 10 SOLUTION ORAL; RECTAL at 11:01

## 2018-04-27 RX ADMIN — VASOPRESSIN 1 MLS/HR: 20 INJECTION, SOLUTION INTRAMUSCULAR; SUBCUTANEOUS at 13:53

## 2018-04-27 RX ADMIN — FERROUS SULFATE TAB 325 MG (65 MG ELEMENTAL FE) 1 MG: 325 (65 FE) TAB at 08:28

## 2018-04-27 RX ADMIN — MORPHINE SULFATE 1 MG: 10 SOLUTION ORAL at 17:36

## 2018-04-27 RX ADMIN — LACTULOSE 1 GM: 20 SOLUTION ORAL at 12:21

## 2018-04-27 RX ADMIN — METOCLOPRAMIDE HYDROCHLORIDE 1 MG: 10 INJECTION, SOLUTION INTRAMUSCULAR; INTRAVENOUS at 23:59

## 2018-04-28 LAB
ABNORMAL IP MESSAGE: 1
ADD MAN DIFF?: NO
ALANINE AMINOTRANSFERASE: 43 IU/L (ref 13–69)
ALBUMIN/GLOBULIN RATIO: 0.56
ALBUMIN: 1.8 G/DL (ref 3.3–4.9)
ALKALINE PHOSPHATASE: 152 IU/L (ref 42–121)
ANION GAP: 5 (ref 8–16)
ASPARTATE AMINO TRANSFERASE: 55 IU/L (ref 15–46)
BASOPHIL #: 0.1 10^3/UL (ref 0–0.1)
BASOPHILS %: 1.1 % (ref 0–2)
BILIRUBIN,DIRECT: 0 MG/DL (ref 0–0.2)
BILIRUBIN,TOTAL: 0.8 MG/DL (ref 0.2–1.3)
BLOOD UREA NITROGEN: 6 MG/DL (ref 7–20)
CALCIUM: 8.4 MG/DL (ref 8.4–10.2)
CARBON DIOXIDE: 25 MMOL/L (ref 21–31)
CHLORIDE: 108 MMOL/L (ref 97–110)
CREATININE: 0.56 MG/DL (ref 0.61–1.24)
EOSINOPHILS #: 0.2 10^3/UL (ref 0–0.5)
EOSINOPHILS %: 2.8 % (ref 0–7)
GLOBULIN: 3.2 G/DL (ref 1.3–3.2)
GLUCOSE: 141 MG/DL (ref 70–220)
HEMATOCRIT: 29.1 % (ref 42–52)
HEMOGLOBIN: 9.9 G/DL (ref 14–18)
LYMPHOCYTES #: 0.9 10^3/UL (ref 0.8–2.9)
LYMPHOCYTES %: 14 % (ref 15–51)
MEAN CORPUSCULAR HEMOGLOBIN: 32.7 PG (ref 29–33)
MEAN CORPUSCULAR HGB CONC: 34 G/DL (ref 32–37)
MEAN CORPUSCULAR VOLUME: 96 FL (ref 82–101)
MEAN PLATELET VOLUME: 10.7 FL (ref 7.4–10.4)
MONOCYTE #: 0.8 10^3/UL (ref 0.3–0.9)
MONOCYTES %: 12.3 % (ref 0–11)
NEUTROPHIL #: 4.5 10^3/UL (ref 1.6–7.5)
NEUTROPHILS %: 69.2 % (ref 39–77)
NUCLEATED RED BLOOD CELLS #: 0 10^3/UL (ref 0–0)
NUCLEATED RED BLOOD CELLS%: 0 /100WBC (ref 0–0)
PLATELET COUNT: 93 10^3/UL (ref 140–415)
POSITIVE DIFF: (no result)
POTASSIUM: 3.9 MMOL/L (ref 3.5–5.1)
RED BLOOD COUNT: 3.03 10^6/UL (ref 4.7–6.1)
RED CELL DISTRIBUTION WIDTH: 14.4 % (ref 11.5–14.5)
SODIUM: 134 MMOL/L (ref 135–144)
TOTAL PROTEIN: 5 G/DL (ref 6.1–8.1)
WHITE BLOOD COUNT: 6.4 10^3/UL (ref 4.8–10.8)

## 2018-04-28 RX ADMIN — MIDODRINE HYDROCHLORIDE 1 MG: 5 TABLET ORAL at 08:25

## 2018-04-28 RX ADMIN — INSULIN GLARGINE 1 UNIT: 100 INJECTION, SOLUTION SUBCUTANEOUS at 20:18

## 2018-04-28 RX ADMIN — SPIRONOLACTONE 1 MG: 50 TABLET, FILM COATED ORAL at 20:21

## 2018-04-28 RX ADMIN — RIFAXIMIN 1 MG: 550 TABLET ORAL at 20:17

## 2018-04-28 RX ADMIN — METOCLOPRAMIDE HYDROCHLORIDE 1 MG: 10 INJECTION, SOLUTION INTRAMUSCULAR; INTRAVENOUS at 12:00

## 2018-04-28 RX ADMIN — SPIRONOLACTONE 1 MG: 50 TABLET, FILM COATED ORAL at 08:25

## 2018-04-28 RX ADMIN — LACTULOSE 1 GM: 20 SOLUTION ORAL at 18:20

## 2018-04-28 RX ADMIN — MORPHINE SULFATE 1 MG: 10 SOLUTION ORAL at 19:09

## 2018-04-28 RX ADMIN — MORPHINE SULFATE 1 MG: 10 SOLUTION ORAL at 00:38

## 2018-04-28 RX ADMIN — INSULIN ASPART 1 UNIT: 100 INJECTION, SOLUTION INTRAVENOUS; SUBCUTANEOUS at 17:36

## 2018-04-28 RX ADMIN — THIAMINE HYDROCHLORIDE 1 MLS/HR: 100 INJECTION, SOLUTION INTRAMUSCULAR; INTRAVENOUS at 20:23

## 2018-04-28 RX ADMIN — EZETIMIBE 1 MG: 10 TABLET ORAL at 08:25

## 2018-04-28 RX ADMIN — INSULIN ASPART 1 UNIT: 100 INJECTION, SOLUTION INTRAVENOUS; SUBCUTANEOUS at 08:00

## 2018-04-28 RX ADMIN — FERROUS SULFATE TAB 325 MG (65 MG ELEMENTAL FE) 1 MG: 325 (65 FE) TAB at 09:36

## 2018-04-28 RX ADMIN — MIDODRINE HYDROCHLORIDE 1 MG: 5 TABLET ORAL at 13:25

## 2018-04-28 RX ADMIN — INSULIN ASPART 1 UNIT: 100 INJECTION, SOLUTION INTRAVENOUS; SUBCUTANEOUS at 12:00

## 2018-04-28 RX ADMIN — THIAMINE HYDROCHLORIDE 1 MLS/HR: 100 INJECTION, SOLUTION INTRAMUSCULAR; INTRAVENOUS at 03:30

## 2018-04-28 RX ADMIN — PANTOPRAZOLE SODIUM 1 MG: 40 TABLET, DELAYED RELEASE ORAL at 04:41

## 2018-04-28 RX ADMIN — THIAMINE HYDROCHLORIDE 1 MLS/HR: 100 INJECTION, SOLUTION INTRAMUSCULAR; INTRAVENOUS at 16:00

## 2018-04-28 RX ADMIN — CALCIUM ACETATE 1 MG: 667 CAPSULE ORAL at 18:19

## 2018-04-28 RX ADMIN — RIFAXIMIN 1 MG: 550 TABLET ORAL at 08:25

## 2018-04-28 RX ADMIN — INSULIN ASPART 1 UNIT: 100 INJECTION, SOLUTION INTRAVENOUS; SUBCUTANEOUS at 12:21

## 2018-04-28 RX ADMIN — INSULIN ASPART 1 UNIT: 100 INJECTION, SOLUTION INTRAVENOUS; SUBCUTANEOUS at 17:37

## 2018-04-28 RX ADMIN — LACTULOSE 1 ML: 10 SOLUTION ORAL; RECTAL at 23:18

## 2018-04-28 RX ADMIN — METOCLOPRAMIDE HYDROCHLORIDE 1 MG: 10 INJECTION, SOLUTION INTRAMUSCULAR; INTRAVENOUS at 18:20

## 2018-04-28 RX ADMIN — MORPHINE SULFATE 1 MG: 10 SOLUTION ORAL at 04:41

## 2018-04-28 RX ADMIN — MIDODRINE HYDROCHLORIDE 1 MG: 5 TABLET ORAL at 20:19

## 2018-04-28 RX ADMIN — LACTULOSE 1 ML: 10 SOLUTION ORAL; RECTAL at 18:00

## 2018-04-28 RX ADMIN — LACTULOSE 1 ML: 10 SOLUTION ORAL; RECTAL at 04:06

## 2018-04-28 RX ADMIN — LACTULOSE 1 GM: 20 SOLUTION ORAL at 04:41

## 2018-04-28 RX ADMIN — INSULIN ASPART 1 UNIT: 100 INJECTION, SOLUTION INTRAVENOUS; SUBCUTANEOUS at 08:27

## 2018-04-28 RX ADMIN — METOCLOPRAMIDE HYDROCHLORIDE 1 MG: 10 INJECTION, SOLUTION INTRAMUSCULAR; INTRAVENOUS at 04:41

## 2018-04-28 RX ADMIN — MORPHINE SULFATE 1 MG: 10 SOLUTION ORAL at 23:19

## 2018-04-28 RX ADMIN — CYANOCOBALAMIN TAB 500 MCG 1 MCG: 500 TAB at 08:25

## 2018-04-28 RX ADMIN — ONDANSETRON HYDROCHLORIDE 1 MG: 2 INJECTION, SOLUTION INTRAMUSCULAR; INTRAVENOUS at 19:09

## 2018-04-28 RX ADMIN — LACTULOSE 1 ML: 10 SOLUTION ORAL; RECTAL at 12:00

## 2018-04-28 RX ADMIN — CALCIUM ACETATE 1 MG: 667 CAPSULE ORAL at 13:00

## 2018-04-28 RX ADMIN — LACTULOSE 1 GM: 20 SOLUTION ORAL at 12:00

## 2018-04-28 RX ADMIN — INSULIN ASPART 1 UNIT: 100 INJECTION, SOLUTION INTRAVENOUS; SUBCUTANEOUS at 20:18

## 2018-04-28 RX ADMIN — PANTOPRAZOLE SODIUM 1 MG: 40 TABLET, DELAYED RELEASE ORAL at 18:20

## 2018-04-28 RX ADMIN — CALCIUM ACETATE 1 MG: 667 CAPSULE ORAL at 08:24

## 2018-04-29 RX ADMIN — EZETIMIBE 1 MG: 10 TABLET ORAL at 08:24

## 2018-04-29 RX ADMIN — CYANOCOBALAMIN TAB 500 MCG 1 MCG: 500 TAB at 08:27

## 2018-04-29 RX ADMIN — RIFAXIMIN 1 MG: 550 TABLET ORAL at 08:25

## 2018-04-29 RX ADMIN — MIDODRINE HYDROCHLORIDE 1 MG: 5 TABLET ORAL at 08:27

## 2018-04-29 RX ADMIN — FERROUS SULFATE TAB 325 MG (65 MG ELEMENTAL FE) 1 MG: 325 (65 FE) TAB at 08:27

## 2018-04-29 RX ADMIN — LACTULOSE 1 GM: 20 SOLUTION ORAL at 00:00

## 2018-04-29 RX ADMIN — LACTULOSE 1 GM: 20 SOLUTION ORAL at 05:36

## 2018-04-29 RX ADMIN — LACTULOSE 1 ML: 10 SOLUTION ORAL; RECTAL at 05:36

## 2018-04-29 RX ADMIN — INSULIN ASPART 1 UNIT: 100 INJECTION, SOLUTION INTRAVENOUS; SUBCUTANEOUS at 08:00

## 2018-04-29 RX ADMIN — SPIRONOLACTONE 1 MG: 50 TABLET, FILM COATED ORAL at 08:27

## 2018-04-29 RX ADMIN — CALCIUM ACETATE 1 MG: 667 CAPSULE ORAL at 08:27

## 2018-04-29 RX ADMIN — METOCLOPRAMIDE HYDROCHLORIDE 1 MG: 10 INJECTION, SOLUTION INTRAMUSCULAR; INTRAVENOUS at 00:00

## 2018-04-29 RX ADMIN — INSULIN ASPART 1 UNIT: 100 INJECTION, SOLUTION INTRAVENOUS; SUBCUTANEOUS at 08:20

## 2018-04-29 RX ADMIN — PANTOPRAZOLE SODIUM 1 MG: 40 TABLET, DELAYED RELEASE ORAL at 06:13

## 2018-04-29 RX ADMIN — METOCLOPRAMIDE HYDROCHLORIDE 1 MG: 10 INJECTION, SOLUTION INTRAMUSCULAR; INTRAVENOUS at 05:36

## 2018-04-29 RX ADMIN — METOCLOPRAMIDE HYDROCHLORIDE 1 MG: 10 INJECTION, SOLUTION INTRAMUSCULAR; INTRAVENOUS at 00:10

## 2018-04-29 RX ADMIN — LACTULOSE 1 GM: 20 SOLUTION ORAL at 00:09

## 2018-05-26 ENCOUNTER — HOSPITAL ENCOUNTER (INPATIENT)
Dept: HOSPITAL 91 - TEL | Age: 58
LOS: 2 days | Discharge: HOME | DRG: 441 | End: 2018-05-28
Payer: MEDICAID

## 2018-05-26 ENCOUNTER — HOSPITAL ENCOUNTER (INPATIENT)
Age: 58
LOS: 2 days | Discharge: HOME | DRG: 441 | End: 2018-05-28

## 2018-05-26 DIAGNOSIS — E11.9: ICD-10-CM

## 2018-05-26 DIAGNOSIS — R91.1: ICD-10-CM

## 2018-05-26 DIAGNOSIS — G89.29: ICD-10-CM

## 2018-05-26 DIAGNOSIS — I10: ICD-10-CM

## 2018-05-26 DIAGNOSIS — J18.9: ICD-10-CM

## 2018-05-26 DIAGNOSIS — E72.20: ICD-10-CM

## 2018-05-26 DIAGNOSIS — K70.31: ICD-10-CM

## 2018-05-26 DIAGNOSIS — I25.10: ICD-10-CM

## 2018-05-26 DIAGNOSIS — K72.90: Primary | ICD-10-CM

## 2018-05-26 LAB
ADD MAN DIFF?: NO
ADD UMIC: NO
ALANINE AMINOTRANSFERASE: 86 IU/L (ref 13–69)
ALBUMIN/GLOBULIN RATIO: 0.67
ALBUMIN: 2.9 G/DL (ref 3.3–4.9)
ALKALINE PHOSPHATASE: 281 IU/L (ref 42–121)
AMMONIA: 170 UMOL/L (ref 9–30)
AMYLASE: 64 U/L (ref 11–123)
ANION GAP: 17 (ref 8–16)
ASPARTATE AMINO TRANSFERASE: 91 IU/L (ref 15–46)
BASOPHIL #: 0.1 10^3/UL (ref 0–0.1)
BASOPHILS %: 1 % (ref 0–2)
BILIRUBIN,DIRECT: 0 MG/DL (ref 0–0.2)
BILIRUBIN,TOTAL: 1.1 MG/DL (ref 0.2–1.3)
BLOOD UREA NITROGEN: 15 MG/DL (ref 7–20)
CALCIUM: 9.2 MG/DL (ref 8.4–10.2)
CARBON DIOXIDE: 22 MMOL/L (ref 21–31)
CHLORIDE: 98 MMOL/L (ref 97–110)
CREATININE: 0.77 MG/DL (ref 0.61–1.24)
EOSINOPHILS #: 0.2 10^3/UL (ref 0–0.5)
EOSINOPHILS %: 1.7 % (ref 0–7)
GLOBULIN: 4.3 G/DL (ref 1.3–3.2)
GLUCOSE: 115 MG/DL (ref 70–220)
HEMATOCRIT: 35 % (ref 42–52)
HEMOGLOBIN: 12.3 G/DL (ref 14–18)
INR: 1.35
LACTIC ACID: 5.6 MMOL/L (ref 0.5–2)
LIPASE: 34 U/L (ref 23–300)
LYMPHOCYTES #: 1.4 10^3/UL (ref 0.8–2.9)
LYMPHOCYTES %: 14.5 % (ref 15–51)
MEAN CORPUSCULAR HEMOGLOBIN: 31.5 PG (ref 29–33)
MEAN CORPUSCULAR HGB CONC: 35.1 G/DL (ref 32–37)
MEAN CORPUSCULAR VOLUME: 89.7 FL (ref 82–101)
MEAN PLATELET VOLUME: 9.9 FL (ref 7.4–10.4)
MONOCYTE #: 1.1 10^3/UL (ref 0.3–0.9)
MONOCYTES %: 11.6 % (ref 0–11)
NEUTROPHIL #: 6.5 10^3/UL (ref 1.6–7.5)
NEUTROPHILS %: 69.7 % (ref 39–77)
NUCLEATED RED BLOOD CELLS #: 0 10^3/UL (ref 0–0)
NUCLEATED RED BLOOD CELLS%: 0 /100WBC (ref 0–0)
PARTIAL THROMBOPLASTIN TIME: 33.2 SEC (ref 25–35)
PLATELET COUNT: 193 10^3/UL (ref 140–415)
POTASSIUM: 4 MMOL/L (ref 3.5–5.1)
PROTIME: 16.9 SEC (ref 11.9–14.9)
PT RATIO: 1.3
RED BLOOD COUNT: 3.9 10^6/UL (ref 4.7–6.1)
RED CELL DISTRIBUTION WIDTH: 14.6 % (ref 11.5–14.5)
SODIUM: 133 MMOL/L (ref 135–144)
TOTAL PROTEIN: 7.2 G/DL (ref 6.1–8.1)
TROPONIN-I: 0.03 NG/ML (ref 0–0.12)
UR ASCORBIC ACID: NEGATIVE MG/DL
UR BILIRUBIN (DIP): NEGATIVE MG/DL
UR BLOOD (DIP): NEGATIVE MG/DL
UR CLARITY: CLEAR
UR COLOR: (no result)
UR GLUCOSE (DIP): NEGATIVE MG/DL
UR KETONES (DIP): NEGATIVE MG/DL
UR LEUKOCYTE ESTERASE (DIP): NEGATIVE LEU/UL
UR NITRITE (DIP): NEGATIVE MG/DL
UR PH (DIP): 7 (ref 5–9)
UR SPECIFIC GRAVITY (DIP): 1 (ref 1–1.03)
UR TOTAL PROTEIN (DIP): NEGATIVE MG/DL
UR UROBILINOGEN (DIP): NEGATIVE MG/DL
WHITE BLOOD COUNT: 9.3 10^3/UL (ref 4.8–10.8)

## 2018-05-26 PROCEDURE — 96375 TX/PRO/DX INJ NEW DRUG ADDON: CPT

## 2018-05-26 PROCEDURE — 84484 ASSAY OF TROPONIN QUANT: CPT

## 2018-05-26 PROCEDURE — 99291 CRITICAL CARE FIRST HOUR: CPT

## 2018-05-26 PROCEDURE — 85025 COMPLETE CBC W/AUTO DIFF WBC: CPT

## 2018-05-26 PROCEDURE — 82140 ASSAY OF AMMONIA: CPT

## 2018-05-26 PROCEDURE — 80048 BASIC METABOLIC PNL TOTAL CA: CPT

## 2018-05-26 PROCEDURE — 85730 THROMBOPLASTIN TIME PARTIAL: CPT

## 2018-05-26 PROCEDURE — 87086 URINE CULTURE/COLONY COUNT: CPT

## 2018-05-26 PROCEDURE — 81003 URINALYSIS AUTO W/O SCOPE: CPT

## 2018-05-26 PROCEDURE — 82150 ASSAY OF AMYLASE: CPT

## 2018-05-26 PROCEDURE — 93005 ELECTROCARDIOGRAM TRACING: CPT

## 2018-05-26 PROCEDURE — 92610 EVALUATE SWALLOWING FUNCTION: CPT

## 2018-05-26 PROCEDURE — 83735 ASSAY OF MAGNESIUM: CPT

## 2018-05-26 PROCEDURE — 94640 AIRWAY INHALATION TREATMENT: CPT

## 2018-05-26 PROCEDURE — 71045 X-RAY EXAM CHEST 1 VIEW: CPT

## 2018-05-26 PROCEDURE — 94664 DEMO&/EVAL PT USE INHALER: CPT

## 2018-05-26 PROCEDURE — 85610 PROTHROMBIN TIME: CPT

## 2018-05-26 PROCEDURE — 84100 ASSAY OF PHOSPHORUS: CPT

## 2018-05-26 PROCEDURE — 80053 COMPREHEN METABOLIC PANEL: CPT

## 2018-05-26 PROCEDURE — 87081 CULTURE SCREEN ONLY: CPT

## 2018-05-26 PROCEDURE — 83605 ASSAY OF LACTIC ACID: CPT

## 2018-05-26 PROCEDURE — 82962 GLUCOSE BLOOD TEST: CPT

## 2018-05-26 PROCEDURE — 96374 THER/PROPH/DIAG INJ IV PUSH: CPT

## 2018-05-26 PROCEDURE — 87040 BLOOD CULTURE FOR BACTERIA: CPT

## 2018-05-26 PROCEDURE — 83690 ASSAY OF LIPASE: CPT

## 2018-05-26 PROCEDURE — 76705 ECHO EXAM OF ABDOMEN: CPT

## 2018-05-26 RX ADMIN — ONDANSETRON HYDROCHLORIDE 1 MG: 2 INJECTION, SOLUTION INTRAMUSCULAR; INTRAVENOUS at 22:59

## 2018-05-26 RX ADMIN — THIAMINE HYDROCHLORIDE 1 ML: 100 INJECTION, SOLUTION INTRAMUSCULAR; INTRAVENOUS at 22:59

## 2018-05-27 LAB
ADD MAN DIFF?: NO
ALANINE AMINOTRANSFERASE: 72 IU/L (ref 13–69)
ALBUMIN/GLOBULIN RATIO: 0.65
ALBUMIN: 2.1 G/DL (ref 3.3–4.9)
ALKALINE PHOSPHATASE: 209 IU/L (ref 42–121)
ANION GAP: 14 (ref 8–16)
ASPARTATE AMINO TRANSFERASE: 72 IU/L (ref 15–46)
BASOPHIL #: 0 10^3/UL (ref 0–0.1)
BASOPHILS %: 0.4 % (ref 0–2)
BILIRUBIN,DIRECT: 0 MG/DL (ref 0–0.2)
BILIRUBIN,TOTAL: 0.7 MG/DL (ref 0.2–1.3)
BLOOD UREA NITROGEN: 15 MG/DL (ref 7–20)
CALCIUM: 8.5 MG/DL (ref 8.4–10.2)
CARBON DIOXIDE: 24 MMOL/L (ref 21–31)
CHLORIDE: 100 MMOL/L (ref 97–110)
CREATININE: 0.77 MG/DL (ref 0.61–1.24)
EOSINOPHILS #: 0 10^3/UL (ref 0–0.5)
EOSINOPHILS %: 0.4 % (ref 0–7)
GLOBULIN: 3.2 G/DL (ref 1.3–3.2)
GLUCOSE: 240 MG/DL (ref 70–220)
HEMATOCRIT: 29.6 % (ref 42–52)
HEMOGLOBIN: 10.2 G/DL (ref 14–18)
LACTIC ACID: 4.5 MMOL/L (ref 0.5–2)
LACTIC ACID: 4.8 MMOL/L (ref 0.5–2)
LACTIC ACID: 6 MMOL/L (ref 0.5–2)
LACTIC ACID: 6.4 MMOL/L (ref 0.5–2)
LYMPHOCYTES #: 0.8 10^3/UL (ref 0.8–2.9)
LYMPHOCYTES %: 9.9 % (ref 15–51)
MEAN CORPUSCULAR HEMOGLOBIN: 31.3 PG (ref 29–33)
MEAN CORPUSCULAR HGB CONC: 34.5 G/DL (ref 32–37)
MEAN CORPUSCULAR VOLUME: 90.8 FL (ref 82–101)
MEAN PLATELET VOLUME: 10.3 FL (ref 7.4–10.4)
MONOCYTE #: 0.6 10^3/UL (ref 0.3–0.9)
MONOCYTES %: 7.7 % (ref 0–11)
NEUTROPHIL #: 6.1 10^3/UL (ref 1.6–7.5)
NEUTROPHILS %: 81.1 % (ref 39–77)
NUCLEATED RED BLOOD CELLS #: 0 10^3/UL (ref 0–0)
NUCLEATED RED BLOOD CELLS%: 0 /100WBC (ref 0–0)
PLATELET COUNT: 106 10^3/UL (ref 140–415)
POTASSIUM: 5.1 MMOL/L (ref 3.5–5.1)
RED BLOOD COUNT: 3.26 10^6/UL (ref 4.7–6.1)
RED CELL DISTRIBUTION WIDTH: 14.7 % (ref 11.5–14.5)
SODIUM: 133 MMOL/L (ref 135–144)
TOTAL PROTEIN: 5.3 G/DL (ref 6.1–8.1)
WHITE BLOOD COUNT: 7.6 10^3/UL (ref 4.8–10.8)

## 2018-05-27 RX ADMIN — INSULIN ASPART 1 UNIT: 100 INJECTION, SOLUTION INTRAVENOUS; SUBCUTANEOUS at 05:23

## 2018-05-27 RX ADMIN — CLOPIDOGREL 1 MG: 75 TABLET, FILM COATED ORAL at 08:14

## 2018-05-27 RX ADMIN — INSULIN GLARGINE 1 UNIT: 100 INJECTION, SOLUTION SUBCUTANEOUS at 23:06

## 2018-05-27 RX ADMIN — MORPHINE SULFATE 1 MG: 2 INJECTION, SOLUTION INTRAMUSCULAR; INTRAVENOUS at 14:31

## 2018-05-27 RX ADMIN — EZETIMIBE 1 MG: 10 TABLET ORAL at 08:13

## 2018-05-27 RX ADMIN — MORPHINE SULFATE 1 MG: 2 INJECTION, SOLUTION INTRAMUSCULAR; INTRAVENOUS at 04:24

## 2018-05-27 RX ADMIN — CEFTRIAXONE 1 MLS/HR: 1 INJECTION, SOLUTION INTRAVENOUS at 05:25

## 2018-05-27 RX ADMIN — PANTOPRAZOLE SODIUM 1 MG: 40 INJECTION, POWDER, FOR SOLUTION INTRAVENOUS at 17:44

## 2018-05-27 RX ADMIN — THIAMINE HYDROCHLORIDE 1 MLS/HR: 100 INJECTION, SOLUTION INTRAMUSCULAR; INTRAVENOUS at 10:20

## 2018-05-27 RX ADMIN — ONDANSETRON HYDROCHLORIDE 1 MG: 2 INJECTION, SOLUTION INTRAMUSCULAR; INTRAVENOUS at 04:23

## 2018-05-27 RX ADMIN — LACTULOSE 1 ML: 10 SOLUTION ORAL; RECTAL at 12:29

## 2018-05-27 RX ADMIN — LACTULOSE 1 GM: 20 SOLUTION ORAL at 01:23

## 2018-05-27 RX ADMIN — MORPHINE SULFATE 1 MG: 2 INJECTION, SOLUTION INTRAMUSCULAR; INTRAVENOUS at 10:19

## 2018-05-27 RX ADMIN — LACTULOSE 1 GM: 20 SOLUTION ORAL at 05:25

## 2018-05-27 RX ADMIN — INSULIN ASPART 1 UNIT: 100 INJECTION, SOLUTION INTRAVENOUS; SUBCUTANEOUS at 08:17

## 2018-05-27 RX ADMIN — INSULIN ASPART 1 UNIT: 100 INJECTION, SOLUTION INTRAVENOUS; SUBCUTANEOUS at 17:50

## 2018-05-27 RX ADMIN — LACTULOSE 1 ML: 10 SOLUTION ORAL; RECTAL at 17:44

## 2018-05-27 RX ADMIN — IPRATROPIUM BROMIDE AND ALBUTEROL SULFATE 1 ML: .5; 3 SOLUTION RESPIRATORY (INHALATION) at 14:51

## 2018-05-27 RX ADMIN — INSULIN ASPART 1 UNIT: 100 INJECTION, SOLUTION INTRAVENOUS; SUBCUTANEOUS at 21:01

## 2018-05-27 RX ADMIN — SPIRONOLACTONE 1 MG: 50 TABLET, FILM COATED ORAL at 20:32

## 2018-05-27 RX ADMIN — RIFAXIMIN 1 MG: 550 TABLET ORAL at 20:31

## 2018-05-27 RX ADMIN — ASCORBIC ACID, VITAMIN A PALMITATE, CHOLECALCIFEROL, THIAMINE HYDROCHLORIDE, RIBOFLAVIN-5 PHOSPHATE SODIUM, PYRIDOXINE HYDROCHLORIDE, NIACINAMIDE, DEXPANTHENOL, ALPHA-TOCOPHEROL ACETATE, VITAMIN K1, FOLIC ACID, BIOTIN, CYANOCOBALAMIN 1 MLS/HR: 200; 3300; 200; 6; 3.6; 6; 40; 15; 10; 150; 600; 60; 5 INJECTION, SOLUTION INTRAVENOUS at 04:25

## 2018-05-27 RX ADMIN — INSULIN ASPART 1 UNIT: 100 INJECTION, SOLUTION INTRAVENOUS; SUBCUTANEOUS at 12:26

## 2018-05-27 RX ADMIN — FERROUS SULFATE TAB 325 MG (65 MG ELEMENTAL FE) 1 MG: 325 (65 FE) TAB at 08:14

## 2018-05-27 RX ADMIN — SPIRONOLACTONE 1 MG: 50 TABLET, FILM COATED ORAL at 08:14

## 2018-05-27 RX ADMIN — MORPHINE SULFATE 1 MG: 2 INJECTION, SOLUTION INTRAMUSCULAR; INTRAVENOUS at 18:39

## 2018-05-27 RX ADMIN — IPRATROPIUM BROMIDE AND ALBUTEROL SULFATE 1 ML: .5; 3 SOLUTION RESPIRATORY (INHALATION) at 19:38

## 2018-05-27 RX ADMIN — LACTULOSE 1 GM: 20 SOLUTION ORAL at 23:02

## 2018-05-27 RX ADMIN — MORPHINE SULFATE 1 MG: 10 SOLUTION ORAL at 21:53

## 2018-05-27 RX ADMIN — THIAMINE HYDROCHLORIDE 1 MLS/HR: 100 INJECTION, SOLUTION INTRAMUSCULAR; INTRAVENOUS at 21:54

## 2018-05-27 RX ADMIN — RIFAXIMIN 1 MG: 550 TABLET ORAL at 08:13

## 2018-05-28 LAB
ADD MAN DIFF?: NO
AMMONIA: 29 UMOL/L (ref 9–30)
ANION GAP: 13 (ref 8–16)
BASOPHIL #: 0.1 10^3/UL (ref 0–0.1)
BASOPHILS %: 1.1 % (ref 0–2)
BLOOD UREA NITROGEN: 9 MG/DL (ref 7–20)
CALCIUM: 8.4 MG/DL (ref 8.4–10.2)
CARBON DIOXIDE: 25 MMOL/L (ref 21–31)
CHLORIDE: 109 MMOL/L (ref 97–110)
CREATININE: 0.65 MG/DL (ref 0.61–1.24)
EOSINOPHILS #: 0.2 10^3/UL (ref 0–0.5)
EOSINOPHILS %: 3.1 % (ref 0–7)
GLUCOSE: 47 MG/DL (ref 70–220)
HEMATOCRIT: 31.6 % (ref 42–52)
HEMOGLOBIN: 10.7 G/DL (ref 14–18)
LACTIC ACID: 3.2 MMOL/L (ref 0.5–2)
LYMPHOCYTES #: 1.3 10^3/UL (ref 0.8–2.9)
LYMPHOCYTES %: 19.8 % (ref 15–51)
MAGNESIUM: 1.4 MG/DL (ref 1.7–2.5)
MEAN CORPUSCULAR HEMOGLOBIN: 30.8 PG (ref 29–33)
MEAN CORPUSCULAR HGB CONC: 33.9 G/DL (ref 32–37)
MEAN CORPUSCULAR VOLUME: 91.1 FL (ref 82–101)
MEAN PLATELET VOLUME: 10 FL (ref 7.4–10.4)
MONOCYTE #: 0.8 10^3/UL (ref 0.3–0.9)
MONOCYTES %: 12.4 % (ref 0–11)
NEUTROPHIL #: 4.2 10^3/UL (ref 1.6–7.5)
NEUTROPHILS %: 63.3 % (ref 39–77)
NUCLEATED RED BLOOD CELLS #: 0 10^3/UL (ref 0–0)
NUCLEATED RED BLOOD CELLS%: 0 /100WBC (ref 0–0)
PHOSPHORUS: 3.2 MG/DL (ref 2.5–4.9)
PLATELET COUNT: 120 10^3/UL (ref 140–415)
POTASSIUM: 3.5 MMOL/L (ref 3.5–5.1)
RED BLOOD COUNT: 3.47 10^6/UL (ref 4.7–6.1)
RED CELL DISTRIBUTION WIDTH: 14.8 % (ref 11.5–14.5)
SODIUM: 143 MMOL/L (ref 135–144)
WHITE BLOOD COUNT: 6.6 10^3/UL (ref 4.8–10.8)

## 2018-05-28 RX ADMIN — THIAMINE HYDROCHLORIDE 1 MLS/HR: 100 INJECTION, SOLUTION INTRAMUSCULAR; INTRAVENOUS at 10:23

## 2018-05-28 RX ADMIN — FERROUS SULFATE TAB 325 MG (65 MG ELEMENTAL FE) 1 MG: 325 (65 FE) TAB at 08:41

## 2018-05-28 RX ADMIN — MORPHINE SULFATE 1 MG: 10 SOLUTION ORAL at 14:12

## 2018-05-28 RX ADMIN — CEFTRIAXONE 1 MLS/HR: 1 INJECTION, SOLUTION INTRAVENOUS at 05:54

## 2018-05-28 RX ADMIN — MORPHINE SULFATE 1 MG: 10 SOLUTION ORAL at 01:36

## 2018-05-28 RX ADMIN — IPRATROPIUM BROMIDE AND ALBUTEROL SULFATE 1 ML: .5; 3 SOLUTION RESPIRATORY (INHALATION) at 07:17

## 2018-05-28 RX ADMIN — IPRATROPIUM BROMIDE AND ALBUTEROL SULFATE 1 ML: .5; 3 SOLUTION RESPIRATORY (INHALATION) at 13:55

## 2018-05-28 RX ADMIN — MORPHINE SULFATE 1 MG: 10 SOLUTION ORAL at 05:54

## 2018-05-28 RX ADMIN — CLOPIDOGREL 1 MG: 75 TABLET, FILM COATED ORAL at 08:41

## 2018-05-28 RX ADMIN — SPIRONOLACTONE 1 MG: 50 TABLET, FILM COATED ORAL at 08:42

## 2018-05-28 RX ADMIN — LACTULOSE 1 GM: 20 SOLUTION ORAL at 05:53

## 2018-05-28 RX ADMIN — EZETIMIBE 1 MG: 10 TABLET ORAL at 08:42

## 2018-05-28 RX ADMIN — POTASSIUM CHLORIDE 1 MEQ: 1500 TABLET, EXTENDED RELEASE ORAL at 10:03

## 2018-05-28 RX ADMIN — RIFAXIMIN 1 MG: 550 TABLET ORAL at 08:42

## 2018-05-28 RX ADMIN — INSULIN ASPART 1 UNIT: 100 INJECTION, SOLUTION INTRAVENOUS; SUBCUTANEOUS at 07:25

## 2018-05-28 RX ADMIN — INSULIN ASPART 1 UNIT: 100 INJECTION, SOLUTION INTRAVENOUS; SUBCUTANEOUS at 11:56

## 2018-05-28 RX ADMIN — PANTOPRAZOLE SODIUM 1 MG: 40 INJECTION, POWDER, FOR SOLUTION INTRAVENOUS at 05:53

## 2018-05-28 RX ADMIN — MAGNESIUM SULFATE HEPTAHYDRATE 1 MLS/HR: 40 INJECTION, SOLUTION INTRAVENOUS at 10:03

## 2018-05-28 RX ADMIN — LACTULOSE 1 GM: 20 SOLUTION ORAL at 11:57

## 2018-05-28 RX ADMIN — MORPHINE SULFATE 1 MG: 10 SOLUTION ORAL at 10:05

## 2018-08-19 ENCOUNTER — HOSPITAL ENCOUNTER (INPATIENT)
Dept: HOSPITAL 91 - E/R | Age: 58
LOS: 8 days | Discharge: LEFT BEFORE DISCHARGE | DRG: 442 | End: 2018-08-27
Payer: MEDICAID

## 2018-08-19 ENCOUNTER — HOSPITAL ENCOUNTER (INPATIENT)
Age: 58
LOS: 8 days | Discharge: LEFT BEFORE DISCHARGE | DRG: 442 | End: 2018-08-27

## 2018-08-19 DIAGNOSIS — K72.00: Primary | ICD-10-CM

## 2018-08-19 DIAGNOSIS — E87.2: ICD-10-CM

## 2018-08-19 DIAGNOSIS — E11.43: ICD-10-CM

## 2018-08-19 DIAGNOSIS — Z95.1: ICD-10-CM

## 2018-08-19 DIAGNOSIS — E87.1: ICD-10-CM

## 2018-08-19 DIAGNOSIS — Z79.4: ICD-10-CM

## 2018-08-19 DIAGNOSIS — K70.30: ICD-10-CM

## 2018-08-19 DIAGNOSIS — K80.20: ICD-10-CM

## 2018-08-19 DIAGNOSIS — K31.84: ICD-10-CM

## 2018-08-19 DIAGNOSIS — E78.5: ICD-10-CM

## 2018-08-19 DIAGNOSIS — R91.1: ICD-10-CM

## 2018-08-19 DIAGNOSIS — I10: ICD-10-CM

## 2018-08-19 DIAGNOSIS — D64.9: ICD-10-CM

## 2018-08-19 DIAGNOSIS — E87.5: ICD-10-CM

## 2018-08-19 DIAGNOSIS — K86.1: ICD-10-CM

## 2018-08-19 DIAGNOSIS — I25.10: ICD-10-CM

## 2018-08-19 DIAGNOSIS — Z95.5: ICD-10-CM

## 2018-08-19 DIAGNOSIS — R65.10: ICD-10-CM

## 2018-08-19 DIAGNOSIS — D68.9: ICD-10-CM

## 2018-08-19 DIAGNOSIS — Z87.891: ICD-10-CM

## 2018-08-19 LAB
ABNORMAL IP MESSAGE: 1
ADD MAN DIFF?: NO
ADD UMIC: YES
ALANINE AMINOTRANSFERASE: 108 IU/L (ref 13–69)
ALBUMIN/GLOBULIN RATIO: 0.78
ALBUMIN: 3.2 G/DL (ref 3.3–4.9)
ALKALINE PHOSPHATASE: 274 IU/L (ref 42–121)
AMMONIA: 72 UMOL/L (ref 9–30)
ANION GAP: 17 (ref 8–16)
ASPARTATE AMINO TRANSFERASE: 169 IU/L (ref 15–46)
BASOPHIL #: 0.1 10^3/UL (ref 0–0.1)
BASOPHILS %: 0.4 % (ref 0–2)
BILIRUBIN,DIRECT: 0 MG/DL (ref 0–0.2)
BILIRUBIN,TOTAL: 1.1 MG/DL (ref 0.2–1.3)
BLOOD UREA NITROGEN: 17 MG/DL (ref 7–20)
CALCIUM: 9.5 MG/DL (ref 8.4–10.2)
CARBON DIOXIDE: 23 MMOL/L (ref 21–31)
CHLORIDE: 98 MMOL/L (ref 97–110)
CREATININE: 0.9 MG/DL (ref 0.61–1.24)
EOSINOPHILS #: 0 10^3/UL (ref 0–0.5)
EOSINOPHILS %: 0.3 % (ref 0–7)
GLOBULIN: 4.1 G/DL (ref 1.3–3.2)
GLUCOSE: 133 MG/DL (ref 70–220)
HEMATOCRIT: 36.1 % (ref 42–52)
HEMOGLOBIN: 12.7 G/DL (ref 14–18)
IMMATURE GRANS #M: 0.08 10^3/UL (ref 0–0.03)
IMMATURE GRANS % (M): 0.6 % (ref 0–0.43)
INR: 1.21
LACTIC ACID: 3.1 MMOL/L (ref 0.5–2)
LACTIC ACID: 3.7 MMOL/L (ref 0.5–2)
LACTIC ACID: 3.8 MMOL/L (ref 0.5–2)
LYMPHOCYTES #: 0.5 10^3/UL (ref 0.8–2.9)
LYMPHOCYTES %: 3.6 % (ref 15–51)
MEAN CORPUSCULAR HEMOGLOBIN: 32.1 PG (ref 29–33)
MEAN CORPUSCULAR HGB CONC: 35.2 G/DL (ref 32–37)
MEAN CORPUSCULAR VOLUME: 91.2 FL (ref 82–101)
MEAN PLATELET VOLUME: 10.5 FL (ref 7.4–10.4)
MONOCYTE #: 1.1 10^3/UL (ref 0.3–0.9)
MONOCYTES %: 8.5 % (ref 0–11)
NEUTROPHIL #: 11.5 10^3/UL (ref 1.6–7.5)
NEUTROPHILS %: 86.6 % (ref 39–77)
NUCLEATED RED BLOOD CELLS #: 0 10^3/UL (ref 0–0)
NUCLEATED RED BLOOD CELLS%: 0 /100WBC (ref 0–0)
PARTIAL THROMBOPLASTIN TIME: 31.7 SEC (ref 25–35)
PLATELET COUNT: 177 10^3/UL (ref 140–415)
POSITIVE DIFF: (no result)
POTASSIUM: 4.4 MMOL/L (ref 3.5–5.1)
PROTIME: 15.5 SEC (ref 11.9–14.9)
PT RATIO: 1.2
RED BLOOD COUNT: 3.96 10^6/UL (ref 4.7–6.1)
RED CELL DISTRIBUTION WIDTH: 16.6 % (ref 11.5–14.5)
SODIUM: 134 MMOL/L (ref 135–144)
TOTAL PROTEIN: 7.3 G/DL (ref 6.1–8.1)
TROPONIN-I: 0.01 NG/ML (ref 0–0.12)
UR ASCORBIC ACID: 40 MG/DL
UR BILIRUBIN (DIP): NEGATIVE MG/DL
UR BLOOD (DIP): (no result) MG/DL
UR CLARITY: (no result)
UR COLOR: YELLOW
UR GLUCOSE (DIP): NEGATIVE MG/DL
UR KETONES (DIP): NEGATIVE MG/DL
UR LEUKOCYTE ESTERASE (DIP): NEGATIVE LEU/UL
UR MUCUS: (no result) /HPF
UR NITRITE (DIP): NEGATIVE MG/DL
UR PH (DIP): 5 (ref 5–9)
UR RBC: 12 /HPF (ref 0–5)
UR SPECIFIC GRAVITY (DIP): 1.01 (ref 1–1.03)
UR TOTAL PROTEIN (DIP): NEGATIVE MG/DL
UR UROBILINOGEN (DIP): NEGATIVE MG/DL
UR WBC: 4 /HPF (ref 0–5)
WHITE BLOOD COUNT: 13.3 10^3/UL (ref 4.8–10.8)

## 2018-08-19 PROCEDURE — 99285 EMERGENCY DEPT VISIT HI MDM: CPT

## 2018-08-19 PROCEDURE — 82043 UR ALBUMIN QUANTITATIVE: CPT

## 2018-08-19 PROCEDURE — 87040 BLOOD CULTURE FOR BACTERIA: CPT

## 2018-08-19 PROCEDURE — 85610 PROTHROMBIN TIME: CPT

## 2018-08-19 PROCEDURE — 84443 ASSAY THYROID STIM HORMONE: CPT

## 2018-08-19 PROCEDURE — 84484 ASSAY OF TROPONIN QUANT: CPT

## 2018-08-19 PROCEDURE — 96374 THER/PROPH/DIAG INJ IV PUSH: CPT

## 2018-08-19 PROCEDURE — 82962 GLUCOSE BLOOD TEST: CPT

## 2018-08-19 PROCEDURE — 82803 BLOOD GASES ANY COMBINATION: CPT

## 2018-08-19 PROCEDURE — 81003 URINALYSIS AUTO W/O SCOPE: CPT

## 2018-08-19 PROCEDURE — 87081 CULTURE SCREEN ONLY: CPT

## 2018-08-19 PROCEDURE — 82150 ASSAY OF AMYLASE: CPT

## 2018-08-19 PROCEDURE — 83036 HEMOGLOBIN GLYCOSYLATED A1C: CPT

## 2018-08-19 PROCEDURE — 83735 ASSAY OF MAGNESIUM: CPT

## 2018-08-19 PROCEDURE — 85025 COMPLETE CBC W/AUTO DIFF WBC: CPT

## 2018-08-19 PROCEDURE — 70450 CT HEAD/BRAIN W/O DYE: CPT

## 2018-08-19 PROCEDURE — 83935 ASSAY OF URINE OSMOLALITY: CPT

## 2018-08-19 PROCEDURE — 83930 ASSAY OF BLOOD OSMOLALITY: CPT

## 2018-08-19 PROCEDURE — 80061 LIPID PANEL: CPT

## 2018-08-19 PROCEDURE — 82533 TOTAL CORTISOL: CPT

## 2018-08-19 PROCEDURE — 96375 TX/PRO/DX INJ NEW DRUG ADDON: CPT

## 2018-08-19 PROCEDURE — 84100 ASSAY OF PHOSPHORUS: CPT

## 2018-08-19 PROCEDURE — 83605 ASSAY OF LACTIC ACID: CPT

## 2018-08-19 PROCEDURE — 74176 CT ABD & PELVIS W/O CONTRAST: CPT

## 2018-08-19 PROCEDURE — 83690 ASSAY OF LIPASE: CPT

## 2018-08-19 PROCEDURE — 36600 WITHDRAWAL OF ARTERIAL BLOOD: CPT

## 2018-08-19 PROCEDURE — 82140 ASSAY OF AMMONIA: CPT

## 2018-08-19 PROCEDURE — 81001 URINALYSIS AUTO W/SCOPE: CPT

## 2018-08-19 PROCEDURE — 84439 ASSAY OF FREE THYROXINE: CPT

## 2018-08-19 PROCEDURE — 93005 ELECTROCARDIOGRAM TRACING: CPT

## 2018-08-19 PROCEDURE — 78264 GASTRIC EMPTYING IMG STUDY: CPT

## 2018-08-19 PROCEDURE — 84300 ASSAY OF URINE SODIUM: CPT

## 2018-08-19 PROCEDURE — 80053 COMPREHEN METABOLIC PANEL: CPT

## 2018-08-19 PROCEDURE — 85730 THROMBOPLASTIN TIME PARTIAL: CPT

## 2018-08-19 PROCEDURE — 71045 X-RAY EXAM CHEST 1 VIEW: CPT

## 2018-08-19 PROCEDURE — 36415 COLL VENOUS BLD VENIPUNCTURE: CPT

## 2018-08-19 PROCEDURE — 84155 ASSAY OF PROTEIN SERUM: CPT

## 2018-08-19 PROCEDURE — 80048 BASIC METABOLIC PNL TOTAL CA: CPT

## 2018-08-19 RX ADMIN — SPIRONOLACTONE 1 MG: 50 TABLET, FILM COATED ORAL at 20:38

## 2018-08-19 RX ADMIN — HYDROMORPHONE HYDROCHLORIDE 1 MG: 1 INJECTION, SOLUTION INTRAMUSCULAR; INTRAVENOUS; SUBCUTANEOUS at 06:19

## 2018-08-19 RX ADMIN — SPIRONOLACTONE 1 MG: 50 TABLET, FILM COATED ORAL at 10:16

## 2018-08-19 RX ADMIN — HYDROMORPHONE HYDROCHLORIDE 1 MG: 1 INJECTION, SOLUTION INTRAMUSCULAR; INTRAVENOUS; SUBCUTANEOUS at 10:14

## 2018-08-19 RX ADMIN — PROPRANOLOL HYDROCHLORIDE 1 MG: 10 TABLET ORAL at 20:38

## 2018-08-19 RX ADMIN — RIFAXIMIN 1 MG: 550 TABLET ORAL at 20:38

## 2018-08-19 RX ADMIN — EZETIMIBE 1 MG: 10 TABLET ORAL at 10:15

## 2018-08-19 RX ADMIN — THIAMINE HYDROCHLORIDE 1 ML: 100 INJECTION, SOLUTION INTRAMUSCULAR; INTRAVENOUS at 03:25

## 2018-08-19 RX ADMIN — LACTULOSE 1 GM: 20 SOLUTION ORAL at 12:00

## 2018-08-19 RX ADMIN — CLOPIDOGREL 1 MG: 75 TABLET, FILM COATED ORAL at 08:47

## 2018-08-19 RX ADMIN — MORPHINE SULFATE 1 MG: 2 INJECTION, SOLUTION INTRAMUSCULAR; INTRAVENOUS at 16:22

## 2018-08-19 RX ADMIN — THIAMINE HYDROCHLORIDE 1 MLS/HR: 100 INJECTION, SOLUTION INTRAMUSCULAR; INTRAVENOUS at 02:04

## 2018-08-19 RX ADMIN — INSULIN DETEMIR 1 UNITS: 100 INJECTION, SOLUTION SUBCUTANEOUS at 20:48

## 2018-08-19 RX ADMIN — PANTOPRAZOLE SODIUM 1 MG: 40 TABLET, DELAYED RELEASE ORAL at 18:22

## 2018-08-19 RX ADMIN — LACTULOSE 1 GM: 20 SOLUTION ORAL at 06:19

## 2018-08-19 RX ADMIN — LIDOCAINE HYDROCHLORIDE 1 MLS/HR: 10 INJECTION, SOLUTION EPIDURAL; INFILTRATION; INTRACAUDAL; PERINEURAL at 21:46

## 2018-08-19 RX ADMIN — HYDROMORPHONE HYDROCHLORIDE 1 MG: 1 INJECTION, SOLUTION INTRAMUSCULAR; INTRAVENOUS; SUBCUTANEOUS at 04:06

## 2018-08-19 RX ADMIN — MORPHINE SULFATE 1 MG: 2 INJECTION, SOLUTION INTRAMUSCULAR; INTRAVENOUS at 18:29

## 2018-08-19 RX ADMIN — ONDANSETRON HYDROCHLORIDE 1 MG: 2 INJECTION, SOLUTION INTRAMUSCULAR; INTRAVENOUS at 02:04

## 2018-08-19 RX ADMIN — LACTULOSE 1 GM: 20 SOLUTION ORAL at 03:49

## 2018-08-19 RX ADMIN — LACTULOSE 1 GM: 20 SOLUTION ORAL at 17:57

## 2018-08-19 RX ADMIN — RIFAXIMIN 1 MG: 550 TABLET ORAL at 10:15

## 2018-08-19 RX ADMIN — PANTOPRAZOLE SODIUM 1 MG: 40 TABLET, DELAYED RELEASE ORAL at 07:47

## 2018-08-19 RX ADMIN — PROPRANOLOL HYDROCHLORIDE 1 MG: 10 TABLET ORAL at 10:16

## 2018-08-20 LAB
ABNORMAL IP MESSAGE: 1
ADD MAN DIFF?: YES
ALANINE AMINOTRANSFERASE: 80 IU/L (ref 13–69)
ALBUMIN/GLOBULIN RATIO: 0.73
ALBUMIN: 2.2 G/DL (ref 3.3–4.9)
ALKALINE PHOSPHATASE: 164 IU/L (ref 42–121)
AMMONIA: 96 UMOL/L (ref 9–30)
ANION GAP: 12 (ref 8–16)
ANISOCYTOSIS: (no result) (ref 0–0)
ASPARTATE AMINO TRANSFERASE: 111 IU/L (ref 15–46)
BASOPHIL #M: 0 10^3/UL (ref 0–0)
BASOPHILS % (M): 1 % (ref 0–2)
BILIRUBIN,DIRECT: 0 MG/DL (ref 0–0.2)
BILIRUBIN,TOTAL: 0.6 MG/DL (ref 0.2–1.3)
BLOOD UREA NITROGEN: 11 MG/DL (ref 7–20)
CALCIUM: 8.5 MG/DL (ref 8.4–10.2)
CARBON DIOXIDE: 20 MMOL/L (ref 21–31)
CHLORIDE: 103 MMOL/L (ref 97–110)
CREATININE: 0.71 MG/DL (ref 0.61–1.24)
EOSINOPHILS % (M): 8 % (ref 0–7)
GLOBULIN: 3 G/DL (ref 1.3–3.2)
GLUCOSE: 186 MG/DL (ref 70–220)
HEMATOCRIT: 27.9 % (ref 42–52)
HEMOGLOBIN: 9.9 G/DL (ref 14–18)
IMMATURE GRANS #M: 0.04 10^3/UL (ref 0–0.03)
IMMATURE GRANS % (M): 0.7 % (ref 0–0.43)
LYMPHOCYTES #M: 0.4 10^3/UL (ref 0.8–2.9)
LYMPHOCYTES % (M): 8 % (ref 15–51)
MAGNESIUM: 2 MG/DL (ref 1.7–2.5)
MEAN CORPUSCULAR HEMOGLOBIN: 32 PG (ref 29–33)
MEAN CORPUSCULAR HGB CONC: 35.5 G/DL (ref 32–37)
MEAN CORPUSCULAR VOLUME: 90.3 FL (ref 82–101)
MEAN PLATELET VOLUME: 11.1 FL (ref 7.4–10.4)
MONOCYTE #M: 0.8 10^3/UL (ref 0.3–0.9)
MONOCYTES % (M): 14 % (ref 0–11)
NUCLEATED RED BLOOD CELLS%: 0 /100WBC (ref 0–0)
PLATELET COUNT: 89 10^3/UL (ref 140–415)
PLATELET ESTIMATE: (no result)
POLYCHROMASIA: (no result) (ref 0–0)
POSITIVE DIFF: (no result)
POTASSIUM: 5.2 MMOL/L (ref 3.5–5.1)
RED BLOOD COUNT: 3.09 10^6/UL (ref 4.7–6.1)
RED CELL DISTRIBUTION WIDTH: 16.5 % (ref 11.5–14.5)
SEGMENTED NEUTROPHILS (M) %: 69 % (ref 39–77)
SMUDGE%M: 6 % (ref 0–0)
SODIUM: 130 MMOL/L (ref 135–144)
SPHEROCYTES: (no result) (ref 0–0)
TARGET CELLS: (no result) (ref 0–0)
TOTAL PROTEIN: 5.2 G/DL (ref 6.1–8.1)
WHITE BLOOD COUNT: 6 10^3/UL (ref 4.8–10.8)

## 2018-08-20 RX ADMIN — MIDODRINE HYDROCHLORIDE 1 MG: 5 TABLET ORAL at 16:53

## 2018-08-20 RX ADMIN — SPIRONOLACTONE 1 MG: 50 TABLET, FILM COATED ORAL at 20:55

## 2018-08-20 RX ADMIN — LACTULOSE 1 GM: 20 SOLUTION ORAL at 17:08

## 2018-08-20 RX ADMIN — EZETIMIBE 1 MG: 10 TABLET ORAL at 08:08

## 2018-08-20 RX ADMIN — MIDODRINE HYDROCHLORIDE 1 MG: 5 TABLET ORAL at 00:20

## 2018-08-20 RX ADMIN — MORPHINE SULFATE 1 MG: 2 INJECTION, SOLUTION INTRAMUSCULAR; INTRAVENOUS at 04:34

## 2018-08-20 RX ADMIN — RIFAXIMIN 1 MG: 550 TABLET ORAL at 08:09

## 2018-08-20 RX ADMIN — RIFAXIMIN 1 MG: 550 TABLET ORAL at 20:57

## 2018-08-20 RX ADMIN — MORPHINE SULFATE 1 MG: 2 INJECTION, SOLUTION INTRAMUSCULAR; INTRAVENOUS at 00:21

## 2018-08-20 RX ADMIN — LACTULOSE 1 GM: 20 SOLUTION ORAL at 06:03

## 2018-08-20 RX ADMIN — MORPHINE SULFATE 1 MG: 2 INJECTION, SOLUTION INTRAMUSCULAR; INTRAVENOUS at 11:45

## 2018-08-20 RX ADMIN — LACTULOSE 1 GM: 20 SOLUTION ORAL at 12:37

## 2018-08-20 RX ADMIN — INSULIN ASPART 1 UNIT: 100 INJECTION, SOLUTION INTRAVENOUS; SUBCUTANEOUS at 17:11

## 2018-08-20 RX ADMIN — INSULIN ASPART 1 UNIT: 100 INJECTION, SOLUTION INTRAVENOUS; SUBCUTANEOUS at 21:39

## 2018-08-20 RX ADMIN — INSULIN ASPART 1 UNIT: 100 INJECTION, SOLUTION INTRAVENOUS; SUBCUTANEOUS at 11:52

## 2018-08-20 RX ADMIN — INSULIN DETEMIR 1 UNITS: 100 INJECTION, SOLUTION SUBCUTANEOUS at 21:37

## 2018-08-20 RX ADMIN — CLOPIDOGREL 1 MG: 75 TABLET, FILM COATED ORAL at 08:08

## 2018-08-20 RX ADMIN — SPIRONOLACTONE 1 MG: 50 TABLET, FILM COATED ORAL at 08:08

## 2018-08-20 RX ADMIN — PROPRANOLOL HYDROCHLORIDE 1 MG: 10 TABLET ORAL at 20:01

## 2018-08-20 RX ADMIN — LACTULOSE 1 GM: 20 SOLUTION ORAL at 00:20

## 2018-08-20 RX ADMIN — PANTOPRAZOLE SODIUM 1 MG: 40 TABLET, DELAYED RELEASE ORAL at 06:03

## 2018-08-20 RX ADMIN — PANTOPRAZOLE SODIUM 1 MG: 40 TABLET, DELAYED RELEASE ORAL at 17:08

## 2018-08-20 RX ADMIN — MIDODRINE HYDROCHLORIDE 1 MG: 5 TABLET ORAL at 12:29

## 2018-08-20 RX ADMIN — PROPRANOLOL HYDROCHLORIDE 1 MG: 10 TABLET ORAL at 08:09

## 2018-08-20 RX ADMIN — INSULIN ASPART 1 UNIT: 100 INJECTION, SOLUTION INTRAVENOUS; SUBCUTANEOUS at 08:14

## 2018-08-20 RX ADMIN — MORPHINE SULFATE 1 MG: 10 SOLUTION ORAL at 17:08

## 2018-08-20 RX ADMIN — HYDROMORPHONE HYDROCHLORIDE 1 MG: 1 INJECTION, SOLUTION INTRAMUSCULAR; INTRAVENOUS; SUBCUTANEOUS at 08:48

## 2018-08-21 LAB
ABNORMAL IP MESSAGE: 1
ADD MAN DIFF?: NO
ALANINE AMINOTRANSFERASE: 83 IU/L (ref 13–69)
ALBUMIN/GLOBULIN RATIO: 0.66
ALBUMIN: 2.2 G/DL (ref 3.3–4.9)
ALKALINE PHOSPHATASE: 203 IU/L (ref 42–121)
AMMONIA: 92 UMOL/L (ref 9–30)
AMYLASE: < 30 U/L (ref 11–123)
ANION GAP: 13 (ref 8–16)
ASPARTATE AMINO TRANSFERASE: 93 IU/L (ref 15–46)
BASOPHIL #: 0.1 10^3/UL (ref 0–0.1)
BASOPHILS %: 0.9 % (ref 0–2)
BILIRUBIN,DIRECT: 0 MG/DL (ref 0–0.2)
BILIRUBIN,TOTAL: 1 MG/DL (ref 0.2–1.3)
BLOOD UREA NITROGEN: 10 MG/DL (ref 7–20)
CALCIUM: 8.6 MG/DL (ref 8.4–10.2)
CARBON DIOXIDE: 19 MMOL/L (ref 21–31)
CHLORIDE: 101 MMOL/L (ref 97–110)
CHOL/HDL RATIO: 2 RATIO
CHOLESTEROL: 55 MG/DL (ref 100–200)
CREATININE: 0.6 MG/DL (ref 0.61–1.24)
EOSINOPHILS #: 0 10^3/UL (ref 0–0.5)
EOSINOPHILS %: 0.3 % (ref 0–7)
FREE T4 (FREE THYROXINE): 1.32 NG/DL (ref 0.64–1.79)
GLOBULIN: 3.3 G/DL (ref 1.3–3.2)
GLUCOSE: 127 MG/DL (ref 70–220)
HDL CHOLESTEROL: 27 MG/DL (ref 28–71)
HEMATOCRIT: 29.7 % (ref 42–52)
HEMOGLOBIN A1C: 6.8 % (ref 0–5.9)
HEMOGLOBIN: 10.3 G/DL (ref 14–18)
IMMATURE GRANS #M: 0.08 10^3/UL (ref 0–0.03)
IMMATURE GRANS % (M): 1.2 % (ref 0–0.43)
INR: 1.42
LDL CHOLESTEROL,CALCULATED: 11 MG/DL
LIPASE: 26 U/L (ref 23–300)
LYMPHOCYTES #: 0.4 10^3/UL (ref 0.8–2.9)
LYMPHOCYTES %: 6.2 % (ref 15–51)
MAGNESIUM: 1.9 MG/DL (ref 1.7–2.5)
MEAN CORPUSCULAR HEMOGLOBIN: 31.6 PG (ref 29–33)
MEAN CORPUSCULAR HGB CONC: 34.7 G/DL (ref 32–37)
MEAN CORPUSCULAR VOLUME: 91.1 FL (ref 82–101)
MEAN PLATELET VOLUME: 10.9 FL (ref 7.4–10.4)
MONOCYTE #: 0.7 10^3/UL (ref 0.3–0.9)
MONOCYTES %: 10.3 % (ref 0–11)
NEUTROPHIL #: 5.4 10^3/UL (ref 1.6–7.5)
NEUTROPHILS %: 81.1 % (ref 39–77)
NUCLEATED RED BLOOD CELLS #: 0 10^3/UL (ref 0–0)
NUCLEATED RED BLOOD CELLS%: 0 /100WBC (ref 0–0)
OSMOLALITY: 267 MOSM/KG (ref 280–295)
PARTIAL THROMBOPLASTIN TIME: 37.1 SEC (ref 25–35)
PHOSPHORUS: 2 MG/DL (ref 2.5–4.9)
PLATELET COUNT: 98 10^3/UL (ref 140–415)
POSITIVE DIFF: (no result)
POTASSIUM: 5.6 MMOL/L (ref 3.5–5.1)
PROTIME: 17.6 SEC (ref 11.9–14.9)
PT RATIO: 1.4
RED BLOOD COUNT: 3.26 10^6/UL (ref 4.7–6.1)
RED CELL DISTRIBUTION WIDTH: 16.5 % (ref 11.5–14.5)
SODIUM: 127 MMOL/L (ref 135–144)
THYROID STIMULATING HORMONE: 2.83 MIU/L (ref 0.47–4.68)
TOTAL PROTEIN: 5.5 G/DL (ref 6.1–8.1)
TRIGLYCERIDES: 85 MG/DL (ref 0–149)
WHITE BLOOD COUNT: 6.6 10^3/UL (ref 4.8–10.8)

## 2018-08-21 RX ADMIN — MORPHINE SULFATE 1 MG: 10 SOLUTION ORAL at 02:42

## 2018-08-21 RX ADMIN — LACTULOSE 1 GM: 20 SOLUTION ORAL at 12:46

## 2018-08-21 RX ADMIN — MIDODRINE HYDROCHLORIDE 1 MG: 5 TABLET ORAL at 12:46

## 2018-08-21 RX ADMIN — MORPHINE SULFATE 1 MG: 10 SOLUTION ORAL at 16:01

## 2018-08-21 RX ADMIN — PANTOPRAZOLE SODIUM 1 MG: 40 TABLET, DELAYED RELEASE ORAL at 06:18

## 2018-08-21 RX ADMIN — INSULIN ASPART 1 UNIT: 100 INJECTION, SOLUTION INTRAVENOUS; SUBCUTANEOUS at 08:00

## 2018-08-21 RX ADMIN — INSULIN ASPART 1 UNIT: 100 INJECTION, SOLUTION INTRAVENOUS; SUBCUTANEOUS at 20:52

## 2018-08-21 RX ADMIN — HYDROMORPHONE HYDROCHLORIDE 1 MG: 1 INJECTION, SOLUTION INTRAMUSCULAR; INTRAVENOUS; SUBCUTANEOUS at 12:51

## 2018-08-21 RX ADMIN — SODIUM CHLORIDE 1 MLS/HR: 900 INJECTION, SOLUTION INTRAVENOUS at 17:01

## 2018-08-21 RX ADMIN — MORPHINE SULFATE 1 MG: 10 SOLUTION ORAL at 09:51

## 2018-08-21 RX ADMIN — PANTOPRAZOLE SODIUM 1 MG: 40 TABLET, DELAYED RELEASE ORAL at 17:13

## 2018-08-21 RX ADMIN — RIFAXIMIN 1 MG: 550 TABLET ORAL at 09:54

## 2018-08-21 RX ADMIN — SODIUM POLYSTYRENE SULFONATE 1 GM: 15 SUSPENSION ORAL; RECTAL at 15:53

## 2018-08-21 RX ADMIN — INSULIN ASPART 1 UNIT: 100 INJECTION, SOLUTION INTRAVENOUS; SUBCUTANEOUS at 12:13

## 2018-08-21 RX ADMIN — LACTULOSE 1 GM: 20 SOLUTION ORAL at 17:14

## 2018-08-21 RX ADMIN — LACTULOSE 1 GM: 20 SOLUTION ORAL at 06:18

## 2018-08-21 RX ADMIN — INSULIN ASPART 1 UNIT: 100 INJECTION, SOLUTION INTRAVENOUS; SUBCUTANEOUS at 17:34

## 2018-08-21 RX ADMIN — RIFAXIMIN 1 MG: 550 TABLET ORAL at 20:25

## 2018-08-21 RX ADMIN — MIDODRINE HYDROCHLORIDE 1 MG: 5 TABLET ORAL at 09:59

## 2018-08-21 RX ADMIN — SPIRONOLACTONE 1 MG: 50 TABLET, FILM COATED ORAL at 09:00

## 2018-08-21 RX ADMIN — PROPRANOLOL HYDROCHLORIDE 1 MG: 10 TABLET ORAL at 09:00

## 2018-08-21 RX ADMIN — PROPRANOLOL HYDROCHLORIDE 1 MG: 10 TABLET ORAL at 20:25

## 2018-08-21 RX ADMIN — INSULIN DETEMIR 1 UNITS: 100 INJECTION, SOLUTION SUBCUTANEOUS at 20:52

## 2018-08-21 RX ADMIN — ONDANSETRON HYDROCHLORIDE 1 MG: 2 INJECTION, SOLUTION INTRAMUSCULAR; INTRAVENOUS at 16:01

## 2018-08-21 RX ADMIN — LACTULOSE 1 GM: 20 SOLUTION ORAL at 00:29

## 2018-08-21 RX ADMIN — MIDODRINE HYDROCHLORIDE 1 MG: 5 TABLET ORAL at 17:03

## 2018-08-21 RX ADMIN — EZETIMIBE 1 MG: 10 TABLET ORAL at 09:54

## 2018-08-21 RX ADMIN — CLOPIDOGREL 1 MG: 75 TABLET, FILM COATED ORAL at 09:54

## 2018-08-22 LAB
ADD MAN DIFF?: NO
ADD UMIC: YES
ALANINE AMINOTRANSFERASE: 78 IU/L (ref 13–69)
ALBUMIN/GLOBULIN RATIO: 0.71
ALBUMIN: 2.5 G/DL (ref 3.3–4.9)
ALKALINE PHOSPHATASE: 214 IU/L (ref 42–121)
AMMONIA: 255 UMOL/L (ref 9–30)
ANION GAP: 12 (ref 8–16)
ANION GAP: 15 (ref 8–16)
ASPARTATE AMINO TRANSFERASE: 95 IU/L (ref 15–46)
BASOPHIL #: 0.1 10^3/UL (ref 0–0.1)
BASOPHILS %: 0.5 % (ref 0–2)
BILIRUBIN,DIRECT: 0 MG/DL (ref 0–0.2)
BILIRUBIN,TOTAL: 0.9 MG/DL (ref 0.2–1.3)
BLOOD UREA NITROGEN: 14 MG/DL (ref 7–20)
BLOOD UREA NITROGEN: 14 MG/DL (ref 7–20)
CALCIUM: 8.5 MG/DL (ref 8.4–10.2)
CALCIUM: 8.8 MG/DL (ref 8.4–10.2)
CARBON DIOXIDE: 16 MMOL/L (ref 21–31)
CARBON DIOXIDE: 17 MMOL/L (ref 21–31)
CHLORIDE: 105 MMOL/L (ref 97–110)
CHLORIDE: 107 MMOL/L (ref 97–110)
CREATININE,URINE RANDOM: 13.65 MG/DL (ref 20–370)
CREATININE: 0.6 MG/DL (ref 0.61–1.24)
CREATININE: 0.72 MG/DL (ref 0.61–1.24)
EOSINOPHILS #: 0.1 10^3/UL (ref 0–0.5)
EOSINOPHILS %: 0.9 % (ref 0–7)
GLOBULIN: 3.5 G/DL (ref 1.3–3.2)
GLUCOSE: 104 MG/DL (ref 70–220)
GLUCOSE: 220 MG/DL (ref 70–220)
HEMATOCRIT: 33.8 % (ref 42–52)
HEMOGLOBIN: 12.2 G/DL (ref 14–18)
IMMATURE GRANS #M: 0.1 10^3/UL (ref 0–0.03)
IMMATURE GRANS % (M): 1 % (ref 0–0.43)
LYMPHOCYTES #: 1.1 10^3/UL (ref 0.8–2.9)
LYMPHOCYTES %: 10.9 % (ref 15–51)
MAGNESIUM: 1.9 MG/DL (ref 1.7–2.5)
MEAN CORPUSCULAR HEMOGLOBIN: 31.9 PG (ref 29–33)
MEAN CORPUSCULAR HGB CONC: 36.1 G/DL (ref 32–37)
MEAN CORPUSCULAR VOLUME: 88.5 FL (ref 82–101)
MEAN PLATELET VOLUME: 10.6 FL (ref 7.4–10.4)
MONOCYTE #: 1 10^3/UL (ref 0.3–0.9)
MONOCYTES %: 10.5 % (ref 0–11)
NEUTROPHIL #: 7.6 10^3/UL (ref 1.6–7.5)
NEUTROPHILS %: 76.2 % (ref 39–77)
NUCLEATED RED BLOOD CELLS #: 0 10^3/UL (ref 0–0)
NUCLEATED RED BLOOD CELLS%: 0 /100WBC (ref 0–0)
OSMOLALITY,URINE: 253 MOSM/KG (ref 250–1200)
PHOSPHORUS: 3.2 MG/DL (ref 2.5–4.9)
PLATELET COUNT: 149 10^3/UL (ref 140–415)
POTASSIUM: 5 MMOL/L (ref 3.5–5.1)
POTASSIUM: 5.6 MMOL/L (ref 3.5–5.1)
PROTEIN URINE: 14 MG/DL (ref 0–11.9)
RED BLOOD COUNT: 3.82 10^6/UL (ref 4.7–6.1)
RED CELL DISTRIBUTION WIDTH: 16.5 % (ref 11.5–14.5)
SODIUM,URINE RANDOM: 95 MMOL/L (ref 30–90)
SODIUM: 129 MMOL/L (ref 135–144)
SODIUM: 132 MMOL/L (ref 135–144)
THYROID STIMULATING HORMONE: 3.51 MIU/L (ref 0.47–4.68)
TOTAL PROTEIN: 6 G/DL (ref 6.1–8.1)
UR ASCORBIC ACID: NEGATIVE MG/DL
UR BACTERIA: (no result) /HPF
UR BILIRUBIN (DIP): NEGATIVE MG/DL
UR BLOOD (DIP): (no result) MG/DL
UR CLARITY: CLEAR
UR COLOR: YELLOW
UR GLUCOSE (DIP): (no result) MG/DL
UR KETONES (DIP): NEGATIVE MG/DL
UR LEUKOCYTE ESTERASE (DIP): (no result) LEU/UL
UR NITRITE (DIP): NEGATIVE MG/DL
UR PH (DIP): 8 (ref 5–9)
UR RBC: 0 /HPF (ref 0–5)
UR SPECIFIC GRAVITY (DIP): 1 (ref 1–1.03)
UR TOTAL PROTEIN (DIP): NEGATIVE MG/DL
UR UROBILINOGEN (DIP): NEGATIVE MG/DL
UR WBC: 6 /HPF (ref 0–5)
WHITE BLOOD COUNT: 9.9 10^3/UL (ref 4.8–10.8)

## 2018-08-22 RX ADMIN — INSULIN ASPART 1 UNIT: 100 INJECTION, SOLUTION INTRAVENOUS; SUBCUTANEOUS at 20:44

## 2018-08-22 RX ADMIN — LACTULOSE 1 ML: 10 SOLUTION ORAL; RECTAL at 18:25

## 2018-08-22 RX ADMIN — PROPRANOLOL HYDROCHLORIDE 1 MG: 10 TABLET ORAL at 20:45

## 2018-08-22 RX ADMIN — RIFAXIMIN 1 MG: 550 TABLET ORAL at 08:57

## 2018-08-22 RX ADMIN — LACTULOSE 1 ML: 10 SOLUTION ORAL; RECTAL at 23:51

## 2018-08-22 RX ADMIN — PANTOPRAZOLE SODIUM 1 MG: 40 TABLET, DELAYED RELEASE ORAL at 18:00

## 2018-08-22 RX ADMIN — PANTOPRAZOLE SODIUM 1 MG: 40 TABLET, DELAYED RELEASE ORAL at 05:22

## 2018-08-22 RX ADMIN — ALBUMIN (HUMAN) 1 MLS/HR: 0.25 INJECTION, SOLUTION INTRAVENOUS at 13:21

## 2018-08-22 RX ADMIN — INSULIN DETEMIR 1 UNITS: 100 INJECTION, SOLUTION SUBCUTANEOUS at 20:00

## 2018-08-22 RX ADMIN — MORPHINE SULFATE 1 MG: 2 INJECTION, SOLUTION INTRAMUSCULAR; INTRAVENOUS at 02:15

## 2018-08-22 RX ADMIN — ONDANSETRON HYDROCHLORIDE 1 MG: 2 INJECTION, SOLUTION INTRAMUSCULAR; INTRAVENOUS at 04:21

## 2018-08-22 RX ADMIN — INSULIN HUMAN 1 UNIT: 100 INJECTION, SOLUTION PARENTERAL at 14:05

## 2018-08-22 RX ADMIN — RIFAXIMIN 1 MG: 550 TABLET ORAL at 20:45

## 2018-08-22 RX ADMIN — ALBUMIN (HUMAN) 1 MLS/HR: 0.25 INJECTION, SOLUTION INTRAVENOUS at 19:24

## 2018-08-22 RX ADMIN — MIDODRINE HYDROCHLORIDE 1 MG: 5 TABLET ORAL at 13:00

## 2018-08-22 RX ADMIN — PROPRANOLOL HYDROCHLORIDE 1 MG: 10 TABLET ORAL at 21:00

## 2018-08-22 RX ADMIN — CLOPIDOGREL 1 MG: 75 TABLET, FILM COATED ORAL at 08:57

## 2018-08-22 RX ADMIN — INSULIN ASPART 1 UNIT: 100 INJECTION, SOLUTION INTRAVENOUS; SUBCUTANEOUS at 18:00

## 2018-08-22 RX ADMIN — PROPRANOLOL HYDROCHLORIDE 1 MG: 10 TABLET ORAL at 08:58

## 2018-08-22 RX ADMIN — DEXTROSE MONOHYDRATE 1 ML: 500 INJECTION PARENTERAL at 13:34

## 2018-08-22 RX ADMIN — RIFAXIMIN 1 MG: 550 TABLET ORAL at 21:00

## 2018-08-22 RX ADMIN — LACTULOSE 1 GM: 20 SOLUTION ORAL at 00:01

## 2018-08-22 RX ADMIN — MIDODRINE HYDROCHLORIDE 1 MG: 5 TABLET ORAL at 08:58

## 2018-08-22 RX ADMIN — LACTULOSE 1 GM: 20 SOLUTION ORAL at 05:22

## 2018-08-22 RX ADMIN — PANTOPRAZOLE SODIUM 1 MG: 40 TABLET, DELAYED RELEASE ORAL at 18:25

## 2018-08-22 RX ADMIN — INSULIN ASPART 1 UNIT: 100 INJECTION, SOLUTION INTRAVENOUS; SUBCUTANEOUS at 08:00

## 2018-08-22 RX ADMIN — INSULIN ASPART 1 UNIT: 100 INJECTION, SOLUTION INTRAVENOUS; SUBCUTANEOUS at 12:00

## 2018-08-22 RX ADMIN — LACTULOSE 1 ML: 10 SOLUTION ORAL; RECTAL at 12:48

## 2018-08-22 RX ADMIN — EZETIMIBE 1 MG: 10 TABLET ORAL at 08:57

## 2018-08-23 LAB
ADD MAN DIFF?: NO
ALANINE AMINOTRANSFERASE: 73 IU/L (ref 13–69)
ALBUMIN/GLOBULIN RATIO: 1.42
ALBUMIN: 4 G/DL (ref 3.3–4.9)
ALKALINE PHOSPHATASE: 175 IU/L (ref 42–121)
AMMONIA: 79 UMOL/L (ref 9–30)
ANION GAP: 19 (ref 8–16)
ARTERIAL BASE EXCESS: -8.3 MMOL/L (ref -3–3)
ARTERIAL BLOOD GAS OXYGEN SAT: 98.3 MMHG (ref 95–98)
ARTERIAL COHB: 0.2 % (ref 0–3)
ARTERIAL FRACTION OF OXYHGB: 97.8 % (ref 93–99)
ARTERIAL HCO3: 11.6 MMOL/L (ref 22–26)
ARTERIAL METHB: 0.3 % (ref 0–1.5)
ARTERIAL PCO2: 14.1 MMHG (ref 35–45)
ARTERIAL TOTAL HEMGLOBIN: 11.9 G/DL (ref 12–18)
ASPARTATE AMINO TRANSFERASE: 108 IU/L (ref 15–46)
BASOPHIL #: 0 10^3/UL (ref 0–0.1)
BASOPHILS %: 0.5 % (ref 0–2)
BILIRUBIN,DIRECT: 0 MG/DL (ref 0–0.2)
BILIRUBIN,TOTAL: 1.9 MG/DL (ref 0.2–1.3)
BLOOD UREA NITROGEN: 14 MG/DL (ref 7–20)
CALCIUM: 10.3 MG/DL (ref 8.4–10.2)
CARBON DIOXIDE: 13 MMOL/L (ref 21–31)
CHLORIDE: 111 MMOL/L (ref 97–110)
CREATININE: 0.59 MG/DL (ref 0.61–1.24)
EOSINOPHILS #: 0.1 10^3/UL (ref 0–0.5)
EOSINOPHILS %: 1.1 % (ref 0–7)
FIO2: 21 %
GLOBULIN: 2.8 G/DL (ref 1.3–3.2)
GLUCOSE: 124 MG/DL (ref 70–220)
HEMATOCRIT: 32.1 % (ref 42–52)
HEMOGLOBIN: 11.1 G/DL (ref 14–18)
IMMATURE GRANS #M: 0.05 10^3/UL (ref 0–0.03)
IMMATURE GRANS % (M): 0.6 % (ref 0–0.43)
INR: 1.42
LYMPHOCYTES #: 0.8 10^3/UL (ref 0.8–2.9)
LYMPHOCYTES %: 10.3 % (ref 15–51)
MAGNESIUM: 2 MG/DL (ref 1.7–2.5)
MEAN CORPUSCULAR HEMOGLOBIN: 31.2 PG (ref 29–33)
MEAN CORPUSCULAR HGB CONC: 34.6 G/DL (ref 32–37)
MEAN CORPUSCULAR VOLUME: 90.2 FL (ref 82–101)
MEAN PLATELET VOLUME: 10.5 FL (ref 7.4–10.4)
MODE: (no result)
MONOCYTE #: 0.9 10^3/UL (ref 0.3–0.9)
MONOCYTES %: 11.6 % (ref 0–11)
NEUTROPHIL #: 6.1 10^3/UL (ref 1.6–7.5)
NEUTROPHILS %: 75.9 % (ref 39–77)
NUCLEATED RED BLOOD CELLS #: 0 10^3/UL (ref 0–0)
NUCLEATED RED BLOOD CELLS%: 0 /100WBC (ref 0–0)
O2 A-A PPRESDIFF RESPIRATORY: 0.8 MMHG (ref 7–24)
PHOSPHORUS: 3.7 MG/DL (ref 2.5–4.9)
PLATELET COUNT: 109 10^3/UL (ref 140–415)
POTASSIUM: 5.2 MMOL/L (ref 3.5–5.1)
PROTIME: 17.6 SEC (ref 11.9–14.9)
PT RATIO: 1.4
RED BLOOD COUNT: 3.56 10^6/UL (ref 4.7–6.1)
RED CELL DISTRIBUTION WIDTH: 17.1 % (ref 11.5–14.5)
SODIUM: 138 MMOL/L (ref 135–144)
TOTAL PROTEIN: 6.8 G/DL (ref 6.1–8.1)
WHITE BLOOD COUNT: 8.1 10^3/UL (ref 4.8–10.8)

## 2018-08-23 RX ADMIN — INSULIN ASPART 1 UNIT: 100 INJECTION, SOLUTION INTRAVENOUS; SUBCUTANEOUS at 12:00

## 2018-08-23 RX ADMIN — LACTULOSE 1 ML: 10 SOLUTION ORAL; RECTAL at 05:11

## 2018-08-23 RX ADMIN — MORPHINE SULFATE 1 MG: 10 SOLUTION ORAL at 17:31

## 2018-08-23 RX ADMIN — ALBUMIN (HUMAN) 1 MLS/HR: 0.25 INJECTION, SOLUTION INTRAVENOUS at 02:16

## 2018-08-23 RX ADMIN — MORPHINE SULFATE 1 MG: 10 SOLUTION ORAL at 21:01

## 2018-08-23 RX ADMIN — MORPHINE SULFATE 1 MG: 10 SOLUTION ORAL at 23:13

## 2018-08-23 RX ADMIN — PROPRANOLOL HYDROCHLORIDE 1 MG: 10 TABLET ORAL at 09:10

## 2018-08-23 RX ADMIN — LACTULOSE 1 ML: 10 SOLUTION ORAL; RECTAL at 17:28

## 2018-08-23 RX ADMIN — PANTOPRAZOLE SODIUM 1 MG: 40 TABLET, DELAYED RELEASE ORAL at 17:28

## 2018-08-23 RX ADMIN — ALBUMIN (HUMAN) 1 MLS/HR: 0.25 INJECTION, SOLUTION INTRAVENOUS at 17:27

## 2018-08-23 RX ADMIN — INSULIN ASPART 1 UNIT: 100 INJECTION, SOLUTION INTRAVENOUS; SUBCUTANEOUS at 21:12

## 2018-08-23 RX ADMIN — LACTULOSE 1 ML: 10 SOLUTION ORAL; RECTAL at 15:59

## 2018-08-23 RX ADMIN — RIFAXIMIN 1 MG: 550 TABLET ORAL at 20:53

## 2018-08-23 RX ADMIN — EZETIMIBE 1 MG: 10 TABLET ORAL at 09:08

## 2018-08-23 RX ADMIN — RIFAXIMIN 1 MG: 550 TABLET ORAL at 09:09

## 2018-08-23 RX ADMIN — CLOPIDOGREL 1 MG: 75 TABLET, FILM COATED ORAL at 09:10

## 2018-08-23 RX ADMIN — INSULIN ASPART 1 UNIT: 100 INJECTION, SOLUTION INTRAVENOUS; SUBCUTANEOUS at 08:00

## 2018-08-23 RX ADMIN — PROPRANOLOL HYDROCHLORIDE 1 MG: 10 TABLET ORAL at 20:54

## 2018-08-23 RX ADMIN — PANTOPRAZOLE SODIUM 1 MG: 40 TABLET, DELAYED RELEASE ORAL at 05:11

## 2018-08-23 RX ADMIN — ALBUMIN (HUMAN) 1 MLS/HR: 0.25 INJECTION, SOLUTION INTRAVENOUS at 10:08

## 2018-08-23 RX ADMIN — INSULIN DETEMIR 1 UNITS: 100 INJECTION, SOLUTION SUBCUTANEOUS at 21:10

## 2018-08-23 RX ADMIN — INSULIN ASPART 1 UNIT: 100 INJECTION, SOLUTION INTRAVENOUS; SUBCUTANEOUS at 17:21

## 2018-08-24 LAB
ADD MAN DIFF?: NO
AMMONIA: 101 UMOL/L (ref 9–30)
ANION GAP: 17 (ref 8–16)
BASOPHIL #: 0.1 10^3/UL (ref 0–0.1)
BASOPHILS %: 0.9 % (ref 0–2)
BLOOD UREA NITROGEN: 12 MG/DL (ref 7–20)
CALCIUM: 10.7 MG/DL (ref 8.4–10.2)
CARBON DIOXIDE: 12 MMOL/L (ref 21–31)
CHLORIDE: 111 MMOL/L (ref 97–110)
CREATININE: 0.53 MG/DL (ref 0.61–1.24)
EOSINOPHILS #: 0.1 10^3/UL (ref 0–0.5)
EOSINOPHILS %: 1.6 % (ref 0–7)
GLUCOSE: 116 MG/DL (ref 70–220)
HEMATOCRIT: 29.1 % (ref 42–52)
HEMOGLOBIN: 10.2 G/DL (ref 14–18)
IMMATURE GRANS #M: 0.06 10^3/UL (ref 0–0.03)
IMMATURE GRANS % (M): 0.9 % (ref 0–0.43)
LYMPHOCYTES #: 0.9 10^3/UL (ref 0.8–2.9)
LYMPHOCYTES %: 13.4 % (ref 15–51)
MAGNESIUM: 1.9 MG/DL (ref 1.7–2.5)
MEAN CORPUSCULAR HEMOGLOBIN: 31.5 PG (ref 29–33)
MEAN CORPUSCULAR HGB CONC: 35.1 G/DL (ref 32–37)
MEAN CORPUSCULAR VOLUME: 89.8 FL (ref 82–101)
MEAN PLATELET VOLUME: 10.6 FL (ref 7.4–10.4)
MICROALBUMIN/CREATININE RATIO: 19 (ref ?–30)
MICROALBUMIN: 0.3 MG/DL
MONOCYTE #: 1 10^3/UL (ref 0.3–0.9)
MONOCYTES %: 14.4 % (ref 0–11)
NEUTROPHIL #: 4.7 10^3/UL (ref 1.6–7.5)
NEUTROPHILS %: 68.8 % (ref 39–77)
NUCLEATED RED BLOOD CELLS #: 0 10^3/UL (ref 0–0)
NUCLEATED RED BLOOD CELLS%: 0 /100WBC (ref 0–0)
PHOSPHORUS: 2.8 MG/DL (ref 2.5–4.9)
PLATELET COUNT: 128 10^3/UL (ref 140–415)
POTASSIUM: 4.7 MMOL/L (ref 3.5–5.1)
RED BLOOD COUNT: 3.24 10^6/UL (ref 4.7–6.1)
RED CELL DISTRIBUTION WIDTH: 17.2 % (ref 11.5–14.5)
SODIUM: 135 MMOL/L (ref 135–144)
WHITE BLOOD COUNT: 6.9 10^3/UL (ref 4.8–10.8)

## 2018-08-24 RX ADMIN — RIFAXIMIN 1 MG: 550 TABLET ORAL at 08:33

## 2018-08-24 RX ADMIN — ONDANSETRON HYDROCHLORIDE 1 MG: 2 INJECTION, SOLUTION INTRAMUSCULAR; INTRAVENOUS at 08:33

## 2018-08-24 RX ADMIN — LACTULOSE 1 GM: 20 SOLUTION ORAL at 13:56

## 2018-08-24 RX ADMIN — INSULIN ASPART 1 UNIT: 100 INJECTION, SOLUTION INTRAVENOUS; SUBCUTANEOUS at 07:57

## 2018-08-24 RX ADMIN — INSULIN ASPART 1 UNIT: 100 INJECTION, SOLUTION INTRAVENOUS; SUBCUTANEOUS at 12:37

## 2018-08-24 RX ADMIN — MORPHINE SULFATE 1 MG: 10 SOLUTION ORAL at 02:12

## 2018-08-24 RX ADMIN — PANTOPRAZOLE SODIUM 1 MG: 40 TABLET, DELAYED RELEASE ORAL at 06:23

## 2018-08-24 RX ADMIN — RIFAXIMIN 1 MG: 550 TABLET ORAL at 20:25

## 2018-08-24 RX ADMIN — INSULIN ASPART 1 UNIT: 100 INJECTION, SOLUTION INTRAVENOUS; SUBCUTANEOUS at 20:45

## 2018-08-24 RX ADMIN — INSULIN DETEMIR 1 UNITS: 100 INJECTION, SOLUTION SUBCUTANEOUS at 20:45

## 2018-08-24 RX ADMIN — EZETIMIBE 1 MG: 10 TABLET ORAL at 08:29

## 2018-08-24 RX ADMIN — INSULIN ASPART 1 UNIT: 100 INJECTION, SOLUTION INTRAVENOUS; SUBCUTANEOUS at 17:30

## 2018-08-24 RX ADMIN — PANTOPRAZOLE SODIUM 1 MG: 40 TABLET, DELAYED RELEASE ORAL at 17:26

## 2018-08-24 RX ADMIN — LACTULOSE 1 ML: 10 SOLUTION ORAL; RECTAL at 00:58

## 2018-08-24 RX ADMIN — CLOPIDOGREL 1 MG: 75 TABLET, FILM COATED ORAL at 08:29

## 2018-08-24 RX ADMIN — MORPHINE SULFATE 1 MG: 10 SOLUTION ORAL at 20:37

## 2018-08-24 RX ADMIN — MORPHINE SULFATE 1 MG: 10 SOLUTION ORAL at 10:59

## 2018-08-24 RX ADMIN — PROPRANOLOL HYDROCHLORIDE 1 MG: 10 TABLET ORAL at 20:25

## 2018-08-24 RX ADMIN — ALBUMIN (HUMAN) 1 MLS/HR: 0.25 INJECTION, SOLUTION INTRAVENOUS at 01:15

## 2018-08-24 RX ADMIN — PROPRANOLOL HYDROCHLORIDE 1 MG: 10 TABLET ORAL at 08:29

## 2018-08-24 RX ADMIN — LACTULOSE 1 GM: 20 SOLUTION ORAL at 17:26

## 2018-08-24 RX ADMIN — MORPHINE SULFATE 1 MG: 10 SOLUTION ORAL at 06:23

## 2018-08-24 RX ADMIN — LACTULOSE 1 ML: 10 SOLUTION ORAL; RECTAL at 06:23

## 2018-08-25 LAB
ABNORMAL IP MESSAGE: 1
ADD MAN DIFF?: NO
ALANINE AMINOTRANSFERASE: 81 IU/L (ref 13–69)
ALBUMIN/GLOBULIN RATIO: 1.51
ALBUMIN: 4.4 G/DL (ref 3.3–4.9)
ALKALINE PHOSPHATASE: 162 IU/L (ref 42–121)
AMMONIA: 56 UMOL/L (ref 9–30)
ANION GAP: 19 (ref 8–16)
ASPARTATE AMINO TRANSFERASE: 125 IU/L (ref 15–46)
BASOPHIL #: 0.1 10^3/UL (ref 0–0.1)
BASOPHILS %: 0.5 % (ref 0–2)
BILIRUBIN,DIRECT: 0 MG/DL (ref 0–0.2)
BILIRUBIN,TOTAL: 2.4 MG/DL (ref 0.2–1.3)
BLOOD UREA NITROGEN: 20 MG/DL (ref 7–20)
CALCIUM: 10.9 MG/DL (ref 8.4–10.2)
CARBON DIOXIDE: 14 MMOL/L (ref 21–31)
CHLORIDE: 106 MMOL/L (ref 97–110)
CREATININE: 0.75 MG/DL (ref 0.61–1.24)
EOSINOPHILS #: 0 10^3/UL (ref 0–0.5)
EOSINOPHILS %: 0.1 % (ref 0–7)
GLOBULIN: 2.9 G/DL (ref 1.3–3.2)
GLUCOSE: 270 MG/DL (ref 70–220)
HEMATOCRIT: 34 % (ref 42–52)
HEMOGLOBIN: 12 G/DL (ref 14–18)
IMMATURE GRANS #M: 0.12 10^3/UL (ref 0–0.03)
IMMATURE GRANS % (M): 1.3 % (ref 0–0.43)
LYMPHOCYTES #: 0.5 10^3/UL (ref 0.8–2.9)
LYMPHOCYTES %: 5.6 % (ref 15–51)
MAGNESIUM: 2.1 MG/DL (ref 1.7–2.5)
MEAN CORPUSCULAR HEMOGLOBIN: 32.2 PG (ref 29–33)
MEAN CORPUSCULAR HGB CONC: 35.3 G/DL (ref 32–37)
MEAN CORPUSCULAR VOLUME: 91.2 FL (ref 82–101)
MEAN PLATELET VOLUME: 10.4 FL (ref 7.4–10.4)
MONOCYTE #: 1.2 10^3/UL (ref 0.3–0.9)
MONOCYTES %: 13.1 % (ref 0–11)
NEUTROPHIL #: 7.3 10^3/UL (ref 1.6–7.5)
NEUTROPHILS %: 79.4 % (ref 39–77)
NUCLEATED RED BLOOD CELLS #: 0 10^3/UL (ref 0–0)
NUCLEATED RED BLOOD CELLS%: 0 /100WBC (ref 0–0)
PHOSPHORUS: 3.5 MG/DL (ref 2.5–4.9)
PLATELET COUNT: 112 10^3/UL (ref 140–415)
POSITIVE DIFF: (no result)
POTASSIUM: 5.5 MMOL/L (ref 3.5–5.1)
RED BLOOD COUNT: 3.73 10^6/UL (ref 4.7–6.1)
RED CELL DISTRIBUTION WIDTH: 17 % (ref 11.5–14.5)
SODIUM: 133 MMOL/L (ref 135–144)
TOTAL PROTEIN: 7.3 G/DL (ref 6.1–8.1)
WHITE BLOOD COUNT: 9.2 10^3/UL (ref 4.8–10.8)

## 2018-08-25 RX ADMIN — MORPHINE SULFATE 1 MG: 10 SOLUTION ORAL at 18:20

## 2018-08-25 RX ADMIN — RIFAXIMIN 1 MG: 550 TABLET ORAL at 20:08

## 2018-08-25 RX ADMIN — PANTOPRAZOLE SODIUM 1 MG: 40 TABLET, DELAYED RELEASE ORAL at 17:25

## 2018-08-25 RX ADMIN — CLOPIDOGREL 1 MG: 75 TABLET, FILM COATED ORAL at 09:03

## 2018-08-25 RX ADMIN — LACTULOSE 1 GM: 20 SOLUTION ORAL at 00:45

## 2018-08-25 RX ADMIN — PANTOPRAZOLE SODIUM 1 MG: 40 TABLET, DELAYED RELEASE ORAL at 06:17

## 2018-08-25 RX ADMIN — EZETIMIBE 1 MG: 10 TABLET ORAL at 09:04

## 2018-08-25 RX ADMIN — FUROSEMIDE 1 MG: 20 TABLET ORAL at 09:05

## 2018-08-25 RX ADMIN — PROPRANOLOL HYDROCHLORIDE 1 MG: 10 TABLET ORAL at 09:05

## 2018-08-25 RX ADMIN — INSULIN ASPART 1 UNIT: 100 INJECTION, SOLUTION INTRAVENOUS; SUBCUTANEOUS at 17:45

## 2018-08-25 RX ADMIN — PROPRANOLOL HYDROCHLORIDE 1 MG: 10 TABLET ORAL at 21:00

## 2018-08-25 RX ADMIN — INSULIN ASPART 1 UNIT: 100 INJECTION, SOLUTION INTRAVENOUS; SUBCUTANEOUS at 17:41

## 2018-08-25 RX ADMIN — INSULIN ASPART 1 UNIT: 100 INJECTION, SOLUTION INTRAVENOUS; SUBCUTANEOUS at 02:43

## 2018-08-25 RX ADMIN — INSULIN ASPART 1 UNIT: 100 INJECTION, SOLUTION INTRAVENOUS; SUBCUTANEOUS at 20:11

## 2018-08-25 RX ADMIN — MORPHINE SULFATE 1 MG: 10 SOLUTION ORAL at 09:06

## 2018-08-25 RX ADMIN — INSULIN DETEMIR 1 UNITS: 100 INJECTION, SOLUTION SUBCUTANEOUS at 20:07

## 2018-08-25 RX ADMIN — LACTULOSE 1 GM: 20 SOLUTION ORAL at 11:40

## 2018-08-25 RX ADMIN — MORPHINE SULFATE 1 MG: 10 SOLUTION ORAL at 20:35

## 2018-08-25 RX ADMIN — LACTULOSE 1 GM: 20 SOLUTION ORAL at 06:17

## 2018-08-25 RX ADMIN — INSULIN ASPART 1 UNIT: 100 INJECTION, SOLUTION INTRAVENOUS; SUBCUTANEOUS at 08:20

## 2018-08-25 RX ADMIN — INSULIN ASPART 1 UNIT: 100 INJECTION, SOLUTION INTRAVENOUS; SUBCUTANEOUS at 11:46

## 2018-08-25 RX ADMIN — INSULIN ASPART 1 UNIT: 100 INJECTION, SOLUTION INTRAVENOUS; SUBCUTANEOUS at 12:01

## 2018-08-25 RX ADMIN — RIFAXIMIN 1 MG: 550 TABLET ORAL at 09:05

## 2018-08-25 RX ADMIN — LACTULOSE 1 GM: 20 SOLUTION ORAL at 17:23

## 2018-08-26 LAB
ABNORMAL IP MESSAGE: 1
ADD MAN DIFF?: NO
ANION GAP: 18 (ref 8–16)
BASOPHIL #: 0.1 10^3/UL (ref 0–0.1)
BASOPHILS %: 0.7 % (ref 0–2)
BLOOD UREA NITROGEN: 21 MG/DL (ref 7–20)
CALCIUM: 9.9 MG/DL (ref 8.4–10.2)
CARBON DIOXIDE: 16 MMOL/L (ref 21–31)
CHLORIDE: 105 MMOL/L (ref 97–110)
CREATININE: 0.67 MG/DL (ref 0.61–1.24)
EOSINOPHILS #: 0.2 10^3/UL (ref 0–0.5)
EOSINOPHILS %: 1.6 % (ref 0–7)
GLUCOSE: 261 MG/DL (ref 70–220)
HEMATOCRIT: 30.6 % (ref 42–52)
HEMOGLOBIN: 10.7 G/DL (ref 14–18)
IMMATURE GRANS #M: 0.13 10^3/UL (ref 0–0.03)
IMMATURE GRANS % (M): 1.3 % (ref 0–0.43)
LYMPHOCYTES #: 1.2 10^3/UL (ref 0.8–2.9)
LYMPHOCYTES %: 11.8 % (ref 15–51)
MAGNESIUM: 2.1 MG/DL (ref 1.7–2.5)
MEAN CORPUSCULAR HEMOGLOBIN: 31.9 PG (ref 29–33)
MEAN CORPUSCULAR HGB CONC: 35 G/DL (ref 32–37)
MEAN CORPUSCULAR VOLUME: 91.3 FL (ref 82–101)
MEAN PLATELET VOLUME: 10.7 FL (ref 7.4–10.4)
MONOCYTE #: 1.8 10^3/UL (ref 0.3–0.9)
MONOCYTES %: 17.2 % (ref 0–11)
NEUTROPHIL #: 6.9 10^3/UL (ref 1.6–7.5)
NEUTROPHILS %: 67.4 % (ref 39–77)
NUCLEATED RED BLOOD CELLS #: 0 10^3/UL (ref 0–0)
NUCLEATED RED BLOOD CELLS%: 0 /100WBC (ref 0–0)
PHOSPHORUS: 3.3 MG/DL (ref 2.5–4.9)
PLATELET COUNT: 105 10^3/UL (ref 140–415)
POSITIVE DIFF: (no result)
POTASSIUM: 4.8 MMOL/L (ref 3.5–5.1)
RED BLOOD COUNT: 3.35 10^6/UL (ref 4.7–6.1)
RED CELL DISTRIBUTION WIDTH: 16.8 % (ref 11.5–14.5)
SODIUM: 134 MMOL/L (ref 135–144)
WHITE BLOOD COUNT: 10.2 10^3/UL (ref 4.8–10.8)

## 2018-08-26 RX ADMIN — MORPHINE SULFATE 1 MG: 10 SOLUTION ORAL at 21:29

## 2018-08-26 RX ADMIN — PROPRANOLOL HYDROCHLORIDE 1 MG: 10 TABLET ORAL at 20:40

## 2018-08-26 RX ADMIN — INSULIN ASPART 1 UNIT: 100 INJECTION, SOLUTION INTRAVENOUS; SUBCUTANEOUS at 17:20

## 2018-08-26 RX ADMIN — INSULIN ASPART 1 UNIT: 100 INJECTION, SOLUTION INTRAVENOUS; SUBCUTANEOUS at 17:23

## 2018-08-26 RX ADMIN — INSULIN ASPART 1 UNIT: 100 INJECTION, SOLUTION INTRAVENOUS; SUBCUTANEOUS at 08:42

## 2018-08-26 RX ADMIN — PANTOPRAZOLE SODIUM 1 MG: 40 TABLET, DELAYED RELEASE ORAL at 17:14

## 2018-08-26 RX ADMIN — PANTOPRAZOLE SODIUM 1 MG: 40 TABLET, DELAYED RELEASE ORAL at 06:22

## 2018-08-26 RX ADMIN — PROPRANOLOL HYDROCHLORIDE 1 MG: 10 TABLET ORAL at 09:00

## 2018-08-26 RX ADMIN — LACTULOSE 1 GM: 20 SOLUTION ORAL at 06:22

## 2018-08-26 RX ADMIN — EZETIMIBE 1 MG: 10 TABLET ORAL at 08:27

## 2018-08-26 RX ADMIN — INSULIN ASPART 1 UNIT: 100 INJECTION, SOLUTION INTRAVENOUS; SUBCUTANEOUS at 08:00

## 2018-08-26 RX ADMIN — SPIRONOLACTONE 1 MG: 50 TABLET, FILM COATED ORAL at 20:41

## 2018-08-26 RX ADMIN — CLOPIDOGREL 1 MG: 75 TABLET, FILM COATED ORAL at 08:26

## 2018-08-26 RX ADMIN — LACTULOSE 1 GM: 20 SOLUTION ORAL at 17:14

## 2018-08-26 RX ADMIN — MORPHINE SULFATE 1 MG: 10 SOLUTION ORAL at 15:22

## 2018-08-26 RX ADMIN — INSULIN ASPART 1 UNIT: 100 INJECTION, SOLUTION INTRAVENOUS; SUBCUTANEOUS at 03:22

## 2018-08-26 RX ADMIN — INSULIN ASPART 1 UNIT: 100 INJECTION, SOLUTION INTRAVENOUS; SUBCUTANEOUS at 12:52

## 2018-08-26 RX ADMIN — RIFAXIMIN 1 MG: 550 TABLET ORAL at 08:26

## 2018-08-26 RX ADMIN — FUROSEMIDE 1 MG: 20 TABLET ORAL at 08:26

## 2018-08-26 RX ADMIN — RIFAXIMIN 1 MG: 550 TABLET ORAL at 20:39

## 2018-08-26 RX ADMIN — MORPHINE SULFATE 1 MG: 10 SOLUTION ORAL at 01:01

## 2018-08-26 RX ADMIN — LACTULOSE 1 GM: 20 SOLUTION ORAL at 12:41

## 2018-08-26 RX ADMIN — INSULIN DETEMIR 1 UNITS: 100 INJECTION, SOLUTION SUBCUTANEOUS at 21:27

## 2018-08-26 RX ADMIN — INSULIN ASPART 1 UNIT: 100 INJECTION, SOLUTION INTRAVENOUS; SUBCUTANEOUS at 12:58

## 2018-08-26 RX ADMIN — LACTULOSE 1 GM: 20 SOLUTION ORAL at 00:53

## 2018-08-26 RX ADMIN — INSULIN ASPART 1 UNIT: 100 INJECTION, SOLUTION INTRAVENOUS; SUBCUTANEOUS at 21:26

## 2018-08-27 LAB
ABNORMAL IP MESSAGE: 1
ADD MAN DIFF?: NO
ALANINE AMINOTRANSFERASE: 105 IU/L (ref 13–69)
ALBUMIN/GLOBULIN RATIO: 0.96
ALBUMIN: 3.1 G/DL (ref 3.3–4.9)
ALKALINE PHOSPHATASE: 173 IU/L (ref 42–121)
AMMONIA: 67 UMOL/L (ref 9–30)
ANION GAP: 16 (ref 8–16)
ASPARTATE AMINO TRANSFERASE: 151 IU/L (ref 15–46)
BASOPHIL #: 0.1 10^3/UL (ref 0–0.1)
BASOPHILS %: 0.7 % (ref 0–2)
BILIRUBIN,DIRECT: 0 MG/DL (ref 0–0.2)
BILIRUBIN,TOTAL: 1.6 MG/DL (ref 0.2–1.3)
BLOOD UREA NITROGEN: 26 MG/DL (ref 7–20)
CALCIUM: 9.8 MG/DL (ref 8.4–10.2)
CARBON DIOXIDE: 18 MMOL/L (ref 21–31)
CHLORIDE: 106 MMOL/L (ref 97–110)
CREATININE: 0.63 MG/DL (ref 0.61–1.24)
EOSINOPHILS #: 0.2 10^3/UL (ref 0–0.5)
EOSINOPHILS %: 2 % (ref 0–7)
GLOBULIN: 3.2 G/DL (ref 1.3–3.2)
GLUCOSE: 205 MG/DL (ref 70–220)
HEMATOCRIT: 30.4 % (ref 42–52)
HEMOGLOBIN: 10.6 G/DL (ref 14–18)
IMMATURE GRANS #M: 0.13 10^3/UL (ref 0–0.03)
IMMATURE GRANS % (M): 1.1 % (ref 0–0.43)
LYMPHOCYTES #: 1.2 10^3/UL (ref 0.8–2.9)
LYMPHOCYTES %: 10.2 % (ref 15–51)
MAGNESIUM: 2.2 MG/DL (ref 1.7–2.5)
MEAN CORPUSCULAR HEMOGLOBIN: 31.7 PG (ref 29–33)
MEAN CORPUSCULAR HGB CONC: 34.9 G/DL (ref 32–37)
MEAN CORPUSCULAR VOLUME: 91 FL (ref 82–101)
MEAN PLATELET VOLUME: 10.5 FL (ref 7.4–10.4)
MONOCYTE #: 1.7 10^3/UL (ref 0.3–0.9)
MONOCYTES %: 14.9 % (ref 0–11)
NEUTROPHIL #: 8.1 10^3/UL (ref 1.6–7.5)
NEUTROPHILS %: 71.1 % (ref 39–77)
NUCLEATED RED BLOOD CELLS #: 0 10^3/UL (ref 0–0)
NUCLEATED RED BLOOD CELLS%: 0 /100WBC (ref 0–0)
PHOSPHORUS: 3.9 MG/DL (ref 2.5–4.9)
PLATELET COUNT: 126 10^3/UL (ref 140–415)
POSITIVE DIFF: (no result)
POTASSIUM: 5.1 MMOL/L (ref 3.5–5.1)
RED BLOOD COUNT: 3.34 10^6/UL (ref 4.7–6.1)
RED CELL DISTRIBUTION WIDTH: 17 % (ref 11.5–14.5)
SODIUM: 135 MMOL/L (ref 135–144)
TOTAL PROTEIN: 6.3 G/DL (ref 6.1–8.1)
WHITE BLOOD COUNT: 11.4 10^3/UL (ref 4.8–10.8)

## 2018-08-27 RX ADMIN — EZETIMIBE 1 MG: 10 TABLET ORAL at 09:18

## 2018-08-27 RX ADMIN — INSULIN ASPART 1 UNIT: 100 INJECTION, SOLUTION INTRAVENOUS; SUBCUTANEOUS at 08:35

## 2018-08-27 RX ADMIN — LACTULOSE 1 GM: 20 SOLUTION ORAL at 00:51

## 2018-08-27 RX ADMIN — INSULIN ASPART 1 UNIT: 100 INJECTION, SOLUTION INTRAVENOUS; SUBCUTANEOUS at 08:36

## 2018-08-27 RX ADMIN — RIFAXIMIN 1 MG: 550 TABLET ORAL at 09:19

## 2018-08-27 RX ADMIN — PANTOPRAZOLE SODIUM 1 MG: 40 TABLET, DELAYED RELEASE ORAL at 06:37

## 2018-08-27 RX ADMIN — FUROSEMIDE 1 MG: 20 TABLET ORAL at 09:00

## 2018-08-27 RX ADMIN — LACTULOSE 1 GM: 20 SOLUTION ORAL at 06:37

## 2018-08-27 RX ADMIN — INSULIN ASPART 1 UNIT: 100 INJECTION, SOLUTION INTRAVENOUS; SUBCUTANEOUS at 02:00

## 2018-08-27 RX ADMIN — CLOPIDOGREL 1 MG: 75 TABLET, FILM COATED ORAL at 09:18

## 2018-08-27 RX ADMIN — PROPRANOLOL HYDROCHLORIDE 1 MG: 10 TABLET ORAL at 09:00

## 2021-09-14 NOTE — RADRPT
[facility] Trans[facility] Initial Follow Up Call    Outreach made within 2 business days of discharge: Yes    Patient: Matheus Pink II Patient : 1971   MRN: [id number]  Reason for Admission: SIMÓN    Discharge Date: 9/10/21       Spoke with: patient, Gladys Dasilva     Discharge department/facility: [facility]     TCM Interactive Patient Contact:  Was patient able to fill all prescriptions: No.  Patient states he has not gone to the pharmacy. Was patient instructed to bring all medications to the follow-up visit: Yes  Is patient taking all medications as directed in the discharge summary? No  Does patient understand their discharge instructions: Yes  Does patient have questions or concerns that need addressed prior to 7-14 day follow up office visit: yes. Patient requesting refill of warfarin and syringes.       Scheduled appointment with PCP within 7-14 days    Follow Up  Future Appointments   Date Time Provider Elisabeth Miller   2021 10:15 AM IGOR Roberts CNP [facility]       Tasia Palafox PROCEDURE:  Lexiscan myocardial perfusion study

 

CLINICAL INDICATION:   57 -year-old patient complaining of chest pain. 

 

TECHNIQUE:   Lexiscan 0.4 mg intravenously separate acquisition gated myocardial perfusion SPECT usi
ng Tc 99m Myoview 25.7 mCi intravenously at stress and Tc-99m Myoview, 8.5 mCi intravenously at rest
 was performed using the rest/stress sequence.  Poststress Myoview SPECT images were obtained in the
 supine position. 

 

COMPARISON:   No prior studies. 

 

FINDINGS:

 

Perfusion images reveal mild nonreversible perfusion abnormality in the inferior wall.

 

 

Lexiscan post stress gated SPECT images demonstrate no wall motion abnormalities. 

 

IMPRESSION:

 

1.  No evidence of stress-induced ischemia.

 

2.  No wall motion abnormalities.

 

3.  The left ventricle ejection fraction at stress is greater than 70%.

 

A call report was made to Dr. Arthur at 12:55 p.m. on October 26, 2017.

 

RPTAT: HH

_____________________________________________ 

.Ami Crow MD, MD           Date    Time 

Electronically viewed and signed by .Ami Crow MD, MD on 10/26/2017 12:58 

 

D:  10/26/2017 12:58  T:  10/26/2017 12:58

.L/